# Patient Record
Sex: FEMALE | Race: WHITE | NOT HISPANIC OR LATINO | Employment: FULL TIME | URBAN - METROPOLITAN AREA
[De-identification: names, ages, dates, MRNs, and addresses within clinical notes are randomized per-mention and may not be internally consistent; named-entity substitution may affect disease eponyms.]

---

## 2017-02-07 ENCOUNTER — HOSPITAL ENCOUNTER (INPATIENT)
Facility: HOSPITAL | Age: 57
LOS: 5 days | Discharge: HOME | DRG: 871 | End: 2017-02-12
Attending: EMERGENCY MEDICINE | Admitting: INTERNAL MEDICINE

## 2017-02-07 DIAGNOSIS — J96.01 ACUTE RESPIRATORY FAILURE WITH HYPOXIA: ICD-10-CM

## 2017-02-07 DIAGNOSIS — J44.1 COPD EXACERBATION: Primary | ICD-10-CM

## 2017-02-07 DIAGNOSIS — J44.0 CHRONIC OBSTRUCTIVE PULMONARY DISEASE WITH ACUTE LOWER RESPIRATORY INFECTION: ICD-10-CM

## 2017-02-07 DIAGNOSIS — R73.02 GLUCOSE INTOLERANCE (IMPAIRED GLUCOSE TOLERANCE): ICD-10-CM

## 2017-02-07 DIAGNOSIS — A41.9 SEPSIS, DUE TO UNSPECIFIED ORGANISM: ICD-10-CM

## 2017-02-07 DIAGNOSIS — J20.9 ACUTE BRONCHITIS, UNSPECIFIED ORGANISM: ICD-10-CM

## 2017-02-07 DIAGNOSIS — J44.9 CHRONIC OBSTRUCTIVE PULMONARY DISEASE, UNSPECIFIED COPD TYPE: ICD-10-CM

## 2017-02-07 DIAGNOSIS — R73.9 HYPERGLYCEMIA, UNSPECIFIED: ICD-10-CM

## 2017-02-07 DIAGNOSIS — Z72.0 TOBACCO ABUSE: ICD-10-CM

## 2017-02-07 DIAGNOSIS — R09.02 HYPOXEMIA: ICD-10-CM

## 2017-02-07 DIAGNOSIS — R06.02 SHORTNESS OF BREATH: ICD-10-CM

## 2017-02-07 LAB
ALBUMIN SERPL BCP-MCNC: 3.3 G/DL
ALP SERPL-CCNC: 58 U/L
ALT SERPL W/O P-5'-P-CCNC: 18 U/L
ANION GAP SERPL CALC-SCNC: 7 MMOL/L
AST SERPL-CCNC: 16 U/L
BASOPHILS # BLD AUTO: 0.01 K/UL
BASOPHILS NFR BLD: 0.1 %
BILIRUB SERPL-MCNC: 0.3 MG/DL
BILIRUB UR QL STRIP: NEGATIVE
BNP SERPL-MCNC: 24 PG/ML
BUN SERPL-MCNC: 8 MG/DL
CALCIUM SERPL-MCNC: 8.9 MG/DL
CHLORIDE SERPL-SCNC: 100 MMOL/L
CLARITY UR REFRACT.AUTO: CLEAR
CO2 SERPL-SCNC: 34 MMOL/L
COLOR UR AUTO: YELLOW
CREAT SERPL-MCNC: 0.8 MG/DL
DIFFERENTIAL METHOD: ABNORMAL
EOSINOPHIL # BLD AUTO: 0 K/UL
EOSINOPHIL NFR BLD: 0.3 %
ERYTHROCYTE [DISTWIDTH] IN BLOOD BY AUTOMATED COUNT: 13.5 %
EST. GFR  (AFRICAN AMERICAN): >60 ML/MIN/1.73 M^2
EST. GFR  (NON AFRICAN AMERICAN): >60 ML/MIN/1.73 M^2
GLUCOSE SERPL-MCNC: 225 MG/DL
GLUCOSE UR QL STRIP: NEGATIVE
HCT VFR BLD AUTO: 48.9 %
HGB BLD-MCNC: 15.7 G/DL
HGB UR QL STRIP: NEGATIVE
KETONES UR QL STRIP: NEGATIVE
LACTATE SERPL-SCNC: 0.7 MMOL/L
LEUKOCYTE ESTERASE UR QL STRIP: NEGATIVE
LYMPHOCYTES # BLD AUTO: 0.9 K/UL
LYMPHOCYTES NFR BLD: 5.9 %
MCH RBC QN AUTO: 31.5 PG
MCHC RBC AUTO-ENTMCNC: 32.1 %
MCV RBC AUTO: 98 FL
MONOCYTES # BLD AUTO: 0.7 K/UL
MONOCYTES NFR BLD: 4.5 %
NEUTROPHILS # BLD AUTO: 13.3 K/UL
NEUTROPHILS NFR BLD: 88.7 %
NITRITE UR QL STRIP: NEGATIVE
PH UR STRIP: 6 [PH] (ref 5–8)
PLATELET # BLD AUTO: 194 K/UL
PMV BLD AUTO: 10.9 FL
POCT GLUCOSE: 107 MG/DL (ref 70–110)
POTASSIUM SERPL-SCNC: 3.8 MMOL/L
PROCALCITONIN SERPL IA-MCNC: <0.09 NG/ML
PROT SERPL-MCNC: 6.6 G/DL
PROT UR QL STRIP: ABNORMAL
RBC # BLD AUTO: 4.98 M/UL
SODIUM SERPL-SCNC: 141 MMOL/L
SP GR UR STRIP: 1.01 (ref 1–1.03)
TROPONIN I SERPL DL<=0.01 NG/ML-MCNC: <0.006 NG/ML
URN SPEC COLLECT METH UR: ABNORMAL
UROBILINOGEN UR STRIP-ACNC: NEGATIVE EU/DL
WBC # BLD AUTO: 14.99 K/UL

## 2017-02-07 PROCEDURE — 87040 BLOOD CULTURE FOR BACTERIA: CPT | Mod: 59

## 2017-02-07 PROCEDURE — 96374 THER/PROPH/DIAG INJ IV PUSH: CPT

## 2017-02-07 PROCEDURE — 81003 URINALYSIS AUTO W/O SCOPE: CPT

## 2017-02-07 PROCEDURE — 25000242 PHARM REV CODE 250 ALT 637 W/ HCPCS

## 2017-02-07 PROCEDURE — 11000001 HC ACUTE MED/SURG PRIVATE ROOM

## 2017-02-07 PROCEDURE — 83605 ASSAY OF LACTIC ACID: CPT

## 2017-02-07 PROCEDURE — 99291 CRITICAL CARE FIRST HOUR: CPT | Mod: ,,, | Performed by: EMERGENCY MEDICINE

## 2017-02-07 PROCEDURE — 96361 HYDRATE IV INFUSION ADD-ON: CPT

## 2017-02-07 PROCEDURE — 87633 RESP VIRUS 12-25 TARGETS: CPT

## 2017-02-07 PROCEDURE — 84145 PROCALCITONIN (PCT): CPT

## 2017-02-07 PROCEDURE — 84484 ASSAY OF TROPONIN QUANT: CPT

## 2017-02-07 PROCEDURE — 93005 ELECTROCARDIOGRAM TRACING: CPT

## 2017-02-07 PROCEDURE — 25000242 PHARM REV CODE 250 ALT 637 W/ HCPCS: Performed by: INTERNAL MEDICINE

## 2017-02-07 PROCEDURE — 94640 AIRWAY INHALATION TREATMENT: CPT

## 2017-02-07 PROCEDURE — 25000003 PHARM REV CODE 250: Performed by: EMERGENCY MEDICINE

## 2017-02-07 PROCEDURE — 25000003 PHARM REV CODE 250: Performed by: INTERNAL MEDICINE

## 2017-02-07 PROCEDURE — 93010 ELECTROCARDIOGRAM REPORT: CPT | Mod: ,,, | Performed by: INTERNAL MEDICINE

## 2017-02-07 PROCEDURE — 85025 COMPLETE CBC W/AUTO DIFF WBC: CPT

## 2017-02-07 PROCEDURE — 96372 THER/PROPH/DIAG INJ SC/IM: CPT

## 2017-02-07 PROCEDURE — 25000242 PHARM REV CODE 250 ALT 637 W/ HCPCS: Performed by: EMERGENCY MEDICINE

## 2017-02-07 PROCEDURE — 99284 EMERGENCY DEPT VISIT MOD MDM: CPT | Mod: 25

## 2017-02-07 PROCEDURE — 83880 ASSAY OF NATRIURETIC PEPTIDE: CPT

## 2017-02-07 PROCEDURE — 80053 COMPREHEN METABOLIC PANEL: CPT

## 2017-02-07 PROCEDURE — 63600175 PHARM REV CODE 636 W HCPCS: Performed by: INTERNAL MEDICINE

## 2017-02-07 PROCEDURE — 27000221 HC OXYGEN, UP TO 24 HOURS

## 2017-02-07 PROCEDURE — 82962 GLUCOSE BLOOD TEST: CPT

## 2017-02-07 RX ORDER — METHYLPREDNISOLONE SOD SUCC 125 MG
125 VIAL (EA) INJECTION ONCE
Status: COMPLETED | OUTPATIENT
Start: 2017-02-07 | End: 2017-02-07

## 2017-02-07 RX ORDER — IPRATROPIUM BROMIDE AND ALBUTEROL SULFATE 2.5; .5 MG/3ML; MG/3ML
3 SOLUTION RESPIRATORY (INHALATION) EVERY 4 HOURS
Status: DISCONTINUED | OUTPATIENT
Start: 2017-02-07 | End: 2017-02-12 | Stop reason: HOSPADM

## 2017-02-07 RX ORDER — IBUPROFEN 200 MG
1 TABLET ORAL DAILY
Status: DISCONTINUED | OUTPATIENT
Start: 2017-02-08 | End: 2017-02-12 | Stop reason: HOSPADM

## 2017-02-07 RX ORDER — ACETAMINOPHEN 325 MG/1
650 TABLET ORAL EVERY 6 HOURS PRN
Status: DISCONTINUED | OUTPATIENT
Start: 2017-02-07 | End: 2017-02-12 | Stop reason: HOSPADM

## 2017-02-07 RX ORDER — ASPIRIN 81 MG/1
81 TABLET ORAL DAILY
Status: DISCONTINUED | OUTPATIENT
Start: 2017-02-08 | End: 2017-02-12 | Stop reason: HOSPADM

## 2017-02-07 RX ORDER — RAMELTEON 8 MG/1
8 TABLET ORAL NIGHTLY PRN
Status: DISCONTINUED | OUTPATIENT
Start: 2017-02-07 | End: 2017-02-12 | Stop reason: HOSPADM

## 2017-02-07 RX ORDER — AZITHROMYCIN 250 MG/1
250 TABLET, FILM COATED ORAL DAILY
Status: COMPLETED | OUTPATIENT
Start: 2017-02-08 | End: 2017-02-11

## 2017-02-07 RX ORDER — ONDANSETRON 8 MG/1
8 TABLET, ORALLY DISINTEGRATING ORAL EVERY 8 HOURS PRN
Status: DISCONTINUED | OUTPATIENT
Start: 2017-02-07 | End: 2017-02-12 | Stop reason: HOSPADM

## 2017-02-07 RX ORDER — AZITHROMYCIN 250 MG/1
500 TABLET, FILM COATED ORAL DAILY
Status: DISCONTINUED | OUTPATIENT
Start: 2017-02-08 | End: 2017-02-07

## 2017-02-07 RX ORDER — PREDNISONE 20 MG/1
40 TABLET ORAL DAILY
Status: DISCONTINUED | OUTPATIENT
Start: 2017-02-07 | End: 2017-02-12 | Stop reason: HOSPADM

## 2017-02-07 RX ORDER — PANTOPRAZOLE SODIUM 40 MG/1
40 TABLET, DELAYED RELEASE ORAL DAILY
Status: DISCONTINUED | OUTPATIENT
Start: 2017-02-07 | End: 2017-02-12 | Stop reason: HOSPADM

## 2017-02-07 RX ORDER — AMOXICILLIN 250 MG
1 CAPSULE ORAL 2 TIMES DAILY
Status: DISCONTINUED | OUTPATIENT
Start: 2017-02-07 | End: 2017-02-12 | Stop reason: HOSPADM

## 2017-02-07 RX ORDER — ENOXAPARIN SODIUM 100 MG/ML
40 INJECTION SUBCUTANEOUS EVERY 24 HOURS
Status: DISCONTINUED | OUTPATIENT
Start: 2017-02-07 | End: 2017-02-12 | Stop reason: HOSPADM

## 2017-02-07 RX ORDER — POLYETHYLENE GLYCOL 3350 17 G/17G
17 POWDER, FOR SOLUTION ORAL 2 TIMES DAILY PRN
Status: DISCONTINUED | OUTPATIENT
Start: 2017-02-07 | End: 2017-02-12 | Stop reason: HOSPADM

## 2017-02-07 RX ORDER — POTASSIUM CHLORIDE 20 MEQ/15ML
40 SOLUTION ORAL
Status: DISCONTINUED | OUTPATIENT
Start: 2017-02-07 | End: 2017-02-08

## 2017-02-07 RX ORDER — TIOTROPIUM BROMIDE 18 UG/1
18 CAPSULE ORAL; RESPIRATORY (INHALATION) DAILY
Status: DISCONTINUED | OUTPATIENT
Start: 2017-02-08 | End: 2017-02-12 | Stop reason: HOSPADM

## 2017-02-07 RX ORDER — FLUTICASONE FUROATE AND VILANTEROL 100; 25 UG/1; UG/1
1 POWDER RESPIRATORY (INHALATION) DAILY
Status: DISCONTINUED | OUTPATIENT
Start: 2017-02-08 | End: 2017-02-12 | Stop reason: HOSPADM

## 2017-02-07 RX ORDER — IPRATROPIUM BROMIDE AND ALBUTEROL SULFATE 2.5; .5 MG/3ML; MG/3ML
3 SOLUTION RESPIRATORY (INHALATION)
Status: ACTIVE | OUTPATIENT
Start: 2017-02-07 | End: 2017-02-07

## 2017-02-07 RX ORDER — POTASSIUM CHLORIDE 20 MEQ/15ML
60 SOLUTION ORAL
Status: DISCONTINUED | OUTPATIENT
Start: 2017-02-07 | End: 2017-02-08

## 2017-02-07 RX ORDER — GLUCAGON 1 MG
1 KIT INJECTION
Status: DISCONTINUED | OUTPATIENT
Start: 2017-02-07 | End: 2017-02-08

## 2017-02-07 RX ORDER — IPRATROPIUM BROMIDE AND ALBUTEROL SULFATE 2.5; .5 MG/3ML; MG/3ML
SOLUTION RESPIRATORY (INHALATION)
Status: COMPLETED
Start: 2017-02-07 | End: 2017-02-07

## 2017-02-07 RX ORDER — IBUPROFEN 200 MG
16 TABLET ORAL
Status: DISCONTINUED | OUTPATIENT
Start: 2017-02-07 | End: 2017-02-08

## 2017-02-07 RX ORDER — OXYCODONE HYDROCHLORIDE 5 MG/1
5 TABLET ORAL EVERY 4 HOURS PRN
Status: DISCONTINUED | OUTPATIENT
Start: 2017-02-07 | End: 2017-02-12 | Stop reason: HOSPADM

## 2017-02-07 RX ORDER — FLUTICASONE PROPIONATE 50 MCG
1 SPRAY, SUSPENSION (ML) NASAL DAILY
Status: DISCONTINUED | OUTPATIENT
Start: 2017-02-08 | End: 2017-02-12 | Stop reason: HOSPADM

## 2017-02-07 RX ORDER — METHYLPREDNISOLONE SOD SUCC 125 MG
125 VIAL (EA) INJECTION
Status: DISCONTINUED | OUTPATIENT
Start: 2017-02-07 | End: 2017-02-07

## 2017-02-07 RX ORDER — INSULIN ASPART 100 [IU]/ML
0-5 INJECTION, SOLUTION INTRAVENOUS; SUBCUTANEOUS
Status: DISCONTINUED | OUTPATIENT
Start: 2017-02-07 | End: 2017-02-08

## 2017-02-07 RX ORDER — IPRATROPIUM BROMIDE AND ALBUTEROL SULFATE 2.5; .5 MG/3ML; MG/3ML
3 SOLUTION RESPIRATORY (INHALATION)
Status: COMPLETED | OUTPATIENT
Start: 2017-02-07 | End: 2017-02-07

## 2017-02-07 RX ORDER — AZITHROMYCIN 250 MG/1
500 TABLET, FILM COATED ORAL
Status: COMPLETED | OUTPATIENT
Start: 2017-02-07 | End: 2017-02-07

## 2017-02-07 RX ORDER — IBUPROFEN 200 MG
24 TABLET ORAL
Status: DISCONTINUED | OUTPATIENT
Start: 2017-02-07 | End: 2017-02-08

## 2017-02-07 RX ORDER — IBUPROFEN 200 MG
1 TABLET ORAL
Status: COMPLETED | OUTPATIENT
Start: 2017-02-07 | End: 2017-02-08

## 2017-02-07 RX ORDER — ONDANSETRON 2 MG/ML
4 INJECTION INTRAMUSCULAR; INTRAVENOUS EVERY 12 HOURS PRN
Status: DISCONTINUED | OUTPATIENT
Start: 2017-02-07 | End: 2017-02-07

## 2017-02-07 RX ADMIN — NICOTINE 1 PATCH: 21 PATCH, EXTENDED RELEASE TOPICAL at 08:02

## 2017-02-07 RX ADMIN — IPRATROPIUM BROMIDE AND ALBUTEROL SULFATE 3 ML: .5; 3 SOLUTION RESPIRATORY (INHALATION) at 03:02

## 2017-02-07 RX ADMIN — IPRATROPIUM BROMIDE AND ALBUTEROL SULFATE 3 ML: .5; 3 SOLUTION RESPIRATORY (INHALATION) at 05:02

## 2017-02-07 RX ADMIN — PANTOPRAZOLE SODIUM 40 MG: 40 TABLET, DELAYED RELEASE ORAL at 06:02

## 2017-02-07 RX ADMIN — METHYLPREDNISOLONE SODIUM SUCCINATE 125 MG: 125 INJECTION, POWDER, FOR SOLUTION INTRAMUSCULAR; INTRAVENOUS at 05:02

## 2017-02-07 RX ADMIN — IPRATROPIUM BROMIDE AND ALBUTEROL SULFATE 3 ML: .5; 3 SOLUTION RESPIRATORY (INHALATION) at 07:02

## 2017-02-07 RX ADMIN — ENOXAPARIN SODIUM 40 MG: 100 INJECTION SUBCUTANEOUS at 06:02

## 2017-02-07 RX ADMIN — AZITHROMYCIN 500 MG: 250 TABLET, FILM COATED ORAL at 04:02

## 2017-02-07 RX ADMIN — SODIUM CHLORIDE 1000 ML: 0.9 INJECTION, SOLUTION INTRAVENOUS at 05:02

## 2017-02-07 NOTE — IP AVS SNAPSHOT
[facility]  [address]  [facility] [address]  Phone: [phone]           Patient Discharge Instructions     Our goal is to set you up for success. This packet includes information on your condition, medications, and your home care. It will help you to care for yourself so you don't get sicker and need to go back to the hospital.     Please ask your nurse if you have any questions.        There are many details to remember when preparing to leave the hospital. Here is what you will need to do:    1. Take your medicine. If you are prescribed medications, review your Medication List in the following pages. You may have new medications to  at the pharmacy and others that you'll need to stop taking. Review the instructions for how and when to take your medications. Talk with your doctor or nurses if you are unsure of what to do.     2. Go to your follow-up appointments. Specific follow-up information is listed in the following pages. Your may be contacted by a transition nurse or clinical provider about future appointments. Be sure we have all of the phone numbers to reach you, if needed. Please contact your provider's office if you are unable to make an appointment.     3. Watch for warning signs. Your doctor or nurse will give you detailed warning signs to watch for and when to call for assistance. These instructions may also include educational information about your condition. If you experience any of warning signs to your health, call your doctor.               [facility]  Unless otherwise directed by your provider, please contact [facility], our nurse care line that is available for 24/7 assistance.     [phone] (toll-free)    Registered nurses in the [facility] provide clinical advisement, health education, appointment booking, and other advisory services.                    ** Verify the list of medication(s) below is accurate and up  to date. Carry this with you in case of emergency. If your medications have changed, please notify your healthcare provider.             Medication List      START taking these medications        Additional Info                      * albuterol 90 mcg/actuation inhaler   Quantity:  18 g   Refills:  0   Dose:  2 puff    Instructions:  Inhale 2 puffs into the lungs every 6 (six) hours as needed for Wheezing. Rescue     Begin Date    AM    Noon    PM    Bedtime       * albuterol 90 mcg/actuation inhaler   Quantity:  18 g   Refills:  11   Dose:  2 puff    Instructions:  Inhale 2 puffs into the lungs every 6 (six) hours as needed for Wheezing. Rescue     Begin Date    AM    Noon    PM    Bedtime       guaifenesin 600 mg 12 hr tablet   Commonly known as:  MUCINEX   Quantity:  20 tablet   Refills:  0   Dose:  1200 mg    Last time this was given:  600 mg on 2/12/2017  9:43 AM   Instructions:  Take 2 tablets (1,200 mg total) by mouth 2 (two) times daily.     Begin Date    AM    Noon    PM    Bedtime       predniSONE 20 MG tablet   Commonly known as:  DELTASONE   Quantity:  7 tablet   Refills:  0   Dose:  20 mg    Last time this was given:  40 mg on 2/12/2017  9:43 AM   Instructions:  Take 1 tablet (20 mg total) by mouth once daily.     Begin Date    AM    Noon    PM    Bedtime       * Notice:  This list has 2 medication(s) that are the same as other medications prescribed for you. Read the directions carefully, and ask your doctor or other care provider to review them with you.      CONTINUE taking these medications        Additional Info                      aspirin 81 MG EC tablet   Commonly known as:  ECOTRIN   Quantity:  30 tablet   Refills:  3   Dose:  81 mg    Last time this was given:  81 mg on 2/12/2017  9:42 AM   Instructions:  Take 1 tablet (81 mg total) by mouth once daily.     Begin Date    AM    Noon    PM    Bedtime       fluticasone 50 mcg/actuation nasal spray   Commonly known as:  FLONASE   Quantity:  1  Bottle   Refills:  1   Dose:  1 spray    Last time this was given:  1 spray on 2/12/2017  9:43 AM   Instructions:  1 spray by Each Nare route once daily.     Begin Date    AM    Noon    PM    Bedtime       fluticasone-salmeterol 250-50 mcg/dose 250-50 mcg/dose diskus inhaler   Commonly known as:  ADVAIR   Quantity:  60 each   Refills:  11   Dose:  1 puff    Instructions:  Inhale 1 puff into the lungs 2 (two) times daily.     Begin Date    AM    Noon    PM    Bedtime       nicotine 21 mg/24 hr   Commonly known as:  NICODERM CQ   Quantity:  21 patch   Refills:  0   Dose:  1 patch    Last time this was given:  1 patch on 2/12/2017  9:44 AM   Instructions:  Place 1 patch onto the skin once daily.     Begin Date    AM    Noon    PM    Bedtime       tiotropium 18 mcg inhalation capsule   Commonly known as:  SPIRIVA   Quantity:  30 capsule   Refills:  11   Dose:  18 mcg    Last time this was given:  18 mcg on 2/12/2017  9:43 AM   Instructions:  Inhale 1 capsule (18 mcg total) into the lungs once daily.     Begin Date    AM    Noon    PM    Bedtime            Where to Get Your Medications      These medications were sent to [facility] - [place], [place] - [address], [place] [address]     Phone:  [phone]     albuterol 90 mcg/actuation inhaler         You can get these medications from any pharmacy     Bring a paper prescription for each of these medications     albuterol 90 mcg/actuation inhaler    fluticasone-salmeterol 250-50 mcg/dose 250-50 mcg/dose diskus inhaler    predniSONE 20 MG tablet    tiotropium 18 mcg inhalation capsule       You don't need a prescription for these medications     guaifenesin 600 mg 12 hr tablet                  Please bring to all follow up appointments:    1. A copy of your discharge instructions.  2. All medicines you are currently taking in their original bottles.  3. Identification and insurance card.    Please arrive 15  minutes ahead of scheduled appointment time.    Please call 24 hours in advance if you must reschedule your appointment and/or time.        Follow-up Information     Follow up with [facility]. Schedule an appointment as soon as possible for a visit in 1 week.    Why:  To establish care, For discharge from hospital follow up        Follow up with [facility]. Schedule an appointment as soon as possible for a visit in 3 days.    Specialty:  [facility]    Why:  For discharge from hospital follow up    Contact information:    [address]  [facility] [address] [phone]    Additional information:    [facility]      Referrals     Future Orders    Ambulatory Referral to [facility]     Ambulatory referral to Internal Medicine     Scheduling Instructions:    Schedule appointment within 7 days of d/c and inform the patient to bring all of their inhalers to the appointment    Ambulatory referral to Outpatient Case Management     Questions:    Does the patient have a chronic or uncontrolled disease process?:  Yes    Does the patient have a new diagnosis of a catastrophic or life altering illness/treatment?:  No    Does the patient have any psycho-social issues that may affect their ability to adhere to treatment plan?:  Yes    Does patient have any behaviors or circumstances that may impede ability to adhere to treatment plan?:  No    Is patient at risk for admission/readmission?:  Yes    Ambulatory Referral to Pharmacy Assistance     Questions:    Medication(s) needing assistance with?:  respiratory meds - get from [facility]    Ambulatory Referral to Pulmonary Rehab     Ambulatory referral to Pulmonology     Ambulatory referral to Smoking Cessation Program         Discharge Instructions     Future Orders    Activity as tolerated     Call MD for:  difficulty breathing or increased cough     Call MD for:  persistent  "dizziness, light-headedness, or visual disturbances     Call MD for:  persistent nausea and vomiting or diarrhea     Call MD for:  temperature >100.4     Diet general     Comments:    LOW CARB    Questions:    Total calories:      Fat restriction, if any:      Protein restriction, if any:      Na restriction, if any:      Fluid restriction:      Additional restrictions:      OXYGEN FOR HOME USE     Questions:    Liter Flow:  2    Duration:  With activity    Qualifying SpO2:  82% with ambulation    Testing done at:  Exercise/Activity    Route:  nasal cannula    Portable mode:  continuous    Device:  home concentrator with portable unit    Length of need (in months):  12 mos    Patient condition with qualifying saturation:  COPD    Height:  5' 6" (1.676 m)    Weight:  65.8 kg (145 lb)    Does patient have medical equipment at home?:  none    Alternative treatment measures have been tried or considered and deemed clinically ineffective.:  Yes        Primary Diagnosis     Your primary diagnosis was:  Chronic Bronchitis      Admission Information     Date & Time Provider Department CSN    2/7/2017  2:58 PM Jade Chou MD [facility] [id number]      Care Providers     Provider Role Specialty Primary office phone    Jade Chou MD Attending Provider Hospitalist [phone]    Fahad Elliott MD Team Attending  Hospitalist [phone]    Jade Chou MD Team Attending  Hospitalist [phone]      Your Vitals Were     BP Pulse Temp Resp Height Weight    122/72 (BP Location: Right arm, Patient Position: Lying, BP Method: Automatic) 109 97.4 °F (36.3 °C) (Oral) 20 5' 6" (1.676 m) 65.8 kg (145 lb)    SpO2 BMI             99% 23.4 kg/m2         Recent Lab Values        2/8/2017                           4:00 AM           A1C 6.5 (H)           Comment for A1C at  4:00 AM on 2/8/2017:  According to ADA guidelines, hemoglobin A1C <7.0% represents  optimal control in non-pregnant diabetic " patients.  Different  metrics may apply to specific populations.   Standards of Medical Care in Diabetes - 2016.  For the purpose of screening for the presence of diabetes:  <5.7%     Consistent with the absence of diabetes  5.7-6.4%  Consistent with increasing risk for diabetes   (prediabetes)  >or=6.5%  Consistent with diabetes  Currently no consensus exists for use of hemoglobin A1C  for diagnosis of diabetes for children.        Pending Labs     Order Current Status    Respiratory Viral Panel by PCR In process    Blood Culture #1 **CANNOT BE ORDERED STAT** Preliminary result    Blood Culture #2 **CANNOT BE ORDERED STAT** Preliminary result      Allergies as of 2/12/2017        Reactions    Avelox [Moxifloxacin] Itching, Rash    IV      Advance Directives     An advance directive is a document which, in the event you are no longer able to make decisions for yourself, tells your healthcare team what kind of treatment you do or do not want to receive, or who you would like to make those decisions for you.  If you do not currently have an advance directive, [facility] encourages you to create one.  For more information call:  [phone] ([phone]), [phone] ([phone]),  or log on to [email or web address].        Smoking Cessation     If you would like to quit smoking:   You may be eligible for free services if you are a [place] resident and started smoking cigarettes before September 1, 1988.  Call the Smoking Cessation Trust (SCT) toll free at (193) 004-9830 or (206) 203-1644.   Call 1-800-QUIT-NOW if you do not meet the above criteria.            Language Assistance Services     ATTENTION: Language assistance services are available, free of charge. Please call [phone].      ATENCIÓN: Si habla español, tiene a puente disposición servicios gratuitos de asistencia lingüística. Llame al [phone].     CHÚ Ý: N?u b?n nói Ti?ng Vi?t, có các d?ch v? h? tr? ngôn ng? mi?n phí dành cho b?n. G?i s?  [phone].        [facility] Sign-Up     Activating your [facility] account is as easy as 1-2-3!     1) Visit [email or web address], select Sign Up Now, enter this activation code and your date of birth, then select Next.  [email or web address]  Expires: 3/26/2017 10:32 AM      2) Create a username and password to use when you visit [facility] in the future and select a security question in case you lose your password and select Next.    3) Enter your e-mail address and click Sign Up!    Additional Information  If you have questions, please e-mail [email or web address] or call [phone] to talk to our [facility] staff. Remember, [facility] is NOT to be used for urgent needs. For medical emergencies, dial 911.          [facility] complies with applicable Federal civil rights laws and does not discriminate on the basis of race, color, national origin, age, disability, or sex.

## 2017-02-07 NOTE — ED NOTES
Pt sitting up in stretcher on 3L oxygen via nasal cannula, tolerating well. Pt states breathing is easier since receiving breathing treatment. Pt aware a urine specimen is needed. NAD noted. Will continue to monitor

## 2017-02-07 NOTE — ED PROVIDER NOTES
Encounter Date: 2/7/2017       History     Chief Complaint   Patient presents with    Shortness of Breath     Review of patient's allergies indicates:   Allergen Reactions    Avelox [moxifloxacin] Itching and Rash     IV     HPI   Ms. Canavan is 57 YO female h/o COPD presenting with 2 weeks worsening cough, congestion, shortness of breath. She states symptoms worsening this morning. Does not use rescue inhalers, no home oxygen. She is having subjective fevers, chills. Smokes 5-6 cigarettes/day. Denies chest pain, +wheezing. No recent COPD exacerbations in years or steroid use. No other complaints    History reviewed. No pertinent past medical history.  No past medical history pertinent negatives.  History reviewed. No pertinent past surgical history.  History reviewed. No pertinent family history.  Social History   Substance Use Topics    Smoking status: Heavy Tobacco Smoker     Packs/day: 1.00     Types: Cigarettes    Smokeless tobacco: None    Alcohol use 0.5 oz/week     1 drink(s) per week     Review of Systems   Constitutional: Positive for chills, fatigue and fever.   HENT: Negative for sore throat.    Respiratory: Positive for cough, chest tightness, shortness of breath and wheezing.    Cardiovascular: Negative for chest pain.   Gastrointestinal: Negative for nausea.   Genitourinary: Negative for dysuria.   Musculoskeletal: Negative for back pain.   Skin: Negative for rash.   Neurological: Negative for weakness.   Hematological: Does not bruise/bleed easily.   All other systems reviewed and are negative.      Physical Exam   Initial Vitals   BP Pulse Resp Temp SpO2   02/07/17 1444 02/07/17 1444 02/07/17 1444 02/07/17 1444 02/07/17 1444   120/74 144 30 98.9 °F (37.2 °C) 69 %     Physical Exam    Nursing note and vitals reviewed.  Constitutional: She appears well-developed and well-nourished. She is not diaphoretic. No distress.   HENT:   Head: Normocephalic and atraumatic.   Eyes: EOM are normal. Pupils  are equal, round, and reactive to light.   Neck: Normal range of motion. Neck supple.   Cardiovascular: Normal rate, regular rhythm and normal heart sounds. Exam reveals no gallop and no friction rub.    No murmur heard.  Pulmonary/Chest: Breath sounds normal. She has no wheezes. She has no rhonchi. She has no rales.   RR 20s  Decreased breath sounds throughout   Abdominal: Soft. Bowel sounds are normal. She exhibits no distension. There is no tenderness. There is no rebound and no guarding.   Musculoskeletal: Normal range of motion. She exhibits no edema or tenderness.   Neurological: She is alert and oriented to person, place, and time. She has normal strength. No cranial nerve deficit or sensory deficit.   Skin: Skin is warm and dry.         ED Course   Procedures  Labs Reviewed   CBC W/ AUTO DIFFERENTIAL - Abnormal; Notable for the following:        Result Value    WBC 14.99 (*)     Hematocrit 48.9 (*)     MCH 31.5 (*)     Gran # 13.3 (*)     Lymph # 0.9 (*)     Gran% 88.7 (*)     Lymph% 5.9 (*)     All other components within normal limits   COMPREHENSIVE METABOLIC PANEL - Abnormal; Notable for the following:     CO2 34 (*)     Glucose 225 (*)     Albumin 3.3 (*)     Anion Gap 7 (*)     All other components within normal limits   CULTURE, BLOOD   CULTURE, BLOOD   CULTURE, RESPIRATORY   RESPIRATORY VIRAL PANEL BY PCR   TROPONIN I   B-TYPE NATRIURETIC PEPTIDE   LACTIC ACID, PLASMA   PROCALCITONIN   PROCALCITONIN    Narrative:     ADDING ON PROCALCITONIN PER GILDARDO GUSTAFSON MD 18:10  02/07/2017       POCT GLUCOSE   POCT GLUCOSE MONITORING CONTINUOUS     EKG Readings: (Independently Interpreted)   Initial Reading: No STEMI. Rhythm: Sinus Tachycardia. Heart Rate: 129. Ectopy: No Ectopy. Conduction: Normal. ST Segments: Normal ST Segments. T Waves: Normal. Clinical Impression: Normal Sinus Rhythm                APC / Resident Notes:   H/o well controlled COPD, active smoker with SOB/cough/wheezing x 2 weeks,  worse today.  Severe hypoxia and tachycardia on arrival, emergently evaluated on arrival.    Ddx included COPD exacerbation, URI, pneumonia, pleural effusion, pneumothorax, symptomatic anemia. Less likely ACS. Pt improving with duonebs. Will check labs, CXR. Dispo pending.  Trinh Gonzalez, [address]  [facility] Emergency Medicine  4:04 PM     Work up with leukocytosis 14.99, CO2 34. Lactate wnl, CXR no e/o infiltrate. She is feeling better after nebs- now on 3L NC sating 94%. When pt stands off oxygen, sats drop to 85% and she feels lightheaded. Will give another round of duonebs, IVF and steroids. Will admit for COPD exacerbation, hypoxia. Gave azithromycin to cover atypical pneumonia in setting of COPD with leukocytosis.   Trinh Gonzalez, [address] Emergency Medicine  5:00 PM          Attending Attestation:   Physician Attestation Statement for Resident:  As the supervising MD   Physician Attestation Statement: I have personally seen and examined this patient.   I agree with the above history. -:   As the supervising MD I agree with the above PE.    As the supervising MD I agree with the above treatment, course, plan, and disposition.   -:     H/o well controlled COPD, active smoker with SOB/cough/wheezing x 2 weeks, worse today. Severe hypoxia and tachycardia on arrival, improved with NRB, no sign hypercapnea. Tx as COPD exacerbation with improvement, no definite PNA so covered for bronchitis and admitted to IM.            Attending Critical Care:   Critical Care Times:   Direct Patient Care (initial evaluation, reassessments, and time considering the case)................................................................19 minutes.   Additional History from reviewing old medical records or taking additional history from the family, EMS, PCP, etc.......................3 minutes.   Ordering, Reviewing, and Interpreting Diagnostic  Studies...............................................................................................................5 minutes.   Documentation..................................................................................................................................................................................5 minutes.   Consultation with other Physicians. .................................................................................................................................................4 minutes.   ==============================================================  · Total Critical Care Time - exclusive of procedural time: 36 minutes.  ==============================================================  Critical Care Condition: life-threatening   Critical Care Comments:     Severe COPD exacerbation with O2 sat 60s and tachycardia on arrival, emergent evaluation and stabilization               ED Course     Clinical Impression:   The primary encounter diagnosis was Chronic obstructive pulmonary disease, unspecified COPD type. Diagnoses of Shortness of breath and Hypoxemia were also pertinent to this visit.          Trinh Gonzalez MD  Resident  02/07/17 1726       Jose D Guzman MD  02/08/17 0008

## 2017-02-07 NOTE — ED TRIAGE NOTES
Patient received with complaint of flu type symptoms two weeks ago.  Ongoing weakness, cough, thick yellow/white sputum in mornings, intermittent chills though has not taken temperature.     No LDA's in place on arrival to department.    Family not present.    Pain:  Denies pain.      Psychosocial:  Patient is calm and cooperative.  Patients insight and judgement are appropriate to situation.  Appears clean, well maintained, with clothing appropriate to environment.  No evidence of delusions, hallucinations, or psychosis.    Neuro:  Eyes open spontaneously.  Awake, alert, oriented x 4.  Speech clear and appropriate.  Tolerating saliva secretions well.  Able to follow commands, demonstrating ability to actively and appropriately communicate within context of current conversation.  Symmetrical facial muscles.  Moving all extremities well with no noted weakness.  Adequate muscle tone present.    Movement is purposeful.  No evidence of impaired sensation.        Airway:  Bilateral chest rise and fall.  RR regular and non-labored.  Speaking full sentences without SOB on 6 LPM NC.  No crepitus or subcutaneous emphysema noted on palpation.      Circulatory:  Skin warm, dry, and pink.  Apical and radial pulses strong and regular.  Capillary refill/skin blanching less than 3 seconds to distal of 4 extremities.    Abdomen:  Abdomen soft and non-distended.  LBM yesterday loose x 2 weeks, dark brown, twice daily.    Urinary:  Patient reports routine urination without pain, frequency, or urgency.  Voids independently.  Reports urine appears antonio/yellow in color.    Extremities:  No redness, heat, swelling, deformity, or pain.    Skin:  Intact with no bruising/discolorations noted.

## 2017-02-07 NOTE — H&P
"[facility] Hospital Medicine  History & Physical    Patient Name: Ann Canavan  MRN: [id number]  Admission Date: 2/7/2017  Attending Physician: Jade Chou MD  Primary Care Provider: [facility] Medicine Team: Networked reference to record PCT  W Desmond Cooley MD     Patient information was obtained from patient, past medical records and ER records.     Subjective:     Principal Problem: COPD EXACERBATION    Chief Complaint:    Chief Complaint   Patient presents with    Shortness of Breath        HPI: Ms. Canavan is a 57 yo lady with a past medical history of COPD due to smoking for over 40 years.  She has been recently trying to wean herself down to just shy of 1/2 PPD of cigarettes over the past several months.  She has been admitted in the past with COPD exacerbations and quite well remembers how they felt.  She now comes to the ED due to gradually progressive shortness of breath and dyspnea on exertion worsening over the past two weeks.  She states that originally she had some friends come visit from [place] who were ill with a respiratory infection on arrival.  Shortly afterwards she, "felt icky, like I had the flu."  She developed malaise, diarrhea and some dry hacking cough.  She never coughed up any purulent sputum.  As the days went by, she gradually developed the same feeling she had back when she had to be admitted for her last COPD exacerbation.  She tried to tough it out with her home medications, but finally her friends confronted her and mentioned that she looked just like she did the last time she had to be admitted.  She finally agreed to seek help.      Past Medical History   Diagnosis Date    COPD (chronic obstructive pulmonary disease)        History reviewed. No pertinent past surgical history.    Review of patient's allergies indicates:   Allergen Reactions    Avelox [moxifloxacin] Itching and Rash     IV       No current facility-administered " medications on file prior to encounter.      Current Outpatient Prescriptions on File Prior to Encounter   Medication Sig    albuterol 2.5 mg /3 mL (0.083 %) Nebu 3 mL, albuterol 5 mg/mL Nebu 0.5 mL Inhale into the lungs.    aspirin (ECOTRIN) 81 MG EC tablet Take 1 tablet (81 mg total) by mouth once daily.    doxycycline (VIBRA-TABS) 100 MG tablet Take 1 tablet (100 mg total) by mouth every 12 (twelve) hours.    fluticasone (FLONASE) 50 mcg/actuation nasal spray 1 spray by Each Nare route once daily.    fluticasone-salmeterol 250-50 mcg/dose (ADVAIR) 250-50 mcg/dose diskus inhaler Inhale 1 puff into the lungs 2 (two) times daily.    nicotine (NICODERM CQ) 21 mg/24 hr Place 1 patch onto the skin once daily.    tiotropium (SPIRIVA) 18 mcg inhalation capsule Inhale 1 capsule (18 mcg total) into the lungs once daily.     Family History     None        Social History Main Topics    Smoking status: Heavy Tobacco Smoker     Packs/day: 1.00     Types: Cigarettes    Smokeless tobacco: Not on file    Alcohol use 0.5 oz/week     1 drink(s) per week    Drug use: No    Sexual activity: Not on file     Review of Systems   Constitutional: Positive for activity change, appetite change and diaphoresis. Negative for chills, fatigue and fever.   HENT: Positive for sore throat. Negative for congestion.    Respiratory: Positive for cough, shortness of breath and wheezing.    Cardiovascular: Negative for chest pain.   Gastrointestinal: Positive for diarrhea and nausea. Negative for abdominal pain, constipation and vomiting.   Neurological: Negative for dizziness and weakness.   Psychiatric/Behavioral: Negative for confusion. The patient is not nervous/anxious.      Objective:     Vital Signs (Most Recent):  Temp: 98.8 °F (37.1 °C) (02/07/17 2340)  Pulse: 110 (02/07/17 2340)  Resp: 18 (02/07/17 2340)  BP: 123/71 (02/07/17 2340)  SpO2: (!) 93 % (02/07/17 2340) Vital Signs (24h Range):  Temp:  [98.8 °F (37.1 °C)-98.9 °F (37.2  °C)] 98.8 °F (37.1 °C)  Pulse:  [104-144] 110  Resp:  [15-30] 18  SpO2:  [69 %-99 %] 93 %  BP: (120-146)/(64-92) 123/71     Weight: 65.8 kg (145 lb)  Body mass index is 23.4 kg/(m^2).    Physical Exam   Constitutional: She is oriented to person, place, and time. She appears well-developed and well-nourished. She appears listless.  Non-toxic appearance. She has a sickly appearance. She does not appear ill. No distress.   HENT:   Head: Normocephalic and atraumatic.   Mouth/Throat: Mucous membranes are dry. Abnormal dentition. No oropharyngeal exudate.   Eyes: EOM are normal. Pupils are equal, round, and reactive to light. Right eye exhibits no discharge. Left eye exhibits no discharge. Right conjunctiva is injected. Left conjunctiva is injected. No scleral icterus.   Neck: Normal range of motion. Neck supple. No JVD present. No tracheal deviation present. No thyromegaly present.   Cardiovascular: Regular rhythm and intact distal pulses.  Tachycardia present.  Exam reveals no gallop and no friction rub.    Murmur heard.   Systolic murmur is present with a grade of 1/6   Pulmonary/Chest: Effort normal. No stridor. No respiratory distress. She has decreased breath sounds. She has wheezes. She has no rhonchi. She has no rales. She exhibits no tenderness.   Very tight with mostly end expiratory wheezing with squeaks   Abdominal: Soft. Bowel sounds are normal. She exhibits no distension and no mass. There is no tenderness. There is no rebound and no guarding.   Genitourinary:   Genitourinary Comments: No reeves in place   Musculoskeletal: Normal range of motion. She exhibits no edema or tenderness.   Lymphadenopathy:     She has no cervical adenopathy.   No peripheral edema   Neurological: She is oriented to person, place, and time. She appears listless. She is not disoriented. She displays no tremor and normal reflexes. No cranial nerve deficit or sensory deficit. She exhibits normal muscle tone. Coordination normal. GCS  eye subscore is 4. GCS verbal subscore is 5. GCS motor subscore is 6.   Skin: Skin is warm and dry. No rash noted. She is not diaphoretic. No erythema. No pallor.   Psychiatric: She has a normal mood and affect. Her behavior is normal. Judgment and thought content normal. She is not actively hallucinating. Cognition and memory are normal. She is attentive.   Nursing note and vitals reviewed.      Significant Labs:   Recent Results (from the past 24 hour(s))   Troponin I    Collection Time: 02/07/17  3:17 PM   Result Value Ref Range    Troponin I <0.006 0.000 - 0.026 ng/mL   CBC auto differential    Collection Time: 02/07/17  3:17 PM   Result Value Ref Range    WBC 14.99 (H) 3.90 - 12.70 K/uL    RBC 4.98 4.00 - 5.40 M/uL    Hemoglobin 15.7 12.0 - 16.0 g/dL    Hematocrit 48.9 (H) 37.0 - 48.5 %    MCV 98 82 - 98 fL    MCH 31.5 (H) 27.0 - 31.0 pg    MCHC 32.1 32.0 - 36.0 %    RDW 13.5 11.5 - 14.5 %    Platelets 194 150 - 350 K/uL    MPV 10.9 9.2 - 12.9 fL    Gran # 13.3 (H) 1.8 - 7.7 K/uL    Lymph # 0.9 (L) 1.0 - 4.8 K/uL    Mono # 0.7 0.3 - 1.0 K/uL    Eos # 0.0 0.0 - 0.5 K/uL    Baso # 0.01 0.00 - 0.20 K/uL    Gran% 88.7 (H) 38.0 - 73.0 %    Lymph% 5.9 (L) 18.0 - 48.0 %    Mono% 4.5 4.0 - 15.0 %    Eosinophil% 0.3 0.0 - 8.0 %    Basophil% 0.1 0.0 - 1.9 %    Differential Method Automated    Comprehensive metabolic panel    Collection Time: 02/07/17  3:17 PM   Result Value Ref Range    Sodium 141 136 - 145 mmol/L    Potassium 3.8 3.5 - 5.1 mmol/L    Chloride 100 95 - 110 mmol/L    CO2 34 (H) 23 - 29 mmol/L    Glucose 225 (H) 70 - 110 mg/dL    BUN, Bld 8 6 - 20 mg/dL    Creatinine 0.8 0.5 - 1.4 mg/dL    Calcium 8.9 8.7 - 10.5 mg/dL    Total Protein 6.6 6.0 - 8.4 g/dL    Albumin 3.3 (L) 3.5 - 5.2 g/dL    Total Bilirubin 0.3 0.1 - 1.0 mg/dL    Alkaline Phosphatase 58 55 - 135 U/L    AST 16 10 - 40 U/L    ALT 18 10 - 44 U/L    Anion Gap 7 (L) 8 - 16 mmol/L    eGFR if African American >60.0 >60 mL/min/1.73 m^2    eGFR if non  African American >60.0 >60 mL/min/1.73 m^2   Brain natriuretic peptide    Collection Time: 17  3:17 PM   Result Value Ref Range    BNP 24 0 - 99 pg/mL   Lactic acid, plasma    Collection Time: 17  3:17 PM   Result Value Ref Range    Lactate (Lactic Acid) 0.7 0.5 - 2.2 mmol/L   Procalcitonin    Collection Time: 17  3:17 PM   Result Value Ref Range    Procalcitonin <0.09 <0.25 ng/mL   POCT glucose    Collection Time: 17  6:48 PM   Result Value Ref Range    POCT Glucose 107 70 - 110 mg/dL   Urinalysis    Collection Time: 17 10:08 PM   Result Value Ref Range    Specimen UA Urine, Clean Catch     Color, UA Yellow Yellow, Straw, Britni    Appearance, UA Clear Clear    pH, UA 6.0 5.0 - 8.0    Specific Gravity, UA 1.010 1.005 - 1.030    Protein, UA Trace (A) Negative    Glucose, UA Negative Negative    Ketones, UA Negative Negative    Bilirubin (UA) Negative Negative    Occult Blood UA Negative Negative    Nitrite, UA Negative Negative    Urobilinogen, UA Negative <2.0 EU/dL    Leukocytes, UA Negative Negative         Significant Imaging:   X-Ray Chest 1 View 2017 None Specified          RESULTS:  Chest x-ray AP portable demonstrates that the lungs are very well-expanded, possibly related to COPD. No acute infiltrates are seen. No pleural effusion or pneumothorax is noted.  IMPRESSION:         Stable radiographic appearance compared with 2014        Electronically signed by: JANETTE LUU MD  Date:     17  Time:    16:07        Signed By: Janette Luu MD on 2017 4:07 PM                   EK2017 15:11:15 [facility]  Sinus tachycardia 129  Biatrial enlargement  Right axis deviation  Cannot rule out Anterior infarct ,age undetermined  Abnormal ECG  When compared with ECG of 02-MAY-2014 00:42,  No significant change was found    Assessment/Plan:     COPD EXACERBATION WITH ACUTE BRONCHITIS  -COPD best practices protocol  -Duonebs, Breo,  Spiriva  -Solumedrol 125mg iv x 1 in ED then Prednisone 40mg po qday to start 6 hours later  -Mucinex, tessalon pearles  -Consider repeat TTE in am with CFD to assess PAP  -Tele    ACUTE HYPOXIC RESPIRATORY FAILURE  -ABG was attempted twice with inability to get by respiratory  -O2 titration as per COPD pathway  -Appears comfortable, talking in complete sentences with no accessory muscle use or pursed lip breathing on my exam    HYPERGLYCEMIA  -SSI protocol while on steroids  -IVF's    VTE Risk Mitigation         Ordered     enoxaparin injection 40 mg  Daily     Route:  Subcutaneous        02/07/17 1736     Medium Risk of VTE  Once      02/07/17 1736     Place sequential compression device  Until discontinued      02/07/17 1736     Place HOLLIE hose  Until discontinued      02/07/17 1736        W Desmond Cooley MD  Department of Hospital Medicine   [facility]

## 2017-02-08 PROBLEM — R73.9 HYPERGLYCEMIA, UNSPECIFIED: Status: ACTIVE | Noted: 2017-02-08

## 2017-02-08 LAB
ANION GAP SERPL CALC-SCNC: 6 MMOL/L
BASOPHILS # BLD AUTO: 0.01 K/UL
BASOPHILS NFR BLD: 0.1 %
BUN SERPL-MCNC: 7 MG/DL
CALCIUM SERPL-MCNC: 8.4 MG/DL
CHLORIDE SERPL-SCNC: 103 MMOL/L
CO2 SERPL-SCNC: 35 MMOL/L
CREAT SERPL-MCNC: 0.7 MG/DL
DIFFERENTIAL METHOD: ABNORMAL
EOSINOPHIL # BLD AUTO: 0 K/UL
EOSINOPHIL NFR BLD: 0 %
ERYTHROCYTE [DISTWIDTH] IN BLOOD BY AUTOMATED COUNT: 13.4 %
EST. GFR  (AFRICAN AMERICAN): >60 ML/MIN/1.73 M^2
EST. GFR  (NON AFRICAN AMERICAN): >60 ML/MIN/1.73 M^2
GLUCOSE SERPL-MCNC: 209 MG/DL
HCT VFR BLD AUTO: 44.1 %
HGB BLD-MCNC: 14.1 G/DL
LYMPHOCYTES # BLD AUTO: 0.7 K/UL
LYMPHOCYTES NFR BLD: 3.9 %
MAGNESIUM SERPL-MCNC: 1.7 MG/DL
MCH RBC QN AUTO: 31.5 PG
MCHC RBC AUTO-ENTMCNC: 32 %
MCV RBC AUTO: 98 FL
MONOCYTES # BLD AUTO: 0.5 K/UL
MONOCYTES NFR BLD: 2.7 %
NEUTROPHILS # BLD AUTO: 17.7 K/UL
NEUTROPHILS NFR BLD: 93 %
PHOSPHATE SERPL-MCNC: 4 MG/DL
PLATELET # BLD AUTO: 179 K/UL
PMV BLD AUTO: 11 FL
POCT GLUCOSE: 108 MG/DL (ref 70–110)
POCT GLUCOSE: 134 MG/DL (ref 70–110)
POCT GLUCOSE: 180 MG/DL (ref 70–110)
POCT GLUCOSE: 263 MG/DL (ref 70–110)
POTASSIUM SERPL-SCNC: 4.7 MMOL/L
RBC # BLD AUTO: 4.48 M/UL
SODIUM SERPL-SCNC: 144 MMOL/L
WBC # BLD AUTO: 18.99 K/UL

## 2017-02-08 PROCEDURE — 97161 PT EVAL LOW COMPLEX 20 MIN: CPT

## 2017-02-08 PROCEDURE — 97530 THERAPEUTIC ACTIVITIES: CPT

## 2017-02-08 PROCEDURE — 94640 AIRWAY INHALATION TREATMENT: CPT

## 2017-02-08 PROCEDURE — 99232 SBSQ HOSP IP/OBS MODERATE 35: CPT | Mod: ,,, | Performed by: INTERNAL MEDICINE

## 2017-02-08 PROCEDURE — 63600175 PHARM REV CODE 636 W HCPCS: Performed by: INTERNAL MEDICINE

## 2017-02-08 PROCEDURE — 27000221 HC OXYGEN, UP TO 24 HOURS

## 2017-02-08 PROCEDURE — 36415 COLL VENOUS BLD VENIPUNCTURE: CPT

## 2017-02-08 PROCEDURE — 99406 BEHAV CHNG SMOKING 3-10 MIN: CPT

## 2017-02-08 PROCEDURE — 94761 N-INVAS EAR/PLS OXIMETRY MLT: CPT

## 2017-02-08 PROCEDURE — 80048 BASIC METABOLIC PNL TOTAL CA: CPT

## 2017-02-08 PROCEDURE — 25000003 PHARM REV CODE 250: Performed by: INTERNAL MEDICINE

## 2017-02-08 PROCEDURE — 11000001 HC ACUTE MED/SURG PRIVATE ROOM

## 2017-02-08 PROCEDURE — 97165 OT EVAL LOW COMPLEX 30 MIN: CPT

## 2017-02-08 PROCEDURE — 97535 SELF CARE MNGMENT TRAINING: CPT

## 2017-02-08 PROCEDURE — 85025 COMPLETE CBC W/AUTO DIFF WBC: CPT

## 2017-02-08 PROCEDURE — 25000242 PHARM REV CODE 250 ALT 637 W/ HCPCS: Performed by: INTERNAL MEDICINE

## 2017-02-08 PROCEDURE — 84100 ASSAY OF PHOSPHORUS: CPT

## 2017-02-08 PROCEDURE — 83735 ASSAY OF MAGNESIUM: CPT

## 2017-02-08 PROCEDURE — 83036 HEMOGLOBIN GLYCOSYLATED A1C: CPT

## 2017-02-08 RX ORDER — GUAIFENESIN/DEXTROMETHORPHAN 100-10MG/5
5 SYRUP ORAL EVERY 4 HOURS PRN
Status: DISCONTINUED | OUTPATIENT
Start: 2017-02-08 | End: 2017-02-12 | Stop reason: HOSPADM

## 2017-02-08 RX ORDER — INSULIN ASPART 100 [IU]/ML
1-10 INJECTION, SOLUTION INTRAVENOUS; SUBCUTANEOUS
Status: DISCONTINUED | OUTPATIENT
Start: 2017-02-08 | End: 2017-02-12 | Stop reason: HOSPADM

## 2017-02-08 RX ORDER — GUAIFENESIN 600 MG/1
600 TABLET, EXTENDED RELEASE ORAL 2 TIMES DAILY
Status: DISCONTINUED | OUTPATIENT
Start: 2017-02-08 | End: 2017-02-12 | Stop reason: HOSPADM

## 2017-02-08 RX ORDER — PROMETHAZINE HYDROCHLORIDE 12.5 MG/1
12.5 TABLET ORAL EVERY 6 HOURS PRN
Status: DISCONTINUED | OUTPATIENT
Start: 2017-02-08 | End: 2017-02-12 | Stop reason: HOSPADM

## 2017-02-08 RX ORDER — GLUCAGON 1 MG
1 KIT INJECTION
Status: DISCONTINUED | OUTPATIENT
Start: 2017-02-08 | End: 2017-02-12 | Stop reason: HOSPADM

## 2017-02-08 RX ORDER — SODIUM CHLORIDE AND POTASSIUM CHLORIDE 150; 900 MG/100ML; MG/100ML
INJECTION, SOLUTION INTRAVENOUS CONTINUOUS
Status: DISCONTINUED | OUTPATIENT
Start: 2017-02-08 | End: 2017-02-08

## 2017-02-08 RX ORDER — BENZONATATE 100 MG/1
100 CAPSULE ORAL 3 TIMES DAILY PRN
Status: DISCONTINUED | OUTPATIENT
Start: 2017-02-08 | End: 2017-02-12 | Stop reason: HOSPADM

## 2017-02-08 RX ORDER — IBUPROFEN 200 MG
24 TABLET ORAL
Status: DISCONTINUED | OUTPATIENT
Start: 2017-02-08 | End: 2017-02-12 | Stop reason: HOSPADM

## 2017-02-08 RX ORDER — INSULIN ASPART 100 [IU]/ML
3 INJECTION, SOLUTION INTRAVENOUS; SUBCUTANEOUS
Status: DISCONTINUED | OUTPATIENT
Start: 2017-02-08 | End: 2017-02-09

## 2017-02-08 RX ORDER — IBUPROFEN 200 MG
16 TABLET ORAL
Status: DISCONTINUED | OUTPATIENT
Start: 2017-02-08 | End: 2017-02-12 | Stop reason: HOSPADM

## 2017-02-08 RX ADMIN — PREDNISONE 40 MG: 20 TABLET ORAL at 09:02

## 2017-02-08 RX ADMIN — FLUTICASONE FUROATE AND VILANTEROL TRIFENATATE 1 PUFF: 100; 25 POWDER RESPIRATORY (INHALATION) at 09:02

## 2017-02-08 RX ADMIN — STANDARDIZED SENNA CONCENTRATE AND DOCUSATE SODIUM 1 TABLET: 8.6; 5 TABLET, FILM COATED ORAL at 09:02

## 2017-02-08 RX ADMIN — IPRATROPIUM BROMIDE AND ALBUTEROL SULFATE 3 ML: .5; 3 SOLUTION RESPIRATORY (INHALATION) at 12:02

## 2017-02-08 RX ADMIN — PANTOPRAZOLE SODIUM 40 MG: 40 TABLET, DELAYED RELEASE ORAL at 09:02

## 2017-02-08 RX ADMIN — IPRATROPIUM BROMIDE AND ALBUTEROL SULFATE 3 ML: .5; 3 SOLUTION RESPIRATORY (INHALATION) at 09:02

## 2017-02-08 RX ADMIN — INSULIN ASPART 6 UNITS: 100 INJECTION, SOLUTION INTRAVENOUS; SUBCUTANEOUS at 06:02

## 2017-02-08 RX ADMIN — IPRATROPIUM BROMIDE AND ALBUTEROL SULFATE 3 ML: .5; 3 SOLUTION RESPIRATORY (INHALATION) at 08:02

## 2017-02-08 RX ADMIN — AZITHROMYCIN 250 MG: 250 TABLET, FILM COATED ORAL at 09:02

## 2017-02-08 RX ADMIN — ENOXAPARIN SODIUM 40 MG: 100 INJECTION SUBCUTANEOUS at 11:02

## 2017-02-08 RX ADMIN — FLUTICASONE PROPIONATE 1 SPRAY: 50 SPRAY, METERED NASAL at 09:02

## 2017-02-08 RX ADMIN — PANTOPRAZOLE SODIUM 600 MG: 40 TABLET, DELAYED RELEASE ORAL at 09:02

## 2017-02-08 RX ADMIN — SODIUM CHLORIDE AND POTASSIUM CHLORIDE: 9; 1.49 INJECTION, SOLUTION INTRAVENOUS at 01:02

## 2017-02-08 RX ADMIN — IPRATROPIUM BROMIDE AND ALBUTEROL SULFATE 3 ML: .5; 3 SOLUTION RESPIRATORY (INHALATION) at 04:02

## 2017-02-08 RX ADMIN — TIOTROPIUM BROMIDE 18 MCG: 18 CAPSULE ORAL; RESPIRATORY (INHALATION) at 09:02

## 2017-02-08 RX ADMIN — INSULIN DETEMIR 6 UNITS: 100 INJECTION, SOLUTION SUBCUTANEOUS at 09:02

## 2017-02-08 RX ADMIN — ASPIRIN 81 MG: 81 TABLET, COATED ORAL at 09:02

## 2017-02-08 NOTE — PLAN OF CARE
Patient information sent to Pharmacy assistance program.     02/08/17 4873   Discharge Assessment   Assessment Type Discharge Planning Assessment   Confirmed/corrected address and phone number on facesheet? Yes   Assessment information obtained from? Patient;Medical Record   Expected Length of Stay (days) 3   Communicated expected length of stay with patient/caregiver yes   Prior to hospitilization cognitive status: Alert/Oriented   Prior to hospitalization functional status: Independent   Current cognitive status: Alert/Oriented   Current Functional Status: Independent   Arrived From home or self-care   Lives With alone   Able to Return to Prior Arrangements yes   Is patient able to care for self after discharge? Yes   Patient's perception of discharge disposition home or selfcare   Readmission Within The Last 30 Days no previous admission in last 30 days   Patient currently being followed by outpatient case management? No   Patient currently receives home health services? No   Does the patient currently use HME? No   Patient currently receives private duty nursing? No   Patient currently receives any other outside agency services? No   Equipment Currently Used at Home none   Do you have any problems affording any of your prescribed medications? Yes   If yes, what medications? respiratory meds - get from [facility]   Is the patient taking medications as prescribed? yes   Do you have any financial concerns preventing you from receiving the healthcare you need? Yes  (no insurance)   Does the patient have transportation to healthcare appointments? Yes   Transportation Available car   On Dialysis? No   Does the patient receive services at the Coumadin Clinic? No   Are there any open cases? No   Discharge Plan A Home   Patient/Family In Agreement With Plan yes

## 2017-02-08 NOTE — ED NOTES
Pt aware of admitting status. Pt aware waiting for a room to become available. Pt offers no complaints at this time. Will continue to monitor

## 2017-02-08 NOTE — PLAN OF CARE
Problem: Physical Therapy Goal  Goal: Physical Therapy Goal  Goals to be met by: 2017     Patient will increase functional independence with mobility by performin. Gait x 400 feet with [place] and O2 sats >90% on supplemental oxygen as needed.   2. Ascend/descend 6 stair with no Handrails and independence  3. Pt will verbalize exercise/walking plan to continue upon discharge  Outcome: Ongoing (interventions implemented as appropriate)  Initial eval completed.  Results, POC, goals discussed with patient.

## 2017-02-08 NOTE — PLAN OF CARE
Problem: Occupational Therapy Goal  Goal: Occupational Therapy Goal  No goals set this date 2* high (I) demonstrated during eval. Pt does not require further OT at this time due to performing functional tasks at baseline.  Outcome: Outcome(s) achieved Date Met:  02/08/17  OT eval completed and pt DC'd 2* high (I).  MARU Jesus  2/8/2017

## 2017-02-08 NOTE — PLAN OF CARE
PATHWAY - IP COPD Exacerbation - [facility]      COPD Step 1       RT: Clinical Outcome Respiratory rate < 20 at time of aerosol treatment Met

## 2017-02-08 NOTE — ED NOTES
Pt requesting nicotine patch . Dr. Cooley notified and telephone orders received to administer patch now . Pt appears anxious and nervous

## 2017-02-08 NOTE — PROGRESS NOTES
Patient Consulted by CTTS:     The following was discussed by the Tobacco Treatment Specialist:  ? Relevance of Quitting  ? Risk to Health  ? Long Term Risk  ? Risk for Others  ? Rewards of Quitting  ? Motivation Intervention to Quit        Information given to patient concerning the [facility].  Patient is currently using NRT (patch) during hospital stay.

## 2017-02-08 NOTE — PT/OT/SLP EVAL
"Occupational Therapy  Evaluation, Treatment, and Discharge    Ann Canavan   MRN: [id number]   Admitting Diagnosis: COPD exacerbation    OT Date of Treatment: 17   OT Start Time: 1008  OT Stop Time: 1024  OT Total Time (min): 16 min    Billable Minutes:  Evaluation 8  Self Care/Home Management 8    Diagnosis: COPD exacerbation       Past Medical History   Diagnosis Date    COPD (chronic obstructive pulmonary disease)       History reviewed. No pertinent past surgical history.    Referring physician: JOSEE Cooley  Date referred to OT: 2017    General Precautions: Standard, fall  Orthopedic Precautions:    Braces:      Do you have any cultural, spiritual, Zoroastrian conflicts, given your current situation?: no     Patient History:  Living Environment  Lives With: alone  Living Environment Comment: Pt lives alone in 1 SH with 5-6 CHILANGO and currently no rail (but rail is in process of replacing). Pt reported her boss is currently staying with her but she is basically living alone. Pt reported her home is still under construction and has tub/shower combo. Pt was (I) with ADLs and funcitonal mobiltiy prior to ~2 weeks ago. Pt reported recently required incresaed time, effort, and rest breaks to complete all tasks. Pt will have friend A upon DC.   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  "I just have to plan it out. This is life changing."  Chief Complaint: fatigue with tasks  Patient/Family stated goals: improve endurance    Pain Ratin/10      "         Pain Rating Post-Intervention: 0/10    Objective:  Patient found with: telemetry, oxygen    Cognitive Exam:  Oriented to: Person, Place, Time and Situation  Follows Commands/attention: Follows multistep  commands  Communication: clear/fluent  Memory:  No Deficits noted  Safety awareness/insight to disability: intact  Coping skills/emotional control: Appropriate to situation    Visual/perceptual:  Intact    Physical Exam:  Postural examination/scapula alignment: No postural abnormalities identified  Skin integrity: Visible skin intact  Edema: None noted     Sensation:   Intact    Upper Extremity Range of Motion:  Right Upper Extremity: WFL  Left Upper Extremity: WFL    Upper Extremity Strength:  Right Upper Extremity: WFL  Left Upper Extremity: WFL   Strength: WFL    Fine motor coordination:   Intact    Gross motor coordination: WFL    Functional Mobility:  Bed Mobility:  Scooting/Bridging: Independent  Supine to Sit: Independent (HOB flat)  Sit to Supine: Independent (HOB flat)    Transfers:  Sit <> Stand Assistance: Independent (EOB)  Sit <> Stand Assistive Device: No Assistive Device  Toilet Transfer Technique: Stand Pivot  Toilet Transfer Assistance: Independent  Toilet Transfer Assistive Device: No Assistive Device    Functional Ambulation: Pt performed functional mobility ~12ft within room with (I) and no AD.      Activities of Daily Living:       UE Dressing Level of Assistance: Independent (don/doff gown like robe)    LE Dressing Level of Assistance: Independent (don socks)    Grooming Position: Standing at sink  Grooming Level of Assistance: Modified independent (wash hands occasionally utilizing sink for support)              Therapeutic Activities and Exercises:  Pt educated on energy conservation techniques, planned rest breaks, self-initiated rest breaks when needed, overall safety with daily tasks OOB, and importance of participating in daily ax. Pt whiteboard updated.      AM-PAC 6 CLICK  "ADL  How much help from another person does this patient currently need?  1 = Unable, Total/Dependent Assistance  2 = A lot, Maximum/Moderate Assistance  3 = A little, Minimum/Contact Guard/Supervision  4 = None, Modified [place]/Independent    Putting on and taking off regular lower body clothing? : 4  Bathing (including washing, rinsing, drying)?: 4  Toileting, which includes using toilet, bedpan, or urinal? : 4  Putting on and taking off regular upper body clothing?: 4  Taking care of personal grooming such as brushing teeth?: 4  Eating meals?: 4  Total Score: 24    AM-PAC Raw Score CMS "G-Code Modifier Level of Impairment Assistance   6 % Total / Unable   7 - 9 CM 80 - 100% Maximal Assist   10 - 14 CL 60 - 80% Moderate Assist   15 - 19 CK 40 - 60% Moderate Assist   20 - 22 CJ 20 - 40% Minimal Assist   23 CI 1-20% SBA / CGA   24 CH 0% Independent/ Mod I       Patient left seated EOB with all lines intact, call button in reach and RN notified    Assessment:  Ann Canavan is a 56 y.o. female with a medical diagnosis of COPD exacerbation. Pt tolerated session well and put forth good effort to participate. Pt presented with high (I) and only slight deficits in functional performance regarding endurance. Pt provided education for energy conservation, rest break initiation and planned tasks to optimize performance and time based on her ax tolerance during exertion.  Pt being DC'd by acute OT 2* performing at baseline and demonstrating no significant decline in functional performance. Pt required no further OT at this time.         Rehab identified problem list/impairments: Rehab identified problem list/impairments: impaired endurance    Rehab potential is good.    Activity tolerance: Good    Discharge recommendations: Discharge Facility/Level Of Care Needs: home health PT     Barriers to discharge: Barriers to Discharge: Inaccessible home environment (5-6 CHILANGO with no rail)    Equipment recommendations: " shower chair, grab bar     GOALS:   Occupational Therapy Goals     Not on file      Multidisciplinary Problems (Resolved)        Problem: Occupational Therapy Goal    Goal Priority Disciplines Outcome Interventions   Occupational Therapy Goal   (Resolved)     OT, PT/OT Outcome(s) achieved    Description:  No goals set this date 2* high (I) demonstrated during eval. Pt does not require further OT at this time due to performing functional tasks at baseline.                PLAN:  DC from acute OT.  Plan of Care reviewed with: patient         MARU Jesus  02/08/2017

## 2017-02-08 NOTE — PT/OT/SLP EVAL
"Physical Therapy  Evaluation    Ann Canavan   MRN: [id number]   Admitting Diagnosis: COPD exacerbation    PT Received On: 17  PT Start Time: 1035     PT Stop Time: 1106    PT Total Time (min): 31 min       Billable Minutes:  Evaluation 20 and Therapeutic Activity 10    Diagnosis: COPD exacerbation      Past Medical History   Diagnosis Date    COPD (chronic obstructive pulmonary disease)       History reviewed. No pertinent past surgical history.    Referring physician: JOSEE Cooley MD  Date referred to PT: 2017    General Precautions: Standard,      Patient History:  Living Environment Comment: Pt lives alone in a 1 story home with 5-6 steps and no rails to enter.  She was completely independent prior to admit but led a very sedentary lifestyle.  Equipment Currently Used at Home: none  DME owned (not currently used): none    Subjective:  Communicated with Rn prior to session.  "I feel embarrassed to say I really was not even leaving the house much anymore.  That (walking to the coffee pot down the [address]) was the most I've walked in a while."   Chief Complaint: SOB  Patient goals: to improve endurance    Pain Ratin/10      Objective:   Patient found with: oxygen supine in bed.  She agreed to initial evaluation.  Patient ambulated with therapist      Cognitive Exam:  Oriented to: Person, Place, Time and Situation    Follows Commands/attention: Follows multistep  commands  Communication: clear/fluent  Safety awareness/insight to disability: intact    Physical Exam:  Skin integrity: Visible skin intact  Edema: None noted     Sensation:   Intact    Lower Extremity Range of Motion:  Right Lower Extremity: WFL  Left Lower Extremity: WFL    Lower Extremity Strength:  Right Lower Extremity: WFL  Left Lower Extremity: WFL     Fine motor coordination:  Intact    Gross motor coordination: WFL    Functional Mobility:  Bed Mobility:  Rolling/Turning to Left: Independent  Rolling/Turning Right: " Independent  Scooting/Bridging: Independent  Supine to Sit: Independent  Sit to Supine: Independent    Transfers:  Sit <> Stand Assistance: Stand By Assistance  Bed <> Chair Assistance: Stand By Assistance    Gait:   300 feet with supervision, supplemental O2 and puse ox.  Pt ambulates with no significant gait deviaitons, but experienced decrease in O2 sats.   · Oxygen sats prior to gait: 94% on 3L at rest  · Oxygen during gait/activity: 86% after ambulating 250 feet with 3L supplemental oxygen.  Therapist coached patient in pursed lip breathing with improvement in sats to >90% prior to resuming gait  · Oxygen after gait:  92% on 3L at rest    Therapeutic Activities and Exercises:  Therapist provided patient with extensive education regarding the following:  · Role of PT, POC  · Importance of mobilization   · Lifestyle change to include daily exercise  · Response of O2 sats with exercise  · Independent walking program to improve endurance while in the hospital.   · Energy conservation   · COPD handout with therapy-related education    Therapist ambulated with patient a second time to determine consistency of oxygen response with activity. Patient ambulated 250 feet with 3L supplemental oxygen and sats decreasing to 86%.  Pt stopped gait when sats dropped to 88% and began practicing pursed lip breathing.      AM-PAC 6 CLICK MOBILITY  How much help from another person does this patient currently need?   1 = Unable, Total/Dependent Assistance  2 = A lot, Maximum/Moderate Assistance  3 = A little, Minimum/Contact Guard/Supervision  4 = None, Modified [place]/Independent    Turning over in bed (including adjusting bedclothes, sheets and blankets)?: 4  Sitting down on and standing up from a chair with arms (e.g., wheelchair, bedside commode, etc.): 4  Moving from lying on back to sitting on the side of the bed?: 4  Moving to and from a bed to a chair (including a wheelchair)?: 4  Need to walk in hospital room?:  3  Climbing 3-5 steps with a railing?: 3  Total Score: 22     AM-PAC Raw Score CMS G-Code Modifier Level of Impairment Assistance   6 % Total / Unable   7 - 9 CM 80 - 100% Maximal Assist   10 - 14 CL 60 - 80% Moderate Assist   15 - 19 CK 40 - 60% Moderate Assist   20 - 22 CJ 20 - 40% Minimal Assist   23 CI 1-20% SBA / CGA   24 CH 0% Independent/ Mod I     Patient left up in chair with all lines intact and call button in reach.    Assessment:   Ann Canavan is a 56 y.o. female with a medical diagnosis of COPD exacerbation and presents with decreased endurance.  Patient reports living a sedentary lifestyle prior to admit.  She was able to ambulate 250 feet twice with supervision and supplemental oxygen, but O2 sats decreased to 86% on both trials.  Patient would benefit from skilled PT services to progress endurance and assist patient to safely initiate an endurance exercise program at home upon d/c.    Rehab identified problem list/impairments: Rehab identified problem list/impairments: impaired endurance    Rehab potential is excellent.    Activity tolerance: Good    Discharge recommendations: Discharge Facility/Level Of Care Needs: home with home health     Barriers to discharge: Barriers to Discharge: None    Equipment recommendations:   none    GOALS:   Physical Therapy Goals        Problem: Physical Therapy Goal    Goal Priority Disciplines Outcome Goal Variances Interventions   Physical Therapy Goal     PT/OT, PT Ongoing (interventions implemented as appropriate)     Description:  Goals to be met by: 2017     Patient will increase functional independence with mobility by performin. Gait  x 400 feet with [place] and O2 sats >90% on supplemental oxygen as needed.   2. Ascend/descend 6 stair with no Handrails and independence  3. Pt will verbalize exercise/walking plan to continue upon discharge              PLAN:    Patient to be seen 3 x/week to address the above listed problems via gait  training, therapeutic activities, therapeutic exercises  Plan of Care expires: 03/08/17  Plan of Care reviewed with: patient          Nelda A Jessica, PT  02/08/2017

## 2017-02-08 NOTE — PLAN OF CARE
Problem: Patient Care Overview  Goal: Plan of Care Review  Outcome: Ongoing (interventions implemented as appropriate)  Pt AA0x3 and VSS. Two side rails up, bed locked, call light within reach. No falls noted as these precautions remain. Pt free of skin breakdown as the pt moves well independently. Oxygen saturation maintained above  91% with 3.5 L/min via NC. Blood glucose monitoring ordered. Pain controlled well with PRN meds. Hourly rounds made and no complaints at this time noted. Will resume with plan of care.

## 2017-02-08 NOTE — PLAN OF CARE
Problem: Patient Care Overview  Goal: Plan of Care Review  Outcome: Ongoing (interventions implemented as appropriate)  Patient progressing with POC. Patient denies pain throughout shift. Vital signs stable. Afebrile. 3L O2. Safety and fall precautions active. Call light in reach. Will continue to monitor per frequent rounding.

## 2017-02-09 LAB
BASOPHILS # BLD AUTO: 0 K/UL
BASOPHILS NFR BLD: 0 %
DIFFERENTIAL METHOD: ABNORMAL
EOSINOPHIL # BLD AUTO: 0 K/UL
EOSINOPHIL NFR BLD: 0.5 %
ERYTHROCYTE [DISTWIDTH] IN BLOOD BY AUTOMATED COUNT: 13.8 %
ESTIMATED AVG GLUCOSE: 140 MG/DL
HBA1C MFR BLD HPLC: 6.5 %
HCT VFR BLD AUTO: 41.8 %
HGB BLD-MCNC: 13 G/DL
LYMPHOCYTES # BLD AUTO: 1.5 K/UL
LYMPHOCYTES NFR BLD: 19 %
MCH RBC QN AUTO: 31 PG
MCHC RBC AUTO-ENTMCNC: 31.1 %
MCV RBC AUTO: 100 FL
MONOCYTES # BLD AUTO: 0.5 K/UL
MONOCYTES NFR BLD: 6.7 %
NEUTROPHILS # BLD AUTO: 5.9 K/UL
NEUTROPHILS NFR BLD: 73.8 %
PLATELET # BLD AUTO: 167 K/UL
PMV BLD AUTO: 11.2 FL
POCT GLUCOSE: 171 MG/DL (ref 70–110)
POCT GLUCOSE: 181 MG/DL (ref 70–110)
POCT GLUCOSE: 87 MG/DL (ref 70–110)
RBC # BLD AUTO: 4.19 M/UL
WBC # BLD AUTO: 8.05 K/UL

## 2017-02-09 PROCEDURE — 63600175 PHARM REV CODE 636 W HCPCS: Performed by: INTERNAL MEDICINE

## 2017-02-09 PROCEDURE — 99232 SBSQ HOSP IP/OBS MODERATE 35: CPT | Mod: ,,, | Performed by: INTERNAL MEDICINE

## 2017-02-09 PROCEDURE — 25000003 PHARM REV CODE 250: Performed by: INTERNAL MEDICINE

## 2017-02-09 PROCEDURE — 11000001 HC ACUTE MED/SURG PRIVATE ROOM

## 2017-02-09 PROCEDURE — 94640 AIRWAY INHALATION TREATMENT: CPT

## 2017-02-09 PROCEDURE — 99900031 HC PATIENT EDUCATION (STAT)

## 2017-02-09 PROCEDURE — 97802 MEDICAL NUTRITION INDIV IN: CPT

## 2017-02-09 PROCEDURE — 85025 COMPLETE CBC W/AUTO DIFF WBC: CPT

## 2017-02-09 PROCEDURE — 94761 N-INVAS EAR/PLS OXIMETRY MLT: CPT

## 2017-02-09 PROCEDURE — 36415 COLL VENOUS BLD VENIPUNCTURE: CPT

## 2017-02-09 PROCEDURE — 25000242 PHARM REV CODE 250 ALT 637 W/ HCPCS: Performed by: INTERNAL MEDICINE

## 2017-02-09 PROCEDURE — 27000221 HC OXYGEN, UP TO 24 HOURS

## 2017-02-09 RX ORDER — FLUTICASONE PROPIONATE AND SALMETEROL 250; 50 UG/1; UG/1
1 POWDER RESPIRATORY (INHALATION) 2 TIMES DAILY
Qty: 180 EACH | Refills: 3 | Status: SHIPPED | OUTPATIENT
Start: 2017-02-09 | End: 2017-02-12

## 2017-02-09 RX ORDER — MAGNESIUM SULFATE HEPTAHYDRATE 40 MG/ML
2 INJECTION, SOLUTION INTRAVENOUS ONCE
Status: COMPLETED | OUTPATIENT
Start: 2017-02-09 | End: 2017-02-09

## 2017-02-09 RX ORDER — ALBUTEROL SULFATE 90 UG/1
2 AEROSOL, METERED RESPIRATORY (INHALATION) EVERY 6 HOURS PRN
Qty: 18 G | Refills: 0 | Status: SHIPPED | OUTPATIENT
Start: 2017-02-09 | End: 2017-02-14

## 2017-02-09 RX ORDER — AZITHROMYCIN 500 MG/1
500 TABLET, FILM COATED ORAL DAILY
Qty: 3 TABLET | Refills: 0 | Status: SHIPPED | OUTPATIENT
Start: 2017-02-09 | End: 2017-02-12 | Stop reason: HOSPADM

## 2017-02-09 RX ORDER — ALBUTEROL SULFATE 90 UG/1
2 AEROSOL, METERED RESPIRATORY (INHALATION) EVERY 6 HOURS PRN
Qty: 18 G | Refills: 11 | Status: SHIPPED | OUTPATIENT
Start: 2017-02-09 | End: 2017-02-12

## 2017-02-09 RX ORDER — PREDNISONE 20 MG/1
40 TABLET ORAL DAILY
Qty: 14 TABLET | Refills: 0 | Status: SHIPPED | OUTPATIENT
Start: 2017-02-09 | End: 2017-02-12

## 2017-02-09 RX ORDER — GUAIFENESIN 600 MG/1
1200 TABLET, EXTENDED RELEASE ORAL 2 TIMES DAILY
Qty: 20 TABLET | Refills: 0 | COMMUNITY
Start: 2017-02-09 | End: 2017-02-19

## 2017-02-09 RX ORDER — INSULIN ASPART 100 [IU]/ML
2 INJECTION, SOLUTION INTRAVENOUS; SUBCUTANEOUS
Status: DISCONTINUED | OUTPATIENT
Start: 2017-02-10 | End: 2017-02-12 | Stop reason: HOSPADM

## 2017-02-09 RX ORDER — TIOTROPIUM BROMIDE 18 UG/1
18 CAPSULE ORAL; RESPIRATORY (INHALATION) DAILY
Qty: 90 CAPSULE | Refills: 3 | Status: SHIPPED | OUTPATIENT
Start: 2017-02-09 | End: 2017-02-12

## 2017-02-09 RX ADMIN — INSULIN ASPART 2 UNITS: 100 INJECTION, SOLUTION INTRAVENOUS; SUBCUTANEOUS at 01:02

## 2017-02-09 RX ADMIN — INSULIN DETEMIR 6 UNITS: 100 INJECTION, SOLUTION SUBCUTANEOUS at 09:02

## 2017-02-09 RX ADMIN — FLUTICASONE PROPIONATE 1 SPRAY: 50 SPRAY, METERED NASAL at 09:02

## 2017-02-09 RX ADMIN — BENZONATATE 100 MG: 100 CAPSULE ORAL at 11:02

## 2017-02-09 RX ADMIN — IPRATROPIUM BROMIDE AND ALBUTEROL SULFATE 3 ML: .5; 3 SOLUTION RESPIRATORY (INHALATION) at 06:02

## 2017-02-09 RX ADMIN — FLUTICASONE FUROATE AND VILANTEROL TRIFENATATE 1 PUFF: 100; 25 POWDER RESPIRATORY (INHALATION) at 09:02

## 2017-02-09 RX ADMIN — IPRATROPIUM BROMIDE AND ALBUTEROL SULFATE 3 ML: .5; 3 SOLUTION RESPIRATORY (INHALATION) at 02:02

## 2017-02-09 RX ADMIN — AZITHROMYCIN 250 MG: 250 TABLET, FILM COATED ORAL at 09:02

## 2017-02-09 RX ADMIN — PANTOPRAZOLE SODIUM 40 MG: 40 TABLET, DELAYED RELEASE ORAL at 09:02

## 2017-02-09 RX ADMIN — IPRATROPIUM BROMIDE AND ALBUTEROL SULFATE 3 ML: .5; 3 SOLUTION RESPIRATORY (INHALATION) at 11:02

## 2017-02-09 RX ADMIN — TIOTROPIUM BROMIDE 18 MCG: 18 CAPSULE ORAL; RESPIRATORY (INHALATION) at 09:02

## 2017-02-09 RX ADMIN — PANTOPRAZOLE SODIUM 600 MG: 40 TABLET, DELAYED RELEASE ORAL at 08:02

## 2017-02-09 RX ADMIN — INSULIN ASPART 3 UNITS: 100 INJECTION, SOLUTION INTRAVENOUS; SUBCUTANEOUS at 01:02

## 2017-02-09 RX ADMIN — PREDNISONE 40 MG: 20 TABLET ORAL at 09:02

## 2017-02-09 RX ADMIN — PANTOPRAZOLE SODIUM 600 MG: 40 TABLET, DELAYED RELEASE ORAL at 09:02

## 2017-02-09 RX ADMIN — IPRATROPIUM BROMIDE AND ALBUTEROL SULFATE 3 ML: .5; 3 SOLUTION RESPIRATORY (INHALATION) at 05:02

## 2017-02-09 RX ADMIN — NICOTINE 1 PATCH: 21 PATCH TRANSDERMAL at 09:02

## 2017-02-09 RX ADMIN — ASPIRIN 81 MG: 81 TABLET, COATED ORAL at 09:02

## 2017-02-09 RX ADMIN — IPRATROPIUM BROMIDE AND ALBUTEROL SULFATE 3 ML: .5; 3 SOLUTION RESPIRATORY (INHALATION) at 03:02

## 2017-02-09 RX ADMIN — MAGNESIUM SULFATE IN WATER 2 G: 40 INJECTION, SOLUTION INTRAVENOUS at 09:02

## 2017-02-09 RX ADMIN — STANDARDIZED SENNA CONCENTRATE AND DOCUSATE SODIUM 1 TABLET: 8.6; 5 TABLET, FILM COATED ORAL at 08:02

## 2017-02-09 NOTE — SUBJECTIVE & OBJECTIVE
Interval History: Patient with no events overnight, no new complaints. Feeling better, almost back to baseline.    Review of Systems   Constitutional: Negative for fever.   HENT: Negative for congestion.    Respiratory: Positive for shortness of breath. Negative for cough.    Cardiovascular: Negative for chest pain.   Gastrointestinal: Negative for abdominal pain.     Objective:     Vital Signs (Most Recent):  Temp: 97.4 °F (36.3 °C) (02/08/17 1540)  Pulse: 106 (02/08/17 1700)  Resp: 16 (02/08/17 1645)  BP: (!) 112/59 (02/08/17 1540)  SpO2: 96 % (02/08/17 1645) Vital Signs (24h Range):  Temp:  [97.4 °F (36.3 °C)-98.8 °F (37.1 °C)] 97.4 °F (36.3 °C)  Pulse:  [] 106  Resp:  [16-21] 16  SpO2:  [90 %-98 %] 96 %  BP: (107-138)/(59-74) 112/59     Weight: 65.8 kg (145 lb)  Body mass index is 23.4 kg/(m^2).    Intake/Output Summary (Last 24 hours) at 02/08/17 1829  Last data filed at 02/08/17 1338   Gross per 24 hour   Intake              560 ml   Output                0 ml   Net              560 ml      Physical Exam   Constitutional: She appears well-developed.   HENT:   Head: Normocephalic.   Mouth/Throat: Mucous membranes are not cyanotic.   Eyes: Conjunctivae and lids are normal. Pupils are equal.   Neck: Neck supple.   Cardiovascular: Normal rate, regular rhythm, S1 normal and S2 normal.    Pulmonary/Chest: Effort normal. She has decreased breath sounds (prolonged expiratory phase). She has no wheezes. She has no rales.   Abdominal: Soft. Bowel sounds are normal. There is no tenderness.   Musculoskeletal: She exhibits no edema.   Neurological: She is alert. She is not disoriented.   Skin: She is not diaphoretic. No cyanosis. Nails show no clubbing.   Psychiatric: She has a normal mood and affect. Cognition and memory are normal.       Significant Labs:     CBC:   Recent Labs  Lab 02/07/17  1517 02/08/17  0440   WBC 14.99* 18.99*   HGB 15.7 14.1   HCT 48.9* 44.1    179     CMP:   Recent Labs  Lab  02/07/17  1517 02/08/17  0440    144   K 3.8 4.7    103   CO2 34* 35*   * 209*   BUN 8 7   CREATININE 0.8 0.7   CALCIUM 8.9 8.4*   PROT 6.6  --    ALBUMIN 3.3*  --    BILITOT 0.3  --    ALKPHOS 58  --    AST 16  --    ALT 18  --    ANIONGAP 7* 6*   EGFRNONAA >60.0 >60.0     Cardiac Markers:   Recent Labs  Lab 02/07/17  1517   BNP 24     Lactic Acid:   Recent Labs  Lab 02/07/17  1517   LACTATE 0.7     POCT Glucose:   Recent Labs  Lab 02/08/17  0825 02/08/17  1310 02/08/17  1811   POCTGLUCOSE 108 134* 263*     Troponin:   Recent Labs  Lab 02/07/17  1517   TROPONINI <0.006     Urine Studies:   Recent Labs  Lab 02/07/17  2208   COLORU Yellow   APPEARANCEUA Clear   PHUR 6.0   SPECGRAV 1.010   PROTEINUA Trace*   GLUCUA Negative   KETONESU Negative   BILIRUBINUA Negative   OCCULTUA Negative   NITRITE Negative   UROBILINOGEN Negative   LEUKOCYTESUR Negative

## 2017-02-09 NOTE — PLAN OF CARE
Problem: Patient Care Overview  Goal: Plan of Care Review  Outcome: Ongoing (interventions implemented as appropriate)  AAOX3 VSS. No falls noted, fall precautions remain. Skin intact, patient encouraged to eat and drink. Pain assessed, no pain noted. Call light within reach. Will continue to monitor.

## 2017-02-09 NOTE — NURSING
"Patient stated that she felt light headed and weak. /80 with HR of 96. Pulse oximetry on RA at rest 94% and then 90%. Patient ambulated to bathroom with no oxygen and when returned, pulse oximetry was 70% on RA. When put on three liters patient's pulse oximetry came up to 85% at rest. Patient still stated she did not "feel good" and she had felt that way since she had woken up this morning. Dr. Chou notified of the above. Will continue to monitor.  "

## 2017-02-09 NOTE — PLAN OF CARE
"PATHWAY - IP COPD Exacerbation - [facility]      COPD Step 3       RT: Clinical Outcome: RR<20 at morning assessment Met       RT: Clinical Outcome: No wheezing and improved air movement near baseline at morning assessment Met       RT: "When to seek help for flare-up" education provided at discharge Met          Chronic Obstructive Pulmonary Disease (Adult)     Signs and Symptoms of Listed Potential Problems Will be Absent, Minimized or Managed (Chronic Obstructive Pulmonary Disease) Ongoing (interventions implemented as appropriate)          "

## 2017-02-09 NOTE — PHYSICIAN QUERY
PT Name: Ann Canavan  MR #: [id number]     Physician Query Form - Documentation Clarification    Reviewer  B.Capley, RN  Ext [id number]    This form is a permanent document in the medical record.     Query Date: February 9, 2017  By submitting this query, we are merely seeking further clarification of documentation to reflect the severity of illness of your patient. Please utilize your independent clinical judgment when addressing the question(s) below.    (The Medical record reflects the following:)      Supporting Clinical Findings Location in Medical Record     Magnesium= 1.7     Labs 2/8     Magnesium sulfate 2g IVPB     MAR 2/9 2983                                                                            Doctor, Please specify diagnosis or diagnoses associated with above clinical findings.     ( xxx )  Hypomagnesemia     (  )  Other (please specify):  _________________________________    Physician Use Only                                                                                                             [  ] Unable to determine

## 2017-02-09 NOTE — PROGRESS NOTES
"[facility] Hospital Medicine  Progress Note    Patient Name: Ann Canavan  MRN: [id number]  Patient Class: IP- Inpatient   Admission Date: 2/7/2017  Length of Stay: 1 days  Attending Physician: Jade Chou MD  Primary Care Provider: Primary Doctor [facility] Medicine Team: [facility]OSP MED G Jade Chou MD    Subjective:     Principal Problem:COPD exacerbation    HPI:  "57 yo female with a past medical history of COPD due to smoking for over 40 years.  She has recently tried to wean herself down to just shy of 1/2 PPD of cigarettes over the past several months.  She has been admitted in the past with COPD exacerbations and quite well remembers how they felt.  She now comes to the ED due to gradually progressive shortness of breath and dyspnea on exertion worsening over the past two weeks.  She states that originally she had some friends come visit from [place] who were ill with a respiratory infection on arrival.  Shortly afterwards she, "felt icky, like I had the flu."  She developed malaise, diarrhea and some dry hacking cough.  She never coughed up any purulent sputum.  As the days went by, she gradually developed the same feeling she had back when she had to be admitted for her last COPD exacerbation.  She tried to tough it out with her home medications, but finally her friends confronted her and mentioned that she looked just like she did the last time she had to be admitted.  She finally agreed to seek help."    Hospital Course:       Interval History: Patient with no events overnight, no new complaints. Feeling better, almost back to baseline.    Review of Systems   Constitutional: Negative for fever.   HENT: Negative for congestion.    Respiratory: Positive for shortness of breath. Negative for cough.    Cardiovascular: Negative for chest pain.   Gastrointestinal: Negative for abdominal pain.     Objective:     Vital Signs (Most Recent):  Temp: 97.4 °F (36.3 °C) (02/08/17 " 1540)  Pulse: 106 (02/08/17 1700)  Resp: 16 (02/08/17 1645)  BP: (!) 112/59 (02/08/17 1540)  SpO2: 96 % (02/08/17 1645) Vital Signs (24h Range):  Temp:  [97.4 °F (36.3 °C)-98.8 °F (37.1 °C)] 97.4 °F (36.3 °C)  Pulse:  [] 106  Resp:  [16-21] 16  SpO2:  [90 %-98 %] 96 %  BP: (107-138)/(59-74) 112/59     Weight: 65.8 kg (145 lb)  Body mass index is 23.4 kg/(m^2).    Intake/Output Summary (Last 24 hours) at 02/08/17 1829  Last data filed at 02/08/17 1338   Gross per 24 hour   Intake              560 ml   Output                0 ml   Net              560 ml      Physical Exam   Constitutional: She appears well-developed.   HENT:   Head: Normocephalic.   Mouth/Throat: Mucous membranes are not cyanotic.   Eyes: Conjunctivae and lids are normal. Pupils are equal.   Neck: Neck supple.   Cardiovascular: Normal rate, regular rhythm, S1 normal and S2 normal.    Pulmonary/Chest: Effort normal. She has decreased breath sounds (prolonged expiratory phase). She has no wheezes. She has no rales.   Abdominal: Soft. Bowel sounds are normal. There is no tenderness.   Musculoskeletal: She exhibits no edema.   Neurological: She is alert. She is not disoriented.   Skin: She is not diaphoretic. No cyanosis. Nails show no clubbing.   Psychiatric: She has a normal mood and affect. Cognition and memory are normal.       Significant Labs:     CBC:   Recent Labs  Lab 02/07/17  1517 02/08/17  0440   WBC 14.99* 18.99*   HGB 15.7 14.1   HCT 48.9* 44.1    179     CMP:   Recent Labs  Lab 02/07/17  1517 02/08/17  0440    144   K 3.8 4.7    103   CO2 34* 35*   * 209*   BUN 8 7   CREATININE 0.8 0.7   CALCIUM 8.9 8.4*   PROT 6.6  --    ALBUMIN 3.3*  --    BILITOT 0.3  --    ALKPHOS 58  --    AST 16  --    ALT 18  --    ANIONGAP 7* 6*   EGFRNONAA >60.0 >60.0     Cardiac Markers:   Recent Labs  Lab 02/07/17  1517   BNP 24     Lactic Acid:   Recent Labs  Lab 02/07/17  1517   LACTATE 0.7     POCT Glucose:   Recent Labs  Lab  02/08/17  0825 02/08/17  1310 02/08/17  1811   POCTGLUCOSE 108 134* 263*     Troponin:   Recent Labs  Lab 02/07/17  1517   TROPONINI <0.006     Urine Studies:   Recent Labs  Lab 02/07/17  2208   COLORU Yellow   APPEARANCEUA Clear   PHUR 6.0   SPECGRAV 1.010   PROTEINUA Trace*   GLUCUA Negative   KETONESU Negative   BILIRUBINUA Negative   OCCULTUA Negative   NITRITE Negative   UROBILINOGEN Negative   LEUKOCYTESUR Negative     Assessment/Plan:      * COPD exacerbation  -COPD pathway  -Duonebs Breo, Spiriva  -Solumedrol 125 mg IV x 1 in ED then Prednisone 40 mg daily  -Mucinex, tessalon pearles for cough    Bronchitis, acute  Continue azithromycin.    Acute respiratory failure with hypoxia  -O2 titration as per COPD pathway  Improved.    Hyperglycemia, unspecified  Likely due to corticosteroids; A1C pending.    VTE Risk Mitigation         Ordered     enoxaparin injection 40 mg  Daily     Route:  Subcutaneous        02/07/17 1736     Medium Risk of VTE  Once      02/07/17 1736     Place sequential compression device  Until discontinued      02/07/17 1736     Place HOLLIE hose  Until discontinued      02/07/17 1736          Jade Chou MD  Department of Hospital Medicine   [facility]

## 2017-02-09 NOTE — PLAN OF CARE
11:51 AM. SW provided pt with a list of [facility]'s throughout the city explaining to pt that these centers accept private pay on a sliding scale and have different specialist throughout their system. Pt thankful for resources.       GERHARD Villalobos, AMBROCIO  [id number]

## 2017-02-09 NOTE — ASSESSMENT & PLAN NOTE
-COPD pathway  -Julia Harris Spiriva  -Solumedrol 125 mg IV x 1 in ED then Prednisone 40 mg daily  -Mucinex tessalon ray for cough

## 2017-02-09 NOTE — PLAN OF CARE
Problem: Patient Care Overview  Goal: Plan of Care Review  Outcome: Ongoing (interventions implemented as appropriate)  Pt AA0x3 and VSS. Two side rails up, bed locked, call light within reach. No falls noted as these precautions remain. Pt free of skin breakdown as the pt moves well independently. Blood glucose monitoring ordered. Pain controlled well with PRN meds. Hourly rounds made and no complaints at this time noted. Will resume with plan of care.

## 2017-02-09 NOTE — CONSULTS
Consult received for COPD Pathway. Discussed low sodium diet and label reading with patient. Provided written guidelines. Patient verbalized understanding.

## 2017-02-09 NOTE — NURSING
Home Oxygen Evaluation    Date Performed: 2/9/2017    1) Patient's Home O2 Sat on room air, while at rest: ***        If O2 sats on room air at rest are 88% or below, patient qualifies. No additional testing needed. Document N/A in steps 2 and 3. If 89% or above, complete steps 2.      2) Patient's O2 Sat on room air while exercising: ***        If O2 sats on room air while exercising remain 89% or above patient does not qualify, no further testing needed Document N/A in step 3. If O2 sats on room air while exercising are 88% or below, continue to step 3.      3) Patient's O2 Sat while exercising on O2: *** at *** LPM         (Must show improvement from #2 for patients to qualify)    If O2 sats improve on oxygen, patient qualifies for portable oxygen. If not, the patient does not qualify.

## 2017-02-10 ENCOUNTER — TELEPHONE (OUTPATIENT)
Dept: PHARMACY | Facility: CLINIC | Age: 57
End: 2017-02-10

## 2017-02-10 LAB
ALBUMIN SERPL BCP-MCNC: 2.7 G/DL
ANION GAP SERPL CALC-SCNC: 7 MMOL/L
BASOPHILS # BLD AUTO: 0.01 K/UL
BASOPHILS NFR BLD: 0.1 %
BUN SERPL-MCNC: 9 MG/DL
CALCIUM SERPL-MCNC: 8.7 MG/DL
CHLORIDE SERPL-SCNC: 99 MMOL/L
CO2 SERPL-SCNC: 36 MMOL/L
CREAT SERPL-MCNC: 0.6 MG/DL
DIFFERENTIAL METHOD: ABNORMAL
EOSINOPHIL # BLD AUTO: 0.1 K/UL
EOSINOPHIL NFR BLD: 0.7 %
ERYTHROCYTE [DISTWIDTH] IN BLOOD BY AUTOMATED COUNT: 13.8 %
EST. GFR  (AFRICAN AMERICAN): >60 ML/MIN/1.73 M^2
EST. GFR  (NON AFRICAN AMERICAN): >60 ML/MIN/1.73 M^2
GLUCOSE SERPL-MCNC: 84 MG/DL
HCT VFR BLD AUTO: 42.3 %
HGB BLD-MCNC: 13.2 G/DL
LYMPHOCYTES # BLD AUTO: 1.7 K/UL
LYMPHOCYTES NFR BLD: 24.7 %
MAGNESIUM SERPL-MCNC: 2 MG/DL
MCH RBC QN AUTO: 30.8 PG
MCHC RBC AUTO-ENTMCNC: 31.2 %
MCV RBC AUTO: 99 FL
MONOCYTES # BLD AUTO: 0.5 K/UL
MONOCYTES NFR BLD: 7.1 %
NEUTROPHILS # BLD AUTO: 4.7 K/UL
NEUTROPHILS NFR BLD: 67.3 %
PHOSPHATE SERPL-MCNC: 3.7 MG/DL
PLATELET # BLD AUTO: 170 K/UL
PMV BLD AUTO: 10.4 FL
POCT GLUCOSE: 102 MG/DL (ref 70–110)
POCT GLUCOSE: 136 MG/DL (ref 70–110)
POCT GLUCOSE: 180 MG/DL (ref 70–110)
POCT GLUCOSE: 86 MG/DL (ref 70–110)
POTASSIUM SERPL-SCNC: 4 MMOL/L
RBC # BLD AUTO: 4.28 M/UL
SODIUM SERPL-SCNC: 142 MMOL/L
WBC # BLD AUTO: 6.93 K/UL

## 2017-02-10 PROCEDURE — 94640 AIRWAY INHALATION TREATMENT: CPT

## 2017-02-10 PROCEDURE — 83735 ASSAY OF MAGNESIUM: CPT

## 2017-02-10 PROCEDURE — 99232 SBSQ HOSP IP/OBS MODERATE 35: CPT | Mod: ,,, | Performed by: INTERNAL MEDICINE

## 2017-02-10 PROCEDURE — 63600175 PHARM REV CODE 636 W HCPCS: Performed by: INTERNAL MEDICINE

## 2017-02-10 PROCEDURE — 25000242 PHARM REV CODE 250 ALT 637 W/ HCPCS: Performed by: INTERNAL MEDICINE

## 2017-02-10 PROCEDURE — 36415 COLL VENOUS BLD VENIPUNCTURE: CPT

## 2017-02-10 PROCEDURE — 27000221 HC OXYGEN, UP TO 24 HOURS

## 2017-02-10 PROCEDURE — 11000001 HC ACUTE MED/SURG PRIVATE ROOM

## 2017-02-10 PROCEDURE — 85025 COMPLETE CBC W/AUTO DIFF WBC: CPT

## 2017-02-10 PROCEDURE — 25000003 PHARM REV CODE 250: Performed by: INTERNAL MEDICINE

## 2017-02-10 PROCEDURE — 80069 RENAL FUNCTION PANEL: CPT

## 2017-02-10 PROCEDURE — 97530 THERAPEUTIC ACTIVITIES: CPT

## 2017-02-10 RX ADMIN — IPRATROPIUM BROMIDE AND ALBUTEROL SULFATE 3 ML: .5; 3 SOLUTION RESPIRATORY (INHALATION) at 08:02

## 2017-02-10 RX ADMIN — PANTOPRAZOLE SODIUM 600 MG: 40 TABLET, DELAYED RELEASE ORAL at 08:02

## 2017-02-10 RX ADMIN — IPRATROPIUM BROMIDE AND ALBUTEROL SULFATE 3 ML: .5; 3 SOLUTION RESPIRATORY (INHALATION) at 12:02

## 2017-02-10 RX ADMIN — STANDARDIZED SENNA CONCENTRATE AND DOCUSATE SODIUM 1 TABLET: 8.6; 5 TABLET, FILM COATED ORAL at 08:02

## 2017-02-10 RX ADMIN — IPRATROPIUM BROMIDE AND ALBUTEROL SULFATE 3 ML: .5; 3 SOLUTION RESPIRATORY (INHALATION) at 05:02

## 2017-02-10 RX ADMIN — INSULIN ASPART 2 UNITS: 100 INJECTION, SOLUTION INTRAVENOUS; SUBCUTANEOUS at 05:02

## 2017-02-10 RX ADMIN — NICOTINE 1 PATCH: 21 PATCH TRANSDERMAL at 09:02

## 2017-02-10 RX ADMIN — PANTOPRAZOLE SODIUM 40 MG: 40 TABLET, DELAYED RELEASE ORAL at 09:02

## 2017-02-10 RX ADMIN — IPRATROPIUM BROMIDE AND ALBUTEROL SULFATE 3 ML: .5; 3 SOLUTION RESPIRATORY (INHALATION) at 03:02

## 2017-02-10 RX ADMIN — STANDARDIZED SENNA CONCENTRATE AND DOCUSATE SODIUM 1 TABLET: 8.6; 5 TABLET, FILM COATED ORAL at 09:02

## 2017-02-10 RX ADMIN — ENOXAPARIN SODIUM 40 MG: 100 INJECTION SUBCUTANEOUS at 12:02

## 2017-02-10 RX ADMIN — IPRATROPIUM BROMIDE AND ALBUTEROL SULFATE 3 ML: .5; 3 SOLUTION RESPIRATORY (INHALATION) at 07:02

## 2017-02-10 RX ADMIN — FLUTICASONE PROPIONATE 1 SPRAY: 50 SPRAY, METERED NASAL at 09:02

## 2017-02-10 RX ADMIN — TIOTROPIUM BROMIDE 18 MCG: 18 CAPSULE ORAL; RESPIRATORY (INHALATION) at 09:02

## 2017-02-10 RX ADMIN — AZITHROMYCIN 250 MG: 250 TABLET, FILM COATED ORAL at 09:02

## 2017-02-10 RX ADMIN — IPRATROPIUM BROMIDE AND ALBUTEROL SULFATE 3 ML: .5; 3 SOLUTION RESPIRATORY (INHALATION) at 11:02

## 2017-02-10 RX ADMIN — GUAIFENESIN AND DEXTROMETHORPHAN 5 ML: 100; 10 SYRUP ORAL at 09:02

## 2017-02-10 RX ADMIN — INSULIN ASPART 3 UNITS: 100 INJECTION, SOLUTION INTRAVENOUS; SUBCUTANEOUS at 05:02

## 2017-02-10 RX ADMIN — ASPIRIN 81 MG: 81 TABLET, COATED ORAL at 09:02

## 2017-02-10 RX ADMIN — PANTOPRAZOLE SODIUM 600 MG: 40 TABLET, DELAYED RELEASE ORAL at 09:02

## 2017-02-10 RX ADMIN — FLUTICASONE FUROATE AND VILANTEROL TRIFENATATE 1 PUFF: 100; 25 POWDER RESPIRATORY (INHALATION) at 09:02

## 2017-02-10 RX ADMIN — PREDNISONE 40 MG: 20 TABLET ORAL at 09:02

## 2017-02-10 NOTE — SUBJECTIVE & OBJECTIVE
Interval History: Patient with no events overnight, no new complaints. Feeling better, but remains hypoxic with ambulation.    Review of Systems   Constitutional: Negative for fever.   HENT: Negative for congestion.    Respiratory: Positive for shortness of breath. Negative for cough.    Cardiovascular: Negative for chest pain.   Gastrointestinal: Negative for abdominal pain.   Neurological: Positive for dizziness and light-headedness.     Objective:     Vital Signs (Most Recent):  Temp: 98.1 °F (36.7 °C) (02/09/17 1532)  Pulse: 91 (02/09/17 1803)  Resp: 15 (02/09/17 1803)  BP: 133/71 (02/09/17 1532)  SpO2: (!) 94 % (02/09/17 1803) Vital Signs (24h Range):  Temp:  [96.3 °F (35.7 °C)-98.5 °F (36.9 °C)] 98.1 °F (36.7 °C)  Pulse:  [] 91  Resp:  [14-19] 15  SpO2:  [91 %-98 %] 94 %  BP: ()/(60-80) 133/71     Weight: 65.8 kg (145 lb)  Body mass index is 23.4 kg/(m^2).    Intake/Output Summary (Last 24 hours) at 02/09/17 1814  Last data filed at 02/09/17 1336   Gross per 24 hour   Intake             1110 ml   Output                2 ml   Net             1108 ml      Physical Exam   Constitutional: She appears well-developed.   HENT:   Head: Normocephalic.   Mouth/Throat: Mucous membranes are not cyanotic.   Eyes: Conjunctivae and lids are normal. Pupils are equal.   Neck: Neck supple.   Cardiovascular: Normal rate, regular rhythm, S1 normal and S2 normal.    Pulmonary/Chest: Effort normal. She has decreased breath sounds (prolonged expiratory phase). She has no wheezes. She has no rales.   Abdominal: Soft. Bowel sounds are normal. There is no tenderness.   Musculoskeletal: She exhibits no edema.   Neurological: She is alert. She is not disoriented.   Skin: She is not diaphoretic. No cyanosis. Nails show no clubbing.   Psychiatric: She has a normal mood and affect. Cognition and memory are normal.       Significant Labs:     CBC:     Recent Labs  Lab 02/08/17  0440 02/09/17  0540   WBC 18.99* 8.05   HGB 14.1  13.0   HCT 44.1 41.8    167     CMP:     Recent Labs  Lab 02/08/17  0440      K 4.7      CO2 35*   *   BUN 7   CREATININE 0.7   CALCIUM 8.4*   ANIONGAP 6*   EGFRNONAA >60.0     POCT Glucose:     Recent Labs  Lab 02/08/17  2115 02/09/17  0905 02/09/17  1344   POCTGLUCOSE 180* 87 171*     Urine Studies:     Recent Labs  Lab 02/07/17  2208   COLORU Yellow   APPEARANCEUA Clear   PHUR 6.0   SPECGRAV 1.010   PROTEINUA Trace*   GLUCUA Negative   KETONESU Negative   BILIRUBINUA Negative   OCCULTUA Negative   NITRITE Negative   UROBILINOGEN Negative   LEUKOCYTESUR Negative

## 2017-02-10 NOTE — SUBJECTIVE & OBJECTIVE
Interval History: Patient with no events overnight, no new complaints. Remains hypoxic with ambulation.    Review of Systems   Constitutional: Negative for fever.   HENT: Negative for congestion.    Respiratory: Positive for shortness of breath. Negative for cough.    Cardiovascular: Negative for chest pain.   Gastrointestinal: Negative for abdominal pain.   Neurological: Negative for light-headedness.     Objective:     Vital Signs (Most Recent):  Temp: 98.2 °F (36.8 °C) (02/10/17 1600)  Pulse: 77 (02/10/17 1700)  Resp: 20 (02/10/17 1600)  BP: 133/68 (02/10/17 1600)  SpO2: (!) 92 % (02/10/17 1600) Vital Signs (24h Range):  Temp:  [96.6 °F (35.9 °C)-98.5 °F (36.9 °C)] 98.2 °F (36.8 °C)  Pulse:  [] 77  Resp:  [15-20] 20  SpO2:  [92 %-99 %] 92 %  BP: (120-133)/(68-72) 133/68     Weight: 65.8 kg (145 lb)  Body mass index is 23.4 kg/(m^2).  No intake or output data in the 24 hours ending 02/10/17 1731   Physical Exam   Constitutional: She appears well-developed.   HENT:   Head: Normocephalic.   Mouth/Throat: Mucous membranes are not cyanotic.   Eyes: Conjunctivae and lids are normal. Pupils are equal.   Neck: Neck supple.   Cardiovascular: Normal rate, regular rhythm, S1 normal and S2 normal.    Pulmonary/Chest: Effort normal. She has decreased breath sounds (prolonged expiratory phase). She has no wheezes. She has no rales.   Abdominal: Soft. Bowel sounds are normal. There is no tenderness.   Musculoskeletal: She exhibits no edema.   Neurological: She is alert. She is not disoriented.   Skin: She is not diaphoretic. No cyanosis. Nails show no clubbing.   Psychiatric: She has a normal mood and affect. Cognition and memory are normal.       Significant Labs:     CBC:     Recent Labs  Lab 02/09/17  0540 02/10/17  0500   WBC 8.05 6.93   HGB 13.0 13.2   HCT 41.8 42.3    170     CMP:     Recent Labs  Lab 02/10/17  0500      K 4.0   CL 99   CO2 36*   GLU 84   BUN 9   CREATININE 0.6   CALCIUM 8.7   ALBUMIN  2.7*   ANIONGAP 7*   EGFRNONAA >60.0     POCT Glucose:     Recent Labs  Lab 02/09/17 2048 02/10/17  0745 02/10/17  1247   POCTGLUCOSE 181* 86 136*

## 2017-02-10 NOTE — PLAN OF CARE
COPD Step 3     RT: Clinical Outcome: RR<20 at morning assessment Met          COPD Step 3     RT: Clinical Outcome: Reduced respiratory therapy treatments at morning assessment Not Met        PATHWAYS STILL REQUIRES Q4 NEBULIZER     RT: Clinical Outcome: No wheezing and improved air movement near baseline at morning assessment Not Met        PATHWAYS STILL REQUIRES Q4 NEBULIZER

## 2017-02-10 NOTE — PLAN OF CARE
CM met with patient at bedside. Pharmacy assistance Jennifer monique's number given. Jennifer requested patient call her when discharged home. CM also discussed possible home O2 with patient including approximate cost. Patient verbalized understanding and stated she could manage if needed. Will follow.

## 2017-02-10 NOTE — PT/OT/SLP PROGRESS
"Physical Therapy  Treatment    Ann Canavan   MRN: [id number]   Admitting Diagnosis: COPD exacerbation    PT Received On: 02/10/17  PT Start Time: 1019     PT Stop Time: 1050    PT Total Time (min): 31 min       Billable Minutes:  Therapeutic Activity 30    Treatment Type: Treatment  PT/PTA: PT             General Precautions: Standard,      Subjective:  Communicated with RN prior to session.  "I didn't walk yesterday.  I know I need to and I want to."     Objective:   Patient found with: oxygen sitting edge of bed.  Therapist explained 6 minute walk test protocol.  Pt agreed to participate in 6 minute walk test.  Therapist facilitated 6 minute walk test in the hallway.  Following the test the therapist educated the patient on importance of progressive endurance activity.  Therapist educated patient on pursed lip breathing, safety with home O2 tubing, energy conservation, and independent walking program 2 minutes at a time 3x/day or more for the duration of hospital stay.     6MWT   Supplemental O2: room air to start   AD: N/A   Time in Minutes BP SpO2 HR RPE Distance Walked (feet) Rests/comments   At Rest  90% 116 0 0 Pt coughing with audible chest congestion   1 -----84% 124 ---- 235 Therapist stopped patient and instructed her to perform pursed lip breathing to elevate O2 sats.   2 -----75% 130 1 +0 (235) Therapist applied supplemental oxygen at 2L to improve O2 sats.   3 -----89% 2LO2 133 ---- +0 (235) Standing rest break with 2L O2.     4 -----89% 130 1 +200 (435) Walking resumed on 2L supplemental O2   5 -----88% 125 ---- +200 (635)    6 -----88% 136 1 +200 (835) Patient able to complete test with no further standing rest breaks after O2 applied.    Recovery 1  87% 134 1 - Standing rest break   Recovery 2   91% 131 1 - Standing rest   *Feet to meters conversion:   1 ft = .3048 meter    Total distance walked= (835 feet) 254.5 m    (Optional) Walking speed=.7 m/s    Normative data:  Healthy subjects:  ? Range from " 400-700 m  ? Walking speed = 1.4 m/s  COPD:   ? Average: 380 m   ? Range from 160-600 m  Community-dwelling Elderly:   ? Age  Male  Female   ? 60-69 yrs 572 m  538 m  ? 70-79 yrs 527 m  471 m  ? 80-89 yrs 417 m  392 m    Summary of Results:  1) Oxygen sats at rest on room air:  90%    2) Oxygen sats with activity on room air: 75-84%    3) Oxygen sats with activity and supplemental oxygen: 88-89% on 2 L O2    Assessment:  Ann Canavan is a 56 y.o. female with a medical diagnosis of COPD exacerbation and presents with oxygen desaturation with activity.  Patient participated in 6 minute walk test to evaluate oxygenation with activity.  Pt began the test with O2 sats of 90% on room air.  After only 1 minute of walking (~200 feet) her O2 sats dropped to 84%.  Even with a 1 minute standing rest break and deliberate pursed lip breathing, her oxygen continued to fall into the mid 70s on room air.  2L O2 reapplied with immediate rise in O2 sats to 88-89%.  She was able to complete the walk test with supplemental oxygen and O2 sats in the upper 80s.  From this test it appears the patient will require supplemental oxygen upon discharge.  Pt would benefit from pulmonary rehab upon discharge.      Rehab identified problem list/impairments: Rehab identified problem list/impairments: impaired endurance    Discharge recommendations: Discharge Facility/Level Of Care Needs:  (HH PT or pulmonary rehab)     Barriers to discharge: Barriers to Discharge: None    Equipment recommendations: Equipment Needed After Discharge:  (oxygen)     GOALS:   Physical Therapy Goals        Problem: Physical Therapy Goal    Goal Priority Disciplines Outcome Goal Variances Interventions   Physical Therapy Goal     PT/OT, PT Ongoing (interventions implemented as appropriate)     Description:  Goals to be met by: 2017     Patient will increase functional independence with mobility by performin. Gait  x 400 feet with [place] and O2 sats >90%  on supplemental oxygen as needed. - MET  2. Ascend/descend 6 stair with no Handrails and independence-  3. Pt will verbalize exercise/walking plan to continue upon discharge - MET              PLAN:    Patient appropriate for discharge from inpatient physical therapy.   Plan of Care reviewed with: patient         Nelda RASHAD Lopez, PT  02/10/2017

## 2017-02-10 NOTE — PROGRESS NOTES
"[facility] Hospital Medicine  Progress Note    Patient Name: Ann Canavan  MRN: [id number]  Patient Class: IP- Inpatient   Admission Date: 2/7/2017  Length of Stay: 2 days  Attending Physician: Jade Chou MD  Primary Care Provider: Primary Doctor [facility] Medicine Team[place] [facility] [place] Jade Chou MD    Subjective:     Principal Problem:COPD exacerbation    HPI:  "55 yo female with a past medical history of COPD due to smoking for over 40 years.  She has recently tried to wean herself down to just shy of 1/2 PPD of cigarettes over the past several months.  She has been admitted in the past with COPD exacerbations and quite well remembers how they felt.  She now comes to the ED due to gradually progressive shortness of breath and dyspnea on exertion worsening over the past two weeks.  She states that originally she had some friends come visit from [place] who were ill with a respiratory infection on arrival.  Shortly afterwards she, "felt icky, like I had the flu."  She developed malaise, diarrhea and some dry hacking cough.  She never coughed up any purulent sputum.  As the days went by, she gradually developed the same feeling she had back when she had to be admitted for her last COPD exacerbation.  She tried to tough it out with her home medications, but finally her friends confronted her and mentioned that she looked just like she did the last time she had to be admitted.  She finally agreed to seek help."    Hospital Course:       Interval History: Patient with no events overnight, no new complaints. Feeling better, but remains hypoxic with ambulation.    Review of Systems   Constitutional: Negative for fever.   HENT: Negative for congestion.    Respiratory: Positive for shortness of breath. Negative for cough.    Cardiovascular: Negative for chest pain.   Gastrointestinal: Negative for abdominal pain.   Neurological: Positive for dizziness and light-headedness.     Objective: "     Vital Signs (Most Recent):  Temp: 98.1 °F (36.7 °C) (02/09/17 1532)  Pulse: 91 (02/09/17 1803)  Resp: 15 (02/09/17 1803)  BP: 133/71 (02/09/17 1532)  SpO2: (!) 94 % (02/09/17 1803) Vital Signs (24h Range):  Temp:  [96.3 °F (35.7 °C)-98.5 °F (36.9 °C)] 98.1 °F (36.7 °C)  Pulse:  [] 91  Resp:  [14-19] 15  SpO2:  [91 %-98 %] 94 %  BP: ()/(60-80) 133/71     Weight: 65.8 kg (145 lb)  Body mass index is 23.4 kg/(m^2).    Intake/Output Summary (Last 24 hours) at 02/09/17 1814  Last data filed at 02/09/17 1336   Gross per 24 hour   Intake             1110 ml   Output                2 ml   Net             1108 ml      Physical Exam   Constitutional: She appears well-developed.   HENT:   Head: Normocephalic.   Mouth/Throat: Mucous membranes are not cyanotic.   Eyes: Conjunctivae and lids are normal. Pupils are equal.   Neck: Neck supple.   Cardiovascular: Normal rate, regular rhythm, S1 normal and S2 normal.    Pulmonary/Chest: Effort normal. She has decreased breath sounds (prolonged expiratory phase). She has no wheezes. She has no rales.   Abdominal: Soft. Bowel sounds are normal. There is no tenderness.   Musculoskeletal: She exhibits no edema.   Neurological: She is alert. She is not disoriented.   Skin: She is not diaphoretic. No cyanosis. Nails show no clubbing.   Psychiatric: She has a normal mood and affect. Cognition and memory are normal.       Significant Labs:     CBC:     Recent Labs  Lab 02/08/17  0440 02/09/17  0540   WBC 18.99* 8.05   HGB 14.1 13.0   HCT 44.1 41.8    167     CMP:     Recent Labs  Lab 02/08/17  0440      K 4.7      CO2 35*   *   BUN 7   CREATININE 0.7   CALCIUM 8.4*   ANIONGAP 6*   EGFRNONAA >60.0     POCT Glucose:     Recent Labs  Lab 02/08/17  2115 02/09/17  0905 02/09/17  1344   POCTGLUCOSE 180* 87 171*     Urine Studies:     Recent Labs  Lab 02/07/17 2208   COLORU Yellow   APPEARANCEUA Clear   PHUR 6.0   SPECGRAV 1.010   PROTEINUA Trace*   GLUCUA  Negative   KETONESU Negative   BILIRUBINUA Negative   OCCULTUA Negative   NITRITE Negative   UROBILINOGEN Negative   LEUKOCYTESUR Negative     Assessment/Plan:      * COPD exacerbation  -COPD pathway  -DuJulia sibley Spiriva  -Solumedrol 125 mg IV x 1 in ED then Prednisone 40 mg daily  -Mucinex, tessalon pearles for cough  Slowly improving.    Acute respiratory failure with hypoxia  -O2 titration as per COPD pathway  Improved but remains hypoxic.    Bronchitis, acute  Continue azithromycin.    Hyperglycemia, unspecified  Likely due to corticosteroids; A1C 6.5%.    VTE Risk Mitigation         Ordered     enoxaparin injection 40 mg  Daily     Route:  Subcutaneous        02/07/17 1736     Medium Risk of VTE  Once      02/07/17 1736     Place sequential compression device  Until discontinued      02/07/17 1736     Place HOLLIE hose  Until discontinued      02/07/17 1736          Jade Chou MD  Department of Hospital Medicine   [facility]

## 2017-02-10 NOTE — ASSESSMENT & PLAN NOTE
-COPD pathway  -Julia Harris Spiriva  -Solumedrol 125 mg IV x 1 in ED then Prednisone 40 mg daily  -Mucinex tessalon pearles for cough  Slowly improving.

## 2017-02-10 NOTE — PLAN OF CARE
Chronic Obstructive Pulmonary Disease (Adult)     Signs and Symptoms of Listed Potential Problems Will be Absent, Minimized or Managed (Chronic Obstructive Pulmonary Disease) Ongoing (interventions implemented as appropriate)

## 2017-02-11 PROBLEM — J44.0 CHRONIC OBSTRUCTIVE PULMONARY DISEASE WITH ACUTE LOWER RESPIRATORY INFECTION: Status: ACTIVE | Noted: 2017-02-07

## 2017-02-11 PROBLEM — J44.0 CHRONIC OBSTRUCTIVE PULMONARY DISEASE WITH ACUTE LOWER RESPIRATORY INFECTION: Status: RESOLVED | Noted: 2017-02-07 | Resolved: 2017-02-07

## 2017-02-11 PROBLEM — A41.9 SEPSIS: Status: ACTIVE | Noted: 2017-02-11

## 2017-02-11 LAB
ALBUMIN SERPL BCP-MCNC: 2.9 G/DL
ANION GAP SERPL CALC-SCNC: 5 MMOL/L
BASOPHILS # BLD AUTO: 0.01 K/UL
BASOPHILS NFR BLD: 0.2 %
BUN SERPL-MCNC: 11 MG/DL
CALCIUM SERPL-MCNC: 8.9 MG/DL
CHLORIDE SERPL-SCNC: 99 MMOL/L
CO2 SERPL-SCNC: 37 MMOL/L
CREAT SERPL-MCNC: 0.7 MG/DL
DIFFERENTIAL METHOD: ABNORMAL
EOSINOPHIL # BLD AUTO: 0.1 K/UL
EOSINOPHIL NFR BLD: 0.9 %
ERYTHROCYTE [DISTWIDTH] IN BLOOD BY AUTOMATED COUNT: 13.6 %
EST. GFR  (AFRICAN AMERICAN): >60 ML/MIN/1.73 M^2
EST. GFR  (NON AFRICAN AMERICAN): >60 ML/MIN/1.73 M^2
GLUCOSE SERPL-MCNC: 94 MG/DL
HCT VFR BLD AUTO: 42.6 %
HGB BLD-MCNC: 13.1 G/DL
LYMPHOCYTES # BLD AUTO: 1.5 K/UL
LYMPHOCYTES NFR BLD: 25.6 %
MAGNESIUM SERPL-MCNC: 1.9 MG/DL
MCH RBC QN AUTO: 30.4 PG
MCHC RBC AUTO-ENTMCNC: 30.8 %
MCV RBC AUTO: 99 FL
MONOCYTES # BLD AUTO: 0.4 K/UL
MONOCYTES NFR BLD: 7 %
NEUTROPHILS # BLD AUTO: 3.9 K/UL
NEUTROPHILS NFR BLD: 66.1 %
PHOSPHATE SERPL-MCNC: 4.4 MG/DL
PLATELET # BLD AUTO: 179 K/UL
PMV BLD AUTO: 11.2 FL
POCT GLUCOSE: 132 MG/DL (ref 70–110)
POCT GLUCOSE: 163 MG/DL (ref 70–110)
POCT GLUCOSE: 214 MG/DL (ref 70–110)
POCT GLUCOSE: 94 MG/DL (ref 70–110)
POTASSIUM SERPL-SCNC: 3.8 MMOL/L
RBC # BLD AUTO: 4.31 M/UL
SODIUM SERPL-SCNC: 141 MMOL/L
WBC # BLD AUTO: 5.83 K/UL

## 2017-02-11 PROCEDURE — 94761 N-INVAS EAR/PLS OXIMETRY MLT: CPT

## 2017-02-11 PROCEDURE — 99232 SBSQ HOSP IP/OBS MODERATE 35: CPT | Mod: ,,, | Performed by: INTERNAL MEDICINE

## 2017-02-11 PROCEDURE — 85025 COMPLETE CBC W/AUTO DIFF WBC: CPT

## 2017-02-11 PROCEDURE — 80069 RENAL FUNCTION PANEL: CPT

## 2017-02-11 PROCEDURE — 25000003 PHARM REV CODE 250: Performed by: INTERNAL MEDICINE

## 2017-02-11 PROCEDURE — 36415 COLL VENOUS BLD VENIPUNCTURE: CPT

## 2017-02-11 PROCEDURE — 25000242 PHARM REV CODE 250 ALT 637 W/ HCPCS: Performed by: INTERNAL MEDICINE

## 2017-02-11 PROCEDURE — 27000221 HC OXYGEN, UP TO 24 HOURS

## 2017-02-11 PROCEDURE — 94640 AIRWAY INHALATION TREATMENT: CPT

## 2017-02-11 PROCEDURE — 63600175 PHARM REV CODE 636 W HCPCS: Performed by: INTERNAL MEDICINE

## 2017-02-11 PROCEDURE — 11000001 HC ACUTE MED/SURG PRIVATE ROOM

## 2017-02-11 PROCEDURE — 83735 ASSAY OF MAGNESIUM: CPT

## 2017-02-11 RX ADMIN — INSULIN ASPART 2 UNITS: 100 INJECTION, SOLUTION INTRAVENOUS; SUBCUTANEOUS at 04:02

## 2017-02-11 RX ADMIN — TIOTROPIUM BROMIDE 18 MCG: 18 CAPSULE ORAL; RESPIRATORY (INHALATION) at 09:02

## 2017-02-11 RX ADMIN — PANTOPRAZOLE SODIUM 600 MG: 40 TABLET, DELAYED RELEASE ORAL at 08:02

## 2017-02-11 RX ADMIN — STANDARDIZED SENNA CONCENTRATE AND DOCUSATE SODIUM 1 TABLET: 8.6; 5 TABLET, FILM COATED ORAL at 08:02

## 2017-02-11 RX ADMIN — NICOTINE 1 PATCH: 21 PATCH TRANSDERMAL at 08:02

## 2017-02-11 RX ADMIN — PREDNISONE 40 MG: 20 TABLET ORAL at 08:02

## 2017-02-11 RX ADMIN — PANTOPRAZOLE SODIUM 40 MG: 40 TABLET, DELAYED RELEASE ORAL at 09:02

## 2017-02-11 RX ADMIN — ENOXAPARIN SODIUM 40 MG: 100 INJECTION SUBCUTANEOUS at 12:02

## 2017-02-11 RX ADMIN — BENZONATATE 100 MG: 100 CAPSULE ORAL at 08:02

## 2017-02-11 RX ADMIN — IPRATROPIUM BROMIDE AND ALBUTEROL SULFATE 3 ML: .5; 3 SOLUTION RESPIRATORY (INHALATION) at 12:02

## 2017-02-11 RX ADMIN — INSULIN ASPART 2 UNITS: 100 INJECTION, SOLUTION INTRAVENOUS; SUBCUTANEOUS at 12:02

## 2017-02-11 RX ADMIN — ASPIRIN 81 MG: 81 TABLET, COATED ORAL at 08:02

## 2017-02-11 RX ADMIN — FLUTICASONE PROPIONATE 1 SPRAY: 50 SPRAY, METERED NASAL at 12:02

## 2017-02-11 RX ADMIN — IPRATROPIUM BROMIDE AND ALBUTEROL SULFATE 3 ML: .5; 3 SOLUTION RESPIRATORY (INHALATION) at 07:02

## 2017-02-11 RX ADMIN — INSULIN ASPART 4 UNITS: 100 INJECTION, SOLUTION INTRAVENOUS; SUBCUTANEOUS at 04:02

## 2017-02-11 RX ADMIN — INSULIN ASPART 2 UNITS: 100 INJECTION, SOLUTION INTRAVENOUS; SUBCUTANEOUS at 08:02

## 2017-02-11 RX ADMIN — INSULIN ASPART 1 UNITS: 100 INJECTION, SOLUTION INTRAVENOUS; SUBCUTANEOUS at 12:02

## 2017-02-11 RX ADMIN — FLUTICASONE FUROATE AND VILANTEROL TRIFENATATE 1 PUFF: 100; 25 POWDER RESPIRATORY (INHALATION) at 09:02

## 2017-02-11 RX ADMIN — IPRATROPIUM BROMIDE AND ALBUTEROL SULFATE 3 ML: .5; 3 SOLUTION RESPIRATORY (INHALATION) at 11:02

## 2017-02-11 RX ADMIN — IPRATROPIUM BROMIDE AND ALBUTEROL SULFATE 3 ML: .5; 3 SOLUTION RESPIRATORY (INHALATION) at 03:02

## 2017-02-11 RX ADMIN — AZITHROMYCIN 250 MG: 250 TABLET, FILM COATED ORAL at 02:02

## 2017-02-11 RX ADMIN — IPRATROPIUM BROMIDE AND ALBUTEROL SULFATE 3 ML: .5; 3 SOLUTION RESPIRATORY (INHALATION) at 08:02

## 2017-02-11 NOTE — PROGRESS NOTES
"[facility] Hospital Medicine  Progress Note    Patient Name: Ann Canavan  MRN: [id number]  Patient Class: IP- Inpatient   Admission Date: 2/7/2017  Length of Stay: 3 days  Attending Physician: Jade Chou MD  Primary Care Provider: Primary Doctor [facility] Medicine Team: [facility] Jade Chou MD    Subjective:     Principal Problem:COPD exacerbation    HPI:  "55 yo female with a past medical history of COPD due to smoking for over 40 years.  She has recently tried to wean herself down to just shy of 1/2 PPD of cigarettes over the past several months.  She has been admitted in the past with COPD exacerbations and quite well remembers how they felt.  She now comes to the ED due to gradually progressive shortness of breath and dyspnea on exertion worsening over the past two weeks.  She states that originally she had some friends come visit from [place] who were ill with a respiratory infection on arrival.  Shortly afterwards she, "felt icky, like I had the flu."  She developed malaise, diarrhea and some dry hacking cough.  She never coughed up any purulent sputum.  As the days went by, she gradually developed the same feeling she had back when she had to be admitted for her last COPD exacerbation.  She tried to tough it out with her home medications, but finally her friends confronted her and mentioned that she looked just like she did the last time she had to be admitted.  She finally agreed to seek help."    Hospital Course:       Interval History: Patient with no events overnight, no new complaints. Remains hypoxic with ambulation.    Review of Systems   Constitutional: Negative for fever.   HENT: Negative for congestion.    Respiratory: Positive for shortness of breath. Negative for cough.    Cardiovascular: Negative for chest pain.   Gastrointestinal: Negative for abdominal pain.   Neurological: Negative for light-headedness.     Objective:     Vital Signs (Most " Recent):  Temp: 98.2 °F (36.8 °C) (02/10/17 1600)  Pulse: 77 (02/10/17 1700)  Resp: 20 (02/10/17 1600)  BP: 133/68 (02/10/17 1600)  SpO2: (!) 92 % (02/10/17 1600) Vital Signs (24h Range):  Temp:  [96.6 °F (35.9 °C)-98.5 °F (36.9 °C)] 98.2 °F (36.8 °C)  Pulse:  [] 77  Resp:  [15-20] 20  SpO2:  [92 %-99 %] 92 %  BP: (120-133)/(68-72) 133/68     Weight: 65.8 kg (145 lb)  Body mass index is 23.4 kg/(m^2).  No intake or output data in the 24 hours ending 02/10/17 1731   Physical Exam   Constitutional: She appears well-developed.   HENT:   Head: Normocephalic.   Mouth/Throat: Mucous membranes are not cyanotic.   Eyes: Conjunctivae and lids are normal. Pupils are equal.   Neck: Neck supple.   Cardiovascular: Normal rate, regular rhythm, S1 normal and S2 normal.    Pulmonary/Chest: Effort normal. She has decreased breath sounds (prolonged expiratory phase). She has no wheezes. She has no rales.   Abdominal: Soft. Bowel sounds are normal. There is no tenderness.   Musculoskeletal: She exhibits no edema.   Neurological: She is alert. She is not disoriented.   Skin: She is not diaphoretic. No cyanosis. Nails show no clubbing.   Psychiatric: She has a normal mood and affect. Cognition and memory are normal.       Significant Labs:     CBC:     Recent Labs  Lab 02/09/17  0540 02/10/17  0500   WBC 8.05 6.93   HGB 13.0 13.2   HCT 41.8 42.3    170     CMP:     Recent Labs  Lab 02/10/17  0500      K 4.0   CL 99   CO2 36*   GLU 84   BUN 9   CREATININE 0.6   CALCIUM 8.7   ALBUMIN 2.7*   ANIONGAP 7*   EGFRNONAA >60.0     POCT Glucose:     Recent Labs  Lab 02/09/17  2048 02/10/17  0745 02/10/17  1247   POCTGLUCOSE 181* 86 136*     Assessment/Plan:      * COPD exacerbation  -COPD pathway  -Julia Harris Spiriva  -Solumedrol 125 mg IV x 1 in ED then Prednisone 40 mg daily  -Mucinex, tessalon pearles for cough  Slowly improving. Remains hypoxic.    Acute respiratory failure with hypoxia  -O2 titration as per COPD  pathway  Improved but remains hypoxic.    Bronchitis, acute  Continue azithromycin. WBC trending downward.    Hyperglycemia, unspecified  Likely due to corticosteroids; A1C 6.5%.    VTE Risk Mitigation         Ordered     enoxaparin injection 40 mg  Daily     Route:  Subcutaneous        02/07/17 1736     Medium Risk of VTE  Once      02/07/17 1736     Place sequential compression device  Until discontinued      02/07/17 1736     Place HOLLIE hose  Until discontinued      02/07/17 1736          Jade Chou MD  Department of Hospital Medicine   [facility]

## 2017-02-11 NOTE — ASSESSMENT & PLAN NOTE
-COPD pathway  -Julia Harris Spiriva  -Solumedrol 125 mg IV x 1 in ED then Prednisone 40 mg daily  -Mucinex, tessalon pearles for cough  Slowly improving. Remains hypoxic.

## 2017-02-12 VITALS
SYSTOLIC BLOOD PRESSURE: 124 MMHG | HEART RATE: 109 BPM | DIASTOLIC BLOOD PRESSURE: 66 MMHG | RESPIRATION RATE: 20 BRPM | WEIGHT: 145 LBS | TEMPERATURE: 98 F | BODY MASS INDEX: 23.3 KG/M2 | OXYGEN SATURATION: 99 % | HEIGHT: 66 IN

## 2017-02-12 PROBLEM — R73.02 GLUCOSE INTOLERANCE (IMPAIRED GLUCOSE TOLERANCE): Status: ACTIVE | Noted: 2017-02-08

## 2017-02-12 PROBLEM — A41.9 SEPSIS: Status: RESOLVED | Noted: 2017-02-11 | Resolved: 2017-02-12

## 2017-02-12 LAB
ALBUMIN SERPL BCP-MCNC: 3 G/DL
ANION GAP SERPL CALC-SCNC: 9 MMOL/L
BACTERIA BLD CULT: NORMAL
BACTERIA BLD CULT: NORMAL
BASOPHILS # BLD AUTO: 0 K/UL
BASOPHILS NFR BLD: 0 %
BUN SERPL-MCNC: 18 MG/DL
CALCIUM SERPL-MCNC: 9.1 MG/DL
CHLORIDE SERPL-SCNC: 100 MMOL/L
CO2 SERPL-SCNC: 33 MMOL/L
CREAT SERPL-MCNC: 0.7 MG/DL
DIFFERENTIAL METHOD: ABNORMAL
EOSINOPHIL # BLD AUTO: 0 K/UL
EOSINOPHIL NFR BLD: 0.5 %
ERYTHROCYTE [DISTWIDTH] IN BLOOD BY AUTOMATED COUNT: 13.9 %
EST. GFR  (AFRICAN AMERICAN): >60 ML/MIN/1.73 M^2
EST. GFR  (NON AFRICAN AMERICAN): >60 ML/MIN/1.73 M^2
GLUCOSE SERPL-MCNC: 97 MG/DL
HCT VFR BLD AUTO: 42.8 %
HGB BLD-MCNC: 13.5 G/DL
LYMPHOCYTES # BLD AUTO: 1.5 K/UL
LYMPHOCYTES NFR BLD: 25 %
MAGNESIUM SERPL-MCNC: 2 MG/DL
MCH RBC QN AUTO: 31 PG
MCHC RBC AUTO-ENTMCNC: 31.5 %
MCV RBC AUTO: 98 FL
MONOCYTES # BLD AUTO: 0.3 K/UL
MONOCYTES NFR BLD: 4.9 %
NEUTROPHILS # BLD AUTO: 4.3 K/UL
NEUTROPHILS NFR BLD: 69.6 %
PHOSPHATE SERPL-MCNC: 4.8 MG/DL
PLATELET # BLD AUTO: 157 K/UL
PMV BLD AUTO: 11 FL
POCT GLUCOSE: 82 MG/DL (ref 70–110)
POCT GLUCOSE: 98 MG/DL (ref 70–110)
POTASSIUM SERPL-SCNC: 4.1 MMOL/L
RBC # BLD AUTO: 4.35 M/UL
SODIUM SERPL-SCNC: 142 MMOL/L
WBC # BLD AUTO: 6.17 K/UL

## 2017-02-12 PROCEDURE — 80069 RENAL FUNCTION PANEL: CPT

## 2017-02-12 PROCEDURE — 25000242 PHARM REV CODE 250 ALT 637 W/ HCPCS: Performed by: INTERNAL MEDICINE

## 2017-02-12 PROCEDURE — 94760 N-INVAS EAR/PLS OXIMETRY 1: CPT

## 2017-02-12 PROCEDURE — 25000003 PHARM REV CODE 250: Performed by: INTERNAL MEDICINE

## 2017-02-12 PROCEDURE — 85025 COMPLETE CBC W/AUTO DIFF WBC: CPT

## 2017-02-12 PROCEDURE — 83735 ASSAY OF MAGNESIUM: CPT

## 2017-02-12 PROCEDURE — 36415 COLL VENOUS BLD VENIPUNCTURE: CPT

## 2017-02-12 PROCEDURE — 63600175 PHARM REV CODE 636 W HCPCS: Performed by: INTERNAL MEDICINE

## 2017-02-12 PROCEDURE — 27000221 HC OXYGEN, UP TO 24 HOURS

## 2017-02-12 PROCEDURE — 94640 AIRWAY INHALATION TREATMENT: CPT

## 2017-02-12 PROCEDURE — 99239 HOSP IP/OBS DSCHRG MGMT >30: CPT | Mod: ,,, | Performed by: INTERNAL MEDICINE

## 2017-02-12 RX ORDER — PREDNISONE 20 MG/1
20 TABLET ORAL DAILY
Qty: 7 TABLET | Refills: 0 | Status: SHIPPED | OUTPATIENT
Start: 2017-02-12 | End: 2017-02-19

## 2017-02-12 RX ORDER — TIOTROPIUM BROMIDE 18 UG/1
18 CAPSULE ORAL; RESPIRATORY (INHALATION) DAILY
Qty: 30 CAPSULE | Refills: 11 | Status: SHIPPED | OUTPATIENT
Start: 2017-02-12 | End: 2017-05-12

## 2017-02-12 RX ORDER — FLUTICASONE PROPIONATE AND SALMETEROL 250; 50 UG/1; UG/1
1 POWDER RESPIRATORY (INHALATION) 2 TIMES DAILY
Qty: 60 EACH | Refills: 11 | Status: SHIPPED | OUTPATIENT
Start: 2017-02-12 | End: 2017-05-12

## 2017-02-12 RX ORDER — ALBUTEROL SULFATE 90 UG/1
2 AEROSOL, METERED RESPIRATORY (INHALATION) EVERY 6 HOURS PRN
Qty: 18 G | Refills: 11 | Status: SHIPPED | OUTPATIENT
Start: 2017-02-12 | End: 2017-05-26 | Stop reason: SDUPTHER

## 2017-02-12 RX ADMIN — FLUTICASONE PROPIONATE 1 SPRAY: 50 SPRAY, METERED NASAL at 09:02

## 2017-02-12 RX ADMIN — FLUTICASONE FUROATE AND VILANTEROL TRIFENATATE 1 PUFF: 100; 25 POWDER RESPIRATORY (INHALATION) at 09:02

## 2017-02-12 RX ADMIN — IPRATROPIUM BROMIDE AND ALBUTEROL SULFATE 3 ML: .5; 3 SOLUTION RESPIRATORY (INHALATION) at 02:02

## 2017-02-12 RX ADMIN — IPRATROPIUM BROMIDE AND ALBUTEROL SULFATE 3 ML: .5; 3 SOLUTION RESPIRATORY (INHALATION) at 01:02

## 2017-02-12 RX ADMIN — IPRATROPIUM BROMIDE AND ALBUTEROL SULFATE 3 ML: .5; 3 SOLUTION RESPIRATORY (INHALATION) at 09:02

## 2017-02-12 RX ADMIN — ASPIRIN 81 MG: 81 TABLET, COATED ORAL at 09:02

## 2017-02-12 RX ADMIN — INSULIN ASPART 2 UNITS: 100 INJECTION, SOLUTION INTRAVENOUS; SUBCUTANEOUS at 06:02

## 2017-02-12 RX ADMIN — TIOTROPIUM BROMIDE 18 MCG: 18 CAPSULE ORAL; RESPIRATORY (INHALATION) at 09:02

## 2017-02-12 RX ADMIN — IPRATROPIUM BROMIDE AND ALBUTEROL SULFATE 3 ML: .5; 3 SOLUTION RESPIRATORY (INHALATION) at 04:02

## 2017-02-12 RX ADMIN — PREDNISONE 40 MG: 20 TABLET ORAL at 09:02

## 2017-02-12 RX ADMIN — STANDARDIZED SENNA CONCENTRATE AND DOCUSATE SODIUM 1 TABLET: 8.6; 5 TABLET, FILM COATED ORAL at 09:02

## 2017-02-12 RX ADMIN — ENOXAPARIN SODIUM 40 MG: 100 INJECTION SUBCUTANEOUS at 12:02

## 2017-02-12 RX ADMIN — PANTOPRAZOLE SODIUM 600 MG: 40 TABLET, DELAYED RELEASE ORAL at 09:02

## 2017-02-12 RX ADMIN — NICOTINE 1 PATCH: 21 PATCH TRANSDERMAL at 09:02

## 2017-02-12 RX ADMIN — PANTOPRAZOLE SODIUM 40 MG: 40 TABLET, DELAYED RELEASE ORAL at 09:02

## 2017-02-12 NOTE — PROGRESS NOTES
[facility] arrived at bedside to deliver home oxygen equipment; pt aware of $475 out-of-pocket fee. Will continue to monitor.

## 2017-02-12 NOTE — PLAN OF CARE
Pt being discharged home today, will need O2, was ordered from [facility], orders faxed to [phone].  Pt is paying 475.00 with a credit card, when O2 arrives, can be discharged home.

## 2017-02-12 NOTE — DISCHARGE SUMMARY
"[facility] Hospital Medicine  Discharge Summary      Patient Name: Ann Canavan  MRN: [id number]  Admission Date: 2/7/2017  Hospital Length of Stay: 5 days  Discharge Date and Time: 2/12/2017  5:33 PM  Attending Physician: Jade Chou MD   Discharging Provider: Jade Chou MD  Primary Care Provider: Primary Doctor [facility] Medicine Team: [facility] HOSP MED  Jade Chou MD    HPI: "57 yo female with a past medical history of COPD due to smoking for over 40 years.  She has recently tried to wean herself down to just shy of 1/2 PPD of cigarettes over the past several months.  She has been admitted in the past with COPD exacerbations and quite well remembers how they felt.  She now comes to the ED due to gradually progressive shortness of breath and dyspnea on exertion worsening over the past two weeks.  She states that originally she had some friends come visit from [place] who were ill with a respiratory infection on arrival.  Shortly afterwards she, "felt icky, like I had the flu."  She developed malaise, diarrhea and some dry hacking cough.  She never coughed up any purulent sputum.  As the days went by, she gradually developed the same feeling she had back when she had to be admitted for her last COPD exacerbation.  She tried to tough it out with her home medications, but finally her friends confronted her and mentioned that she looked just like she did the last time she had to be admitted.  She finally agreed to seek help."      * No surgery found *      Indwelling Lines/Drains at time of discharge:   Lines/Drains/Airways          No matching active lines, drains, or airways        Hospital Course:     COPD exacerbation  -COPD pathway  -Julia Harris Spiriva  -Solumedrol 125 mg IV x 1 in ED then Prednisone 40 mg daily  -Mucinex tessalon pearles for cough  Slowly improving. Remains hypoxic with ambulatory pulse ox in mid-80s.  Arranging home O2.  Continuing Prednisone 20 mg x " 7 more days due to persistent wheezing.     Acute respiratory failure with hypoxia  -O2 titration as per COPD pathway  Improved clinically but remained hypoxic.     Bronchitis, acute  Continued azithromycin x 5 days. WBC trending downward.     Chronic obstructive pulmonary disease with acute lower respiratory infection  Likely bacterial bronchitis.     Sepsis  3/4 SIRS on admission. WBC trending down.     Hyperglycemia, unspecified  Likely due to corticosteroids; A1C 6.5%.     Tobacco abuse  Needs cessation program.    Consults:   Consults         Status Ordering Provider     Inpatient consult to Social Work/Case Management  Once     Provider:  (Not yet assigned)    Acknowledged JOSEE SOLORZANO     IP consult to dietary  Once     Provider:  (Not yet assigned)    Completed WOODY RECINOS        Significant Diagnostic Studies:  EF   Date Value Ref Range Status   05/02/2014 60       Hemoglobin A1C   Date Value Ref Range Status   02/08/2017 6.5 (H) 4.5 - 6.2 % Final     CBC:   Recent Labs  Lab 02/12/17  0435   WBC 6.17   RBC 4.35   HGB 13.5   HCT 42.8      MCV 98   MCH 31.0   MCHC 31.5*     CMP:   Recent Labs  Lab 02/07/17  1517  02/12/17  0435   *  < > 97   CALCIUM 8.9  < > 9.1   ALBUMIN 3.3*  < > 3.0*   PROT 6.6  --   --      < > 142   K 3.8  < > 4.1   CO2 34*  < > 33*     < > 100   BUN 8  < > 18   CREATININE 0.8  < > 0.7   ALKPHOS 58  --   --    ALT 18  --   --    AST 16  --   --    BILITOT 0.3  --   --    < > = values in this interval not displayed.     Cardiac markers:   Recent Labs  Lab 02/07/17  1517   TROPONINI <0.006     Microbiology Results (last 7 days)     Procedure Component Value Units Date/Time    Blood Culture #2 **CANNOT BE ORDERED STAT** [[id number]] Collected:  02/07/17 1526    Order Status:  Completed Specimen:  Blood from Peripheral, Forearm, Right Updated:  02/11/17 1812     Blood Culture, Routine No Growth to date     Blood Culture, Routine No Growth to date     Blood  Culture, Routine No Growth to date     Blood Culture, Routine No Growth to date     Blood Culture, Routine No Growth to date    Blood Culture #1 **CANNOT BE ORDERED STAT** [[id number]] Collected:  02/07/17 1517    Order Status:  Completed Specimen:  Blood from Peripheral, Hand, Left Updated:  02/11/17 1812     Blood Culture, Routine No Growth to date     Blood Culture, Routine No Growth to date     Blood Culture, Routine No Growth to date     Blood Culture, Routine No Growth to date     Blood Culture, Routine No Growth to date    Respiratory Viral Panel by PCR [[id number]] Resulted:  02/07/17 1754    Order Status:  No result Updated:  02/07/17 1754    Respiratory virus antigens panel [[id number]] Collected:  02/07/17 1734    Order Status:  Canceled Specimen:  Respiratory from Nasopharyngeal Swab Updated:  02/07/17 1738    Narrative:       Respiratory Viruses - DFA was cancelled on 02/07/2017 at 17:53 by   JCY; Test changed to sendout. 02/07/2017  17:53    Culture, Respiratory with Gram Stain [[id number]]     Order Status:  No result Specimen:  Respiratory           Recent Labs  Lab 02/07/17  2208   COLORU Yellow   SPECGRAV 1.010   PHUR 6.0   PROTEINUA Trace*   NITRITE Negative   LEUKOCYTESUR Negative   UROBILINOGEN Negative     Imaging Results         X-Ray Chest 1 View (Final result) Result time:  02/07/17 16:07:46    Final result by Janette Blanton MD (02/07/17 16:07:46)    Impression:        Stable radiographic appearance compared with 05/01/2014      Electronically signed by: JANETTE BLANTON MD  Date:     02/07/17  Time:    16:07     Narrative:    Chest x-ray AP portable demonstrates that the lungs are very well-expanded, possibly related to COPD.  No acute infiltrates are seen.  No pleural effusion or pneumothorax is noted.            Pending Diagnostic Studies:     None        Final Active Diagnoses:    Diagnosis Date Noted POA    PRINCIPAL PROBLEM:  COPD exacerbation [J44.1] 05/04/2014 Yes    Acute respiratory  "failure with hypoxia [J96.01] 05/04/2014 Yes    Chronic obstructive pulmonary disease with acute lower respiratory infection [J44.0] 02/07/2017 Yes    Bronchitis, acute [J20.9] 05/01/2014 Yes    Glucose intolerance (impaired glucose tolerance) [R73.02] 02/08/2017 Yes    Tobacco abuse [Z72.0] 05/02/2014 Yes      Problems Resolved During this Admission:    Diagnosis Date Noted Date Resolved POA    Sepsis [A41.9] 02/11/2017 02/12/2017 Yes      Discharged Condition: stable    Disposition: Home or Self Care    Follow Up:  Follow-up Information     Follow up with [facility]. Schedule an appointment as soon as possible for a visit in 1 week.    Why:  To establish care, For discharge from hospital follow up        Follow up with [facility]. Schedule an appointment as soon as possible for a visit in 3 days.    Specialty:  Priority Care    Why:  For discharge from hospital follow up    Contact information:    [id number]  [facility] [address] [phone]    Additional information:    [facility]        Patient Instructions:     OXYGEN FOR HOME USE   Order Specific Question Answer Comments   Liter Flow 2    Duration With activity    Qualifying SpO2: 82% with ambulation    Testing done at: Exercise/Activity    Route nasal cannula    Portable mode: continuous    Device home concentrator with portable unit    Length of need (in months): 12 mos    Patient condition with qualifying saturation COPD    Height: 5' 6" (1.676 m)    Weight: 65.8 kg (145 lb)    Does patient have medical equipment at home? none    Alternative treatment measures have been tried or considered and deemed clinically ineffective. Yes      Ambulatory referral to Internal Medicine   Referral Priority: Routine Referral Type: Consultation   Referral Reason: Specialty Services Required    Requested Specialty: Internal Medicine    Number of Visits Requested: 1  "     Ambulatory Referral to [facility]   Referral Priority: Routine Referral Type: Consultation   Referral Reason: Specialty Services Required    Number of Visits Requested: 1      Ambulatory referral to Pulmonology   Referral Priority: Routine Referral Type: Consultation   Referral Reason: Specialty Services Required    Requested Specialty: Pulmonary Disease    Number of Visits Requested: 1      Ambulatory Referral to Pulmonary Rehab   Referral Priority: Routine Referral Type: Consultation   Referral Reason: Specialty Services Required    Requested Specialty: Pulmonary Disease    Number of Visits Requested: 1      Ambulatory referral to Smoking Cessation Program   Referral Priority: Routine Referral Type: Consultation   Referral Reason: Specialty Services Required    Requested Specialty: CTTS    Number of Visits Requested: 1      Ambulatory Referral to Pharmacy Assistance   Referral Priority: Routine Referral Type: Consultation   Referral Reason: Specialty Services Required    Number of Visits Requested: 1      Ambulatory referral to Outpatient Case Management   Referral Priority: Routine Referral Type: Consultation   Referral Reason: Specialty Services Required    Number of Visits Requested: 1      Diet general   Order Comments: LOW CARB     Activity as tolerated     Call MD for:  temperature >100.4     Call MD for:  persistent nausea and vomiting or diarrhea     Call MD for:  difficulty breathing or increased cough     Call MD for:  persistent dizziness, light-headedness, or visual disturbances       Medications:  Reconciled Home Medications:   Current Discharge Medication List      START taking these medications    Details   !! albuterol 90 mcg/actuation inhaler  Pharmacy assistance -- sent to [facility] Inhale 2 puffs into the lungs every 6 (six) hours as needed for Wheezing. Rescue  Qty: 18 g, Refills: 0      !! albuterol 90 mcg/actuation inhaler Inhale 2 puffs into the lungs every 6 (six) hours as  "needed for Wheezing. Rescue  Qty: 18 g, Refills: 11      guaifenesin (MUCINEX) 600 mg 12 hr tablet Take 2 tablets (1,200 mg total) by mouth 2 (two) times daily.  Qty: 20 tablet, Refills: 0      predniSONE (DELTASONE) 20 MG tablet Take 1 tablet (20 mg total) by mouth once daily.  Qty: 7 tablet, Refills: 0       !! - Potential duplicate medications found. Please discuss with provider.   fluticasone-salmeterol 250-50 mcg/dose (ADVAIR) 250-50 mcg/dose diskus inhaler Inhale 1 puff into the lungs 2 (two) times daily.  Qty: 60 each, Refills: 11      tiotropium (SPIRIVA) 18 mcg inhalation capsule Inhale 1 capsule (18 mcg total) into the lungs once daily.  Qty: 30 capsule, Refills: 11      aspirin (ECOTRIN) 81 MG EC tablet Take 1 tablet (81 mg total) by mouth once daily.  Qty: 30 tablet, Refills: 3      fluticasone (FLONASE) 50 mcg/actuation nasal spray 1 spray by Each Nare route once daily.  Qty: 1 Bottle, Refills: 1      nicotine (NICODERM CQ) 21 mg/24 hr Place 1 patch onto the skin once daily.  Qty: 21 patch, Refills: 0           Time spent on the discharge of patient: 30 minutes  Time Spent:  Included reviewing hospital course with patient/family, reviewing discharge medications, and arranging follow-up care.  Face to face services were provided on 2/12/2017   Physical Exam on 2/12/2017:  height is 5' 6" (1.676 m) and weight is 65.8 kg (145 lb). Her oral temperature is 97.4 °F (36.3 °C). Her blood pressure is 122/72 and her pulse is 108. Her respiration is 17 and oxygen saturation is 96%.   Cardiovascular: Normal rate, regular rhythm, S1 normal and S2 normal.   Pulmonary/Chest: Effort normal. She has decreased breath sounds (prolonged expiratory phase). She has mild wheezes. She has no rales.   Abdominal: Soft. Bowel sounds are normal. There is no tenderness.      Jade Chou MD  Department of Hospital Medicine  [facility]    "

## 2017-02-12 NOTE — PLAN OF CARE
Pt on 2 L PRN O2 overnight, O2 sats >93%. Respiratory treatments done overnight. PRN meds administered for c/o cough. NAD. Will continue to monitor

## 2017-02-12 NOTE — SUBJECTIVE & OBJECTIVE
Interval History: Patient with no events overnight, no new complaints. Remains hypoxic with ambulation but improving.    Review of Systems   Constitutional: Negative for fever.   HENT: Negative for congestion.    Respiratory: Positive for shortness of breath. Negative for cough.    Cardiovascular: Negative for chest pain.   Gastrointestinal: Negative for abdominal pain.   Neurological: Negative for light-headedness.     Objective:     Vital Signs (Most Recent):  Temp: 98.8 °F (37.1 °C) (02/11/17 1515)  Pulse: 104 (02/11/17 1710)  Resp: 18 (02/11/17 1557)  BP: 125/62 (02/11/17 1515)  SpO2: 97 % (02/11/17 1557) Vital Signs (24h Range):  Temp:  [98.2 °F (36.8 °C)-98.8 °F (37.1 °C)] 98.8 °F (37.1 °C)  Pulse:  [] 104  Resp:  [14-18] 18  SpO2:  [89 %-97 %] 97 %  BP: (119-135)/(62-83) 125/62     Weight: 65.8 kg (145 lb)  Body mass index is 23.4 kg/(m^2).    Intake/Output Summary (Last 24 hours) at 02/11/17 1812  Last data filed at 02/11/17 1700   Gross per 24 hour   Intake             2290 ml   Output                3 ml   Net             2287 ml      Physical Exam   Constitutional: She appears well-developed.   HENT:   Head: Normocephalic.   Mouth/Throat: Mucous membranes are not cyanotic.   Eyes: Conjunctivae and lids are normal. Pupils are equal.   Neck: Neck supple.   Cardiovascular: Normal rate, regular rhythm, S1 normal and S2 normal.    Pulmonary/Chest: Effort normal. She has decreased breath sounds (prolonged expiratory phase). She has no wheezes. She has no rales.   Abdominal: Soft. Bowel sounds are normal. There is no tenderness.   Musculoskeletal: She exhibits no edema.   Neurological: She is alert. She is not disoriented.   Skin: She is not diaphoretic. No cyanosis. Nails show no clubbing.   Psychiatric: She has a normal mood and affect. Cognition and memory are normal.       Significant Labs:     CBC:     Recent Labs  Lab 02/10/17  0500 02/11/17  0533   WBC 6.93 5.83   HGB 13.2 13.1   HCT 42.3 42.6   PLT  170 179     CMP:     Recent Labs  Lab 02/10/17  0500 02/11/17  0533    141   K 4.0 3.8   CL 99 99   CO2 36* 37*   GLU 84 94   BUN 9 11   CREATININE 0.6 0.7   CALCIUM 8.7 8.9   ALBUMIN 2.7* 2.9*   ANIONGAP 7* 5*   EGFRNONAA >60.0 >60.0     POCT Glucose:     Recent Labs  Lab 02/11/17  0846 02/11/17  1231 02/11/17  1646   POCTGLUCOSE 94 163* 214*

## 2017-02-12 NOTE — PLAN OF CARE
Problem: Patient Care Overview  Goal: Plan of Care Review  Outcome: Ongoing (interventions implemented as appropriate)  Plan of care reviewed with patient with no questions or concerns at this time. Pain was assessed and managed throughout shift. O2 on continuously per pt O2 sats. Patient voids without difficulty. Patient repositions self independently. Fall precautions in place with call light within reach. Family at bedside. Will continue to monitor.

## 2017-02-12 NOTE — PROGRESS NOTES
Pt returned to room after ambulating in hallway on room air. O2 2.5L NC reapplied; SpO2 returned to 93% after sitting edge of bed for 2 minutes. Will continue to monitor.

## 2017-02-12 NOTE — PROGRESS NOTES
"[facility] Hospital Medicine  Progress Note    Patient Name: Ann Canavan  MRN: [id number]  Patient Class: IP- Inpatient   Admission Date: 2/7/2017  Length of Stay: 4 days  Attending Physician: Jade Chou MD  Primary Care Provider: Primary Doctor [facility] Medicine Team: [facility]OSP MED G Jade Chou MD    Subjective:     Principal Problem:COPD exacerbation    HPI:  "57 yo female with a past medical history of COPD due to smoking for over 40 years.  She has recently tried to wean herself down to just shy of 1/2 PPD of cigarettes over the past several months.  She has been admitted in the past with COPD exacerbations and quite well remembers how they felt.  She now comes to the ED due to gradually progressive shortness of breath and dyspnea on exertion worsening over the past two weeks.  She states that originally she had some friends come visit from [place] who were ill with a respiratory infection on arrival.  Shortly afterwards she, "felt icky, like I had the flu."  She developed malaise, diarrhea and some dry hacking cough.  She never coughed up any purulent sputum.  As the days went by, she gradually developed the same feeling she had back when she had to be admitted for her last COPD exacerbation.  She tried to tough it out with her home medications, but finally her friends confronted her and mentioned that she looked just like she did the last time she had to be admitted.  She finally agreed to seek help."    Hospital Course:       Interval History: Patient with no events overnight, no new complaints. Remains hypoxic with ambulation but improving.    Review of Systems   Constitutional: Negative for fever.   HENT: Negative for congestion.    Respiratory: Positive for shortness of breath. Negative for cough.    Cardiovascular: Negative for chest pain.   Gastrointestinal: Negative for abdominal pain.   Neurological: Negative for light-headedness.     Objective:     Vital Signs " (Most Recent):  Temp: 98.8 °F (37.1 °C) (02/11/17 1515)  Pulse: 104 (02/11/17 1710)  Resp: 18 (02/11/17 1557)  BP: 125/62 (02/11/17 1515)  SpO2: 97 % (02/11/17 1557) Vital Signs (24h Range):  Temp:  [98.2 °F (36.8 °C)-98.8 °F (37.1 °C)] 98.8 °F (37.1 °C)  Pulse:  [] 104  Resp:  [14-18] 18  SpO2:  [89 %-97 %] 97 %  BP: (119-135)/(62-83) 125/62     Weight: 65.8 kg (145 lb)  Body mass index is 23.4 kg/(m^2).    Intake/Output Summary (Last 24 hours) at 02/11/17 1812  Last data filed at 02/11/17 1700   Gross per 24 hour   Intake             2290 ml   Output                3 ml   Net             2287 ml      Physical Exam   Constitutional: She appears well-developed.   HENT:   Head: Normocephalic.   Mouth/Throat: Mucous membranes are not cyanotic.   Eyes: Conjunctivae and lids are normal. Pupils are equal.   Neck: Neck supple.   Cardiovascular: Normal rate, regular rhythm, S1 normal and S2 normal.    Pulmonary/Chest: Effort normal. She has decreased breath sounds (prolonged expiratory phase). She has no wheezes. She has no rales.   Abdominal: Soft. Bowel sounds are normal. There is no tenderness.   Musculoskeletal: She exhibits no edema.   Neurological: She is alert. She is not disoriented.   Skin: She is not diaphoretic. No cyanosis. Nails show no clubbing.   Psychiatric: She has a normal mood and affect. Cognition and memory are normal.       Significant Labs:     CBC:     Recent Labs  Lab 02/10/17  0500 02/11/17  0533   WBC 6.93 5.83   HGB 13.2 13.1   HCT 42.3 42.6    179     CMP:     Recent Labs  Lab 02/10/17  0500 02/11/17  0533    141   K 4.0 3.8   CL 99 99   CO2 36* 37*   GLU 84 94   BUN 9 11   CREATININE 0.6 0.7   CALCIUM 8.7 8.9   ALBUMIN 2.7* 2.9*   ANIONGAP 7* 5*   EGFRNONAA >60.0 >60.0     POCT Glucose:     Recent Labs  Lab 02/11/17  0846 02/11/17  1231 02/11/17  1646   POCTGLUCOSE 94 163* 214*     Assessment/Plan:      * COPD exacerbation  -COPD pathway  -Dumaryjo, Nikio, Spiriva  -Solumedrol  125 mg IV x 1 in ED then Prednisone 40 mg daily  -Mucinex, tessalon pearles for cough  Slowly improving. Remains hypoxic with ambulatory pulse ox in mid-80s.    Acute respiratory failure with hypoxia  -O2 titration as per COPD pathway  Improved but remains hypoxic.    Bronchitis, acute  Continue azithromycin. WBC trending downward.    Chronic obstructive pulmonary disease with acute lower respiratory infection  Likely bacterial bronchitis.    Sepsis  3/4 SIRS on admission. WBC trending down    Hyperglycemia, unspecified  Likely due to corticosteroids; A1C 6.5%.    Tobacco abuse  Needs cessation.    VTE Risk Mitigation         Ordered     enoxaparin injection 40 mg  Daily     Route:  Subcutaneous        02/07/17 1736     Medium Risk of VTE  Once      02/07/17 1736     Place sequential compression device  Until discontinued      02/07/17 1736     Place HOLLIE hose  Until discontinued      02/07/17 1736          Jade Chou MD  Department of Hospital Medicine   [facility]

## 2017-02-12 NOTE — PROGRESS NOTES
Pt ambulated 140 ft in hallway without oxygen. SpO2 decreased from 93% to 82%. Pt denies SOB at this time. Will continue to monitor.

## 2017-02-12 NOTE — ASSESSMENT & PLAN NOTE
-COPD pathway  -Julia Harris Spiriva  -Solumedrol 125 mg IV x 1 in ED then Prednisone 40 mg daily  -Mucinex, tessalon pearles for cough  Slowly improving. Remains hypoxic with ambulatory pulse ox in mid-80s.

## 2017-02-12 NOTE — PLAN OF CARE
Problem: Fall Risk (Adult)  Goal: Identify Related Risk Factors and Signs and Symptoms  Related risk factors and signs and symptoms are identified upon initiation of Human Response Clinical Practice Guideline (CPG)   Outcome: Outcome(s) achieved Date Met:  02/12/17  Pt discharged, per MD order. No c/o pain or discomfort. VSS. IV removed, intact, and Tele discontinued. Discharge paperwork reviewed with pt; verbalized understanding. RXs given. O2 @ 2L NC initiated per O2 tank for home use. Awaiting wheelchair tranpsort off floor at this time.

## 2017-02-12 NOTE — PLAN OF CARE
Home Oxygen Evaluation    Date Performed: 2017    1) Patient's Home O2 Sat on room air, while at rest: 93%         If O2 sats on room air at rest are 88% or below, patient qualifies. No additional testing needed. Document N/A in steps 2 and 3. If 89% or above, complete steps 2.      2) Patient's O2 Sat on room air while exercisin%         If O2 sats on room air while exercising remain 89% or above patient does not qualify, no further testing needed Document N/A in step 3. If O2 sats on room air while exercising are 88% or below, continue to step 3.      3) Patient's O2 Sat while exercising on O2: 93% at 2.5 LPM         (Must show improvement from #2 for patients to qualify)    If O2 sats improve on oxygen, patient qualifies for portable oxygen. If not, the patient does not qualify.

## 2017-02-14 ENCOUNTER — PATIENT OUTREACH (OUTPATIENT)
Dept: ADMINISTRATIVE | Facility: CLINIC | Age: 57
End: 2017-02-14

## 2017-02-14 ENCOUNTER — OUTPATIENT CASE MANAGEMENT (OUTPATIENT)
Dept: ADMINISTRATIVE | Facility: OTHER | Age: 57
End: 2017-02-14

## 2017-02-14 LAB
RVP - ADENOVIRUS: NORMAL
RVP - HUMAN METAPNEUMOVIRUS (HMPV): NORMAL
RVP - INFLUENZA A SUBTYPE H1 - (SEASONAL): NORMAL
RVP - INFLUENZA A SUBTYPE H3 - (SEASONAL): NORMAL
RVP - INFLUENZA A: NORMAL
RVP - INFLUENZA B: NORMAL
RVP - PARAINFLUENZA VIRUS 1: NORMAL
RVP - PARAINFLUENZA VIRUS 2: NORMAL
RVP - PARAINFLUENZA VIRUS 3: NORMAL
RVP - RESPIRATORY SYNCTIAL VIRUS (RSV) A: NORMAL
RVP - RESPIRATORY SYNCTIAL VIRUS (RSV) B: NORMAL
RVP - RESPIRATORY VIRAL PANEL, SOURCE: NORMAL
RVP - RHINOVIRUS: NORMAL

## 2017-02-14 NOTE — PATIENT INSTRUCTIONS
COPD Flare    You have had a flare-up of your COPD.  COPD, or chronic obstructive pulmonary disease, is a common lung disease. It causes your airways to become irritated and narrower. This makes it harder for you to breathe. Emphysema and chronic bronchitis are both types of COPD. This is a chronic condition, which means you always have it. Sometimes it gets worse. When this happens, it is called a flare-up.  Symptoms of COPD  People with COPD may have symptoms most of the time. In a flare-up, your symptoms get worse. These symptoms may mean you are having a flare-up:  · Shortness of breath, shallow or rapid breathing, or wheezing that gets worse  · Lung infection  · Cough that gets worse  · More mucus, thicker mucus or mucus of a different color  · Tiredness, decreased energy, or trouble doing your usual activities  · Fever  · Chest tightness  · Your symptoms dont get better even when you use your usual medicines, inhalers, and nebulizer  · Trouble talking  · You feel confused  Causes of flare-ups  Unfortunately, a flare-up can happen even though you did everything right, and you followed your doctors instructions. Some causes of flare-ups are:  · Smoking or secondhand smoke  · Colds, the flu, or respiratory infections  · Air pollution  · Sudden change in the weather  · Dust, irritating chemicals, or strong fumes  · Not taking your medicines as prescribed  Home care  Here are some things you can do at home to treat a flare-up:  · Try not to panic. This makes it harder to breathe, and keeps you from doing the right things.  · Dont smoke or be around others who are smoking.  · Try to drink more fluids than usual during a flare-up, unless your doctor has told you not to because of heart and kidney problems. More fluids can help loosen the mucus.  · Use your inhalers and nebulizer, if you have one, as you have been told to.  · If you were given antibiotics, take them until they are used up or your doctor tells you  to stop. Its important to finish the antibiotics, even though you feel better. This will make sure the infection has cleared.  · If you were given prednisone or another steroid, finish it even if you feel better.  Preventing a flare-up  Even though flare-ups happen, the best way to treat one is to prevent it before it starts. Here are some pointers:  · Dont smoke or be around others who are smoking.  · Take your medicines as you have been told.  · Talk with your doctor about getting a flu shot every year. Also find out if you need a pneumonia shot.  · If there is a weather advisory warning to stay indoors, try to stay inside when possible.  · Try to eat healthy and get plenty of sleep.  · Try to avoid things that usually set you off, like dust, chemical fumes, hairsprays, or strong perfumes.  Follow-up care  Follow up with your healthcare provider, or as advised.  If a culture was done, you will be told if your treatment needs to be changed. You can call as directed for the results.  If X-rays were done, you will be notified of any new findings that may affect your care.  Call 911  Call 911 if any of these occur:  · You have trouble breathing  · You feel confused or its difficult to wake you up  · You faint or lose consciousness  · You have a rapid heart rate  · You have new pain in your chest, arm, shoulder, neck or upper back  When to seek medical advice  Call your healthcare provider right away if any of these occur:  · Wheezing or shortness of breath gets worse  · You need to use your inhalers more often than usual without relief  · Fever of 100.4°F (38ºC) or higher, or as directed by your healthcare provider  · Coughing up lots of dark-colored or bloody mucus (sputum)  · Chest pain with each breath  · You do not start to get better within 24 hours  · Swelling of your ankles gets worse  · Dizziness or weakness  Date Last Reviewed: 9/1/2016  © 3050-6738 [employer]. [address] [facility],  [place] [address]. All rights reserved. This information is not intended as a substitute for professional medical care. Always follow your healthcare professional's instructions.

## 2017-02-14 NOTE — PROGRESS NOTES
Please note the following patient information has been forwarded to [facility]/ Medicaid for Case Management or .    Please see the media section of the patient's chart for additional details.    Please contact Outpatient Complex Care Management at ext [phone] with any questions.    Thank you    Amira Bowen, SSC

## 2017-04-27 ENCOUNTER — HOSPITAL ENCOUNTER (INPATIENT)
Facility: HOSPITAL | Age: 57
LOS: 3 days | Discharge: HOME | DRG: 190 | End: 2017-05-01
Attending: EMERGENCY MEDICINE | Admitting: HOSPITALIST
Payer: MEDICAID

## 2017-04-27 ENCOUNTER — NURSE TRIAGE (OUTPATIENT)
Dept: ADMINISTRATIVE | Facility: CLINIC | Age: 57
End: 2017-04-27

## 2017-04-27 DIAGNOSIS — R73.02 GLUCOSE INTOLERANCE (IMPAIRED GLUCOSE TOLERANCE): ICD-10-CM

## 2017-04-27 DIAGNOSIS — J44.1 COPD WITH EXACERBATION: Primary | ICD-10-CM

## 2017-04-27 DIAGNOSIS — F17.200 SMOKER: ICD-10-CM

## 2017-04-27 LAB
ALBUMIN SERPL BCP-MCNC: 3.9 G/DL
ALP SERPL-CCNC: 90 U/L
ALT SERPL W/O P-5'-P-CCNC: 16 U/L
ANION GAP SERPL CALC-SCNC: 11 MMOL/L
AST SERPL-CCNC: 26 U/L
BACTERIA #/AREA URNS AUTO: ABNORMAL /HPF
BASOPHILS # BLD AUTO: 0.03 K/UL
BASOPHILS NFR BLD: 0.3 %
BILIRUB SERPL-MCNC: 0.4 MG/DL
BILIRUB UR QL STRIP: NEGATIVE
BUN SERPL-MCNC: 15 MG/DL
CALCIUM SERPL-MCNC: 9.7 MG/DL
CHLORIDE SERPL-SCNC: 103 MMOL/L
CLARITY UR REFRACT.AUTO: ABNORMAL
CO2 SERPL-SCNC: 27 MMOL/L
COLOR UR AUTO: YELLOW
CREAT SERPL-MCNC: 0.8 MG/DL
DIFFERENTIAL METHOD: ABNORMAL
EOSINOPHIL # BLD AUTO: 0.2 K/UL
EOSINOPHIL NFR BLD: 2.2 %
ERYTHROCYTE [DISTWIDTH] IN BLOOD BY AUTOMATED COUNT: 14.5 %
EST. GFR  (AFRICAN AMERICAN): >60 ML/MIN/1.73 M^2
EST. GFR  (NON AFRICAN AMERICAN): >60 ML/MIN/1.73 M^2
GLUCOSE SERPL-MCNC: 115 MG/DL
GLUCOSE UR QL STRIP: NEGATIVE
HCT VFR BLD AUTO: 51.2 %
HGB BLD-MCNC: 16.7 G/DL
HGB UR QL STRIP: NEGATIVE
INR PPP: 0.9
KETONES UR QL STRIP: NEGATIVE
LEUKOCYTE ESTERASE UR QL STRIP: ABNORMAL
LYMPHOCYTES # BLD AUTO: 1.3 K/UL
LYMPHOCYTES NFR BLD: 11.4 %
MCH RBC QN AUTO: 32.4 PG
MCHC RBC AUTO-ENTMCNC: 32.6 %
MCV RBC AUTO: 99 FL
MICROSCOPIC COMMENT: ABNORMAL
MONOCYTES # BLD AUTO: 0.5 K/UL
MONOCYTES NFR BLD: 4.5 %
NEUTROPHILS # BLD AUTO: 9 K/UL
NEUTROPHILS NFR BLD: 81.5 %
NITRITE UR QL STRIP: NEGATIVE
PH UR STRIP: 5 [PH] (ref 5–8)
PLATELET # BLD AUTO: 162 K/UL
PMV BLD AUTO: 12.3 FL
POTASSIUM SERPL-SCNC: 4.9 MMOL/L
PROT SERPL-MCNC: 7.5 G/DL
PROT UR QL STRIP: NEGATIVE
PROTHROMBIN TIME: 10 SEC
RBC # BLD AUTO: 5.16 M/UL
RBC #/AREA URNS AUTO: 0 /HPF (ref 0–4)
SODIUM SERPL-SCNC: 141 MMOL/L
SP GR UR STRIP: 1 (ref 1–1.03)
SQUAMOUS #/AREA URNS AUTO: 2 /HPF
URN SPEC COLLECT METH UR: ABNORMAL
UROBILINOGEN UR STRIP-ACNC: NEGATIVE EU/DL
WBC # BLD AUTO: 11.04 K/UL
WBC #/AREA URNS AUTO: 2 /HPF (ref 0–5)

## 2017-04-27 PROCEDURE — 94761 N-INVAS EAR/PLS OXIMETRY MLT: CPT

## 2017-04-27 PROCEDURE — 99285 EMERGENCY DEPT VISIT HI MDM: CPT | Mod: ,,, | Performed by: EMERGENCY MEDICINE

## 2017-04-27 PROCEDURE — 81001 URINALYSIS AUTO W/SCOPE: CPT

## 2017-04-27 PROCEDURE — 25000242 PHARM REV CODE 250 ALT 637 W/ HCPCS: Performed by: EMERGENCY MEDICINE

## 2017-04-27 PROCEDURE — 80053 COMPREHEN METABOLIC PANEL: CPT

## 2017-04-27 PROCEDURE — 85610 PROTHROMBIN TIME: CPT

## 2017-04-27 PROCEDURE — 11000001 HC ACUTE MED/SURG PRIVATE ROOM

## 2017-04-27 PROCEDURE — 94640 AIRWAY INHALATION TREATMENT: CPT

## 2017-04-27 PROCEDURE — 27000221 HC OXYGEN, UP TO 24 HOURS

## 2017-04-27 PROCEDURE — 85025 COMPLETE CBC W/AUTO DIFF WBC: CPT

## 2017-04-27 PROCEDURE — 99285 EMERGENCY DEPT VISIT HI MDM: CPT | Mod: 25

## 2017-04-27 PROCEDURE — 96374 THER/PROPH/DIAG INJ IV PUSH: CPT

## 2017-04-27 PROCEDURE — 96361 HYDRATE IV INFUSION ADD-ON: CPT

## 2017-04-27 PROCEDURE — 63600175 PHARM REV CODE 636 W HCPCS: Performed by: EMERGENCY MEDICINE

## 2017-04-27 PROCEDURE — 25000003 PHARM REV CODE 250: Performed by: EMERGENCY MEDICINE

## 2017-04-27 RX ORDER — IPRATROPIUM BROMIDE AND ALBUTEROL SULFATE 2.5; .5 MG/3ML; MG/3ML
3 SOLUTION RESPIRATORY (INHALATION)
Status: COMPLETED | OUTPATIENT
Start: 2017-04-27 | End: 2017-04-27

## 2017-04-27 RX ORDER — METHYLPREDNISOLONE SOD SUCC 125 MG
125 VIAL (EA) INJECTION
Status: COMPLETED | OUTPATIENT
Start: 2017-04-27 | End: 2017-04-27

## 2017-04-27 RX ADMIN — IPRATROPIUM BROMIDE AND ALBUTEROL SULFATE 3 ML: .5; 3 SOLUTION RESPIRATORY (INHALATION) at 09:04

## 2017-04-27 RX ADMIN — METHYLPREDNISOLONE SODIUM SUCCINATE 125 MG: 125 INJECTION, POWDER, FOR SOLUTION INTRAMUSCULAR; INTRAVENOUS at 09:04

## 2017-04-27 RX ADMIN — SODIUM CHLORIDE 1000 ML: 0.9 INJECTION, SOLUTION INTRAVENOUS at 09:04

## 2017-04-27 NOTE — TELEPHONE ENCOUNTER
"  Reason for Disposition   Severe difficulty breathing (e.g., struggling for each breath, speaks in single words)    Answer Assessment - Initial Assessment Questions  1. RESPIRATORY STATUS: "Describe your breathing?" (e.g., wheezing, shortness of breath, unable to speak, severe coughing)       Shortness of breath   2. ONSET: "When did this breathing problem begin?"       Two days   3. PATTERN "Does the difficult breathing come and go, or has it been constant since it started?"       Constant   4. SEVERITY: "How bad is your breathing?" (e.g., mild, moderate, severe)     - MILD: No SOB at rest, mild SOB with walking, speaks normally in sentences, can lay down, no retractions, pulse < 100.     - MODERATE: SOB at rest, SOB with minimal exertion and prefers to sit, cannot lie down flat, speaks in phrases, mild retractions, audible wheezing, pulse 100-120.     - SEVERE: Very SOB at rest, speaks in single words, struggling to breathe, sitting hunched forward, retractions, pulse > 120       Severe   5. RECURRENT SYMPTOM: "Have you had difficulty breathing before?" If so, ask: "When was the last time?" and "What happened that time?"       Yes, COPD but never this bad before stop using oxygen two days ago   6. CARDIAC HISTORY: "Do you have any history of heart disease?" (e.g., heart attack, angina, bypass surgery, angioplasty)       No   7. LUNG HISTORY: "Do you have any history of lung disease?"  (e.g., pulmonary embolus, asthma, emphysema)      Yes   8. CAUSE: "What do you think is causing the breathing problem?"       Unsure   9. OTHER SYMPTOMS: "Do you have any other symptoms? (e.g., dizziness, runny nose, cough, chest pain, fever)      weakness  10. PREGNANCY: "Is there any chance you are pregnant?" "When was your last menstrual period?"        No   11. TRAVEL: "Have you traveled out of the country in the last month?" (e.g., travel history, exposures)        No    Protocols used: ST BREATHING DIFFICULTY-A-AH    "

## 2017-04-27 NOTE — IP AVS SNAPSHOT
[facility]  [address]  [facility] [address]  Phone: [phone]           Patient Discharge Instructions   Our goal is to set you up for success. This packet includes information on your condition, medications, and your home care.  It will help you care for yourself to prevent having to return to the hospital.     Please ask your nurse if you have any questions.      There are many details to remember when preparing to leave the hospital. Here is what you will need to do:    1. Take your medicine. If you are prescribed medications, review your Medication List on the following pages. You may have new medications to  at the pharmacy and others that you'll need to stop taking. Review the instructions for how and when to take your medications. Talk with your doctor or nurses if you are unsure of what to do.     2. Go to your follow-up appointments. Specific follow-up information is listed in the following pages. Your may be contacted by a nurse or clinical provider about future appointments. Be sure we have all of the phone numbers to reach you. Please contact your provider's office if you are unable to make an appointment.     3. Watch for warning signs. Your doctor or nurse will give you detailed warning signs to watch for and when to call for assistance. These instructions may also include educational information about your condition. If you experience any of warning signs to your health, call your doctor.           [facility]  Unless otherwise directed by your provider, please   contact [facility], our nurse care line   that is available for 24/7 assistance.     [phone] (toll-free)     Registered nurses in the [facility]   provide: appointment scheduling, clinical advisement, health education, and other advisory services.                  ** Verify the list of medication(s) below is accurate and up to date. Carry this with you in case of  emergency. If your medications have changed, please notify your healthcare provider.             Medication List      START taking these medications        Additional Info    Begin Date AM Noon PM Bedtime    predniSONE 20 MG tablet   Commonly known as:  DELTASONE   Quantity:  6 tablet   Refills:  0   Dose:  40 mg    Last time this was given:  40 mg on 4/30/2017  8:26 AM   Instructions:  Take 2 tablets (40 mg total) by mouth once daily.     5/1/17                            CONTINUE taking these medications        Additional Info    Begin Date AM Noon PM Bedtime    albuterol 90 mcg/actuation inhaler   Quantity:  18 g   Refills:  11   Dose:  2 puff    Instructions:  Inhale 2 puffs into the lungs every 6 (six) hours as needed for Wheezing. Rescue     5/1/17           As needed               aspirin 81 MG EC tablet   Commonly known as:  ECOTRIN   Refills:  0   Dose:  81 mg    Last time this was given:  81 mg on 5/1/2017  8:35 AM   Instructions:  Take 81 mg by mouth once daily.     5/2/17                          fluticasone 50 mcg/actuation nasal spray   Commonly known as:  FLONASE   Quantity:  1 Bottle   Refills:  1   Dose:  1 spray    Instructions:  1 spray by Each Nare route once daily.     5/1/17                          fluticasone-salmeterol 250-50 mcg/dose 250-50 mcg/dose diskus inhaler   Commonly known as:  ADVAIR   Quantity:  60 each   Refills:  11   Dose:  1 puff    Instructions:  Inhale 1 puff into the lungs 2 (two) times daily.     5/2/17                          guaifenesin 600 mg 12 hr tablet   Commonly known as:  MUCINEX   Refills:  0   Dose:  600 mg    Instructions:  Take 600 mg by mouth every morning.     5/1/17                          nicotine 21 mg/24 hr   Commonly known as:  NICODERM CQ   Quantity:  21 patch   Refills:  0   Dose:  1 patch    Last time this was given:  1 patch on 5/1/2017  8:36 AM   Instructions:  Place 1 patch onto the skin once daily.     5/2/17                          tiotropium  18 mcg inhalation capsule   Commonly known as:  SPIRIVA   Quantity:  30 capsule   Refills:  11   Dose:  18 mcg    Last time this was given:  18 mcg on 5/1/2017  8:39 AM   Instructions:  Inhale 1 capsule (18 mcg total) into the lungs once daily.     5/2/17                            Where to Get Your Medications      These medications were sent to [id number] - [place], [place] - [address] AT [facility]  [address], [facility] [address]    Hours:  24-hours Phone:  [phone]     predniSONE 20 MG tablet                  Please bring to all follow up appointments:    1. A copy of your discharge instructions.  2. All medicines you are currently taking in their original bottles.  3. Identification and insurance card.    Please arrive 15 minutes ahead of scheduled appointment time.    Please call 24 hours in advance if you must reschedule your appointment and/or time.        Your Scheduled Appointments     May 12, 2017 10:00 AM T   Hospital Follow Up with PHYSICIAN, [facility]   [facility] ([facility])    [address]  [facility] [address] [phone]              Referrals     Future Orders    Ambulatory Referral to Optometry     Ambulatory referral to Smoking Cessation Program         Discharge Instructions     Future Orders    Activity as tolerated     Call MD for:  difficulty breathing or increased cough     Call MD for:  increased confusion or weakness     Call MD for:  persistent dizziness, light-headedness, or visual disturbances     Call MD for:  persistent nausea and vomiting or diarrhea     Call MD for:  redness, tenderness, or signs of infection (pain, swelling, redness, odor or green/yellow discharge around incision site)     Call MD for:  severe persistent headache     Call MD for:  severe uncontrolled pain     Call MD for:  temperature >100.4     Call MD for:  worsening  "rash     Diet general     Questions:    Total calories:      Fat restriction, if any:      Protein restriction, if any:      Na restriction, if any:      Fluid restriction:      Additional restrictions:      OXYGEN FOR HOME USE     Questions:    Liter Flow:  2    Duration:  Continuous    Qualifying SpO2:  85% Comment - RA    Testing done at:  [facility]    Route:  nasal cannula    Portable mode:  pulse dose acceptable    Device:  home concentrator with portable unit    Length of need (in months):  99 mos    Patient condition with qualifying saturation:  COPD    Height:  5' 6" (1.676 m)    Weight:  53.5 kg (118 lb)    Does patient have medical equipment at home?:  none    Alternative treatment measures have been tried or considered and deemed clinically ineffective.:  Yes    Vendor:  [facility]    Expected Date of Delivery:  4/30/2017    Expected Time of Delivery:          Discharge Instructions         Diabetes (General Information)  Diabetes is a long-term health problem. It means your body does not make enough insulin. Or it may mean that your body cannot use the insulin it makes. Insulin is a hormone in your body. It lets blood sugar (glucose) reach the cells in your body. All of your cells need glucose for fuel.  When you have diabetes, the glucose in your blood builds up because it cannot get into the cells. This buildup is called high blood sugar (hyperglycemia).  Your blood sugar level depends on several things. It depends on what kind of food you eat and how much of it you eat. It also depends on how much exercise you get, and how much insulin you have in your body. Eating too much of the wrong kinds of food or not taking diabetes medicine on time can cause high blood sugar. Infections can cause high blood sugar even if you are taking medicines correctly.  These things can also cause low blood sugar:  · Missing meals  · Not eating enough food  · Taking too much diabetes medicine  Diabetes can " cause serious problems over time if you do not get treated. These problems include heart disease, stroke, kidney failure, and blindness. They also include nerve pain or loss of feeling in your legs and feet, and gangrene of the feet. By keeping your blood sugar under control you can prevent or delay these problems.  Normal blood sugar levels are 80 to 100 before a meal and less than 180 in the 1 to 2 hours after a meal.  Home care  Follow these guidelines when caring for yourself at home:  · Follow the diet your healthcare provider gives you. Take insulin or other diabetes medicine exactly as told to.  · Watch your blood sugar as you are told to. Keep a log of your results. This will help your provider change your medicines to keep your blood sugar under control.  · Try to reach your ideal weight. You may be able to cut back on or not have to take diabetes medicine if you eat the right foods and get exercise.  · Do not smoke. Smoking worsens the effects of diabetes on your circulation. You are much more likely to have a heart attack if you have diabetes and you smoke.  · Take good care of your feet. If you have lost feeling in your feet, you may not see an injury or infection. Check your feet and between your toes at least once a week.  · Wear a medical alert bracelet or necklace, or carry a card in your wallet that says you have diabetes. This will help healthcare providers give you the right care if you get very ill and cannot tell them that you have diabetes.  Sick day plan  If you get a cold, the flu, or a bacterial or viral infection, take these steps:  · Look at your diabetes sick plan and call your healthcare provider as you were told to. You may need to call your provider right away if:  ¨ Your blood sugar is above 240 while taking your diabetes medicine  ¨ Your urine ketone levels are above normal or high  ¨ You have been vomiting more than 6 hours  ¨ You have trouble breathing or your breath ha s a fruity  smell  ¨ You have a high fever  ¨ You have a fever for several days and you are not getting better  ¨ You get light-headed and are sleepier than usual  · Keep taking your diabetes pills (oral medicine) even if you have been vomiting and are feeling sick. Call your provider right away because you may need insulin to lower your blood sugar until you recover from your illness.  · Keep taking your insulin even if you have been vomiting and are feeling sick. Call your provider right away to ask if you need to change your insulin dose. This will depend on your blood sugar results.  · Check your blood sugar every 2 to 4 hours, or at least 4 times a day.  · Check your ketones often. If you are vomiting and having diarrhea, watch them more often.  · Do not skip meals. Try to eat small meals on a regular schedule. Do this even if you do not feel like eating.  · Drink water or other liquids that do not have caffeine or calories. This will keep you from getting dehydrated. If you are nauseated or vomiting, takes small sips every 5 minutes. To prevent dehydration try to drink a cup (8 ounces) of fluids every hour while you are awake.  General care  Always bring a source of fast-acting sugar with you in case you have symptoms of low blood sugar (below 70). At the first sign of low blood sugar, eat or drink 15 to 20 grams of fast-acting sugar to raise your blood sugar. Examples are:  · 3 to 4 glucose tablets. You can buy these at most drugstores.  · 4 ounces (1/2 cup) of regular (not diet) soft drinks  · 4 ounces (1/2 cup) of any fruit juice  · 8 ounces (1 cup) of milk  · 5 to 6 pieces of hard candy  · 1 tablespoon of honey  Check your blood sugar 15 minutes after treating yourself. If it is still below 70, take 15 to 20 more grams of fast-acting sugar. Test again in 15 minutes. If it returns to normal (70 or above), eat a snack or meal to keep your blood sugar in a safe range. If it stays low, call your doctor or go to an  emergency room.  Follow-up care  Follow-up with your healthcare provider, or as advised. For more information about diabetes, visit the American Diabetes Association website at www.diabetes.org or call 331-649-0178.  When to seek medical advice  Call your healthcare provider right away if you have any of these symptoms of high blood sugar:  · Frequent urination  · Dizziness  · Drowsiness  · Thirst  · Headache  · Nausea or vomiting  · Abdominal pain  · Eyesight changes  · Fast breathing  · Confusion or loss of consciousness  Also call your provider right away if you have any of these signs of low blood sugar:  · Fatigue  · Headache  · Shakes  · Excess sweating  · Hunger  · Feeling anxious or restless  · Eyesight changes  · Drowsiness  · Weakness  · Confusion or loss of consciousness  Call 911  Call for emergency help right away if any of these occur:  · Chest pain or shortness of breath  · Dizziness or fainting  · Weakness of an arm or leg or one side of the face  · Trouble speaking or seeing   Date Last Reviewed: 6/1/2016  © 4771-3996 [employer]. [address] [place] [address]. All rights reserved. This information is not intended as a substitute for professional medical care. Always follow your healthcare professional's instructions.            Understanding Type 2 Diabetes  When your body is working normally, the food you eat is digested and used as fuel. This fuel supplies energy to the bodys cells. When you have diabetes, the fuel cant enter the cells. Without treatment, diabetes can cause serious long-term health problems.     Your body breaks down the food you eat into glucose.         How the body gets energy  The digestive system breaks down food, resulting in a sugar called glucose. Some of this glucose is stored in the liver. But most of it enters the bloodstream and travels to the cells to be used as fuel. Glucose needs the help of a hormone called insulin to enter the cells.  Insulin is made in the pancreas. It is released into the bloodstream in response to the presence of glucose in the blood. Think of insulin as a key. When insulin reaches a cell, it attaches to the cell wall. This signals the cell to create an opening that allows glucose to enter the cell.  When you have type 2 diabetes  Early in type 2 diabetes, your cells dont respond properly to insulin. Because of this, less glucose than normal moves into cells. This is called insulin resistance. In response, the pancreas makes more insulin. But eventually, the pancreas cant produce enough insulin to overcome insulin resistance. As less and less glucose enters cells, it builds up to a harmful level in the bloodstream. This is known as high blood sugar or hyperglycemia. The result is type 2 diabetes. The cells become starved for energy, which can leave you feeling tired and rundown.  Why high blood sugar is a problem  If high blood sugar is not controlled, blood vessels throughout the body become damaged. Prolonged high blood sugar affects organs, blood vessels, and nerves. As a result, the risks of damage to the heart, kidneys, eyes, and limbs increase. Diabetes also makes other problems, such as high blood pressure and high cholesterol, more dangerous. Over time, people with uncontrolled high blood sugar have an increase in risk of dying of, or being disabled by, heart attack or stroke.  Date Last Reviewed: 7/1/2016  © 1612-0027 [employer]. [address] [place] [address]. All rights reserved. This information is not intended as a substitute for professional medical care. Always follow your healthcare professional's instructions.          Diabetes: Caring for Your Body    When you have diabetes, your body needs special care. This care helps you stay healthy and prevent complications. Exercise and healthy eating are a part of this. You can also protect yourself by taking special care of your feet, skin,  and teeth.  Caring for your feet  Follow these tips to help keep your feet healthy:  · Check your feet every day for redness, blisters, cracks, dry skin, or numbness. Use a mirror to inspect the bottoms of your feet, if needed. Or, ask for help.  · Wash your feet in warm (not hot) water. Dont soak them.  · Use an emery board to keep your toenails even with the ends of your toes. File away sharp edges. A podiatrist (foot doctor) may need to cut your toenails for you.  · Keep your skin soft and smooth by placing a thin layer of skin lotion on the tops and bottoms of your feet. Do not put lotion in between your toes.  · Always wear shoes or slippers, even inside your home. Make sure that shoes are properly fitted. Change your socks daily.  · Call your healthcare provider right away if your feet are numb or painful, or if a cut or sore doesnt heal within a few days.  Preventing skin infections  To prevent skin infections, bathe every day. Dry yourself well, especially between your toes. Wash any cuts with warm, soapy water and cover with a sterile bandage. Call your healthcare provider if a cut or sore doesn't heal in a few days, feels warm, itches, or has a bad odor.  Caring for your teeth  Follow these guidelines for healthy teeth:  · Brush your teeth twice daily.  · Floss your teeth daily.  · See your dentist at least twice yearly.  If you smoke, quit  Smoking is dangerous for everyone, especially people with diabetes. It can harm the blood vessels in your eyes, kidneys, and heart. It raises blood pressure. Smoking can also slow healing, so infections are more likely. Ask your healthcare provider about programs to help you stop smoking.   Date Last Reviewed: 3/1/2016  © 8287-1542 [employer]. [address] [place] [address], [place] [address]. All rights reserved. This information is not intended as a substitute for professional medical care. Always follow your healthcare professional's  instructions.        A1C  Does this test have other names?  Hemoglobin A1c; HbA1c; glycosylated hemoglobin; glycohemoglobin; Glycated hemoglobin  What is this test?  A1C is a blood test used to screen people to find out whether they have diabetes or prediabetes. It's also used in people who know they have diabetes to measure how well they are controlling their blood sugar and to guide their treatment decisions over time.  Why do I need this test?  You may need this test to check for prediabetes or diabetes. If you already know that you have diabetes or prediabetes, you may need this test to see how well you are controlling your blood sugar.  People with diabetes must track their blood sugar (glucose) levels every day to make sure they arent too high or too low. The A1C test gives results for a longer period of time. It shows whether your blood sugar has been too high on average in the previous two to three months. When blood sugar is high, more glucose builds up and sticks to your hemoglobin, a protein that carries oxygen in red blood cells. The A1C test measures the percentage of hemoglobin that is coated with blood sugar.  Depending on the type of diabetes you have, how well it's controlled, and your health care providers preferences, you may need to have the A1C test two or more times a year. The American Diabetes Association (ADA) recommends that you have an A1C test at least twice a year if you are meeting your blood sugar goals and your blood sugar is well-controlled. If you arent meeting your goals or your medication has changed lately, you should have the A1C test more often. You also may have the test when your health care provider first starts working with you to treat your diabetes.  What other tests might I have along with this test?   If your health care provider is testing you for diabetes, you may also take a fasting plasma glucose test, or FPG, or an oral glucose tolerance test, or OGTT, as part  of your screening and diagnosis. You may also be tested for sugar, ketones or protein in the urine.  What do my test results mean?  Laboratory test results may vary depending on your age, gender, health history, the method used for the test, and many other factors. If your results are different from the results suggested below, this may not mean that you have a problem. Ask your health care provider to explain what the results mean for you.   A1C is reported as a percentage:  · Normal A1C is considered to be below 5.7 percent. Results between 5.7 and 6.4 percent may mean you have prediabetes. This means you have a higher risk of developing diabetes.  · Results of 6.5 percent or higher on two separate occasions may mean that you have diabetes.  · The ADA  recommends that people with diabetes should maintain an A1C below 7 percent. The American Association of Clinical Endocrinologists recommends an A1C of 6.5 percent or less. Recommendations may vary based on the person's age, medical conditions, or other factors.   How is this test done?  The test requires a blood sample, which is drawn through a needle from a vein in your arm.   Does this test pose any risks?  Taking a blood sample with a needle carries risks that include bleeding, infection, bruising, and a sense of lightheadedness. When the needle pricks your arm, you may feel a slight stinging sensation or pain. Afterward, the site may be slightly sore.  What might affect my test results?  Your blood sugar levels usually vary throughout the day. These variations won't affect the A1C.  If you have sickle cell anemia or other blood disorders, a standard A1C test may be less useful for diagnosing or monitoring diabetes. (Your health care provider may be able to find another diabetes test that will better serve you.) In addition, the test results may be skewed if you have anemia, heavy bleeding, an iron deficiency, kidney failure, or liver disease.  How do I get  "ready for this test?  You don't need any special preparation for the test.    Date Last Reviewed: 5/15/2015  © 4270-2619 [employer]. [address] [place] [address], [place] [address]. All rights reserved. This information is not intended as a substitute for professional medical care. Always follow your healthcare professional's instructions.                  Primary Diagnosis     Your primary diagnosis was:  Chronic Bronchitis      Admission Information     Date & Time Provider Department CSN    4/27/2017  8:07 PM Jeffrey Boyer MD [facility] [id number]      Care Providers     Provider Role Specialty Primary office phone    Jeffrey Boyer MD Attending Provider Hospitalist [phone]    Jeffrey Boyer MD Team Attending  Hospitalist [phone]      Your Vitals Were     BP Pulse Temp Resp Height Weight    134/79 (BP Location: Left arm, Patient Position: Lying, BP Method: Automatic) 92 98.2 °F (36.8 °C) (Oral) 16 5' 6" (1.676 m) 53.5 kg (118 lb)    Last Period SpO2 BMI          (LMP Unknown) 99% 19.05 kg/m2        Recent Lab Values        2/8/2017                           4:00 AM           A1C 6.5 (H)           Comment for A1C at  4:00 AM on 2/8/2017:  According to ADA guidelines, hemoglobin A1C <7.0% represents  optimal control in non-pregnant diabetic patients.  Different  metrics may apply to specific populations.   Standards of Medical Care in Diabetes - 2016.  For the purpose of screening for the presence of diabetes:  <5.7%     Consistent with the absence of diabetes  5.7-6.4%  Consistent with increasing risk for diabetes   (prediabetes)  >or=6.5%  Consistent with diabetes  Currently no consensus exists for use of hemoglobin A1C  for diagnosis of diabetes for children.        Allergies as of 5/1/2017        Reactions    Avelox [Moxifloxacin] Itching, Rash    IV      Advance Directives     An advance directive is a document which, in the event you are no longer able to make " decisions for yourself, tells your healthcare team what kind of treatment you do or do not want to receive, or who you would like to make those decisions for you.  If you do not currently have an advance directive, [facility] encourages you to create one.  For more information call:  [phone]), [phone] ([phone]),  or log on to [email or web address].        Smoking Cessation     If you would like to quit smoking:   You may be eligible for free services if you are a [place] resident and started smoking cigarettes before September 1, 1987.  Call the [employer]oking [employer]ess[employer] ([facility]) toll free at [phone] or [phone].   Call [phone] if you do not meet the above criteria.   Contact us via email: [email or web address]   View our website for more information: [email or web address]        Language Assistance Services     ATTENTION: Language assistance services are available, free of charge. Please call [phone].      ATENCIÓN: Si habla español, tiene a puente disposición servicios gratuitos de asistencia lingüística. Llame al [phone].     CHÚ Ý: N?u b?n nói Ti?ng Vi?t, có các d?ch v? h? tr? ngôn ng? mi?n phí dành cho b?n. G?i s? [phone].        Diabetes Discharge Instructions                                   [facility] Sign-Up     Activating your [facility] account is as easy as 1-2-3!     1) Visit [email or web address], select Sign Up Now, enter this activation code and your date of birth, then select Next.  [email or web address]  Expires: 6/12/2017  3:08 PM      2) Create a username and password to use when you visit [facility] in the future and select a security question in case you lose your password and select Next.    3) Enter your e-mail address and click Sign Up!    Additional Information  If you have questions, please e-mail [email or web address] or call [phone] to talk to our [facility] staff. Remember, [facility] is NOT to be used for urgent needs.  For medical emergencies, dial 911.          [facility] complies with applicable Federal civil rights laws and does not discriminate on the basis of race, color, national origin, age, disability, or sex.

## 2017-04-28 PROBLEM — J44.0 CHRONIC OBSTRUCTIVE PULMONARY DISEASE WITH ACUTE LOWER RESPIRATORY INFECTION: Status: RESOLVED | Noted: 2017-02-07 | Resolved: 2017-04-28

## 2017-04-28 PROBLEM — J44.1 COPD WITH EXACERBATION: Status: ACTIVE | Noted: 2017-04-28

## 2017-04-28 LAB
ANION GAP SERPL CALC-SCNC: 10 MMOL/L
BASOPHILS # BLD AUTO: 0.01 K/UL
BASOPHILS NFR BLD: 0.1 %
BUN SERPL-MCNC: 9 MG/DL
CALCIUM SERPL-MCNC: 9.4 MG/DL
CHLORIDE SERPL-SCNC: 103 MMOL/L
CO2 SERPL-SCNC: 27 MMOL/L
CREAT SERPL-MCNC: 0.7 MG/DL
DIFFERENTIAL METHOD: ABNORMAL
EOSINOPHIL # BLD AUTO: 0 K/UL
EOSINOPHIL NFR BLD: 0 %
ERYTHROCYTE [DISTWIDTH] IN BLOOD BY AUTOMATED COUNT: 14.4 %
EST. GFR  (AFRICAN AMERICAN): >60 ML/MIN/1.73 M^2
EST. GFR  (NON AFRICAN AMERICAN): >60 ML/MIN/1.73 M^2
GLUCOSE SERPL-MCNC: 157 MG/DL
HCT VFR BLD AUTO: 47.1 %
HGB BLD-MCNC: 15.5 G/DL
LYMPHOCYTES # BLD AUTO: 0.6 K/UL
LYMPHOCYTES NFR BLD: 8.6 %
MAGNESIUM SERPL-MCNC: 1.9 MG/DL
MCH RBC QN AUTO: 31.6 PG
MCHC RBC AUTO-ENTMCNC: 32.9 %
MCV RBC AUTO: 96 FL
MONOCYTES # BLD AUTO: 0.1 K/UL
MONOCYTES NFR BLD: 1.7 %
NEUTROPHILS # BLD AUTO: 6.3 K/UL
NEUTROPHILS NFR BLD: 89.5 %
PHOSPHATE SERPL-MCNC: 4.1 MG/DL
PLATELET # BLD AUTO: 138 K/UL
PMV BLD AUTO: 11.2 FL
POCT GLUCOSE: 118 MG/DL (ref 70–110)
POCT GLUCOSE: 138 MG/DL (ref 70–110)
POCT GLUCOSE: 195 MG/DL (ref 70–110)
POTASSIUM SERPL-SCNC: 4.5 MMOL/L
RBC # BLD AUTO: 4.91 M/UL
SODIUM SERPL-SCNC: 140 MMOL/L
WBC # BLD AUTO: 6.99 K/UL

## 2017-04-28 PROCEDURE — 97535 SELF CARE MNGMENT TRAINING: CPT

## 2017-04-28 PROCEDURE — 94640 AIRWAY INHALATION TREATMENT: CPT

## 2017-04-28 PROCEDURE — 99900035 HC TECH TIME PER 15 MIN (STAT)

## 2017-04-28 PROCEDURE — 63600175 PHARM REV CODE 636 W HCPCS: Performed by: STUDENT IN AN ORGANIZED HEALTH CARE EDUCATION/TRAINING PROGRAM

## 2017-04-28 PROCEDURE — 99406 BEHAV CHNG SMOKING 3-10 MIN: CPT

## 2017-04-28 PROCEDURE — 25000242 PHARM REV CODE 250 ALT 637 W/ HCPCS: Performed by: STUDENT IN AN ORGANIZED HEALTH CARE EDUCATION/TRAINING PROGRAM

## 2017-04-28 PROCEDURE — 80048 BASIC METABOLIC PNL TOTAL CA: CPT

## 2017-04-28 PROCEDURE — 84100 ASSAY OF PHOSPHORUS: CPT

## 2017-04-28 PROCEDURE — 97161 PT EVAL LOW COMPLEX 20 MIN: CPT

## 2017-04-28 PROCEDURE — 97165 OT EVAL LOW COMPLEX 30 MIN: CPT

## 2017-04-28 PROCEDURE — 97116 GAIT TRAINING THERAPY: CPT

## 2017-04-28 PROCEDURE — 11000001 HC ACUTE MED/SURG PRIVATE ROOM

## 2017-04-28 PROCEDURE — 85025 COMPLETE CBC W/AUTO DIFF WBC: CPT

## 2017-04-28 PROCEDURE — 83735 ASSAY OF MAGNESIUM: CPT

## 2017-04-28 PROCEDURE — 94761 N-INVAS EAR/PLS OXIMETRY MLT: CPT

## 2017-04-28 PROCEDURE — 27000221 HC OXYGEN, UP TO 24 HOURS

## 2017-04-28 PROCEDURE — 25000003 PHARM REV CODE 250: Performed by: STUDENT IN AN ORGANIZED HEALTH CARE EDUCATION/TRAINING PROGRAM

## 2017-04-28 PROCEDURE — 99222 1ST HOSP IP/OBS MODERATE 55: CPT | Mod: ,,, | Performed by: HOSPITALIST

## 2017-04-28 RX ORDER — IBUPROFEN 200 MG
16 TABLET ORAL
Status: DISCONTINUED | OUTPATIENT
Start: 2017-04-28 | End: 2017-04-29

## 2017-04-28 RX ORDER — ASPIRIN 81 MG/1
81 TABLET ORAL DAILY
COMMUNITY
End: 2022-01-01 | Stop reason: HOSPADM

## 2017-04-28 RX ORDER — IPRATROPIUM BROMIDE AND ALBUTEROL SULFATE 2.5; .5 MG/3ML; MG/3ML
3 SOLUTION RESPIRATORY (INHALATION) EVERY 4 HOURS
Status: DISCONTINUED | OUTPATIENT
Start: 2017-04-28 | End: 2017-05-01 | Stop reason: HOSPADM

## 2017-04-28 RX ORDER — FLUTICASONE FUROATE AND VILANTEROL 100; 25 UG/1; UG/1
1 POWDER RESPIRATORY (INHALATION) DAILY
Status: DISCONTINUED | OUTPATIENT
Start: 2017-04-28 | End: 2017-05-01 | Stop reason: HOSPADM

## 2017-04-28 RX ORDER — PREDNISONE 20 MG/1
40 TABLET ORAL DAILY
Status: DISCONTINUED | OUTPATIENT
Start: 2017-04-28 | End: 2017-04-30

## 2017-04-28 RX ORDER — ENOXAPARIN SODIUM 100 MG/ML
40 INJECTION SUBCUTANEOUS EVERY 24 HOURS
Status: DISCONTINUED | OUTPATIENT
Start: 2017-04-28 | End: 2017-05-01 | Stop reason: HOSPADM

## 2017-04-28 RX ORDER — IBUPROFEN 200 MG
24 TABLET ORAL
Status: DISCONTINUED | OUTPATIENT
Start: 2017-04-28 | End: 2017-04-29

## 2017-04-28 RX ORDER — ASPIRIN 81 MG/1
81 TABLET ORAL DAILY
Status: DISCONTINUED | OUTPATIENT
Start: 2017-04-28 | End: 2017-05-01 | Stop reason: HOSPADM

## 2017-04-28 RX ORDER — TIOTROPIUM BROMIDE 18 UG/1
1 CAPSULE ORAL; RESPIRATORY (INHALATION) DAILY
Status: DISCONTINUED | OUTPATIENT
Start: 2017-04-28 | End: 2017-05-01 | Stop reason: HOSPADM

## 2017-04-28 RX ORDER — GLUCAGON 1 MG
1 KIT INJECTION
Status: DISCONTINUED | OUTPATIENT
Start: 2017-04-28 | End: 2017-04-29

## 2017-04-28 RX ORDER — RAMELTEON 8 MG/1
8 TABLET ORAL NIGHTLY PRN
Status: DISCONTINUED | OUTPATIENT
Start: 2017-04-28 | End: 2017-05-01 | Stop reason: HOSPADM

## 2017-04-28 RX ORDER — IBUPROFEN 200 MG
1 TABLET ORAL DAILY
Status: DISCONTINUED | OUTPATIENT
Start: 2017-04-28 | End: 2017-05-01 | Stop reason: HOSPADM

## 2017-04-28 RX ORDER — ACETAMINOPHEN 325 MG/1
650 TABLET ORAL EVERY 4 HOURS PRN
Status: DISCONTINUED | OUTPATIENT
Start: 2017-04-28 | End: 2017-05-01 | Stop reason: HOSPADM

## 2017-04-28 RX ORDER — GUAIFENESIN 600 MG/1
1200 TABLET, EXTENDED RELEASE ORAL DAILY
COMMUNITY
End: 2017-11-16

## 2017-04-28 RX ADMIN — FLUTICASONE FUROATE AND VILANTEROL TRIFENATATE 1 PUFF: 100; 25 POWDER RESPIRATORY (INHALATION) at 02:04

## 2017-04-28 RX ADMIN — IPRATROPIUM BROMIDE AND ALBUTEROL SULFATE 3 ML: .5; 3 SOLUTION RESPIRATORY (INHALATION) at 11:04

## 2017-04-28 RX ADMIN — PREDNISONE 40 MG: 20 TABLET ORAL at 10:04

## 2017-04-28 RX ADMIN — IPRATROPIUM BROMIDE AND ALBUTEROL SULFATE 3 ML: .5; 3 SOLUTION RESPIRATORY (INHALATION) at 03:04

## 2017-04-28 RX ADMIN — IPRATROPIUM BROMIDE AND ALBUTEROL SULFATE 3 ML: .5; 3 SOLUTION RESPIRATORY (INHALATION) at 07:04

## 2017-04-28 RX ADMIN — TIOTROPIUM BROMIDE 18 MCG: 18 CAPSULE ORAL; RESPIRATORY (INHALATION) at 02:04

## 2017-04-28 RX ADMIN — ENOXAPARIN SODIUM 40 MG: 100 INJECTION SUBCUTANEOUS at 04:04

## 2017-04-28 RX ADMIN — NICOTINE 1 PATCH: 21 PATCH, EXTENDED RELEASE TRANSDERMAL at 10:04

## 2017-04-28 RX ADMIN — ASPIRIN 81 MG: 81 TABLET, COATED ORAL at 10:04

## 2017-04-28 NOTE — PLAN OF CARE
Problem: Physical Therapy Goal  Goal: Physical Therapy Goal  Goals to be met by: 17     Patient will increase functional independence with mobility by performin. Gait x 1000 feet with Supervision using Rolling Walker PRN.   2. Ascend/descend 4 stair with bilateral Handrails Supervision .   3. Lower extremity exercise program x 20 reps per handout, with assistance as needed.  Outcome: Ongoing (interventions implemented as appropriate)  PT goals established.

## 2017-04-28 NOTE — PLAN OF CARE
Problem: Occupational Therapy Goal  Goal: Occupational Therapy Goal  Pt is currently performing ADLs, functional mobility & t/fs without assistance and displays age-appropriate strength, endurance & balance. OT services are not recommended at this time and patient is safe to D/C home.  D/C acute OT services         Comments:   Joshua Mcrae OTR/L  4/28/2017

## 2017-04-28 NOTE — PLAN OF CARE
Patient states she feels like she is breathing better at this time.  States that she is still smoking, but that she does want to quit.  RT provided patient with smoking cessation education.  Will continue to follow.  Hema Bray

## 2017-04-28 NOTE — ASSESSMENT & PLAN NOTE
- COPD pathway with recent exacerbation back in 02/2017  - DuoNebs Q4H scheduled  - Spiriva 1 puff daily  - Breo 1 puff daily  - Prednisone 40 mg PO daily  - Smoking cessation counseling  - Nicotine patch 21 mg daily

## 2017-04-28 NOTE — SUBJECTIVE & OBJECTIVE
Past Medical History:   Diagnosis Date    COPD (chronic obstructive pulmonary disease)        History reviewed. No pertinent surgical history.    Review of patient's allergies indicates:   Allergen Reactions    Avelox [moxifloxacin] Itching and Rash     IV       No current facility-administered medications on file prior to encounter.      Current Outpatient Prescriptions on File Prior to Encounter   Medication Sig    albuterol 90 mcg/actuation inhaler Inhale 2 puffs into the lungs every 6 (six) hours as needed for Wheezing. Rescue    aspirin (ECOTRIN) 81 MG EC tablet Take 1 tablet (81 mg total) by mouth once daily.    fluticasone (FLONASE) 50 mcg/actuation nasal spray 1 spray by Each Nare route once daily.    fluticasone-salmeterol 250-50 mcg/dose (ADVAIR) 250-50 mcg/dose diskus inhaler Inhale 1 puff into the lungs 2 (two) times daily.    nicotine (NICODERM CQ) 21 mg/24 hr Place 1 patch onto the skin once daily.    tiotropium (SPIRIVA) 18 mcg inhalation capsule Inhale 1 capsule (18 mcg total) into the lungs once daily.     Family History     None        Social History Main Topics    Smoking status: Heavy Tobacco Smoker     Packs/day: 1.00     Types: Cigarettes    Smokeless tobacco: Not on file    Alcohol use 0.5 oz/week     1 drink(s) per week    Drug use: No    Sexual activity: Not on file     Review of Systems   Constitutional: Negative for activity change, appetite change, chills, diaphoresis, fatigue and fever.   HENT: Negative for congestion, rhinorrhea, sore throat and trouble swallowing.    Eyes: Negative for photophobia, redness and visual disturbance.   Respiratory: Positive for cough (chronic, non-productive), chest tightness, shortness of breath and wheezing.    Cardiovascular: Negative for chest pain, palpitations and leg swelling.   Gastrointestinal: Negative for abdominal distention, abdominal pain, blood in stool, constipation, diarrhea, nausea and vomiting.   Endocrine: Negative for  polyuria.   Genitourinary: Negative for decreased urine volume, difficulty urinating, dysuria, frequency and hematuria.   Musculoskeletal: Negative for arthralgias, back pain, joint swelling and myalgias.   Skin: Negative for rash and wound.   Allergic/Immunologic: Negative for immunocompromised state.   Neurological: Negative for dizziness, tremors, seizures, weakness, light-headedness and headaches.   Hematological: Does not bruise/bleed easily.   Psychiatric/Behavioral: Negative for dysphoric mood and sleep disturbance. The patient is not nervous/anxious.      Objective:     Vital Signs (Most Recent):  Temp: 98.1 °F (36.7 °C) (04/27/17 1958)  Pulse: (!) 114 (04/27/17 2134)  Resp: 20 (04/27/17 2134)  BP: (!) 169/88 (04/27/17 1958)  SpO2: 96 % (04/27/17 2134) Vital Signs (24h Range):  Temp:  [98.1 °F (36.7 °C)] 98.1 °F (36.7 °C)  Pulse:  [114-126] 114  Resp:  [20-31] 20  SpO2:  [80 %-96 %] 96 %  BP: (169)/(88) 169/88     Weight: 59 kg (130 lb)  Body mass index is 20.98 kg/(m^2).    Physical Exam   Constitutional: She is oriented to person, place, and time. She appears well-developed and well-nourished.   HENT:   Head: Normocephalic and atraumatic.   Right Ear: External ear normal.   Left Ear: External ear normal.   Eyes: Conjunctivae and EOM are normal. Pupils are equal, round, and reactive to light. Right eye exhibits no discharge. Left eye exhibits no discharge. No scleral icterus.   Neck: Normal range of motion. Neck supple. No JVD present. No tracheal deviation present. No thyromegaly present.   Cardiovascular: Normal rate, regular rhythm and normal heart sounds.  Exam reveals no gallop and no friction rub.    No murmur heard.  Pulmonary/Chest: Effort normal. No stridor. No respiratory distress. She has decreased breath sounds (all lung fields). She has wheezes (R > L). She has no rales. She exhibits no tenderness.   Abdominal: Soft. Bowel sounds are normal. She exhibits no distension and no mass. There is no  tenderness. There is no rebound and no guarding. No hernia.   Musculoskeletal: Normal range of motion. She exhibits no edema, tenderness or deformity.   Neurological: She is alert and oriented to person, place, and time. No cranial nerve deficit. She exhibits normal muscle tone. Coordination normal.   Skin: Skin is warm and dry. No rash noted. No erythema. No pallor.   Psychiatric: She has a normal mood and affect. Her behavior is normal. Judgment and thought content normal.   Nursing note and vitals reviewed.       Significant Labs:   CBC:   Recent Labs  Lab 04/27/17 2120   WBC 11.04   HGB 16.7*   HCT 51.2*        CMP:   Recent Labs  Lab 04/27/17 2120      K 4.9      CO2 27   *   BUN 15   CREATININE 0.8   CALCIUM 9.7   PROT 7.5   ALBUMIN 3.9   BILITOT 0.4   ALKPHOS 90   AST 26   ALT 16   ANIONGAP 11   EGFRNONAA >60.0     Coagulation:   Recent Labs  Lab 04/27/17 2120   INR 0.9     Urine Studies:   Recent Labs  Lab 04/27/17  2246   COLORU Yellow   APPEARANCEUA Hazy*   PHUR 5.0   SPECGRAV 1.005   PROTEINUA Negative   GLUCUA Negative   KETONESU Negative   BILIRUBINUA Negative   OCCULTUA Negative   NITRITE Negative   UROBILINOGEN Negative   LEUKOCYTESUR Trace*   RBCUA 0   WBCUA 2   BACTERIA Moderate*   SQUAMEPITHEL 2       Significant Imaging:    CXR (04/27/17):  Mediastinal structures are midline. Cardiomediastinal silhouette is stable.  Lungs are hyperexpanded with mild flattening of the hemidiaphragms which can be seen with COPD, similar to prior examination. No large focal consolidation. There is no pneumothorax. No pleural effusions.  No acute osseous abnormality.

## 2017-04-28 NOTE — PT/OT/SLP EVAL
Occupational Therapy  Evaluation/Treatment/Dishcarge     Ann Canavan   MRN: [id number]   Admitting Diagnosis: COPD with exacerbation    OT Date of Treatment: 17   OT Start Time: 809  OT Stop Time: 838  OT Total Time (min): 29 min    Billable Minutes:  Evaluation 21 minutes  Self Care/Home Management 8 minutes    Diagnosis: COPD with exacerbation       Past Medical History:   Diagnosis Date    COPD (chronic obstructive pulmonary disease)       History reviewed. No pertinent surgical history.    Referring physician: Amilcar  Date referred to OT: 2017    General Precautions: Standard, fall  Orthopedic Precautions: N/A  Braces: N/A          Patient History:  Living Environment  Lives With: alone  Living Arrangements: house  Home Accessibility: stairs to enter home  Number of Stairs to Enter Home: 4  Stair Railings at Home: none  Transportation Available: car  Living Environment Comment: Pt lives alone in a [facility] w/ 4 steps to enter and no rails. PLOF indep and bathes in a tub. Pt was on home oxygen but gave it up stating at the time shse didnt believe she needed it.   Equipment Currently Used at Home: none    Prior level of function:   Bed Mobility/Transfers: independent  Grooming: independent  Bathing: independent  Upper Body Dressing: independent  Lower Body Dressing: independent  Toileting: independent  Home Management Skills: independent        Dominant hand: right    Subjective:  Communicated with nurse prior to session.  Pt agreeable to skilled OT services.  Chief Complaint: deconditioning  Patient/Family stated goals: return home    Pain Ratin/10  Pain Rating Post-Intervention: 0/10    Objective:  Patient found with: telemetry, pulse ox (continuous), oxygen, blood pressure cuff  Pt received w/ head of stretcher elevated.    Cognitive Exam:  Oriented to: Person, Place, Time and Situation  Follows Commands/attention: Follows multistep  commands  Communication: clear/fluent  Memory:  No Deficits  noted  Safety awareness/insight to disability: intact  Coping skills/emotional control: Appropriate to situation    Visual/perceptual:  Intact    Physical Exam:  Postural examination/scapula alignment: No postural abnormalities identified  Skin integrity: Visible skin intact  Edema: None noted     Sensation:   Intact    Upper Extremity Range of Motion:  Right Upper Extremity: WFL  Left Upper Extremity: WFL    Upper Extremity Strength:  Right Upper Extremity: WFL  Left Upper Extremity: WFL   Strength: WFL    Fine motor coordination:   Intact    Gross motor coordination: WFL    Functional Mobility:  Bed Mobility:  Rolling/Turning to Left: Independent  Scooting/Bridging: Independent  Supine to Sit: Independent  Sit to Supine: Independent    Transfers:  Sit <> Stand Assistance: Independent  Sit <> Stand Assistive Device: No Assistive Device  Toilet Transfer Technique: Stand Pivot  Toilet Transfer Assistance: Supervision  Toilet Transfer Assistive Device: No Assistive Device    Functional Ambulation: Pt ambulated 35 ft at supv w/o AD on 4 L of 02. Refer to objective session to se about sat levels.    Activities of Daily Living:  LE Dressing Level of Assistance: Supervision (donned/doffed socks)  Grooming Position: Standing at sink  Grooming Level of Assistance: Supervision  Toileting Where Assessed: Toilet  Toileting Level of Assistance: Independent    Balance:   Static Sit: NORMAL: No deviations seen in posture held statically  Dynamic Sit: NORMAL: No deviations seen in posture held dynamically  Static Stand: NORMAL: No deviations seen in posture held statically  Dynamic stand: NORMAL: No deviations seen in posture held dynamically    Therapeutic Activities and Exercises:  Pt educated on POC.  Pt educated on energy conservation technique of pursed lipped breathing.    AM-PAC 6 CLICK ADL  How much help from another person does this patient currently need?  1 = Unable, Total/Dependent Assistance  2 = A lot,  "Maximum/Moderate Assistance  3 = A little, Minimum/Contact Guard/Supervision  4 = None, Modified Miller/Independent    Putting on and taking off regular lower body clothing? : 4  Bathing (including washing, rinsing, drying)?: 4  Toileting, which includes using toilet, bedpan, or urinal? : 3  Putting on and taking off regular upper body clothing?: 4  Taking care of personal grooming such as brushing teeth?: 4  Eating meals?: 4  Total Score: 23    AM-PAC Raw Score CMS "G-Code Modifier Level of Impairment Assistance   6 % Total / Unable   7 - 9 CM 80 - 100% Maximal Assist   10 - 14 CL 60 - 80% Moderate Assist   15 - 19 CK 40 - 60% Moderate Assist   20 - 22 CJ 20 - 40% Minimal Assist   23 CI 1-20% SBA / CGA   24 CH 0% Independent/ Mod I       Patient left seated EOB with call button in reach and MD present    Assessment:  Ann Canavan is a 56 y.o. female with a medical diagnosis of COPD with exacerbation and presents with Impairments listed below. Pt is not currently displaying a need for acute OT services. D/C acute OT services and recommend pt D/C home.    Rehab identified problem list/impairments: Rehab identified problem list/impairments: weakness, impaired endurance, impaired functional mobilty, impaired cardiopulmonary response to activity    Rehab potential is good.    Activity tolerance: Good    Discharge recommendations: Discharge Facility/Level Of Care Needs: home     Barriers to discharge: Barriers to Discharge: Decreased caregiver support    Equipment recommendations: bath bench     GOALS:   Occupational Therapy Goals        Problem: Occupational Therapy Goal    Goal Priority Disciplines Outcome Interventions   Occupational Therapy Goal     OT, PT/OT     Description:  Pt is currently performing ADLs, functional mobility & t/fs without assistance and displays age-appropriate strength, endurance & balance. OT services are not recommended at this time and patient is safe to D/C home.          "       PLAN:  Patient to be seen  (D/C acute OT services ) to address the above listed problems via    Plan of Care expires:    Plan of Care reviewed with: patient    Joshua Mcrae, OT  04/28/2017

## 2017-04-28 NOTE — ASSESSMENT & PLAN NOTE
- No acute processes seen on CXR  - Likely 2/2 to COPD exacerbation  - Continuous oxygen, titrating to pO2 of > 92%  - Will perform 6 min walk test on day of discharge to home O2 therapy

## 2017-04-28 NOTE — PROGRESS NOTES
Patient Consulted by CTTS:     The following was discussed by the Tobacco Treatment Specialist:  ? Relevance of Quitting  ? Risk to Health  ? Long Term Risk  ? Risk for Others  ? Rewards of Quitting  ? Motivation Intervention to Quit      Pt states that she is ready, and highly motivated to quit smoking.  Referral made to ambulatory smoking cessation clinic.

## 2017-04-28 NOTE — PLAN OF CARE
04/28/17 1508   Discharge Assessment   Assessment Type Discharge Planning Assessment   Confirmed/corrected address and phone number on facesheet? Yes   Assessment information obtained from? Patient   Expected Length of Stay (days) 3   Communicated expected length of stay with patient/caregiver yes   Prior to hospitilization cognitive status: Alert/Oriented   Prior to hospitalization functional status: Independent   Current cognitive status: Alert/Oriented   Current Functional Status: Independent   Arrived From home or self-care   Lives With alone   Able to Return to Prior Arrangements yes   Is patient able to care for self after discharge? Yes   Patient's perception of discharge disposition home or selfcare   Readmission Within The Last 30 Days no previous admission in last 30 days   Patient currently being followed by outpatient case management? No   Patient currently receives home health services? No   Does the patient currently use HME? No   Patient currently receives private duty nursing? No   Patient currently receives any other outside agency services? No   Equipment Currently Used at Home none   Do you have any problems affording any of your prescribed medications? TBD   Do you have any financial concerns preventing you from receiving the healthcare you need? Yes  (Patient does not have any medical insurance.)   Does the patient have transportation to healthcare appointments? Yes   Transportation Available car;family or friend will provide   On Dialysis? No   Does the patient receive services at the Coumadin Clinic? No   Discharge Plan A Home   Patient/Family In Agreement With Plan yes

## 2017-04-28 NOTE — PLAN OF CARE
OT: OT Evaluation and education completed upon consult Met    Joshua Mcrae OTR/CONG  4/28/2017

## 2017-04-28 NOTE — PLAN OF CARE
Breath sounds remain diminished, however, slight improvement in aeration noted following treatment.

## 2017-04-28 NOTE — H&P
[facility] Hospital Medicine  History & Physical    Patient Name: Ann Canavan  MRN: [id number]  Admission Date: 4/27/2017  Attending Physician: Matthew Fitzgerald, *   Primary Care Provider: Primary Doctor [facility] Medicine Team: [facility] MED 2 Maxx Vazquez MD     Patient information was obtained from patient, past medical records and ER records.     Subjective:     Principal Problem:COPD with exacerbation    Chief Complaint:   Chief Complaint   Patient presents with    Shortness of Breath     Patient reports reports shortness of breath, especially on exertion. Patient reports a hx of COPD. Patient has not been using her oxygen        HPI: Ann Canavan is a 56 y.o. female w/ a significant PMHx of COPD, continued smoker (1 ppd), who presents to [facility] ED for progressive shortness of breath over the past week with severe episode of hypoxia noticed today by the patient. Patient checked her O2 sats prior to presentation and states that her sats were in the high 60's. Patient states she has been compliant with her home COPD regimen and have not skipped any treatments. Patient cannot recall anything that triggered this episode other than a recent trip to [place] where the air quality is poor and extremely bruce. Patient denies any associated symptoms including productive cough, fevers, chills, recent sick contacts, sinus congestion, sore throat, chest pain. Patient was previously on home oxygen therapy but had it  because she was no longer using it.     In the ED patient receive Solumedrol 125 mg IV x1, as well as breathing treatments. CXR was done which demonstrated findings consistent with a COPD patient however no acute lung processes were seen. UA and urine microscopy were also performed which showed trace leukocytes and moderate bacteria. Patient is currently asymptomatic.      Past Medical History:   Diagnosis Date    COPD (chronic obstructive pulmonary disease)         History reviewed. No pertinent surgical history.    Review of patient's allergies indicates:   Allergen Reactions    Avelox [moxifloxacin] Itching and Rash     IV       No current facility-administered medications on file prior to encounter.      Current Outpatient Prescriptions on File Prior to Encounter   Medication Sig    albuterol 90 mcg/actuation inhaler Inhale 2 puffs into the lungs every 6 (six) hours as needed for Wheezing. Rescue    aspirin (ECOTRIN) 81 MG EC tablet Take 1 tablet (81 mg total) by mouth once daily.    fluticasone (FLONASE) 50 mcg/actuation nasal spray 1 spray by Each Nare route once daily.    fluticasone-salmeterol 250-50 mcg/dose (ADVAIR) 250-50 mcg/dose diskus inhaler Inhale 1 puff into the lungs 2 (two) times daily.    nicotine (NICODERM CQ) 21 mg/24 hr Place 1 patch onto the skin once daily.    tiotropium (SPIRIVA) 18 mcg inhalation capsule Inhale 1 capsule (18 mcg total) into the lungs once daily.     Family History     None        Social History Main Topics    Smoking status: Heavy Tobacco Smoker     Packs/day: 1.00     Types: Cigarettes    Smokeless tobacco: Not on file    Alcohol use 0.5 oz/week     1 drink(s) per week    Drug use: No    Sexual activity: Not on file     Review of Systems   Constitutional: Negative for activity change, appetite change, chills, diaphoresis, fatigue and fever.   HENT: Negative for congestion, rhinorrhea, sore throat and trouble swallowing.    Eyes: Negative for photophobia, redness and visual disturbance.   Respiratory: Positive for cough (chronic, non-productive), chest tightness, shortness of breath and wheezing.    Cardiovascular: Negative for chest pain, palpitations and leg swelling.   Gastrointestinal: Negative for abdominal distention, abdominal pain, blood in stool, constipation, diarrhea, nausea and vomiting.   Endocrine: Negative for polyuria.   Genitourinary: Negative for decreased urine volume, difficulty urinating,  dysuria, frequency and hematuria.   Musculoskeletal: Negative for arthralgias, back pain, joint swelling and myalgias.   Skin: Negative for rash and wound.   Allergic/Immunologic: Negative for immunocompromised state.   Neurological: Negative for dizziness, tremors, seizures, weakness, light-headedness and headaches.   Hematological: Does not bruise/bleed easily.   Psychiatric/Behavioral: Negative for dysphoric mood and sleep disturbance. The patient is not nervous/anxious.      Objective:     Vital Signs (Most Recent):  Temp: 98.1 °F (36.7 °C) (04/27/17 1958)  Pulse: (!) 114 (04/27/17 2134)  Resp: 20 (04/27/17 2134)  BP: (!) 169/88 (04/27/17 1958)  SpO2: 96 % (04/27/17 2134) Vital Signs (24h Range):  Temp:  [98.1 °F (36.7 °C)] 98.1 °F (36.7 °C)  Pulse:  [114-126] 114  Resp:  [20-31] 20  SpO2:  [80 %-96 %] 96 %  BP: (169)/(88) 169/88     Weight: 59 kg (130 lb)  Body mass index is 20.98 kg/(m^2).    Physical Exam   Constitutional: She is oriented to person, place, and time. She appears well-developed and well-nourished.   HENT:   Head: Normocephalic and atraumatic.   Right Ear: External ear normal.   Left Ear: External ear normal.   Eyes: Conjunctivae and EOM are normal. Pupils are equal, round, and reactive to light. Right eye exhibits no discharge. Left eye exhibits no discharge. No scleral icterus.   Neck: Normal range of motion. Neck supple. No JVD present. No tracheal deviation present. No thyromegaly present.   Cardiovascular: Normal rate, regular rhythm and normal heart sounds.  Exam reveals no gallop and no friction rub.    No murmur heard.  Pulmonary/Chest: Effort normal. No stridor. No respiratory distress. She has decreased breath sounds (all lung fields). She has wheezes (R > L). She has no rales. She exhibits no tenderness.   Abdominal: Soft. Bowel sounds are normal. She exhibits no distension and no mass. There is no tenderness. There is no rebound and no guarding. No hernia.   Musculoskeletal: Normal  range of motion. She exhibits no edema, tenderness or deformity.   Neurological: She is alert and oriented to person, place, and time. No cranial nerve deficit. She exhibits normal muscle tone. Coordination normal.   Skin: Skin is warm and dry. No rash noted. No erythema. No pallor.   Psychiatric: She has a normal mood and affect. Her behavior is normal. Judgment and thought content normal.   Nursing note and vitals reviewed.       Significant Labs:   CBC:   Recent Labs  Lab 04/27/17 2120   WBC 11.04   HGB 16.7*   HCT 51.2*        CMP:   Recent Labs  Lab 04/27/17 2120      K 4.9      CO2 27   *   BUN 15   CREATININE 0.8   CALCIUM 9.7   PROT 7.5   ALBUMIN 3.9   BILITOT 0.4   ALKPHOS 90   AST 26   ALT 16   ANIONGAP 11   EGFRNONAA >60.0     Coagulation:   Recent Labs  Lab 04/27/17 2120   INR 0.9     Urine Studies:   Recent Labs  Lab 04/27/17  2246   COLORU Yellow   APPEARANCEUA Hazy*   PHUR 5.0   SPECGRAV 1.005   PROTEINUA Negative   GLUCUA Negative   KETONESU Negative   BILIRUBINUA Negative   OCCULTUA Negative   NITRITE Negative   UROBILINOGEN Negative   LEUKOCYTESUR Trace*   RBCUA 0   WBCUA 2   BACTERIA Moderate*   SQUAMEPITHEL 2       Significant Imaging:    CXR (04/27/17):  Mediastinal structures are midline. Cardiomediastinal silhouette is stable.  Lungs are hyperexpanded with mild flattening of the hemidiaphragms which can be seen with COPD, similar to prior examination. No large focal consolidation. There is no pneumothorax. No pleural effusions.  No acute osseous abnormality.    Assessment/Plan:     * COPD with exacerbation  - COPD pathway with recent exacerbation back in 02/2017  - DuoNebs Q4H scheduled  - Spiriva 1 puff daily  - Breo 1 puff daily  - Prednisone 40 mg PO daily  - Smoking cessation counseling  - Nicotine patch 21 mg daily    Acute respiratory failure with hypoxia  - No acute processes seen on CXR  - Likely 2/2 to COPD exacerbation  - Continuous oxygen, titrating to  pO2 of > 92%  - Will perform 6 min walk test on day of discharge to home O2 therapy    VTE Risk Mitigation         Ordered     enoxaparin injection 40 mg  Daily     Route:  Subcutaneous        04/28/17 0109     Medium Risk of VTE  Once      04/28/17 0109     Place sequential compression device  Until discontinued      04/28/17 0109     Place HOLLIE hose  Until discontinued      04/28/17 0109            Maxx Vazquez MD  Department of Hospital Medicine   [facility]

## 2017-04-28 NOTE — PT/OT/SLP EVAL
Physical Therapy  Evaluation    Ann Canavan   MRN: [id number]   Admitting Diagnosis: COPD with exacerbation    PT Received On: 17  PT Start Time: 937     PT Stop Time: 959    PT Total Time (min): 22 min       Billable Minutes:  Evaluation 14 and Gait Training8    Diagnosis: COPD with exacerbation      Past Medical History:   Diagnosis Date    COPD (chronic obstructive pulmonary disease)       History reviewed. No pertinent surgical history.    Referring physician: Maxx Vazquez MD  Date referred to PT: 17    General Precautions: Standard, fall  Orthopedic Precautions: N/A   Braces: N/A       Do you have any cultural, spiritual, Confucianism conflicts, given your current situation?: none    Patient History:  Lives With: alone  Living Arrangements: house  Home Accessibility: stairs to enter home  Home Layout: Able to live on 1st floor  Number of Stairs to Enter Home: 4  Stair Railings at Home: none  Transportation Available: car, family or friend will provide  Equipment Currently Used at Home: none  DME owned (not currently used): none    Previous Level of Function:  Ambulation Skills: independent  Transfer Skills: independent  ADL Skills: independent  Work/Leisure Activity: independent    Subjective:  Communicated with nursing prior to session.    Chief Complaint: B LE cramping  Patient goals: To get back to amb without pain    Pain Ratin/10               Pain Rating Post-Intervention: 0/10    Objective:   Patient found with: oxygen, telemetry, peripheral IV     Cognitive Exam:  Oriented to: Person, Place, Time and Situation    Follows Commands/attention: Follows multistep  commands  Communication: clear/fluent  Safety awareness/insight to disability: intact    Physical Exam:  Postural examination/scapula alignment: No postural abnormalities identified    Skin integrity: Visible skin intact  Edema: None noted     Sensation:   Intact  light/touch B LEs    Upper Extremity Range of Motion:  Right  Upper Extremity: WFL  Left Upper Extremity: WFL    Upper Extremity Strength:  Right Upper Extremity: WFL  Left Upper Extremity: WFL    Lower Extremity Range of Motion:  Right Lower Extremity: WFL  Left Lower Extremity: WFL    Lower Extremity Strength:  Right Lower Extremity: WFL  Left Lower Extremity: WFL     Fine motor coordination:  Intact  RLE heel shin and LLE heel shin    Gross motor coordination: WFL    Functional Mobility:  Bed Mobility:  Rolling/Turning to Left: Modified independent  Scooting/Bridging: Modified Independent  Supine to Sit: Modified Independent  Sit to Supine: Modified Independent    Transfers:  Sit <> Stand Assistance: Independent  Sit <> Stand Assistive Device: No Assistive Device    Gait:   Gait Distance: Pt amb 16' on room air  Assistance 1: Supervision  Gait Assistive Device: No device  Gait Pattern: swing-through gait  Gait Deviation(s): decreased aleksandr, increased time in double stance      Balance:   Static Sit: GOOD-: Takes MODERATE challenges from all directions but inconsistently  Dynamic Sit: GOOD-: Maintains balance through MODERATE excursions of active trunk movement,     Static Stand: FAIR: Maintains without assist but unable to take challenges  Dynamic stand: GOOD-: Needs SUPERVISION only during gait and able to self right with moderate     Therapeutic Activities and Exercises:  SaO2 98% on 4L/min  SaO2 84% on room air a symptomatic  Amb without supp O2    AM-PAC 6 CLICK MOBILITY  How much help from another person does this patient currently need?   1 = Unable, Total/Dependent Assistance  2 = A lot, Maximum/Moderate Assistance  3 = A little, Minimum/Contact Guard/Supervision  4 = None, Modified Knott/Independent    Turning over in bed (including adjusting bedclothes, sheets and blankets)?: 4  Sitting down on and standing up from a chair with arms (e.g., wheelchair, bedside commode, etc.): 4  Moving from lying on back to sitting on the side of the bed?: 4  Moving to and  from a bed to a chair (including a wheelchair)?: 4  Need to walk in hospital room?: 4  Climbing 3-5 steps with a railing?: 3  Total Score: 23     AM-PAC Raw Score CMS G-Code Modifier Level of Impairment Assistance   6 % Total / Unable   7 - 9 CM 80 - 100% Maximal Assist   10 - 14 CL 60 - 80% Moderate Assist   15 - 19 CK 40 - 60% Moderate Assist   20 - 22 CJ 20 - 40% Minimal Assist   23 CI 1-20% SBA / CGA   24 CH 0% Independent/ Mod I     Patient left supine with all lines intact, call button in reach and nursing notified.    Assessment:   Ann Canavan is a 56 y.o. female with a medical diagnosis of COPD with exacerbation and presents with unstable SaO2, which is asymptomatic.  Pt is functionally mobile and safe with transfers.  Unable to determine safety with gait during initial evaluation.  Pt will benefit from skilled PT services during hospitalization to address the impairments below and to stabilize O2 saturation.      Rehab identified problem list/impairments: Rehab identified problem list/impairments: weakness, impaired endurance, impaired cardiopulmonary response to activity, impaired balance    Rehab potential is fair.    Activity tolerance: Fair    Discharge recommendations: Discharge Facility/Level Of Care Needs: outpatient PT     Barriers to discharge: Barriers to Discharge: Decreased caregiver support    Equipment recommendations: Equipment Needed After Discharge: bath bench     GOALS:   Physical Therapy Goals        Problem: Physical Therapy Goal    Goal Priority Disciplines Outcome Goal Variances Interventions   Physical Therapy Goal     PT/OT, PT Ongoing (interventions implemented as appropriate)     Description:  Goals to be met by: 17     Patient will increase functional independence with mobility by performin. Gait  x 1000 feet with Supervision using Rolling Walker PRN.   2. Ascend/descend 4 stair with bilateral Handrails Supervision .   3. Lower extremity exercise program x 20  reps per handout, with assistance as needed.                PLAN:    Patient to be seen 3 x/week to address the above listed problems via    Plan of Care expires:    Plan of Care reviewed with: patient          Drea Brushin, PT  04/28/2017

## 2017-04-28 NOTE — ED TRIAGE NOTES
56 year old female presents to the ED c/o increasingly worsening shortness of breath. Reports that she was placed on home oxygen after her last admission, but after a few weeks felt as if she did not need it. Shortness of breath with any exertion.

## 2017-04-29 PROBLEM — E11.9 TYPE 2 DIABETES MELLITUS WITHOUT COMPLICATION, WITHOUT LONG-TERM CURRENT USE OF INSULIN: Status: ACTIVE | Noted: 2017-04-29

## 2017-04-29 LAB
ANION GAP SERPL CALC-SCNC: 9 MMOL/L
BASOPHILS # BLD AUTO: 0.01 K/UL
BASOPHILS NFR BLD: 0.1 %
BUN SERPL-MCNC: 13 MG/DL
CALCIUM SERPL-MCNC: 9 MG/DL
CHLORIDE SERPL-SCNC: 102 MMOL/L
CO2 SERPL-SCNC: 30 MMOL/L
CREAT SERPL-MCNC: 0.7 MG/DL
CREAT UR-MCNC: 51 MG/DL
DIFFERENTIAL METHOD: ABNORMAL
EOSINOPHIL # BLD AUTO: 0.2 K/UL
EOSINOPHIL NFR BLD: 1.8 %
ERYTHROCYTE [DISTWIDTH] IN BLOOD BY AUTOMATED COUNT: 14.4 %
EST. GFR  (AFRICAN AMERICAN): >60 ML/MIN/1.73 M^2
EST. GFR  (NON AFRICAN AMERICAN): >60 ML/MIN/1.73 M^2
GLUCOSE SERPL-MCNC: 88 MG/DL
HCT VFR BLD AUTO: 45 %
HGB BLD-MCNC: 14 G/DL
LYMPHOCYTES # BLD AUTO: 2 K/UL
LYMPHOCYTES NFR BLD: 21.1 %
MAGNESIUM SERPL-MCNC: 1.9 MG/DL
MCH RBC QN AUTO: 30.8 PG
MCHC RBC AUTO-ENTMCNC: 31.1 %
MCV RBC AUTO: 99 FL
MICROALBUMIN UR DL<=1MG/L-MCNC: 12 UG/ML
MICROALBUMIN/CREATININE RATIO: 23.5 UG/MG
MONOCYTES # BLD AUTO: 0.6 K/UL
MONOCYTES NFR BLD: 6.6 %
NEUTROPHILS # BLD AUTO: 6.6 K/UL
NEUTROPHILS NFR BLD: 70.3 %
PHOSPHATE SERPL-MCNC: 3.3 MG/DL
PLATELET # BLD AUTO: 137 K/UL
PMV BLD AUTO: 12.1 FL
POCT GLUCOSE: 108 MG/DL (ref 70–110)
POCT GLUCOSE: 160 MG/DL (ref 70–110)
POCT GLUCOSE: 179 MG/DL (ref 70–110)
POCT GLUCOSE: 259 MG/DL (ref 70–110)
POTASSIUM SERPL-SCNC: 3.8 MMOL/L
RBC # BLD AUTO: 4.55 M/UL
SODIUM SERPL-SCNC: 141 MMOL/L
WBC # BLD AUTO: 9.42 K/UL

## 2017-04-29 PROCEDURE — 36415 COLL VENOUS BLD VENIPUNCTURE: CPT

## 2017-04-29 PROCEDURE — 63600175 PHARM REV CODE 636 W HCPCS: Performed by: STUDENT IN AN ORGANIZED HEALTH CARE EDUCATION/TRAINING PROGRAM

## 2017-04-29 PROCEDURE — 80048 BASIC METABOLIC PNL TOTAL CA: CPT

## 2017-04-29 PROCEDURE — 94640 AIRWAY INHALATION TREATMENT: CPT

## 2017-04-29 PROCEDURE — 84100 ASSAY OF PHOSPHORUS: CPT

## 2017-04-29 PROCEDURE — 99232 SBSQ HOSP IP/OBS MODERATE 35: CPT | Mod: ,,, | Performed by: HOSPITALIST

## 2017-04-29 PROCEDURE — 82570 ASSAY OF URINE CREATININE: CPT

## 2017-04-29 PROCEDURE — 83735 ASSAY OF MAGNESIUM: CPT

## 2017-04-29 PROCEDURE — 11000001 HC ACUTE MED/SURG PRIVATE ROOM

## 2017-04-29 PROCEDURE — 25000242 PHARM REV CODE 250 ALT 637 W/ HCPCS: Performed by: STUDENT IN AN ORGANIZED HEALTH CARE EDUCATION/TRAINING PROGRAM

## 2017-04-29 PROCEDURE — 25000003 PHARM REV CODE 250: Performed by: STUDENT IN AN ORGANIZED HEALTH CARE EDUCATION/TRAINING PROGRAM

## 2017-04-29 PROCEDURE — 85025 COMPLETE CBC W/AUTO DIFF WBC: CPT

## 2017-04-29 RX ORDER — GLUCAGON 1 MG
1 KIT INJECTION
Status: DISCONTINUED | OUTPATIENT
Start: 2017-04-29 | End: 2017-05-01 | Stop reason: HOSPADM

## 2017-04-29 RX ORDER — IBUPROFEN 200 MG
16 TABLET ORAL
Status: DISCONTINUED | OUTPATIENT
Start: 2017-04-29 | End: 2017-05-01 | Stop reason: HOSPADM

## 2017-04-29 RX ORDER — INSULIN ASPART 100 [IU]/ML
0-5 INJECTION, SOLUTION INTRAVENOUS; SUBCUTANEOUS
Status: DISCONTINUED | OUTPATIENT
Start: 2017-04-29 | End: 2017-05-01 | Stop reason: HOSPADM

## 2017-04-29 RX ORDER — IBUPROFEN 200 MG
24 TABLET ORAL
Status: DISCONTINUED | OUTPATIENT
Start: 2017-04-29 | End: 2017-05-01 | Stop reason: HOSPADM

## 2017-04-29 RX ORDER — GUAIFENESIN 100 MG/5ML
200 SOLUTION ORAL EVERY 4 HOURS PRN
Status: DISCONTINUED | OUTPATIENT
Start: 2017-04-29 | End: 2017-05-01 | Stop reason: HOSPADM

## 2017-04-29 RX ORDER — PREDNISONE 20 MG/1
40 TABLET ORAL DAILY
Qty: 6 TABLET | Refills: 0 | Status: SHIPPED | OUTPATIENT
Start: 2017-04-29 | End: 2017-05-03

## 2017-04-29 RX ADMIN — FLUTICASONE FUROATE AND VILANTEROL TRIFENATATE 1 PUFF: 100; 25 POWDER RESPIRATORY (INHALATION) at 09:04

## 2017-04-29 RX ADMIN — IPRATROPIUM BROMIDE AND ALBUTEROL SULFATE 3 ML: .5; 3 SOLUTION RESPIRATORY (INHALATION) at 08:04

## 2017-04-29 RX ADMIN — NICOTINE 1 PATCH: 21 PATCH, EXTENDED RELEASE TRANSDERMAL at 09:04

## 2017-04-29 RX ADMIN — INSULIN ASPART 3 UNITS: 100 INJECTION, SOLUTION INTRAVENOUS; SUBCUTANEOUS at 01:04

## 2017-04-29 RX ADMIN — TIOTROPIUM BROMIDE 18 MCG: 18 CAPSULE ORAL; RESPIRATORY (INHALATION) at 09:04

## 2017-04-29 RX ADMIN — IPRATROPIUM BROMIDE AND ALBUTEROL SULFATE 3 ML: .5; 3 SOLUTION RESPIRATORY (INHALATION) at 03:04

## 2017-04-29 RX ADMIN — IPRATROPIUM BROMIDE AND ALBUTEROL SULFATE 3 ML: .5; 3 SOLUTION RESPIRATORY (INHALATION) at 12:04

## 2017-04-29 RX ADMIN — ASPIRIN 81 MG: 81 TABLET, COATED ORAL at 09:04

## 2017-04-29 RX ADMIN — PREDNISONE 40 MG: 20 TABLET ORAL at 09:04

## 2017-04-29 RX ADMIN — IPRATROPIUM BROMIDE AND ALBUTEROL SULFATE 3 ML: .5; 3 SOLUTION RESPIRATORY (INHALATION) at 11:04

## 2017-04-29 RX ADMIN — GUAIFENESIN 200 MG: 100 SOLUTION ORAL at 12:04

## 2017-04-29 RX ADMIN — ENOXAPARIN SODIUM 40 MG: 100 INJECTION SUBCUTANEOUS at 05:04

## 2017-04-29 NOTE — DISCHARGE INSTRUCTIONS
Diabetes (General Information)  Diabetes is a long-term health problem. It means your body does not make enough insulin. Or it may mean that your body cannot use the insulin it makes. Insulin is a hormone in your body. It lets blood sugar (glucose) reach the cells in your body. All of your cells need glucose for fuel.  When you have diabetes, the glucose in your blood builds up because it cannot get into the cells. This buildup is called high blood sugar (hyperglycemia).  Your blood sugar level depends on several things. It depends on what kind of food you eat and how much of it you eat. It also depends on how much exercise you get, and how much insulin you have in your body. Eating too much of the wrong kinds of food or not taking diabetes medicine on time can cause high blood sugar. Infections can cause high blood sugar even if you are taking medicines correctly.  These things can also cause low blood sugar:  · Missing meals  · Not eating enough food  · Taking too much diabetes medicine  Diabetes can cause serious problems over time if you do not get treated. These problems include heart disease, stroke, kidney failure, and blindness. They also include nerve pain or loss of feeling in your legs and feet, and gangrene of the feet. By keeping your blood sugar under control you can prevent or delay these problems.  Normal blood sugar levels are 80 to 100 before a meal and less than 180 in the 1 to 2 hours after a meal.  Home care  Follow these guidelines when caring for yourself at home:  · Follow the diet your healthcare provider gives you. Take insulin or other diabetes medicine exactly as told to.  · Watch your blood sugar as you are told to. Keep a log of your results. This will help your provider change your medicines to keep your blood sugar under control.  · Try to reach your ideal weight. You may be able to cut back on or not have to take diabetes medicine if you eat the right foods and get exercise.  · Do  not smoke. Smoking worsens the effects of diabetes on your circulation. You are much more likely to have a heart attack if you have diabetes and you smoke.  · Take good care of your feet. If you have lost feeling in your feet, you may not see an injury or infection. Check your feet and between your toes at least once a week.  · Wear a medical alert bracelet or necklace, or carry a card in your wallet that says you have diabetes. This will help healthcare providers give you the right care if you get very ill and cannot tell them that you have diabetes.  Sick day plan  If you get a cold, the flu, or a bacterial or viral infection, take these steps:  · Look at your diabetes sick plan and call your healthcare provider as you were told to. You may need to call your provider right away if:  ¨ Your blood sugar is above 240 while taking your diabetes medicine  ¨ Your urine ketone levels are above normal or high  ¨ You have been vomiting more than 6 hours  ¨ You have trouble breathing or your breath ha s a fruity smell  ¨ You have a high fever  ¨ You have a fever for several days and you are not getting better  ¨ You get light-headed and are sleepier than usual  · Keep taking your diabetes pills (oral medicine) even if you have been vomiting and are feeling sick. Call your provider right away because you may need insulin to lower your blood sugar until you recover from your illness.  · Keep taking your insulin even if you have been vomiting and are feeling sick. Call your provider right away to ask if you need to change your insulin dose. This will depend on your blood sugar results.  · Check your blood sugar every 2 to 4 hours, or at least 4 times a day.  · Check your ketones often. If you are vomiting and having diarrhea, watch them more often.  · Do not skip meals. Try to eat small meals on a regular schedule. Do this even if you do not feel like eating.  · Drink water or other liquids that do not have caffeine or  calories. This will keep you from getting dehydrated. If you are nauseated or vomiting, takes small sips every 5 minutes. To prevent dehydration try to drink a cup (8 ounces) of fluids every hour while you are awake.  General care  Always bring a source of fast-acting sugar with you in case you have symptoms of low blood sugar (below 70). At the first sign of low blood sugar, eat or drink 15 to 20 grams of fast-acting sugar to raise your blood sugar. Examples are:  · 3 to 4 glucose tablets. You can buy these at most [employer].  · 4 ounces (1/2 cup) of regular (not diet) soft drinks  · 4 ounces (1/2 cup) of any fruit juice  · 8 ounces (1 cup) of milk  · 5 to 6 pieces of hard candy  · 1 tablespoon of honey  Check your blood sugar 15 minutes after treating yourself. If it is still below 70, take 15 to 20 more grams of fast-acting sugar. Test again in 15 minutes. If it returns to normal (70 or above), eat a snack or meal to keep your blood sugar in a safe range. If it stays low, call your doctor or go to an emergency room.  Follow-up care  Follow-up with your healthcare provider, or as advised. For more information about diabetes, visit the American Diabetes Association website at www.diabetes.org or call 761-949-6636.  When to seek medical advice  Call your healthcare provider right away if you have any of these symptoms of high blood sugar:  · Frequent urination  · Dizziness  · Drowsiness  · Thirst  · Headache  · Nausea or vomiting  · Abdominal pain  · Eyesight changes  · Fast breathing  · Confusion or loss of consciousness  Also call your provider right away if you have any of these signs of low blood sugar:  · Fatigue  · Headache  · Shakes  · Excess sweating  · Hunger  · Feeling anxious or restless  · Eyesight changes  · Drowsiness  · Weakness  · Confusion or loss of consciousness  Call 911  Call for emergency help right away if any of these occur:  · Chest pain or shortness of breath  · Dizziness or  fainting  · Weakness of an arm or leg or one side of the face  · Trouble speaking or seeing   Date Last Reviewed: 6/1/2016 © 2000-2016 "[employer] [address] [place] [address], [place] [address]. All rights reserved. This information is not intended as a substitute for professional medical care. Always follow your healthcare professional's instructions.            Understanding Type 2 Diabetes  When your body is working normally, the food you eat is digested and used as fuel. This fuel supplies energy to the bodys cells. When you have diabetes, the fuel cant enter the cells. Without treatment, diabetes can cause serious long-term health problems.     Your body breaks down the food you eat into glucose.         How the body gets energy  The digestive system breaks down food, resulting in a sugar called glucose. Some of this glucose is stored in the liver. But most of it enters the bloodstream and travels to the cells to be used as fuel. Glucose needs the help of a hormone called insulin to enter the cells. Insulin is made in the pancreas. It is released into the bloodstream in response to the presence of glucose in the blood. Think of insulin as a key. When insulin reaches a cell, it attaches to the cell wall. This signals the cell to create an opening that allows glucose to enter the cell.  When you have type 2 diabetes  Early in type 2 diabetes, your cells dont respond properly to insulin. Because of this, less glucose than normal moves into cells. This is called insulin resistance. In response, the pancreas makes more insulin. But eventually, the pancreas cant produce enough insulin to overcome insulin resistance. As less and less glucose enters cells, it builds up to a harmful level in the bloodstream. This is known as high blood sugar or hyperglycemia. The result is type 2 diabetes. The cells become starved for energy, which can leave you feeling tired and rundown.  Why high blood sugar is a  problem  If high blood sugar is not controlled, blood vessels throughout the body become damaged. Prolonged high blood sugar affects organs, blood vessels, and nerves. As a result, the risks of damage to the heart, kidneys, eyes, and limbs increase. Diabetes also makes other problems, such as high blood pressure and high cholesterol, more dangerous. Over time, people with uncontrolled high blood sugar have an increase in risk of dying of, or being disabled by, heart attack or stroke.  Date Last Reviewed: 7/1/2016 © 2000-2016 [employer]. [address] [place] [address]. All rights reserved. This information is not intended as a substitute for professional medical care. Always follow your healthcare professional's instructions.          Diabetes: Caring for Your Body    When you have diabetes, your body needs special care. This care helps you stay healthy and prevent complications. Exercise and healthy eating are a part of this. You can also protect yourself by taking special care of your feet, skin, and teeth.  Caring for your feet  Follow these tips to help keep your feet healthy:  · Check your feet every day for redness, blisters, cracks, dry skin, or numbness. Use a mirror to inspect the bottoms of your feet, if needed. Or, ask for help.  · Wash your feet in warm (not hot) water. Dont soak them.  · Use an emery board to keep your toenails even with the ends of your toes. File away sharp edges. A podiatrist (foot doctor) may need to cut your toenails for you.  · Keep your skin soft and smooth by placing a thin layer of skin lotion on the tops and bottoms of your feet. Do not put lotion in between your toes.  · Always wear shoes or slippers, even inside your home. Make sure that shoes are properly fitted. Change your socks daily.  · Call your healthcare provider right away if your feet are numb or painful, or if a cut or sore doesnt heal within a few days.  Preventing skin infections  To  prevent skin infections, bathe every day. Dry yourself well, especially between your toes. Wash any cuts with warm, soapy water and cover with a sterile bandage. Call your healthcare provider if a cut or sore doesn't heal in a few days, feels warm, itches, or has a bad odor.  Caring for your teeth  Follow these guidelines for healthy teeth:  · Brush your teeth twice daily.  · Floss your teeth daily.  · See your dentist at least twice yearly.  If you smoke, quit  Smoking is dangerous for everyone, especially people with diabetes. It can harm the blood vessels in your eyes, kidneys, and heart. It raises blood pressure. Smoking can also slow healing, so infections are more likely. Ask your healthcare provider about programs to help you stop smoking.   Date Last Reviewed: 3/1/2016  © 4585-7626 [employer]. [address] [place] [address]. All rights reserved. This information is not intended as a substitute for professional medical care. Always follow your healthcare professional's instructions.        A1C  Does this test have other names?  Hemoglobin A1c; HbA1c; glycosylated hemoglobin; glycohemoglobin; Glycated hemoglobin  What is this test?  A1C is a blood test used to screen people to find out whether they have diabetes or prediabetes. It's also used in people who know they have diabetes to measure how well they are controlling their blood sugar and to guide their treatment decisions over time.  Why do I need this test?  You may need this test to check for prediabetes or diabetes. If you already know that you have diabetes or prediabetes, you may need this test to see how well you are controlling your blood sugar.  People with diabetes must track their blood sugar (glucose) levels every day to make sure they arent too high or too low. The A1C test gives results for a longer period of time. It shows whether your blood sugar has been too high on average in the previous two to three  months. When blood sugar is high, more glucose builds up and sticks to your hemoglobin, a protein that carries oxygen in red blood cells. The A1C test measures the percentage of hemoglobin that is coated with blood sugar.  Depending on the type of diabetes you have, how well it's controlled, and your health care providers preferences, you may need to have the A1C test two or more times a year. The American Diabetes Association (ADA) recommends that you have an A1C test at least twice a year if you are meeting your blood sugar goals and your blood sugar is well-controlled. If you arent meeting your goals or your medication has changed lately, you should have the A1C test more often. You also may have the test when your health care provider first starts working with you to treat your diabetes.  What other tests might I have along with this test?   If your health care provider is testing you for diabetes, you may also take a fasting plasma glucose test, or FPG, or an oral glucose tolerance test, or OGTT, as part of your screening and diagnosis. You may also be tested for sugar, ketones or protein in the urine.  What do my test results mean?  Laboratory test results may vary depending on your age, gender, health history, the method used for the test, and many other factors. If your results are different from the results suggested below, this may not mean that you have a problem. Ask your health care provider to explain what the results mean for you.   A1C is reported as a percentage:  · Normal A1C is considered to be below 5.7 percent. Results between 5.7 and 6.4 percent may mean you have prediabetes. This means you have a higher risk of developing diabetes.  · Results of 6.5 percent or higher on two separate occasions may mean that you have diabetes.  · The ADA  recommends that people with diabetes should maintain an A1C below 7 percent. The American Association of Clinical Endocrinologists recommends an A1C of 6.5  percent or less. Recommendations may vary based on the person's age, medical conditions, or other factors.   How is this test done?  The test requires a blood sample, which is drawn through a needle from a vein in your arm.   Does this test pose any risks?  Taking a blood sample with a needle carries risks that include bleeding, infection, bruising, and a sense of lightheadedness. When the needle pricks your arm, you may feel a slight stinging sensation or pain. Afterward, the site may be slightly sore.  What might affect my test results?  Your blood sugar levels usually vary throughout the day. These variations won't affect the A1C.  If you have sickle cell anemia or other blood disorders, a standard A1C test may be less useful for diagnosing or monitoring diabetes. (Your health care provider may be able to find another diabetes test that will better serve you.) In addition, the test results may be skewed if you have anemia, heavy bleeding, an iron deficiency, kidney failure, or liver disease.  How do I get ready for this test?  You don't need any special preparation for the test.    Date Last Reviewed: 5/15/2015  © 0543-5343 [employer]. [address] [place] [address], [place] [address]. All rights reserved. This information is not intended as a substitute for professional medical care. Always follow your healthcare professional's instructions.

## 2017-04-29 NOTE — PLAN OF CARE
Patient resting in bed. Alert and oriented x 4. Occasional shortness of breath reported. Unable to tolerate oxygen weaning down and she qualified for home oxygen. Non- productive cough noted. Guaifenesin given.  CBG monitored pre-meals and Insulin coverage given per orders. Safety maintained at all times.

## 2017-04-29 NOTE — ASSESSMENT & PLAN NOTE
- COPD pathway with recent exacerbation back in 02/2017  - DuoNebs Q4H scheduled  - Spiriva 1 puff daily  - Breo 1 puff daily  - Prednisone 40 mg PO daily  - Smoking cessation counseling  - Nicotine patch 21 mg daily  - arrange for home O2   - treat cough with guaifenesin, reevaluate later today if she is good to go home when home O2 ready and cough improved

## 2017-04-29 NOTE — PROGRESS NOTES
Home Oxygen Evaluatio  Date Performed: 4/29/2017    1) Patient's Home O2 Sat on room air, while at rest: 85%        If O2 sats on room air at rest are 88% or below, patient qualifies. No additional testing needed. Document N/A in steps 2 and 3. If 89% or above, complete steps 2.      2) Patient's O2 Sat on room air while exercising: NA        If O2 sats on room air while exercising remain 89% or above patient does not qualify, no further testing needed Document N/A in step 3. If O2 sats on room air while exercising are 88% or below, continue to step 3.      3) Patient's O2 Sat while exercising on O2: NA at NA LPM         (Must show improvement from #2 for patients to qualify)    If O2 sats improve on oxygen, patient qualifies for portable oxygen. If not, the patient does not qualify.

## 2017-04-29 NOTE — PLAN OF CARE
Patient ready for discharge home and will be needing home O2. Patient previously with oxygen at home and just recently returned it 2/2 not using. Patient is private pay and will use same credit card information as previously. Face sheet, H&P, O2 qualifying note and orders faxed to [facility] at [phone]. VM left for [place] with E notifying of pending discharge.

## 2017-04-29 NOTE — PROGRESS NOTES
[facility] Hospital Medicine  Progress Note    Patient Name: Ann Canavan  MRN: [id number]  Patient Class: IP- Inpatient   Admission Date: 4/27/2017  Length of Stay: 1 days  Attending Physician: Jeffrey Boyer MD  Primary Care Provider: [facility] Medicine Team: [facility] HOSP MED 2 VICKY Henry    Subjective:     Principal Problem:COPD with exacerbation    HPI:  Ann Canavan is a 56 y.o. female w/ a significant PMHx of COPD, continued smoker (1 ppd), who presents to [facility] ED for progressive shortness of breath over the past week with severe episode of hypoxia noticed today by the patient. Patient checked her O2 sats prior to presentation and states that her sats were in the high 60's. Patient states she has been compliant with her home COPD regimen and have not skipped any treatments. Patient cannot recall anything that triggered this episode other than a recent trip to [place] where the air quality is poor and extremely bruce. Patient denies any associated symptoms including productive cough, fevers, chills, recent sick contacts, sinus congestion, sore throat, chest pain. Patient was previously on home oxygen therapy but had it  because she was no longer using it.     In the ED patient receive Solumedrol 125 mg IV x1, as well as breathing treatments. CXR was done which demonstrated findings consistent with a COPD patient however no acute lung processes were seen. UA and urine microscopy were also performed which showed trace leukocytes and moderate bacteria. Patient is currently asymptomatic.      Hospital Course:  04/28/17: Patient admitted to hospital medicine team 1 for further observation and optimization of respiratory status.   4/29: doing better today, still with bouts of productive cough and SOB. On 3 LNC, dests to 85% at rest off O2.       Interval History:     Review of Systems   Constitutional: Negative for activity change, appetite change, chills,  diaphoresis, fatigue and fever.   HENT: Negative for congestion, rhinorrhea, sore throat and trouble swallowing.    Eyes: Negative for photophobia, redness and visual disturbance.   Respiratory: Positive for cough (chronic, non-productive), chest tightness and shortness of breath. Negative for wheezing.    Cardiovascular: Negative for chest pain, palpitations and leg swelling.   Gastrointestinal: Negative for abdominal distention, abdominal pain, blood in stool, constipation, diarrhea, nausea and vomiting.   Endocrine: Negative for polyuria.   Genitourinary: Negative for decreased urine volume, difficulty urinating, dysuria, frequency and hematuria.   Musculoskeletal: Negative for arthralgias, back pain, joint swelling and myalgias.   Skin: Negative for rash and wound.   Allergic/Immunologic: Negative for immunocompromised state.   Neurological: Negative for dizziness, tremors, seizures, weakness, light-headedness and headaches.   Hematological: Does not bruise/bleed easily.   Psychiatric/Behavioral: Negative for dysphoric mood and sleep disturbance. The patient is not nervous/anxious.      Objective:     Vital Signs (Most Recent):  Temp: 99.4 °F (37.4 °C) (04/29/17 1200)  Pulse: 106 (04/29/17 1200)  Resp: 18 (04/29/17 1200)  BP: 124/67 (04/29/17 1200)  SpO2: (!) 87 % (04/29/17 1200) Vital Signs (24h Range):  Temp:  [97.2 °F (36.2 °C)-99.4 °F (37.4 °C)] 99.4 °F (37.4 °C)  Pulse:  [] 106  Resp:  [16-20] 18  SpO2:  [85 %-96 %] 87 %  BP: (117-145)/(67-76) 124/67     Weight: 53.5 kg (118 lb)  Body mass index is 19.05 kg/(m^2).  No intake or output data in the 24 hours ending 04/29/17 1258   Physical Exam   Constitutional: She is oriented to person, place, and time. She appears well-developed and well-nourished.   HENT:   Head: Normocephalic and atraumatic.   Right Ear: External ear normal.   Left Ear: External ear normal.   Eyes: Conjunctivae and EOM are normal. Pupils are equal, round, and reactive to light.  Right eye exhibits no discharge. Left eye exhibits no discharge. No scleral icterus.   Neck: Normal range of motion. Neck supple. No JVD present. No tracheal deviation present. No thyromegaly present.   Cardiovascular: Normal rate, regular rhythm and normal heart sounds.  Exam reveals no gallop and no friction rub.    No murmur heard.  Pulmonary/Chest: Effort normal. No accessory muscle usage or stridor. No respiratory distress. She has no decreased breath sounds (all lung fields). She has no wheezes (resolved ). She has no rales. She exhibits no tenderness.   adequate air entrey BL.    Abdominal: Soft. Bowel sounds are normal. She exhibits no distension and no mass. There is no tenderness. There is no rebound and no guarding. No hernia.   Musculoskeletal: Normal range of motion. She exhibits no edema, tenderness or deformity.   Neurological: She is alert and oriented to person, place, and time. No cranial nerve deficit. She exhibits normal muscle tone. Coordination normal.   Skin: Skin is warm and dry. No rash noted. No erythema. No pallor.   Psychiatric: She has a normal mood and affect. Her behavior is normal. Judgment and thought content normal.   Nursing note and vitals reviewed.      Significant Labs: All pertinent labs within the past 24 hours have been reviewed.      Assessment/Plan:      * COPD with exacerbation  - COPD pathway with recent exacerbation back in 02/2017  - Shon Q4H scheduled  - Spiriva 1 puff daily  - Breo 1 puff daily  - Prednisone 40 mg PO daily  - Smoking cessation counseling  - Nicotine patch 21 mg daily  - arrange for home O2   - treat cough with guaifenesin, reevaluate later today if she is good to go home when home O2 ready and cough improved      Tobacco abuse        Acute respiratory failure with hypoxia  - No acute processes seen on CXR  - Likely 2/2 to COPD exacerbation  - Continuous oxygen, titrating to pO2 of > 92%      Glucose intolerance (impaired glucose  tolerance)      Type 2 diabetes mellitus without complication, without long-term current use of insulin    HgA1c is 6.5   Informed abut the Dx  - sent microalbuminurea, ref opth  - she is thin and with COPD req O2 at rest. Advise against dieting and exercise.   - PCP follow up  - insulin ISS in hospital after steroid       VTE Risk Mitigation         Ordered     enoxaparin injection 40 mg  Daily     Route:  Subcutaneous        04/28/17 0109     Medium Risk of VTE  Once      04/28/17 0109     Place sequential compression device  Until discontinued      04/28/17 0109     Place HOLLIE hose  Until discontinued      04/28/17 0109          VICKY Henry  Department of Hospital Medicine   [facility]

## 2017-04-29 NOTE — ASSESSMENT & PLAN NOTE
- No acute processes seen on CXR  - Likely 2/2 to COPD exacerbation  - Continuous oxygen, titrating to pO2 of > 92%

## 2017-04-29 NOTE — ASSESSMENT & PLAN NOTE
HgA1c is 6.5   Informed abut the Dx  - sent microalbuminurea, ref opth  - she is thin and with COPD req O2 at rest. Advise against dieting and exercise.   - PCP follow up  - insulin ISS in hospital after steroid  yesterday, improving today, expected to increase after IV steroids today.

## 2017-04-29 NOTE — SUBJECTIVE & OBJECTIVE
Interval History:     Review of Systems   Constitutional: Negative for activity change, appetite change, chills, diaphoresis, fatigue and fever.   HENT: Negative for congestion, rhinorrhea, sore throat and trouble swallowing.    Eyes: Negative for photophobia, redness and visual disturbance.   Respiratory: Positive for cough (chronic, non-productive), chest tightness and shortness of breath. Negative for wheezing.    Cardiovascular: Negative for chest pain, palpitations and leg swelling.   Gastrointestinal: Negative for abdominal distention, abdominal pain, blood in stool, constipation, diarrhea, nausea and vomiting.   Endocrine: Negative for polyuria.   Genitourinary: Negative for decreased urine volume, difficulty urinating, dysuria, frequency and hematuria.   Musculoskeletal: Negative for arthralgias, back pain, joint swelling and myalgias.   Skin: Negative for rash and wound.   Allergic/Immunologic: Negative for immunocompromised state.   Neurological: Negative for dizziness, tremors, seizures, weakness, light-headedness and headaches.   Hematological: Does not bruise/bleed easily.   Psychiatric/Behavioral: Negative for dysphoric mood and sleep disturbance. The patient is not nervous/anxious.      Objective:     Vital Signs (Most Recent):  Temp: 99.4 °F (37.4 °C) (04/29/17 1200)  Pulse: 106 (04/29/17 1200)  Resp: 18 (04/29/17 1200)  BP: 124/67 (04/29/17 1200)  SpO2: (!) 87 % (04/29/17 1200) Vital Signs (24h Range):  Temp:  [97.2 °F (36.2 °C)-99.4 °F (37.4 °C)] 99.4 °F (37.4 °C)  Pulse:  [] 106  Resp:  [16-20] 18  SpO2:  [85 %-96 %] 87 %  BP: (117-145)/(67-76) 124/67     Weight: 53.5 kg (118 lb)  Body mass index is 19.05 kg/(m^2).  No intake or output data in the 24 hours ending 04/29/17 1258   Physical Exam   Constitutional: She is oriented to person, place, and time. She appears well-developed and well-nourished.   HENT:   Head: Normocephalic and atraumatic.   Right Ear: External ear normal.   Left Ear:  External ear normal.   Eyes: Conjunctivae and EOM are normal. Pupils are equal, round, and reactive to light. Right eye exhibits no discharge. Left eye exhibits no discharge. No scleral icterus.   Neck: Normal range of motion. Neck supple. No JVD present. No tracheal deviation present. No thyromegaly present.   Cardiovascular: Normal rate, regular rhythm and normal heart sounds.  Exam reveals no gallop and no friction rub.    No murmur heard.  Pulmonary/Chest: Effort normal. No accessory muscle usage or stridor. No respiratory distress. She has no decreased breath sounds (all lung fields). She has no wheezes (resolved ). She has no rales. She exhibits no tenderness.   adequate air entrey BL.    Abdominal: Soft. Bowel sounds are normal. She exhibits no distension and no mass. There is no tenderness. There is no rebound and no guarding. No hernia.   Musculoskeletal: Normal range of motion. She exhibits no edema, tenderness or deformity.   Neurological: She is alert and oriented to person, place, and time. No cranial nerve deficit. She exhibits normal muscle tone. Coordination normal.   Skin: Skin is warm and dry. No rash noted. No erythema. No pallor.   Psychiatric: She has a normal mood and affect. Her behavior is normal. Judgment and thought content normal.   Nursing note and vitals reviewed.      Significant Labs: All pertinent labs within the past 24 hours have been reviewed.

## 2017-04-30 LAB
ANION GAP SERPL CALC-SCNC: 10 MMOL/L
BASOPHILS # BLD AUTO: 0.02 K/UL
BASOPHILS NFR BLD: 0.2 %
BUN SERPL-MCNC: 16 MG/DL
CALCIUM SERPL-MCNC: 9.1 MG/DL
CHLORIDE SERPL-SCNC: 102 MMOL/L
CO2 SERPL-SCNC: 33 MMOL/L
CREAT SERPL-MCNC: 0.8 MG/DL
DIFFERENTIAL METHOD: ABNORMAL
EOSINOPHIL # BLD AUTO: 0.2 K/UL
EOSINOPHIL NFR BLD: 2.9 %
ERYTHROCYTE [DISTWIDTH] IN BLOOD BY AUTOMATED COUNT: 14.4 %
EST. GFR  (AFRICAN AMERICAN): >60 ML/MIN/1.73 M^2
EST. GFR  (NON AFRICAN AMERICAN): >60 ML/MIN/1.73 M^2
FERRITIN SERPL-MCNC: 41 NG/ML
GLUCOSE SERPL-MCNC: 96 MG/DL
HCT VFR BLD AUTO: 46.7 %
HGB BLD-MCNC: 14.9 G/DL
IRON SERPL-MCNC: 53 UG/DL
LYMPHOCYTES # BLD AUTO: 1.8 K/UL
LYMPHOCYTES NFR BLD: 21.9 %
MAGNESIUM SERPL-MCNC: 1.9 MG/DL
MCH RBC QN AUTO: 31.8 PG
MCHC RBC AUTO-ENTMCNC: 31.9 %
MCV RBC AUTO: 100 FL
MONOCYTES # BLD AUTO: 0.5 K/UL
MONOCYTES NFR BLD: 5.7 %
NEUTROPHILS # BLD AUTO: 5.6 K/UL
NEUTROPHILS NFR BLD: 69.1 %
PHOSPHATE SERPL-MCNC: 4.1 MG/DL
PLATELET # BLD AUTO: 145 K/UL
PMV BLD AUTO: 11.8 FL
POCT GLUCOSE: 144 MG/DL (ref 70–110)
POCT GLUCOSE: 225 MG/DL (ref 70–110)
POCT GLUCOSE: 91 MG/DL (ref 70–110)
POTASSIUM SERPL-SCNC: 3.4 MMOL/L
RBC # BLD AUTO: 4.69 M/UL
SATURATED IRON: 15 %
SODIUM SERPL-SCNC: 145 MMOL/L
TOTAL IRON BINDING CAPACITY: 346 UG/DL
TRANSFERRIN SERPL-MCNC: 234 MG/DL
WBC # BLD AUTO: 8.07 K/UL

## 2017-04-30 PROCEDURE — 84100 ASSAY OF PHOSPHORUS: CPT

## 2017-04-30 PROCEDURE — 94761 N-INVAS EAR/PLS OXIMETRY MLT: CPT

## 2017-04-30 PROCEDURE — 82728 ASSAY OF FERRITIN: CPT

## 2017-04-30 PROCEDURE — 27000221 HC OXYGEN, UP TO 24 HOURS

## 2017-04-30 PROCEDURE — 99232 SBSQ HOSP IP/OBS MODERATE 35: CPT | Mod: ,,, | Performed by: HOSPITALIST

## 2017-04-30 PROCEDURE — 83735 ASSAY OF MAGNESIUM: CPT

## 2017-04-30 PROCEDURE — 83540 ASSAY OF IRON: CPT

## 2017-04-30 PROCEDURE — 63600175 PHARM REV CODE 636 W HCPCS: Performed by: HOSPITALIST

## 2017-04-30 PROCEDURE — 36415 COLL VENOUS BLD VENIPUNCTURE: CPT

## 2017-04-30 PROCEDURE — 85025 COMPLETE CBC W/AUTO DIFF WBC: CPT

## 2017-04-30 PROCEDURE — 63600175 PHARM REV CODE 636 W HCPCS: Performed by: STUDENT IN AN ORGANIZED HEALTH CARE EDUCATION/TRAINING PROGRAM

## 2017-04-30 PROCEDURE — 25000003 PHARM REV CODE 250: Performed by: STUDENT IN AN ORGANIZED HEALTH CARE EDUCATION/TRAINING PROGRAM

## 2017-04-30 PROCEDURE — 25000242 PHARM REV CODE 250 ALT 637 W/ HCPCS: Performed by: STUDENT IN AN ORGANIZED HEALTH CARE EDUCATION/TRAINING PROGRAM

## 2017-04-30 PROCEDURE — 94640 AIRWAY INHALATION TREATMENT: CPT

## 2017-04-30 PROCEDURE — 11000001 HC ACUTE MED/SURG PRIVATE ROOM

## 2017-04-30 PROCEDURE — 80048 BASIC METABOLIC PNL TOTAL CA: CPT

## 2017-04-30 RX ORDER — POTASSIUM CHLORIDE 750 MG/1
30 CAPSULE, EXTENDED RELEASE ORAL ONCE
Status: COMPLETED | OUTPATIENT
Start: 2017-04-30 | End: 2017-04-30

## 2017-04-30 RX ORDER — POTASSIUM CHLORIDE 20 MEQ/1
40 TABLET, EXTENDED RELEASE ORAL ONCE
Status: COMPLETED | OUTPATIENT
Start: 2017-04-30 | End: 2017-04-30

## 2017-04-30 RX ADMIN — PREDNISONE 40 MG: 20 TABLET ORAL at 08:04

## 2017-04-30 RX ADMIN — METHYLPREDNISOLONE SODIUM SUCCINATE 60 MG: 40 INJECTION, POWDER, FOR SOLUTION INTRAMUSCULAR; INTRAVENOUS at 12:04

## 2017-04-30 RX ADMIN — METHYLPREDNISOLONE SODIUM SUCCINATE 60 MG: 40 INJECTION, POWDER, FOR SOLUTION INTRAMUSCULAR; INTRAVENOUS at 10:04

## 2017-04-30 RX ADMIN — IPRATROPIUM BROMIDE AND ALBUTEROL SULFATE 3 ML: .5; 3 SOLUTION RESPIRATORY (INHALATION) at 07:04

## 2017-04-30 RX ADMIN — INSULIN ASPART 2 UNITS: 100 INJECTION, SOLUTION INTRAVENOUS; SUBCUTANEOUS at 05:04

## 2017-04-30 RX ADMIN — POTASSIUM CHLORIDE 30 MEQ: 750 CAPSULE, EXTENDED RELEASE ORAL at 08:04

## 2017-04-30 RX ADMIN — GUAIFENESIN 200 MG: 100 SOLUTION ORAL at 12:04

## 2017-04-30 RX ADMIN — NICOTINE 1 PATCH: 21 PATCH, EXTENDED RELEASE TRANSDERMAL at 08:04

## 2017-04-30 RX ADMIN — IPRATROPIUM BROMIDE AND ALBUTEROL SULFATE 3 ML: .5; 3 SOLUTION RESPIRATORY (INHALATION) at 04:04

## 2017-04-30 RX ADMIN — POTASSIUM CHLORIDE 40 MEQ: 1500 TABLET, EXTENDED RELEASE ORAL at 05:04

## 2017-04-30 RX ADMIN — TIOTROPIUM BROMIDE 18 MCG: 18 CAPSULE ORAL; RESPIRATORY (INHALATION) at 08:04

## 2017-04-30 RX ADMIN — FLUTICASONE FUROATE AND VILANTEROL TRIFENATATE 1 PUFF: 100; 25 POWDER RESPIRATORY (INHALATION) at 08:04

## 2017-04-30 RX ADMIN — IPRATROPIUM BROMIDE AND ALBUTEROL SULFATE 3 ML: .5; 3 SOLUTION RESPIRATORY (INHALATION) at 01:04

## 2017-04-30 RX ADMIN — ENOXAPARIN SODIUM 40 MG: 100 INJECTION SUBCUTANEOUS at 05:04

## 2017-04-30 RX ADMIN — IPRATROPIUM BROMIDE AND ALBUTEROL SULFATE 3 ML: .5; 3 SOLUTION RESPIRATORY (INHALATION) at 08:04

## 2017-04-30 RX ADMIN — ASPIRIN 81 MG: 81 TABLET, COATED ORAL at 08:04

## 2017-04-30 NOTE — PLAN OF CARE
Problem: Chronic Obstructive Pulmonary Disease (Adult)  Intervention: Reduce/Relieve Breathlessness    04/29/17 2121   Cognitive Interventions   Sensory Stimulation Regulation care clustered;lighting decreased;quiet environment promoted   Coping/Psychosocial Interventions   Environmental Support calm environment promoted;environmental consistency promoted;rest periods encouraged       Intervention: Prevent/Manage DVT/VTE Risk    04/29/17 2121   Minimize Embolism Risk   VTE Prevention/Management ambulation promoted;bleeding precautions maintained;bleeding risk assessed;dorsiflexion/plantar flexion performed;fluids promoted;ROM (active) performed   OTHER   VTE Required Core Measure Pharmacological prophylaxis initiated/maintained       Intervention: Optimize Nutrition and Fluid Status    04/30/17 0720   Nutrition Interventions   Oral Nutrition Promotion physical activity promoted;rest periods promoted       Intervention: Prevent/Manage Infection    04/29/17 2121 04/30/17 0720   Safety Interventions   Infection Prevention --  gylcemic control management;environmental surveillance;equipment surfaces disinfected;hydration promoted;rest/sleep promoted;single patient room provided   Infection Management --  aseptic technique maintained   Prevent/Manage Colorectal Surgical Infection   Fever Reduction/Comfort Measures lightweight bedding;lightweight clothing --        Intervention: Optimize Functional Ability/Increase Activity Tolerance    04/29/17 2121   Activity   Activity Type up ad suresh;activity clustered for rest period;activity encouraged       Intervention: Optimize Oxygenation/Ventilation/Perfusion    04/29/17 2121 04/30/17 0720   Respiratory Interventions   Breathing Techniques/Airway Clearance --  deep/controlled cough encouraged   Positioning   Head of Bed (HOB) HOB at 30-45 degrees --        Intervention: Support/Optimize Psychosocial Response to Chronic Pulmonary Disease    04/29/17 2121 04/30/17 0720    Psychosocial Support   Family/Support System Care --  self-care encouraged;support provided   Trust Relationship/Rapport --  care explained;choices provided;empathic listening provided;questions answered;questions encouraged;reassurance provided;thoughts/feelings acknowledged   Coping/Psychosocial Interventions   Supportive Measures active listening utilized;relaxation techniques promoted;self-care encouraged;verbalization of feelings encouraged --    Life Transition/Adjustment --  decision-making facilitated         Goal: Signs and Symptoms of Listed Potential Problems Will be Absent, Minimized or Managed (Chronic Obstructive Pulmonary Disease)  Signs and symptoms of listed potential problems will be absent, minimized or managed by discharge/transition of care (reference Chronic Obstructive Pulmonary Disease (Adult) CPG).   Outcome: Ongoing (interventions implemented as appropriate)    04/30/17 0720   Chronic Obstructive Pulmonary Disease   Problems Assessed (Chronic Obstructive Pulmonary Disease (COPD)) all   Problems Present (Chronic Obstructive Pulmonary Disease (COPD)) functional deficit

## 2017-04-30 NOTE — ASSESSMENT & PLAN NOTE
- COPD pathway with recent exacerbation back in 02/2017  - today with significant o2 sats drop we decided to add IV steroids and revaluate later today. Still pending home O2.   - DuoNebs Q4H scheduled  - Spiriva 1 puff daily  - Breo 1 puff daily  - will continue Prednisone 40 mg PO daily  - Smoking cessation counseling  - Nicotine patch 21 mg daily  - arrange for home O2

## 2017-04-30 NOTE — SUBJECTIVE & OBJECTIVE
Interval History:     Review of Systems   Constitutional: Negative for activity change, appetite change, chills, diaphoresis, fatigue and fever.   HENT: Negative for congestion, rhinorrhea, sore throat and trouble swallowing.    Eyes: Negative for photophobia, redness and visual disturbance.   Respiratory: Positive for cough (chronic, non-productive), chest tightness and shortness of breath. Negative for wheezing.    Cardiovascular: Negative for chest pain, palpitations and leg swelling.   Gastrointestinal: Negative for abdominal distention, abdominal pain, blood in stool, constipation, diarrhea, nausea and vomiting.   Endocrine: Negative for polyuria.   Genitourinary: Negative for decreased urine volume, difficulty urinating, dysuria, frequency and hematuria.   Musculoskeletal: Negative for arthralgias, back pain, joint swelling and myalgias.   Skin: Negative for rash and wound.   Allergic/Immunologic: Negative for immunocompromised state.   Neurological: Negative for dizziness, tremors, seizures, weakness, light-headedness and headaches.   Hematological: Does not bruise/bleed easily.   Psychiatric/Behavioral: Negative for dysphoric mood and sleep disturbance. The patient is not nervous/anxious.      Objective:     Vital Signs (Most Recent):  Temp: 97.8 °F (36.6 °C) (04/30/17 1119)  Pulse: 76 (04/30/17 1309)  Resp: 12 (04/30/17 1309)  BP: 138/78 (04/30/17 1119)  SpO2: 95 % (04/30/17 1119) Vital Signs (24h Range):  Temp:  [97.2 °F (36.2 °C)-98.7 °F (37.1 °C)] 97.8 °F (36.6 °C)  Pulse:  [] 76  Resp:  [12-20] 12  SpO2:  [90 %-98 %] 95 %  BP: (133-153)/(72-85) 138/78     Weight: 53.5 kg (118 lb)  Body mass index is 19.05 kg/(m^2).    Intake/Output Summary (Last 24 hours) at 04/30/17 1400  Last data filed at 04/30/17 1300   Gross per 24 hour   Intake              480 ml   Output                0 ml   Net              480 ml      Physical Exam   Constitutional: She is oriented to person, place, and time. She  appears well-developed and well-nourished.   HENT:   Head: Normocephalic and atraumatic.   Right Ear: External ear normal.   Left Ear: External ear normal.   Eyes: Conjunctivae and EOM are normal. Pupils are equal, round, and reactive to light. Right eye exhibits no discharge. Left eye exhibits no discharge. No scleral icterus.   Neck: Normal range of motion. Neck supple. No JVD present. No tracheal deviation present. No thyromegaly present.   Cardiovascular: Normal rate, regular rhythm and normal heart sounds.  Exam reveals no gallop and no friction rub.    No murmur heard.  Pulmonary/Chest: Effort normal. No accessory muscle usage or stridor. No respiratory distress. She has no decreased breath sounds (all lung fields). She has no wheezes (resolved ). She has no rales. She exhibits no tenderness.   adequate air entrey BL.    Abdominal: Soft. Bowel sounds are normal. She exhibits no distension and no mass. There is no tenderness. There is no rebound and no guarding. No hernia.   Musculoskeletal: Normal range of motion. She exhibits no edema, tenderness or deformity.   Neurological: She is alert and oriented to person, place, and time. No cranial nerve deficit. She exhibits normal muscle tone. Coordination normal.   Skin: Skin is warm and dry. No rash noted. No erythema. No pallor.   Psychiatric: She has a normal mood and affect. Her behavior is normal. Judgment and thought content normal.   Nursing note and vitals reviewed.      Significant Labs: All pertinent labs within the past 24 hours have been reviewed.

## 2017-04-30 NOTE — PLAN OF CARE
Patient will stay overnight and discharge in the morning. Home O2 was delivered and is at bedside. Patient understands to call when she is leaving to have her concentrator delivered.

## 2017-04-30 NOTE — PROGRESS NOTES
[facility] Hospital Medicine  Progress Note    Patient Name: Ann Canavan  MRN: [id number]  Patient Class: IP- Inpatient   Admission Date: 4/27/2017  Length of Stay: 2 days  Attending Physician: Jeffrey Boyer MD  Primary Care Provider: Primary Doctor No    [facility] Medicine Team: [facility] 2 VICKY Henry    Subjective:     Principal Problem:COPD with exacerbation    HPI:  Ann Canavan is a 56 y.o. female w/ a significant PMHx of COPD, continued smoker (1 ppd), who presents to [facility] ED for progressive shortness of breath over the past week with severe episode of hypoxia noticed today by the patient. Patient checked her O2 sats prior to presentation and states that her sats were in the high 60's. Patient states she has been compliant with her home COPD regimen and have not skipped any treatments. Patient cannot recall anything that triggered this episode other than a recent trip to [place] where the air quality is poor and extremely bruce. Patient denies any associated symptoms including productive cough, fevers, chills, recent sick contacts, sinus congestion, sore throat, chest pain. Patient was previously on home oxygen therapy but had it  because she was no longer using it.     In the ED patient receive Solumedrol 125 mg IV x1, as well as breathing treatments. CXR was done which demonstrated findings consistent with a COPD patient however no acute lung processes were seen. UA and urine microscopy were also performed which showed trace leukocytes and moderate bacteria. Patient is currently asymptomatic.      Hospital Course:  04/28/17: Patient admitted to hospital medicine team 1 for further observation and optimization of respiratory status.   4/29: doing better today, still with bouts of productive cough and SOB. On 3 LNC, dests to 85% at rest off O2.   4/30: patient report BOGGS and drop of O2 sats to ~60s after going for rest room. No chest pain or rest SOB. Cough  improved.       Interval History:     Review of Systems   Constitutional: Negative for activity change, appetite change, chills, diaphoresis, fatigue and fever.   HENT: Negative for congestion, rhinorrhea, sore throat and trouble swallowing.    Eyes: Negative for photophobia, redness and visual disturbance.   Respiratory: Positive for cough (chronic, non-productive), chest tightness and shortness of breath. Negative for wheezing.    Cardiovascular: Negative for chest pain, palpitations and leg swelling.   Gastrointestinal: Negative for abdominal distention, abdominal pain, blood in stool, constipation, diarrhea, nausea and vomiting.   Endocrine: Negative for polyuria.   Genitourinary: Negative for decreased urine volume, difficulty urinating, dysuria, frequency and hematuria.   Musculoskeletal: Negative for arthralgias, back pain, joint swelling and myalgias.   Skin: Negative for rash and wound.   Allergic/Immunologic: Negative for immunocompromised state.   Neurological: Negative for dizziness, tremors, seizures, weakness, light-headedness and headaches.   Hematological: Does not bruise/bleed easily.   Psychiatric/Behavioral: Negative for dysphoric mood and sleep disturbance. The patient is not nervous/anxious.      Objective:     Vital Signs (Most Recent):  Temp: 97.8 °F (36.6 °C) (04/30/17 1119)  Pulse: 76 (04/30/17 1309)  Resp: 12 (04/30/17 1309)  BP: 138/78 (04/30/17 1119)  SpO2: 95 % (04/30/17 1119) Vital Signs (24h Range):  Temp:  [97.2 °F (36.2 °C)-98.7 °F (37.1 °C)] 97.8 °F (36.6 °C)  Pulse:  [] 76  Resp:  [12-20] 12  SpO2:  [90 %-98 %] 95 %  BP: (133-153)/(72-85) 138/78     Weight: 53.5 kg (118 lb)  Body mass index is 19.05 kg/(m^2).    Intake/Output Summary (Last 24 hours) at 04/30/17 1400  Last data filed at 04/30/17 1300   Gross per 24 hour   Intake              480 ml   Output                0 ml   Net              480 ml      Physical Exam   Constitutional: She is oriented to person, place, and  time. She appears well-developed and well-nourished.   HENT:   Head: Normocephalic and atraumatic.   Right Ear: External ear normal.   Left Ear: External ear normal.   Eyes: Conjunctivae and EOM are normal. Pupils are equal, round, and reactive to light. Right eye exhibits no discharge. Left eye exhibits no discharge. No scleral icterus.   Neck: Normal range of motion. Neck supple. No JVD present. No tracheal deviation present. No thyromegaly present.   Cardiovascular: Normal rate, regular rhythm and normal heart sounds.  Exam reveals no gallop and no friction rub.    No murmur heard.  Pulmonary/Chest: Effort normal. No accessory muscle usage or stridor. No respiratory distress. She has no decreased breath sounds (all lung fields). She has no wheezes (resolved ). She has no rales. She exhibits no tenderness.   adequate air entrey BL.    Abdominal: Soft. Bowel sounds are normal. She exhibits no distension and no mass. There is no tenderness. There is no rebound and no guarding. No hernia.   Musculoskeletal: Normal range of motion. She exhibits no edema, tenderness or deformity.   Neurological: She is alert and oriented to person, place, and time. No cranial nerve deficit. She exhibits normal muscle tone. Coordination normal.   Skin: Skin is warm and dry. No rash noted. No erythema. No pallor.   Psychiatric: She has a normal mood and affect. Her behavior is normal. Judgment and thought content normal.   Nursing note and vitals reviewed.      Significant Labs: All pertinent labs within the past 24 hours have been reviewed.        Assessment/Plan:      * COPD with exacerbation  - COPD pathway with recent exacerbation back in 02/2017  - today with significant o2 sats drop we decided to add IV steroids and revaluate later today. Still pending home O2.   - DuoNebs Q4H scheduled  - Spiriva 1 puff daily  - Breo 1 puff daily  - will continue Prednisone 40 mg PO daily  - Smoking cessation counseling  - Nicotine patch 21 mg  daily  - arrange for home O2       Claudication        Tobacco abuse        Acute respiratory failure with hypoxia  - No acute processes seen on CXR  - Likely 2/2 to COPD exacerbation  - Continuous oxygen, titrating to pO2 of > 92%      Type 2 diabetes mellitus without complication, without long-term current use of insulin    HgA1c is 6.5   Informed abut the Dx  - sent microalbuminurea, ref opth  - she is thin and with COPD req O2 at rest. Advise against dieting and exercise.   - PCP follow up  - insulin ISS in hospital after steroid  yesterday, improving today, expected to increase after IV steroids today.      VTE Risk Mitigation         Ordered     enoxaparin injection 40 mg  Daily     Route:  Subcutaneous        04/28/17 0109     Medium Risk of VTE  Once      04/28/17 0109     Place sequential compression device  Until discontinued      04/28/17 0109     Place HOLLIE hose  Until discontinued      04/28/17 0109          VICKY Henry  Department of Hospital Medicine   [facility]

## 2017-04-30 NOTE — PLAN OF CARE
Patient resting in bed. Alert and oriented x 4. Maintained on oxygen at 3 lpm/ NC to keep saturations at least 92% per order. Medicated with Guaifenesin for cough. Solumedrol IV x 1 given.  Potassium level 3.4 and oral replacement given per MD order.  CBG monitored pre-meals. Patient did not require Insulin coverage.

## 2017-05-01 VITALS
BODY MASS INDEX: 18.96 KG/M2 | DIASTOLIC BLOOD PRESSURE: 84 MMHG | WEIGHT: 118 LBS | HEIGHT: 66 IN | OXYGEN SATURATION: 98 % | TEMPERATURE: 98 F | SYSTOLIC BLOOD PRESSURE: 148 MMHG | RESPIRATION RATE: 15 BRPM | HEART RATE: 99 BPM

## 2017-05-01 LAB
ANION GAP SERPL CALC-SCNC: 11 MMOL/L
BASOPHILS # BLD AUTO: 0 K/UL
BASOPHILS NFR BLD: 0 %
BUN SERPL-MCNC: 12 MG/DL
CALCIUM SERPL-MCNC: 9.5 MG/DL
CHLORIDE SERPL-SCNC: 101 MMOL/L
CO2 SERPL-SCNC: 28 MMOL/L
CREAT SERPL-MCNC: 0.7 MG/DL
DIFFERENTIAL METHOD: ABNORMAL
EOSINOPHIL # BLD AUTO: 0 K/UL
EOSINOPHIL NFR BLD: 0 %
ERYTHROCYTE [DISTWIDTH] IN BLOOD BY AUTOMATED COUNT: 14.2 %
EST. GFR  (AFRICAN AMERICAN): >60 ML/MIN/1.73 M^2
EST. GFR  (NON AFRICAN AMERICAN): >60 ML/MIN/1.73 M^2
GLUCOSE SERPL-MCNC: 165 MG/DL
HCT VFR BLD AUTO: 44.8 %
HGB BLD-MCNC: 15 G/DL
LYMPHOCYTES # BLD AUTO: 0.7 K/UL
LYMPHOCYTES NFR BLD: 7.5 %
MAGNESIUM SERPL-MCNC: 2 MG/DL
MCH RBC QN AUTO: 32.1 PG
MCHC RBC AUTO-ENTMCNC: 33.5 %
MCV RBC AUTO: 96 FL
MONOCYTES # BLD AUTO: 0.2 K/UL
MONOCYTES NFR BLD: 2.6 %
NEUTROPHILS # BLD AUTO: 8 K/UL
NEUTROPHILS NFR BLD: 89.6 %
PHOSPHATE SERPL-MCNC: 4.3 MG/DL
PLATELET # BLD AUTO: 136 K/UL
PMV BLD AUTO: 11.2 FL
POCT GLUCOSE: 107 MG/DL (ref 70–110)
POCT GLUCOSE: 112 MG/DL (ref 70–110)
POCT GLUCOSE: 132 MG/DL (ref 70–110)
POTASSIUM SERPL-SCNC: 5.1 MMOL/L
RBC # BLD AUTO: 4.68 M/UL
SODIUM SERPL-SCNC: 140 MMOL/L
WBC # BLD AUTO: 8.95 K/UL

## 2017-05-01 PROCEDURE — 80048 BASIC METABOLIC PNL TOTAL CA: CPT

## 2017-05-01 PROCEDURE — 94761 N-INVAS EAR/PLS OXIMETRY MLT: CPT

## 2017-05-01 PROCEDURE — 84100 ASSAY OF PHOSPHORUS: CPT

## 2017-05-01 PROCEDURE — 99238 HOSP IP/OBS DSCHRG MGMT 30/<: CPT | Mod: ,,, | Performed by: HOSPITALIST

## 2017-05-01 PROCEDURE — 25000242 PHARM REV CODE 250 ALT 637 W/ HCPCS: Performed by: STUDENT IN AN ORGANIZED HEALTH CARE EDUCATION/TRAINING PROGRAM

## 2017-05-01 PROCEDURE — 36415 COLL VENOUS BLD VENIPUNCTURE: CPT

## 2017-05-01 PROCEDURE — 85025 COMPLETE CBC W/AUTO DIFF WBC: CPT

## 2017-05-01 PROCEDURE — 83735 ASSAY OF MAGNESIUM: CPT

## 2017-05-01 PROCEDURE — 25000003 PHARM REV CODE 250: Performed by: STUDENT IN AN ORGANIZED HEALTH CARE EDUCATION/TRAINING PROGRAM

## 2017-05-01 PROCEDURE — 94640 AIRWAY INHALATION TREATMENT: CPT

## 2017-05-01 PROCEDURE — 27000221 HC OXYGEN, UP TO 24 HOURS

## 2017-05-01 PROCEDURE — 97530 THERAPEUTIC ACTIVITIES: CPT

## 2017-05-01 RX ADMIN — IPRATROPIUM BROMIDE AND ALBUTEROL SULFATE 3 ML: .5; 3 SOLUTION RESPIRATORY (INHALATION) at 04:05

## 2017-05-01 RX ADMIN — TIOTROPIUM BROMIDE 18 MCG: 18 CAPSULE ORAL; RESPIRATORY (INHALATION) at 08:05

## 2017-05-01 RX ADMIN — IPRATROPIUM BROMIDE AND ALBUTEROL SULFATE 3 ML: .5; 3 SOLUTION RESPIRATORY (INHALATION) at 12:05

## 2017-05-01 RX ADMIN — NICOTINE 1 PATCH: 21 PATCH, EXTENDED RELEASE TRANSDERMAL at 08:05

## 2017-05-01 RX ADMIN — IPRATROPIUM BROMIDE AND ALBUTEROL SULFATE 3 ML: .5; 3 SOLUTION RESPIRATORY (INHALATION) at 08:05

## 2017-05-01 RX ADMIN — ASPIRIN 81 MG: 81 TABLET, COATED ORAL at 08:05

## 2017-05-01 RX ADMIN — FLUTICASONE FUROATE AND VILANTEROL TRIFENATATE 1 PUFF: 100; 25 POWDER RESPIRATORY (INHALATION) at 08:05

## 2017-05-01 NOTE — PT/OT/SLP PROGRESS
Physical Therapy  Treatment/ Discharge Summary    Ann Canavan   MRN: [id number]   Admitting Diagnosis: COPD with exacerbation    PT Received On: 17  PT Start Time: 0956     PT Stop Time: 1015    PT Total Time (min): 19 min       Billable Minutes:  Therapeutic Activity 19    Treatment Type: Treatment  PT/PTA: PT             General Precautions: Standard, fall  Orthopedic Precautions: N/A   Braces: N/A    Do you have any cultural, spiritual, Mu-ism conflicts, given your current situation?: none    Subjective:  Communicated with RN prior to session.  Pt agreeable to PT treatment.     Pain Ratin/10  Pain Rating Post-Intervention: 0/10    Objective:   Patient found supine, HOB elevated with: telemetry, oxygen    Functional Mobility:  Bed Mobility:   Supine to Sit: Independent  Sit to Supine: Independent    Transfers:  Sit <> Stand Assistance: Independent (x 2 trials from bed in lowest position)  Sit <> Stand Assistive Device: No Assistive Device    Gait:   Gait Distance: 350 ft with no AD mod (I) on 3L O2  Assistance 1: Modified Independent  Gait Assistive Device: No device  Gait Pattern: reciprocal  Gait Deviation(s):  (no significant gait deviation or LOB noted)  O2 sats on 3L O2 88-92% during ambulation trial.    Stairs:  Pt refused stair training to mimic home environment.  Pt reports feeling comfortable ascending/descending stairs upon return to home.    Balance:   Static Sit: GOOD: Takes MODERATE challenges from all directions  Dynamic Sit: GOOD: Maintains balance through MODERATE excursions of active trunk movement  Static Stand: FAIR+: Takes MINIMAL challenges from all directions  Dynamic stand: FAIR+: Needs CLOSE SUPERVISION during gait and is able to right self with minor LOB     Therapeutic Activities and Exercises:  Pt performed functional ambulation for endurance training.  Verbal cues on pursed lip breathing and monitoring O2 sats with pulse ox. Pt verbalized understanding.  PT provided pt with  standing BLE therapeutic exercise program (written instruction and illustration).  PT reviewed home exercise program with pt and pt verbalized understanding.      AM-PAC 6 CLICK MOBILITY  How much help from another person does this patient currently need?   1 = Unable, Total/Dependent Assistance  2 = A lot, Maximum/Moderate Assistance  3 = A little, Minimum/Contact Guard/Supervision  4 = None, Modified Will/Independent    Turning over in bed (including adjusting bedclothes, sheets and blankets)?: 4  Sitting down on and standing up from a chair with arms (e.g., wheelchair, bedside commode, etc.): 4  Moving from lying on back to sitting on the side of the bed?: 4  Moving to and from a bed to a chair (including a wheelchair)?: 4  Need to walk in hospital room?: 4  Climbing 3-5 steps with a railing?: 4  Total Score: 24    AM-PAC Raw Score CMS G-Code Modifier Level of Impairment Assistance   6 % Total / Unable   7 - 9 CM 80 - 100% Maximal Assist   10 - 14 CL 60 - 80% Moderate Assist   15 - 19 CK 40 - 60% Moderate Assist   20 - 22 CJ 20 - 40% Minimal Assist   23 CI 1-20% SBA / CGA   24 CH 0% Independent/ Mod I     Patient left HOB elevated with all lines intact and call button in reach.    Assessment:  Ann Canavan is a 56 y.o. female with a medical diagnosis of COPD with exacerbation and presents with below impairment.  She is mod (I) - (I) with functional mobility and O2 sats remained >/= 88% throughout PT session.  She is no longer in need of acute PT intervention.  Pt with no further questions/ concerns for PT and agreeable to discharge at this time.     Rehab identified problem list/impairments: Rehab identified problem list/impairments: impaired cardiopulmonary response to activity, impaired endurance, weakness    Discharge recommendations: Discharge Facility/Level Of Care Needs: outpatient PT     Barriers to discharge: Barriers to Discharge: None    Equipment recommendations: Equipment Needed After  Discharge: none     GOALS:   Physical Therapy Goals     Not on file      Multidisciplinary Problems (Resolved)        Problem: Physical Therapy Goal    Goal Priority Disciplines Outcome Goal Variances Interventions   Physical Therapy Goal   (Resolved)     PT/OT, PT Outcome(s) achieved     Description:  Goals to be met by: 17     Patient will increase functional independence with mobility by performin. Gait  x 1000 feet with Supervision using Rolling Walker PRN.   2. Ascend/descend 4 stair with bilateral Handrails Supervision .   3. Lower extremity exercise program x 20 reps per handout, with assistance as needed.                 PLAN:    Discharge PT.  Plan of Care reviewed with: patient          Mami Lory, PT, DPT  2017  Pager: [phone]

## 2017-05-01 NOTE — PROGRESS NOTES
[facility] Hospital Medicine  Progress Note    Patient Name: Ann Canavan  MRN: [id number]  Patient Class: IP- Inpatient   Admission Date: 4/27/2017  Length of Stay: 3 days  Attending Physician: No att. providers found  Primary Care Provider: Primary Doctor Phuong    [place] [facility] Medicine Team: [facility] HOSP MED 2 Jose Joseph MD    Subjective:     Principal Problem:COPD with exacerbation    HPI:  Ann Canavan is a 56 y.o. female w/ a significant PMHx of COPD, continued smoker (1 ppd), who presents to [facility] ED for progressive shortness of breath over the past week with severe episode of hypoxia noticed today by the patient. Patient checked her O2 sats prior to presentation and states that her sats were in the high 60's. Patient states she has been compliant with her home COPD regimen and have not skipped any treatments. Patient cannot recall anything that triggered this episode other than a recent trip to [place] where the air quality is poor and extremely bruce. Patient denies any associated symptoms including productive cough, fevers, chills, recent sick contacts, sinus congestion, sore throat, chest pain. Patient was previously on home oxygen therapy but had it  because she was no longer using it.     In the ED patient receive Solumedrol 125 mg IV x1, as well as breathing treatments. CXR was done which demonstrated findings consistent with a COPD patient however no acute lung processes were seen. UA and urine microscopy were also performed which showed trace leukocytes and moderate bacteria. Patient is currently asymptomatic.      Hospital Course:  04/28/17: Patient admitted to hospital medicine team 1 for further observation and optimization of respiratory status.   4/29: doing better today, still with bouts of productive cough and SOB. On 3 LNC, dests to 85% at rest off O2.   4/30: patient report BOGGS and drop of O2 sats to ~60s after going for rest room. No chest pain or rest SOB. Cough  improved.   5/1 -- Cough resolved. Lungs clear. No wheezing or crackles. Oxygen at bedside. Plan to discharge home today.          Review of Systems   Constitutional: Negative for activity change, appetite change, chills, diaphoresis, fatigue and fever.   HENT: Negative for congestion, rhinorrhea, sore throat and trouble swallowing.    Eyes: Negative for photophobia, redness and visual disturbance.   Respiratory: Positive for cough (chronic, non-productive), chest tightness and shortness of breath. Negative for wheezing.    Cardiovascular: Negative for chest pain, palpitations and leg swelling.   Gastrointestinal: Negative for abdominal distention, abdominal pain, blood in stool, constipation, diarrhea, nausea and vomiting.   Endocrine: Negative for polyuria.   Genitourinary: Negative for decreased urine volume, difficulty urinating, dysuria, frequency and hematuria.   Musculoskeletal: Negative for arthralgias, back pain, joint swelling and myalgias.   Skin: Negative for rash and wound.   Allergic/Immunologic: Negative for immunocompromised state.   Neurological: Negative for dizziness, tremors, seizures, weakness, light-headedness and headaches.   Hematological: Does not bruise/bleed easily.   Psychiatric/Behavioral: Negative for dysphoric mood and sleep disturbance. The patient is not nervous/anxious.      Objective:     Vital Signs (Most Recent):  Temp: 98 °F (36.7 °C) (05/01/17 1117)  Pulse: 99 (05/01/17 1204)  Resp: 15 (05/01/17 1204)  BP: (!) 148/84 (05/01/17 1117)  SpO2: 98 % (05/01/17 1204) Vital Signs (24h Range):  Temp:  [97.9 °F (36.6 °C)-98.4 °F (36.9 °C)] 98 °F (36.7 °C)  Pulse:  [] 99  Resp:  [15-18] 15  SpO2:  [92 %-99 %] 98 %  BP: (127-148)/(74-84) 148/84     Weight: 53.5 kg (118 lb)  Body mass index is 19.05 kg/(m^2).    Intake/Output Summary (Last 24 hours) at 05/01/17 1352  Last data filed at 04/30/17 1800   Gross per 24 hour   Intake              240 ml   Output                0 ml   Net               240 ml      Physical Exam   Constitutional: She is oriented to person, place, and time. She appears well-developed and well-nourished.   HENT:   Head: Normocephalic and atraumatic.   Right Ear: External ear normal.   Left Ear: External ear normal.   Eyes: Conjunctivae and EOM are normal. Pupils are equal, round, and reactive to light. Right eye exhibits no discharge. Left eye exhibits no discharge. No scleral icterus.   Neck: Normal range of motion. Neck supple. No JVD present. No tracheal deviation present. No thyromegaly present.   Cardiovascular: Normal rate, regular rhythm and normal heart sounds.  Exam reveals no gallop and no friction rub.    No murmur heard.  Pulmonary/Chest: Effort normal. No accessory muscle usage or stridor. No respiratory distress. She has no decreased breath sounds (all lung fields). She has no wheezes (resolved ). She has no rales. She exhibits no tenderness.   adequate air entrey BL.    Abdominal: Soft. Bowel sounds are normal. She exhibits no distension and no mass. There is no tenderness. There is no rebound and no guarding. No hernia.   Musculoskeletal: Normal range of motion. She exhibits no edema, tenderness or deformity.   Neurological: She is alert and oriented to person, place, and time. No cranial nerve deficit. She exhibits normal muscle tone. Coordination normal.   Skin: Skin is warm and dry. No rash noted. No erythema. No pallor.   Psychiatric: She has a normal mood and affect. Her behavior is normal. Judgment and thought content normal.   Nursing note and vitals reviewed.      Recent Labs  Lab 04/29/17  0324 04/30/17 0442 05/01/17  0522   WBC 9.42 8.07 8.95   HGB 14.0 14.9 15.0   HCT 45.0 46.7 44.8   * 145* 136*       Recent Labs  Lab 04/29/17  0324 04/30/17  0442 05/01/17  0521    145 140   K 3.8 3.4* 5.1    102 101   CO2 30* 33* 28   BUN 13 16 12   CREATININE 0.7 0.8 0.7   CALCIUM 9.0 9.1 9.5           Assessment/Plan:      * COPD with  exacerbation  - COPD pathway with recent exacerbation back in 02/2017  - today with significant o2 sats drop we decided to add IV steroids and revaluate later today. Still pending home O2.   - DuoNebs Q4H scheduled  - Spiriva 1 puff daily  - Breo 1 puff daily  - will continue Prednisone 40 mg PO daily  - Smoking cessation counseling  - Nicotine patch 21 mg daily  - Home O2; no wheezing on exam; discharge home today    Acute respiratory failure with hypoxia  Tobacco abuse  - No acute processes seen on CXR  - Likely 2/2 to COPD exacerbation  - Continuous oxygen, titrating to pO2 of > 92%    Type 2 diabetes mellitus without complication, without long-term current use of insulin  HgA1c is 6.5   Informed abut the Dx  - sent microalbuminurea, ref opth  - she is thin and with COPD req O2 at rest. Advise against dieting and exercise.   - PCP follow up  - insulin ISS in hospital after steroid  yesterday, improving today, expected to increase after IV steroids today.      VTE Risk Mitigation         Ordered     enoxaparin injection 40 mg  Daily     Route:  Subcutaneous        04/28/17 0109     Medium Risk of VTE  Once      04/28/17 0109     Place sequential compression device  Until discontinued      04/28/17 0109     Place HOLLIE hose  Until discontinued      04/28/17 0109          Jose Joseph MD  Department of Hospital Medicine   [facility]

## 2017-05-01 NOTE — PLAN OF CARE
Problem: Patient Care Overview  Goal: Plan of Care Review  Outcome: Ongoing (interventions implemented as appropriate)  Pt is AA&O x 3. VSS. Denies pain. Denies SOB. Pt resting between care. Respiratory treatments received per RT.HS bg - 132; no coverage required. Telemetry monitoring; SR noted. No falls or injuries noted. Safety maintained with bed in lowest position and call light within reach.

## 2017-05-01 NOTE — PLAN OF CARE
Future Appointments  Date Time Provider Department Center   5/12/2017 10:00 AM PHYSICIAN, [facility]        05/01/17 1102   Final Note   Assessment Type Final Discharge Note   Discharge Disposition Home   What phone number can be called within the next 1-3 days to see how you are doing after discharge? [phone]   Hospital Follow Up  Appt(s) scheduled? Yes   Discharge/Hospital Encounter Summary to (non[facility]) PCP n/a   Referral to Outpatient Case Management complete? n/a   Referral to / orders for Home Health Complete? n/a   30 day supply of medicines given at discharge, if documented non-compliance / non-adherence? n/a   Any social issues identified prior to discharge? No   Did you assess the readiness or willingness of the family or caregiver to support self management of care? Yes   Right Care Referral Info   Post Acute Recommendation No Care

## 2017-05-01 NOTE — PLAN OF CARE
Problem: Physical Therapy Goal  Goal: Physical Therapy Goal  Goals to be met by: 17     Patient will increase functional independence with mobility by performin. Gait x 1000 feet with Supervision using Rolling Walker PRN.   2. Ascend/descend 4 stair with bilateral Handrails Supervision .   3. Lower extremity exercise program x 20 reps per handout, with assistance as needed.   Outcome: Outcome(s) achieved Date Met:  17  Pt mod (I) to (I) with functional mobility and reports feeling comfortable with stair training upon return to home.  Pt has no further questions/ concerns for PT and agreeable to discharge.     Mami Henley, PT, DPT  2017  Pager: [phone]

## 2017-05-01 NOTE — DISCHARGE SUMMARY
DISCHARGE SUMMARY  Hospital Medicine    Team: [facility] HOSP MED 2    Patient Name: Ann Canavan  YOB: 1960    Admit Date: 4/27/2017    Discharge Date: 05/01/2017    Discharge Attending Physician: No att. providers found     Resident on Service: Dr. Mary    Chief Complaint: COPD Exacerbation    Princilpal Diagnoses:  Active Hospital Problems    Diagnosis  POA    *COPD with exacerbation [J44.1]  Yes    Type 2 diabetes mellitus without complication, without long-term current use of insulin [E11.9]  Yes     HgA1c 6.5      Acute respiratory failure with hypoxia [J96.01]  Yes    Tobacco abuse [Z72.0]  Yes      Resolved Hospital Problems    Diagnosis Date Resolved POA   No resolved problems to display.       Discharged Condition: Admit problems have stabilized     HOSPITAL COURSE:      Initial Presentation:    Ann Canavan is a 56 y.o. female w/ a significant PMHx of COPD, continued smoker (1 ppd), who presents to [facility] ED for progressive shortness of breath over the past week with severe episode of hypoxia noticed today by the patient. Patient checked her O2 sats prior to presentation and states that her sats were in the high 60's. Patient states she has been compliant with her home COPD regimen and have not skipped any treatments. Patient cannot recall anything that triggered this episode other than a recent trip to [place] where the air quality is poor and extremely bruce. Patient denies any associated symptoms including productive cough, fevers, chills, recent sick contacts, sinus congestion, sore throat, chest pain. Patient was previously on home oxygen therapy but had it  because she was no longer using it.      In the ED patient receive Solumedrol 125 mg IV x1, as well as breathing treatments. CXR was done which demonstrated findings consistent with a COPD patient however no acute lung processes were seen. UA and urine microscopy were also performed which showed trace leukocytes and  moderate bacteria. Patient is currently asymptomatic.    Course of Principle Problem for Admission:    04/28/17: Patient admitted to hospital medicine team 1 for further observation and optimization of respiratory status.   4/29: doing better today, still with bouts of productive cough and SOB. On 3 LNC, dests to 85% at rest off O2.   4/30: patient report BOGGS and drop of O2 sats to ~60s after going for rest room. No chest pain or rest SOB. Cough improved.   5/1 -- Cough resolved. Lungs clear. No wheezing or crackles. Oxygen at bedside. Plan to discharge home today.    Other Medical Problems Addressed in the Hospital:    N/A    CONSULTS: None    Last CBC/BMP/HgbA1c (if applicable):  Recent Results (from the past 336 hour(s))   CBC with Automated Differential    Collection Time: 05/01/17  5:22 AM   Result Value Ref Range    WBC 8.95 3.90 - 12.70 K/uL    Hemoglobin 15.0 12.0 - 16.0 g/dL    Hematocrit 44.8 37.0 - 48.5 %    Platelets 136 (L) 150 - 350 K/uL   CBC with Automated Differential    Collection Time: 04/30/17  4:42 AM   Result Value Ref Range    WBC 8.07 3.90 - 12.70 K/uL    Hemoglobin 14.9 12.0 - 16.0 g/dL    Hematocrit 46.7 37.0 - 48.5 %    Platelets 145 (L) 150 - 350 K/uL   CBC with Automated Differential    Collection Time: 04/29/17  3:24 AM   Result Value Ref Range    WBC 9.42 3.90 - 12.70 K/uL    Hemoglobin 14.0 12.0 - 16.0 g/dL    Hematocrit 45.0 37.0 - 48.5 %    Platelets 137 (L) 150 - 350 K/uL     Recent Results (from the past 336 hour(s))   Basic Metabolic Panel (BMP)    Collection Time: 05/01/17  5:21 AM   Result Value Ref Range    Sodium 140 136 - 145 mmol/L    Potassium 5.1 3.5 - 5.1 mmol/L    Chloride 101 95 - 110 mmol/L    CO2 28 23 - 29 mmol/L    BUN, Bld 12 6 - 20 mg/dL    Creatinine 0.7 0.5 - 1.4 mg/dL    Calcium 9.5 8.7 - 10.5 mg/dL    Anion Gap 11 8 - 16 mmol/L   Basic Metabolic Panel (BMP)    Collection Time: 04/30/17  4:42 AM   Result Value Ref Range    Sodium 145 136 - 145 mmol/L     Potassium 3.4 (L) 3.5 - 5.1 mmol/L    Chloride 102 95 - 110 mmol/L    CO2 33 (H) 23 - 29 mmol/L    BUN, Bld 16 6 - 20 mg/dL    Creatinine 0.8 0.5 - 1.4 mg/dL    Calcium 9.1 8.7 - 10.5 mg/dL    Anion Gap 10 8 - 16 mmol/L   Basic Metabolic Panel (BMP)    Collection Time: 04/29/17  3:24 AM   Result Value Ref Range    Sodium 141 136 - 145 mmol/L    Potassium 3.8 3.5 - 5.1 mmol/L    Chloride 102 95 - 110 mmol/L    CO2 30 (H) 23 - 29 mmol/L    BUN, Bld 13 6 - 20 mg/dL    Creatinine 0.7 0.5 - 1.4 mg/dL    Calcium 9.0 8.7 - 10.5 mg/dL    Anion Gap 9 8 - 16 mmol/L     Lab Results   Component Value Date    HGBA1C 6.5 (H) 02/08/2017         Disposition:  Home        Future Scheduled Appointments:  Future Appointments  Date Time Provider Department Center   5/12/2017 10:00 AM PHYSICIAN, [facility]       Follow-up Plans from This Hospitalization:      Discharge Medication List:       Canavan, Ann   Home Medication Instructions TERRI:[id number]    Printed on:05/01/17 3883   Medication Information                      albuterol 90 mcg/actuation inhaler  Inhale 2 puffs into the lungs every 6 (six) hours as needed for Wheezing. Rescue             aspirin (ECOTRIN) 81 MG EC tablet  Take 81 mg by mouth once daily.             fluticasone (FLONASE) 50 mcg/actuation nasal spray  1 spray by Each Nare route once daily.             fluticasone-salmeterol 250-50 mcg/dose (ADVAIR) 250-50 mcg/dose diskus inhaler  Inhale 1 puff into the lungs 2 (two) times daily.             guaifenesin (MUCINEX) 600 mg 12 hr tablet  Take 600 mg by mouth every morning.             nicotine (NICODERM CQ) 21 mg/24 hr  Place 1 patch onto the skin once daily.             predniSONE (DELTASONE) 20 MG tablet  Take 2 tablets (40 mg total) by mouth once daily.             tiotropium (SPIRIVA) 18 mcg inhalation capsule  Inhale 1 capsule (18 mcg total) into the lungs once daily.                 Patient Instructions:    Discharge Procedure  "Orders  OXYGEN FOR HOME USE   Order Specific Question Answer Comments   Liter Flow 2    Duration Continuous    Qualifying SpO2: 85% RA   Testing done at: Rest    Route nasal cannula    Portable mode: pulse dose acceptable    Device home concentrator with portable unit    Length of need (in months): 99 mos    Patient condition with qualifying saturation COPD    Height: 5' 6" (1.676 m)    Weight: 53.5 kg (118 lb)    Does patient have medical equipment at home? none    Alternative treatment measures have been tried or considered and deemed clinically ineffective. Yes    Vendor: [facility]   Expected Date of Delivery: 4/30/2017      Ambulatory referral to Smoking Cessation Program   Referral Priority: Routine Referral Type: Consultation   Referral Reason: Specialty Services Required    Requested Specialty: CTTS    Number of Visits Requested: 1      Ambulatory Referral to Optometry   Referral Priority: Routine Referral Type: Vision (Optometry)   Referral Reason: Specialty Services Required    Requested Specialty: Optometry    Number of Visits Requested: 1      Diet general     Activity as tolerated     Call MD for:  temperature >100.4     Call MD for:  persistent nausea and vomiting or diarrhea     Call MD for:  severe uncontrolled pain     Call MD for:  redness, tenderness, or signs of infection (pain, swelling, redness, odor or green/yellow discharge around incision site)     Call MD for:  difficulty breathing or increased cough     Call MD for:  severe persistent headache     Call MD for:  worsening rash     Call MD for:  persistent dizziness, light-headedness, or visual disturbances     Call MD for:  increased confusion or weakness         At the time of discharge patient was told to take all medications as prescribed, to keep all followup appointments, and to call their primary care physician or return to the emergency room if they have any worsening or concerning symptoms.    Signing Physician:  Jose RODRIGUEZ" MD [place]

## 2017-05-01 NOTE — NURSING
Patient discharged with AVS and education. Prescription sent to pharmacy. IV and telemetry removed. Dr. Boyer visited with patient around noon. Wheelchair ordered for patient to go home with oxygen tank.

## 2017-05-01 NOTE — SUBJECTIVE & OBJECTIVE
Review of Systems   Constitutional: Negative for activity change, appetite change, chills, diaphoresis, fatigue and fever.   HENT: Negative for congestion, rhinorrhea, sore throat and trouble swallowing.    Eyes: Negative for photophobia, redness and visual disturbance.   Respiratory: Positive for cough (chronic, non-productive), chest tightness and shortness of breath. Negative for wheezing.    Cardiovascular: Negative for chest pain, palpitations and leg swelling.   Gastrointestinal: Negative for abdominal distention, abdominal pain, blood in stool, constipation, diarrhea, nausea and vomiting.   Endocrine: Negative for polyuria.   Genitourinary: Negative for decreased urine volume, difficulty urinating, dysuria, frequency and hematuria.   Musculoskeletal: Negative for arthralgias, back pain, joint swelling and myalgias.   Skin: Negative for rash and wound.   Allergic/Immunologic: Negative for immunocompromised state.   Neurological: Negative for dizziness, tremors, seizures, weakness, light-headedness and headaches.   Hematological: Does not bruise/bleed easily.   Psychiatric/Behavioral: Negative for dysphoric mood and sleep disturbance. The patient is not nervous/anxious.      Objective:     Vital Signs (Most Recent):  Temp: 98 °F (36.7 °C) (05/01/17 1117)  Pulse: 99 (05/01/17 1204)  Resp: 15 (05/01/17 1204)  BP: (!) 148/84 (05/01/17 1117)  SpO2: 98 % (05/01/17 1204) Vital Signs (24h Range):  Temp:  [97.9 °F (36.6 °C)-98.4 °F (36.9 °C)] 98 °F (36.7 °C)  Pulse:  [] 99  Resp:  [15-18] 15  SpO2:  [92 %-99 %] 98 %  BP: (127-148)/(74-84) 148/84     Weight: 53.5 kg (118 lb)  Body mass index is 19.05 kg/(m^2).    Intake/Output Summary (Last 24 hours) at 05/01/17 1352  Last data filed at 04/30/17 1800   Gross per 24 hour   Intake              240 ml   Output                0 ml   Net              240 ml      Physical Exam   Constitutional: She is oriented to person, place, and time. She appears well-developed  and well-nourished.   HENT:   Head: Normocephalic and atraumatic.   Right Ear: External ear normal.   Left Ear: External ear normal.   Eyes: Conjunctivae and EOM are normal. Pupils are equal, round, and reactive to light. Right eye exhibits no discharge. Left eye exhibits no discharge. No scleral icterus.   Neck: Normal range of motion. Neck supple. No JVD present. No tracheal deviation present. No thyromegaly present.   Cardiovascular: Normal rate, regular rhythm and normal heart sounds.  Exam reveals no gallop and no friction rub.    No murmur heard.  Pulmonary/Chest: Effort normal. No accessory muscle usage or stridor. No respiratory distress. She has no decreased breath sounds (all lung fields). She has no wheezes (resolved ). She has no rales. She exhibits no tenderness.   adequate air entrey BL.    Abdominal: Soft. Bowel sounds are normal. She exhibits no distension and no mass. There is no tenderness. There is no rebound and no guarding. No hernia.   Musculoskeletal: Normal range of motion. She exhibits no edema, tenderness or deformity.   Neurological: She is alert and oriented to person, place, and time. No cranial nerve deficit. She exhibits normal muscle tone. Coordination normal.   Skin: Skin is warm and dry. No rash noted. No erythema. No pallor.   Psychiatric: She has a normal mood and affect. Her behavior is normal. Judgment and thought content normal.   Nursing note and vitals reviewed.

## 2017-05-03 ENCOUNTER — PATIENT OUTREACH (OUTPATIENT)
Dept: ADMINISTRATIVE | Facility: CLINIC | Age: 57
End: 2017-05-03
Payer: MEDICAID

## 2017-05-03 NOTE — PATIENT INSTRUCTIONS
COPD Flare    You have had a flare-up of your COPD.  COPD, or chronic obstructive pulmonary disease, is a common lung disease. It causes your airways to become irritated and narrower. This makes it harder for you to breathe. Emphysema and chronic bronchitis are both types of COPD. This is a chronic condition, which means you always have it. Sometimes it gets worse. When this happens, it is called a flare-up.  Symptoms of COPD  People with COPD may have symptoms most of the time. In a flare-up, your symptoms get worse. These symptoms may mean you are having a flare-up:  · Shortness of breath, shallow or rapid breathing, or wheezing that gets worse  · Lung infection  · Cough that gets worse  · More mucus, thicker mucus or mucus of a different color  · Tiredness, decreased energy, or trouble doing your usual activities  · Fever  · Chest tightness  · Your symptoms dont get better even when you use your usual medicines, inhalers, and nebulizer  · Trouble talking  · You feel confused  Causes of flare-ups  Unfortunately, a flare-up can happen even though you did everything right, and you followed your doctors instructions. Some causes of flare-ups are:  · Smoking or secondhand smoke  · Colds, the flu, or respiratory infections  · Air pollution  · Sudden change in the weather  · Dust, irritating chemicals, or strong fumes  · Not taking your medicines as prescribed  Home care  Here are some things you can do at home to treat a flare-up:  · Try not to panic. This makes it harder to breathe, and keeps you from doing the right things.  · Dont smoke or be around others who are smoking.  · Try to drink more fluids than usual during a flare-up, unless your doctor has told you not to because of heart and kidney problems. More fluids can help loosen the mucus.  · Use your inhalers and nebulizer, if you have one, as you have been told to.  · If you were given antibiotics, take them until they are used up or your doctor tells you  to stop. Its important to finish the antibiotics, even though you feel better. This will make sure the infection has cleared.  · If you were given prednisone or another steroid, finish it even if you feel better.  Preventing a flare-up  Even though flare-ups happen, the best way to treat one is to prevent it before it starts. Here are some pointers:  · Dont smoke or be around others who are smoking.  · Take your medicines as you have been told.  · Talk with your doctor about getting a flu shot every year. Also find out if you need a pneumonia shot.  · If there is a weather advisory warning to stay indoors, try to stay inside when possible.  · Try to eat healthy and get plenty of sleep.  · Try to avoid things that usually set you off, like dust, chemical fumes, hairsprays, or strong perfumes.  Follow-up care  Follow up with your healthcare provider, or as advised.  If a culture was done, you will be told if your treatment needs to be changed. You can call as directed for the results.  If X-rays were done, you will be notified of any new findings that may affect your care.  Call 911  Call 911 if any of these occur:  · You have trouble breathing  · You feel confused or its difficult to wake you up  · You faint or lose consciousness  · You have a rapid heart rate  · You have new pain in your chest, arm, shoulder, neck or upper back  When to seek medical advice  Call your healthcare provider right away if any of these occur:  · Wheezing or shortness of breath gets worse  · You need to use your inhalers more often than usual without relief  · Fever of 100.4°F (38ºC) or higher, or as directed by your healthcare provider  · Coughing up lots of dark-colored or bloody mucus (sputum)  · Chest pain with each breath  · You do not start to get better within 24 hours  · Swelling of your ankles gets worse  · Dizziness or weakness  Date Last Reviewed: 9/1/2016  © 1004-4396 [employer]. [address] [facility],  [place] [address]. All rights reserved. This information is not intended as a substitute for professional medical care. Always follow your healthcare professional's instructions.

## 2017-05-05 ENCOUNTER — NURSE TRIAGE (OUTPATIENT)
Dept: ADMINISTRATIVE | Facility: CLINIC | Age: 57
End: 2017-05-05

## 2017-05-05 ENCOUNTER — TELEPHONE (OUTPATIENT)
Dept: MEDSURG UNIT | Facility: HOSPITAL | Age: 57
End: 2017-05-05

## 2017-05-05 DIAGNOSIS — J44.1 COPD EXACERBATION: Primary | ICD-10-CM

## 2017-05-05 RX ORDER — PREDNISOLONE 5 MG/1
50 TABLET ORAL DAILY
Qty: 70 TABLET | Refills: 0 | Status: ON HOLD | OUTPATIENT
Start: 2017-05-05 | End: 2017-05-07

## 2017-05-05 RX ORDER — DOXYCYCLINE 100 MG/1
100 CAPSULE ORAL 2 TIMES DAILY
Qty: 16 CAPSULE | Refills: 0 | Status: SHIPPED | OUTPATIENT
Start: 2017-05-05 | End: 2017-05-12 | Stop reason: ALTCHOICE

## 2017-05-05 NOTE — TELEPHONE ENCOUNTER
Patient's message was given to the d/c service resident Dr. Castano - PGY2. MD will f/u with patient at phone number [phone].

## 2017-05-05 NOTE — TELEPHONE ENCOUNTER
----- Message from Nicolasa Krishna sent at 5/5/2017  4:35 PM CDT -----  Contact: Patient  The patient was released from the hospital Monday, 5/1/17, and she does not have a PCP yet. She would like to get a shower chair and would like her hospital physician to order this for her.      You can reach the patient at [phone].    Thsnk!

## 2017-05-05 NOTE — TELEPHONE ENCOUNTER
Called the patient, she describe no improvements since discharge. She still have BOGGS with minimal execration. She have her home O2, but no insurance yet. She got her priority clinic follow up in 5/12. Agree with patient that she need more steroids and sent for Abx.   Advise patient to come the ER if she have fever, hypoxia on home pulse ox despite 3 LNC at rest or feeling unwell with tight chest.

## 2017-05-06 ENCOUNTER — HOSPITAL ENCOUNTER (INPATIENT)
Facility: HOSPITAL | Age: 57
LOS: 3 days | Discharge: HOME | DRG: 191 | End: 2017-05-09
Attending: EMERGENCY MEDICINE | Admitting: EMERGENCY MEDICINE
Payer: MEDICAID

## 2017-05-06 DIAGNOSIS — R06.02 SHORTNESS OF BREATH: ICD-10-CM

## 2017-05-06 DIAGNOSIS — J44.1 COPD EXACERBATION: ICD-10-CM

## 2017-05-06 DIAGNOSIS — Z72.0 TOBACCO ABUSE: ICD-10-CM

## 2017-05-06 DIAGNOSIS — J44.1 COPD WITH EXACERBATION: Primary | ICD-10-CM

## 2017-05-06 DIAGNOSIS — R07.89 CHEST TIGHTNESS: ICD-10-CM

## 2017-05-06 DIAGNOSIS — E11.9 TYPE 2 DIABETES MELLITUS WITHOUT COMPLICATION, WITHOUT LONG-TERM CURRENT USE OF INSULIN: ICD-10-CM

## 2017-05-06 DIAGNOSIS — D72.829 LEUKOCYTOSIS, UNSPECIFIED TYPE: ICD-10-CM

## 2017-05-06 LAB
ALBUMIN SERPL BCP-MCNC: 3.7 G/DL
ALP SERPL-CCNC: 91 U/L
ALT SERPL W/O P-5'-P-CCNC: 15 U/L
ANION GAP SERPL CALC-SCNC: 15 MMOL/L
AST SERPL-CCNC: 18 U/L
BASOPHILS # BLD AUTO: 0.02 K/UL
BASOPHILS NFR BLD: 0.1 %
BILIRUB SERPL-MCNC: 0.6 MG/DL
BNP SERPL-MCNC: 17 PG/ML
BUN SERPL-MCNC: 11 MG/DL
CALCIUM SERPL-MCNC: 10.3 MG/DL
CHLORIDE SERPL-SCNC: 95 MMOL/L
CO2 SERPL-SCNC: 30 MMOL/L
CREAT SERPL-MCNC: 0.7 MG/DL
DIFFERENTIAL METHOD: ABNORMAL
EOSINOPHIL # BLD AUTO: 0.1 K/UL
EOSINOPHIL NFR BLD: 0.4 %
ERYTHROCYTE [DISTWIDTH] IN BLOOD BY AUTOMATED COUNT: 13.6 %
EST. GFR  (AFRICAN AMERICAN): >60 ML/MIN/1.73 M^2
EST. GFR  (NON AFRICAN AMERICAN): >60 ML/MIN/1.73 M^2
GLUCOSE SERPL-MCNC: 110 MG/DL
HCT VFR BLD AUTO: 50.5 %
HGB BLD-MCNC: 17 G/DL
LYMPHOCYTES # BLD AUTO: 1.5 K/UL
LYMPHOCYTES NFR BLD: 7.8 %
MCH RBC QN AUTO: 32.8 PG
MCHC RBC AUTO-ENTMCNC: 33.7 %
MCV RBC AUTO: 97 FL
MONOCYTES # BLD AUTO: 1.6 K/UL
MONOCYTES NFR BLD: 8 %
NEUTROPHILS # BLD AUTO: 16.1 K/UL
NEUTROPHILS NFR BLD: 83.3 %
PLATELET # BLD AUTO: 184 K/UL
PMV BLD AUTO: 11.6 FL
POTASSIUM SERPL-SCNC: 4.7 MMOL/L
PROCALCITONIN SERPL IA-MCNC: <0.09 NG/ML
PROT SERPL-MCNC: 8 G/DL
RBC # BLD AUTO: 5.19 M/UL
SODIUM SERPL-SCNC: 140 MMOL/L
WBC # BLD AUTO: 19.36 K/UL

## 2017-05-06 PROCEDURE — 94640 AIRWAY INHALATION TREATMENT: CPT

## 2017-05-06 PROCEDURE — 96361 HYDRATE IV INFUSION ADD-ON: CPT

## 2017-05-06 PROCEDURE — 99285 EMERGENCY DEPT VISIT HI MDM: CPT | Mod: ,,, | Performed by: EMERGENCY MEDICINE

## 2017-05-06 PROCEDURE — 12000002 HC ACUTE/MED SURGE SEMI-PRIVATE ROOM

## 2017-05-06 PROCEDURE — 83880 ASSAY OF NATRIURETIC PEPTIDE: CPT

## 2017-05-06 PROCEDURE — 94761 N-INVAS EAR/PLS OXIMETRY MLT: CPT

## 2017-05-06 PROCEDURE — 84145 PROCALCITONIN (PCT): CPT

## 2017-05-06 PROCEDURE — 25500020 PHARM REV CODE 255: Performed by: EMERGENCY MEDICINE

## 2017-05-06 PROCEDURE — 80053 COMPREHEN METABOLIC PANEL: CPT

## 2017-05-06 PROCEDURE — 25000003 PHARM REV CODE 250: Performed by: EMERGENCY MEDICINE

## 2017-05-06 PROCEDURE — 63600175 PHARM REV CODE 636 W HCPCS: Performed by: EMERGENCY MEDICINE

## 2017-05-06 PROCEDURE — 99285 EMERGENCY DEPT VISIT HI MDM: CPT | Mod: 25

## 2017-05-06 PROCEDURE — 27000221 HC OXYGEN, UP TO 24 HOURS

## 2017-05-06 PROCEDURE — 85025 COMPLETE CBC W/AUTO DIFF WBC: CPT

## 2017-05-06 PROCEDURE — 25000242 PHARM REV CODE 250 ALT 637 W/ HCPCS: Performed by: EMERGENCY MEDICINE

## 2017-05-06 PROCEDURE — 83036 HEMOGLOBIN GLYCOSYLATED A1C: CPT

## 2017-05-06 PROCEDURE — 94760 N-INVAS EAR/PLS OXIMETRY 1: CPT

## 2017-05-06 PROCEDURE — 96374 THER/PROPH/DIAG INJ IV PUSH: CPT

## 2017-05-06 RX ORDER — PREDNISONE 20 MG/1
40 TABLET ORAL DAILY
Status: DISCONTINUED | OUTPATIENT
Start: 2017-05-07 | End: 2017-05-09 | Stop reason: HOSPADM

## 2017-05-06 RX ORDER — IBUPROFEN 200 MG
24 TABLET ORAL
Status: DISCONTINUED | OUTPATIENT
Start: 2017-05-06 | End: 2017-05-09 | Stop reason: HOSPADM

## 2017-05-06 RX ORDER — DOXYCYCLINE HYCLATE 100 MG
100 TABLET ORAL EVERY 12 HOURS
Status: DISCONTINUED | OUTPATIENT
Start: 2017-05-07 | End: 2017-05-09

## 2017-05-06 RX ORDER — DEXTROSE 50 % IN WATER (D50W) INTRAVENOUS SYRINGE
25
Status: DISCONTINUED | OUTPATIENT
Start: 2017-05-06 | End: 2017-05-09 | Stop reason: HOSPADM

## 2017-05-06 RX ORDER — DEXTROSE 50 % IN WATER (D50W) INTRAVENOUS SYRINGE
12.5
Status: DISCONTINUED | OUTPATIENT
Start: 2017-05-06 | End: 2017-05-09 | Stop reason: HOSPADM

## 2017-05-06 RX ORDER — METHYLPREDNISOLONE SOD SUCC 125 MG
125 VIAL (EA) INJECTION EVERY 6 HOURS
Status: COMPLETED | OUTPATIENT
Start: 2017-05-07 | End: 2017-05-07

## 2017-05-06 RX ORDER — IBUPROFEN 200 MG
16 TABLET ORAL
Status: DISCONTINUED | OUTPATIENT
Start: 2017-05-06 | End: 2017-05-09 | Stop reason: HOSPADM

## 2017-05-06 RX ORDER — IPRATROPIUM BROMIDE AND ALBUTEROL SULFATE 2.5; .5 MG/3ML; MG/3ML
3 SOLUTION RESPIRATORY (INHALATION) EVERY 4 HOURS
Status: DISCONTINUED | OUTPATIENT
Start: 2017-05-07 | End: 2017-05-09 | Stop reason: HOSPADM

## 2017-05-06 RX ORDER — TIOTROPIUM BROMIDE 18 UG/1
1 CAPSULE ORAL; RESPIRATORY (INHALATION) DAILY
Status: DISCONTINUED | OUTPATIENT
Start: 2017-05-07 | End: 2017-05-09 | Stop reason: HOSPADM

## 2017-05-06 RX ORDER — PREDNISONE 20 MG/1
60 TABLET ORAL DAILY
Qty: 15 TABLET | Refills: 0 | Status: ON HOLD | OUTPATIENT
Start: 2017-05-06 | End: 2017-05-07

## 2017-05-06 RX ORDER — METHYLPREDNISOLONE SOD SUCC 125 MG
125 VIAL (EA) INJECTION
Status: COMPLETED | OUTPATIENT
Start: 2017-05-06 | End: 2017-05-06

## 2017-05-06 RX ORDER — IPRATROPIUM BROMIDE AND ALBUTEROL SULFATE 2.5; .5 MG/3ML; MG/3ML
3 SOLUTION RESPIRATORY (INHALATION)
Status: COMPLETED | OUTPATIENT
Start: 2017-05-06 | End: 2017-05-06

## 2017-05-06 RX ORDER — FLUTICASONE FUROATE AND VILANTEROL 100; 25 UG/1; UG/1
1 POWDER RESPIRATORY (INHALATION) DAILY
Status: DISCONTINUED | OUTPATIENT
Start: 2017-05-07 | End: 2017-05-09 | Stop reason: HOSPADM

## 2017-05-06 RX ORDER — AZITHROMYCIN 500 MG/1
500 TABLET, FILM COATED ORAL DAILY
Qty: 3 TABLET | Refills: 0 | Status: ON HOLD | OUTPATIENT
Start: 2017-05-06 | End: 2017-05-07

## 2017-05-06 RX ORDER — GUAIFENESIN 600 MG/1
600 TABLET, EXTENDED RELEASE ORAL EVERY MORNING
Status: DISCONTINUED | OUTPATIENT
Start: 2017-05-07 | End: 2017-05-07

## 2017-05-06 RX ORDER — DOXYCYCLINE HYCLATE 100 MG
100 TABLET ORAL
Status: COMPLETED | OUTPATIENT
Start: 2017-05-06 | End: 2017-05-06

## 2017-05-06 RX ORDER — GLUCAGON 1 MG
1 KIT INJECTION
Status: DISCONTINUED | OUTPATIENT
Start: 2017-05-06 | End: 2017-05-09 | Stop reason: HOSPADM

## 2017-05-06 RX ORDER — ENOXAPARIN SODIUM 100 MG/ML
40 INJECTION SUBCUTANEOUS EVERY 24 HOURS
Status: DISCONTINUED | OUTPATIENT
Start: 2017-05-06 | End: 2017-05-09 | Stop reason: HOSPADM

## 2017-05-06 RX ADMIN — DOXYCYCLINE HYCLATE 100 MG: 100 TABLET, COATED ORAL at 09:05

## 2017-05-06 RX ADMIN — IPRATROPIUM BROMIDE AND ALBUTEROL SULFATE 3 ML: .5; 3 SOLUTION RESPIRATORY (INHALATION) at 07:05

## 2017-05-06 RX ADMIN — IOHEXOL 75 ML: 350 INJECTION, SOLUTION INTRAVENOUS at 09:05

## 2017-05-06 RX ADMIN — METHYLPREDNISOLONE SODIUM SUCCINATE 125 MG: 125 INJECTION, POWDER, FOR SOLUTION INTRAMUSCULAR; INTRAVENOUS at 05:05

## 2017-05-06 RX ADMIN — IPRATROPIUM BROMIDE AND ALBUTEROL SULFATE 3 ML: .5; 3 SOLUTION RESPIRATORY (INHALATION) at 10:05

## 2017-05-06 RX ADMIN — SODIUM CHLORIDE 1000 ML: 0.9 INJECTION, SOLUTION INTRAVENOUS at 08:05

## 2017-05-06 RX ADMIN — SODIUM CHLORIDE 1000 ML: 0.9 INJECTION, SOLUTION INTRAVENOUS at 07:05

## 2017-05-06 NOTE — IP AVS SNAPSHOT
[facility]  [address]  [facility] [address]  Phone: [phone]           Patient Discharge Instructions   Our goal is to set you up for success. This packet includes information on your condition, medications, and your home care.  It will help you care for yourself to prevent having to return to the hospital.     Please ask your nurse if you have any questions.      There are many details to remember when preparing to leave the hospital. Here is what you will need to do:    1. Take your medicine. If you are prescribed medications, review your Medication List on the following pages. You may have new medications to  at the pharmacy and others that you'll need to stop taking. Review the instructions for how and when to take your medications. Talk with your doctor or nurses if you are unsure of what to do.     2. Go to your follow-up appointments. Specific follow-up information is listed in the following pages. Your may be contacted by a nurse or clinical provider about future appointments. Be sure we have all of the phone numbers to reach you. Please contact your provider's office if you are unable to make an appointment.     3. Watch for warning signs. Your doctor or nurse will give you detailed warning signs to watch for and when to call for assistance. These instructions may also include educational information about your condition. If you experience any of warning signs to your health, call your doctor.           [facility]  Unless otherwise directed by your provider, please   contact [facility], our nurse care line   that is available for 24/7 assistance.     [phone] (toll-free)     Registered nurses in the [facility]   provide: appointment scheduling, clinical advisement, health education, and other advisory services.                  ** Verify the list of medication(s) below is accurate and up to date. Carry this with you in case of  emergency. If your medications have changed, please notify your healthcare provider.             Medication List      START taking these medications        Additional Info                      albuterol-ipratropium 2.5mg-0.5mg/3mL 0.5 mg-3 mg(2.5 mg base)/3 mL nebulizer solution   Commonly known as:  DUO-NEB   Quantity:  99 vial   Refills:  0   Dose:  3 mL    Last time this was given:  3 mLs on 5/9/2017 12:59 PM   Instructions:  Take 3 mLs by nebulization every 6 (six) hours as needed for Wheezing or Shortness of Breath. Rescue     Begin Date    AM    Noon    PM    Bedtime       predniSONE 20 MG tablet   Commonly known as:  DELTASONE   Quantity:  6 tablet   Refills:  0   Dose:  40 mg    Last time this was given:  40 mg on 5/9/2017  8:57 AM   Instructions:  Take 2 tablets (40 mg total) by mouth once daily.     Begin Date    AM    Noon    PM    Bedtime         CONTINUE taking these medications        Additional Info                      albuterol 90 mcg/actuation inhaler   Quantity:  18 g   Refills:  11   Dose:  2 puff    Instructions:  Inhale 2 puffs into the lungs every 6 (six) hours as needed for Wheezing. Rescue     Begin Date    AM    Noon    PM    Bedtime       aspirin 81 MG EC tablet   Commonly known as:  ECOTRIN   Refills:  0   Dose:  81 mg    Instructions:  Take 81 mg by mouth once daily.     Begin Date    AM    Noon    PM    Bedtime       doxycycline 100 MG Cap   Commonly known as:  VIBRAMYCIN   Quantity:  16 capsule   Refills:  0   Dose:  100 mg    Instructions:  Take 1 capsule (100 mg total) by mouth 2 (two) times daily.     Begin Date    AM    Noon    PM    Bedtime       fluticasone 50 mcg/actuation nasal spray   Commonly known as:  FLONASE   Quantity:  1 Bottle   Refills:  1   Dose:  1 spray    Instructions:  1 spray by Each Nare route once daily.     Begin Date    AM    Noon    PM    Bedtime       fluticasone-salmeterol 250-50 mcg/dose 250-50 mcg/dose diskus inhaler   Commonly known as:  ADVAIR    Quantity:  60 each   Refills:  11   Dose:  1 puff    Instructions:  Inhale 1 puff into the lungs 2 (two) times daily.     Begin Date    AM    Noon    PM    Bedtime       guaifenesin 600 mg 12 hr tablet   Commonly known as:  MUCINEX   Refills:  0   Dose:  600 mg    Last time this was given:  600 mg on 5/9/2017  8:57 AM   Instructions:  Take 600 mg by mouth 2 (two) times daily.     Begin Date    AM    Noon    PM    Bedtime       loratadine 10 mg tablet   Commonly known as:  CLARITIN   Refills:  0   Dose:  10 mg    Instructions:  Take 10 mg by mouth daily as needed for Allergies.     Begin Date    AM    Noon    PM    Bedtime       nicotine 21 mg/24 hr   Commonly known as:  NICODERM CQ   Quantity:  21 patch   Refills:  0   Dose:  1 patch    Instructions:  Place 1 patch onto the skin once daily.     Begin Date    AM    Noon    PM    Bedtime       tiotropium 18 mcg inhalation capsule   Commonly known as:  SPIRIVA   Quantity:  30 capsule   Refills:  11   Dose:  18 mcg    Last time this was given:  18 mcg on 5/9/2017  8:58 AM   Instructions:  Inhale 1 capsule (18 mcg total) into the lungs once daily.     Begin Date    AM    Noon    PM    Bedtime            Where to Get Your Medications      These medications were sent to [facility] - [place], [place] - [address], [place] [address]     Phone:  [phone]     predniSONE 20 MG tablet         These medications were sent to [facility] [address] [place] [address] [address] AT [facility] [address]  [address] [facility] [address]    Hours:  24-hours Phone:  [phone]     albuterol-ipratropium 2.5mg-0.5mg/3mL 0.5 mg-3 mg(2.5 mg base)/3 mL nebulizer solution                  Please bring to all follow up appointments:    1. A copy of your discharge instructions.  2. All medicines you are currently taking in their original bottles.  3. Identification and insurance  "card.    Please arrive 15 minutes ahead of scheduled appointment time.    Please call 24 hours in advance if you must reschedule your appointment and/or time.        Your Scheduled Appointments     May 12, 2017 10:00 AM T   Hospital Follow Up with PHYSICIAN, [facility]   [facility])    [address]  [facility] [id number] [phone]              Follow-up Information     Follow up with [facility].    Specialty:  DME Provider    Contact information:    [address]  [address]  [facility] [address]  [phone]        Referrals     Future Orders    Ambulatory Referral to Pulmonology     Ambulatory referral to Smoking Cessation Program         Discharge Instructions     Future Orders    Activity as tolerated     Call MD for:  difficulty breathing or increased cough     Call MD for:  increased confusion or weakness     Call MD for:  persistent dizziness, light-headedness, or visual disturbances     Call MD for:  persistent nausea and vomiting or diarrhea     Call MD for:  redness, tenderness, or signs of infection (pain, swelling, redness, odor or green/yellow discharge around incision site)     Call MD for:  severe persistent headache     Call MD for:  severe uncontrolled pain     Call MD for:  temperature >100.4     Call MD for:  worsening rash     Diet general     Questions:    Total calories:      Fat restriction, if any:      Protein restriction, if any:      Na restriction, if any:      Fluid restriction:      Additional restrictions:      NEBULIZER FOR HOME USE     Questions:    Height:  5' 6" (1.676 m)    Weight:  61.2 kg (135 lb)    Does patient have medical equipment at home?:  oxygen    Length of need (1-99 months):  99    Vendor:  [facility] Comment - Pauline delivered to pts bedside    Expected Date of Delivery:  5/8/2017    Expected Time of Delivery:          Primary Diagnosis     Your primary diagnosis was:  " "Chronic Bronchitis      Admission Information     Date & Time Provider Department CSN    5/6/2017  4:39 PM Sarah Krueger MD [facility] [id number]      Care Providers     Provider Role Specialty Primary office phone    Sarah Krueger MD Attending Provider Hospitalist [phone]    Sarah Krueger MD Team Attending  Hospitalist [phone]      Your Vitals Were     BP Pulse Temp Resp Height Weight    134/84 (BP Location: Left arm, Patient Position: Sitting, BP Method: Automatic) 99 98.8 °F (37.1 °C) (Oral) 18 5' 6" (1.676 m) 55.3 kg (121 lb 14.4 oz)    Last Period SpO2 BMI          (LMP Unknown) 96% 19.68 kg/m2        Recent Lab Values        2/8/2017 5/6/2017                        4:00 AM 10:53 PM          A1C 6.5 (H) 5.8          Comment for A1C at  4:00 AM on 2/8/2017:  According to ADA guidelines, hemoglobin A1C <7.0% represents  optimal control in non-pregnant diabetic patients.  Different  metrics may apply to specific populations.   Standards of Medical Care in Diabetes - 2016.  For the purpose of screening for the presence of diabetes:  <5.7%     Consistent with the absence of diabetes  5.7-6.4%  Consistent with increasing risk for diabetes   (prediabetes)  >or=6.5%  Consistent with diabetes  Currently no consensus exists for use of hemoglobin A1C  for diagnosis of diabetes for children.      Comment for A1C at 10:53 PM on 5/6/2017:  According to ADA guidelines, hemoglobin A1C <7.0% represents  optimal control in non-pregnant diabetic patients.  Different  metrics may apply to specific populations.   Standards of Medical Care in Diabetes - 2016.  For the purpose of screening for the presence of diabetes:  <5.7%     Consistent with the absence of diabetes  5.7-6.4%  Consistent with increasing risk for diabetes   (prediabetes)  >or=6.5%  Consistent with diabetes  Currently no consensus exists for use of hemoglobin A1C  for diagnosis of diabetes for children.        Allergies as of " 5/9/2017        Reactions    Avelox [Moxifloxacin] Itching, Rash    IV      Advance Directives     An advance directive is a document which, in the event you are no longer able to make decisions for yourself, tells your healthcare team what kind of treatment you do or do not want to receive, or who you would like to make those decisions for you.  If you do not currently have an advance directive, [facility] encourages you to create one.  For more information call:  [phone]), [phone] ([phone]),  or log on to [email or web address].        Smoking Cessation     If you would like to quit smoking:   You may be eligible for free services if you are a [place] resident and started smoking cigarettes before September 1, 1988.  Call the Smoking Cessation Trust (SCT) toll free at (544) 611-1252 or (072) 937-9420.   Call 5-041-QUIT-NOW if you do not meet the above criteria.   Contact us via email: [email or web address]   View our website for more information: [email or web address]        Language Assistance Services     ATTENTION: Language assistance services are available, free of charge. Please call [phone].      ATENCIÓN: Si habla español, tiene a puente disposición servicios gratuitos de asistencia lingüística. Llame al [phone].     CHÚ Ý: N?u b?n nói Ti?ng Vi?t, có các d?ch v? h? tr? ngôn ng? mi?n phí dành cho b?n. G?i s? [phone].        Diabetes Discharge Instructions                                   [facility] Sign-Up     Activating your [facility] account is as easy as 1-2-3!     1) Visit [email or web address], select Sign Up Now, enter this activation code and your date of birth, then select Next.  [email or web address]  Expires: 6/12/2017  3:08 PM      2) Create a username and password to use when you visit [facility] in the future and select a security question in case you lose your password and select Next.    3) Enter your e-mail address and click Sign  Up!    Additional Information  If you have questions, please e-mail [email or web address] or call [phone] to talk to our [facility] staff. Remember, [facility] is NOT to be used for urgent needs. For medical emergencies, dial 911.          [facility] complies with applicable Federal civil rights laws and does not discriminate on the basis of race, color, national origin, age, disability, or sex.

## 2017-05-06 NOTE — ED PROVIDER NOTES
Encounter Date: 5/6/2017    SCRIBE #1 NOTE: I, Mei Johnson, am scribing for, and in the presence of, Dr. Soto.       History     Chief Complaint   Patient presents with    Shortness of Breath     Review of patient's allergies indicates:   Allergen Reactions    Avelox [moxifloxacin] Itching and Rash     IV     HPI Comments: Time patient was seen by the provider: 4:41 PM      The patient is a 56 y.o. female with hx of: COPD that presents to the ED with a complaint of acute on chronic SOB and increased work of breathing. Pt unable to get up and brush her teeth and perform daily tasks (even on O2) due to SOB/BOGGS. She uses albuterol BID (used immediately PTA) and is on 3L continuous home O2. No known fever. Pt reports non-productive cough. Pt hypoxic to 70 in triage. Pt was discharged 5 days ago after admission for COPD exacerbation.    The history is provided by the patient.     Past Medical History:   Diagnosis Date    COPD (chronic obstructive pulmonary disease)      History reviewed. No pertinent surgical history.   History reviewed. No pertinent family history.  Social History   Substance Use Topics    Smoking status: Heavy Tobacco Smoker     Packs/day: 1.00     Types: Cigarettes    Smokeless tobacco: None    Alcohol use 0.5 oz/week     1 drink(s) per week     Review of Systems   Constitutional: Negative for fever.   HENT: Negative for nosebleeds.    Eyes: Negative for visual disturbance.   Respiratory: Positive for cough and shortness of breath.    Cardiovascular: Negative for chest pain.   Gastrointestinal: Negative for vomiting.   Genitourinary: Negative for hematuria.   Musculoskeletal: Negative for neck stiffness.   Skin: Negative for rash.   Neurological: Negative for syncope.       Physical Exam   Initial Vitals   BP Pulse Resp Temp SpO2   05/06/17 1637 05/06/17 1637 05/06/17 1637 05/06/17 1637 05/06/17 1637   141/81 144 32 98 °F (36.7 °C) 70 %     Physical Exam    Nursing note and vitals  reviewed.  Constitutional: She appears well-developed and well-nourished. She appears distressed.   HENT:   Head: Normocephalic and atraumatic.   Eyes: Conjunctivae are normal.   Neck: Normal range of motion.   Cardiovascular: Regular rhythm and normal heart sounds. Tachycardia present.    Pulmonary/Chest: Tachypnea noted. She is in respiratory distress. She has no wheezes.   Hypoxic, not cyanotic. Coarse breath sounds with minimal air movement.   Abdominal: Soft. She exhibits no distension. There is no tenderness.   Musculoskeletal: Normal range of motion.   Neurological: She is alert and oriented to person, place, and time.   Skin: Skin is warm and dry.         ED Course   Procedures  Labs Reviewed   COMPREHENSIVE METABOLIC PANEL - Abnormal; Notable for the following:        Result Value    CO2 30 (*)     All other components within normal limits   CBC W/ AUTO DIFFERENTIAL - Abnormal; Notable for the following:     WBC 19.36 (*)     Hemoglobin 17.0 (*)     Hematocrit 50.5 (*)     MCH 32.8 (*)     Gran # 16.1 (*)     Mono # 1.6 (*)     Gran% 83.3 (*)     Lymph% 7.8 (*)     All other components within normal limits   B-TYPE NATRIURETIC PEPTIDE   PROCALCITONIN   POCT GLUCOSE MONITORING CONTINUOUS     EKG Readings: (Independently Interpreted)   Initial Reading: No STEMI. Rhythm: Sinus Tachycardia. Heart Rate: 126. Axis: Right Axis Deviation.       X-Rays:   Independently Interpreted Readings:   Chest X-Ray: Normal heart size.  No infiltrates.  No acute abnormalities.     Medical Decision Making:   History:   Old Medical Records: I decided to obtain old medical records.  Initial Assessment:   This is an emergent evaluation of a 56 y.o. female presenting with SOB. Pt has COPD and is oxygen dependent. She arrived not wearing her oxygen. Concerns include respiratory failure, COPD exacerbation, PNA. Pt's O2 sat responded appropriately to normal levels with supplemental O2. Will check labs and CXR. I will give  steroids.  Clinical Tests:   Lab Tests: Reviewed and Ordered  Radiological Study: Reviewed and Ordered  Medical Tests: Reviewed and Ordered  Other:   I have discussed this case with another health care provider.            Scribe Attestation:   Scribe #1: I performed the above scribed service and the documentation accurately describes the services I performed. I attest to the accuracy of the note.    Attending Attestation:           Physician Attestation for Scribe:  Physician Attestation Statement for Scribe #1: I, Dr. Soto, reviewed documentation, as scribed by Mei Johnson in my presence, and it is both accurate and complete.         Attending ED Notes:   7:01 PM  Symptoms not significantly improved and pt is persistently tachycardic. Patient refused ABG. Will send for CTA to evaluate, give IV fluids and a breathing treatment.    Update: CTA negative. Will admit for further antibiotics, breathing treatments.           ED Course     Clinical Impression:   The primary encounter diagnosis was COPD with exacerbation. Diagnoses of Shortness of breath, Leukocytosis, unspecified type, Type 2 diabetes mellitus without complication, without long-term current use of insulin, and COPD exacerbation were also pertinent to this visit.    Disposition:   Disposition: Admitted  Condition: Fair       Sylvester Soto MD  05/07/17 1025       Sylvester Soto MD  05/07/17 1026

## 2017-05-06 NOTE — TELEPHONE ENCOUNTER
Reason for Disposition   Health Information question, no triage required and triager able to answer question    Protocols used: ST INFORMATION ONLY CALL-A-AH  pt has question about adjusting O2 flow on her machine/ she doesn't know what setting is on now or how to adjust    Advised her to call # on machine for advice    Ximena Mccollum RN

## 2017-05-06 NOTE — ED TRIAGE NOTES
Patient states she is here for increased SOB.  Patient states she was just diagnosed with COPD and is on breathing treatments at home.  Patient states she has been using her albuterol once or twice a day.  Patient denies coughing anything up.  Patient states her oxygen was in the 70's at home with oxygen.

## 2017-05-06 NOTE — PROGRESS NOTES
"Pt. Refused ABG; pt. was explained ABG is beneficial to have CO2 and oxygenation levels; pt. still refused and stated "I have had that done before, and it is painful." Will continue to monitor pt; O2 sats 99% on 3LNC.  "

## 2017-05-07 PROBLEM — R91.1 PULMONARY NODULE SEEN ON IMAGING STUDY: Status: ACTIVE | Noted: 2017-05-07

## 2017-05-07 LAB
ALBUMIN SERPL BCP-MCNC: 2.8 G/DL
ALP SERPL-CCNC: 71 U/L
ALT SERPL W/O P-5'-P-CCNC: 13 U/L
ANION GAP SERPL CALC-SCNC: 8 MMOL/L
AST SERPL-CCNC: 10 U/L
BASOPHILS # BLD AUTO: 0 K/UL
BASOPHILS NFR BLD: 0 %
BILIRUB SERPL-MCNC: 0.3 MG/DL
BUN SERPL-MCNC: 13 MG/DL
CALCIUM SERPL-MCNC: 9 MG/DL
CHLORIDE SERPL-SCNC: 99 MMOL/L
CO2 SERPL-SCNC: 33 MMOL/L
CREAT SERPL-MCNC: 0.7 MG/DL
DIFFERENTIAL METHOD: ABNORMAL
EOSINOPHIL # BLD AUTO: 0 K/UL
EOSINOPHIL NFR BLD: 0 %
ERYTHROCYTE [DISTWIDTH] IN BLOOD BY AUTOMATED COUNT: 13.3 %
EST. GFR  (AFRICAN AMERICAN): >60 ML/MIN/1.73 M^2
EST. GFR  (NON AFRICAN AMERICAN): >60 ML/MIN/1.73 M^2
ESTIMATED AVG GLUCOSE: 120 MG/DL
GLUCOSE SERPL-MCNC: 271 MG/DL
HBA1C MFR BLD HPLC: 5.8 %
HCT VFR BLD AUTO: 44.5 %
HGB BLD-MCNC: 14.6 G/DL
LYMPHOCYTES # BLD AUTO: 0.5 K/UL
LYMPHOCYTES NFR BLD: 4.5 %
MAGNESIUM SERPL-MCNC: 1.7 MG/DL
MCH RBC QN AUTO: 31.9 PG
MCHC RBC AUTO-ENTMCNC: 32.8 %
MCV RBC AUTO: 97 FL
MONOCYTES # BLD AUTO: 0.2 K/UL
MONOCYTES NFR BLD: 1.6 %
NEUTROPHILS # BLD AUTO: 9.6 K/UL
NEUTROPHILS NFR BLD: 93.7 %
PHOSPHATE SERPL-MCNC: 2.7 MG/DL
PLATELET # BLD AUTO: 141 K/UL
PMV BLD AUTO: 11.5 FL
POCT GLUCOSE: 209 MG/DL (ref 70–110)
POCT GLUCOSE: 212 MG/DL (ref 70–110)
POCT GLUCOSE: 213 MG/DL (ref 70–110)
POCT GLUCOSE: 241 MG/DL (ref 70–110)
POCT GLUCOSE: 277 MG/DL (ref 70–110)
POTASSIUM SERPL-SCNC: 4.2 MMOL/L
PROT SERPL-MCNC: 6 G/DL
RBC # BLD AUTO: 4.57 M/UL
SODIUM SERPL-SCNC: 140 MMOL/L
WBC # BLD AUTO: 10.26 K/UL

## 2017-05-07 PROCEDURE — 83735 ASSAY OF MAGNESIUM: CPT

## 2017-05-07 PROCEDURE — 80053 COMPREHEN METABOLIC PANEL: CPT

## 2017-05-07 PROCEDURE — 94640 AIRWAY INHALATION TREATMENT: CPT

## 2017-05-07 PROCEDURE — 63600175 PHARM REV CODE 636 W HCPCS: Performed by: STUDENT IN AN ORGANIZED HEALTH CARE EDUCATION/TRAINING PROGRAM

## 2017-05-07 PROCEDURE — 84100 ASSAY OF PHOSPHORUS: CPT

## 2017-05-07 PROCEDURE — 99406 BEHAV CHNG SMOKING 3-10 MIN: CPT

## 2017-05-07 PROCEDURE — 25000003 PHARM REV CODE 250: Performed by: STUDENT IN AN ORGANIZED HEALTH CARE EDUCATION/TRAINING PROGRAM

## 2017-05-07 PROCEDURE — 85025 COMPLETE CBC W/AUTO DIFF WBC: CPT

## 2017-05-07 PROCEDURE — 36415 COLL VENOUS BLD VENIPUNCTURE: CPT

## 2017-05-07 PROCEDURE — 25000242 PHARM REV CODE 250 ALT 637 W/ HCPCS: Performed by: STUDENT IN AN ORGANIZED HEALTH CARE EDUCATION/TRAINING PROGRAM

## 2017-05-07 PROCEDURE — 11000001 HC ACUTE MED/SURG PRIVATE ROOM

## 2017-05-07 PROCEDURE — 27000221 HC OXYGEN, UP TO 24 HOURS

## 2017-05-07 PROCEDURE — 25000003 PHARM REV CODE 250: Performed by: INTERNAL MEDICINE

## 2017-05-07 PROCEDURE — 99900035 HC TECH TIME PER 15 MIN (STAT)

## 2017-05-07 PROCEDURE — 94761 N-INVAS EAR/PLS OXIMETRY MLT: CPT

## 2017-05-07 PROCEDURE — 99232 SBSQ HOSP IP/OBS MODERATE 35: CPT | Mod: ,,, | Performed by: HOSPITALIST

## 2017-05-07 RX ORDER — GUAIFENESIN 600 MG/1
600 TABLET, EXTENDED RELEASE ORAL 2 TIMES DAILY
Status: DISCONTINUED | OUTPATIENT
Start: 2017-05-07 | End: 2017-05-09 | Stop reason: HOSPADM

## 2017-05-07 RX ORDER — INSULIN ASPART 100 [IU]/ML
0-5 INJECTION, SOLUTION INTRAVENOUS; SUBCUTANEOUS
Status: DISCONTINUED | OUTPATIENT
Start: 2017-05-07 | End: 2017-05-09 | Stop reason: HOSPADM

## 2017-05-07 RX ORDER — LORATADINE 10 MG/1
10 TABLET ORAL DAILY PRN
COMMUNITY
End: 2020-11-19

## 2017-05-07 RX ADMIN — IPRATROPIUM BROMIDE AND ALBUTEROL SULFATE 3 ML: .5; 3 SOLUTION RESPIRATORY (INHALATION) at 01:05

## 2017-05-07 RX ADMIN — INSULIN ASPART 2 UNITS: 100 INJECTION, SOLUTION INTRAVENOUS; SUBCUTANEOUS at 05:05

## 2017-05-07 RX ADMIN — PANTOPRAZOLE SODIUM 600 MG: 40 TABLET, DELAYED RELEASE ORAL at 06:05

## 2017-05-07 RX ADMIN — IPRATROPIUM BROMIDE AND ALBUTEROL SULFATE 3 ML: .5; 3 SOLUTION RESPIRATORY (INHALATION) at 07:05

## 2017-05-07 RX ADMIN — DOXYCYCLINE HYCLATE 100 MG: 100 TABLET, COATED ORAL at 09:05

## 2017-05-07 RX ADMIN — TIOTROPIUM BROMIDE 18 MCG: 18 CAPSULE ORAL; RESPIRATORY (INHALATION) at 09:05

## 2017-05-07 RX ADMIN — FLUTICASONE FUROATE AND VILANTEROL TRIFENATATE 1 PUFF: 100; 25 POWDER RESPIRATORY (INHALATION) at 10:05

## 2017-05-07 RX ADMIN — METHYLPREDNISOLONE SODIUM SUCCINATE 125 MG: 125 INJECTION, POWDER, FOR SOLUTION INTRAMUSCULAR; INTRAVENOUS at 06:05

## 2017-05-07 RX ADMIN — PANTOPRAZOLE SODIUM 600 MG: 40 TABLET, DELAYED RELEASE ORAL at 09:05

## 2017-05-07 RX ADMIN — IPRATROPIUM BROMIDE AND ALBUTEROL SULFATE 3 ML: .5; 3 SOLUTION RESPIRATORY (INHALATION) at 11:05

## 2017-05-07 RX ADMIN — METHYLPREDNISOLONE SODIUM SUCCINATE 125 MG: 125 INJECTION, POWDER, FOR SOLUTION INTRAMUSCULAR; INTRAVENOUS at 01:05

## 2017-05-07 RX ADMIN — PREDNISONE 40 MG: 20 TABLET ORAL at 09:05

## 2017-05-07 RX ADMIN — INSULIN ASPART 1 UNITS: 100 INJECTION, SOLUTION INTRAVENOUS; SUBCUTANEOUS at 10:05

## 2017-05-07 RX ADMIN — IPRATROPIUM BROMIDE AND ALBUTEROL SULFATE 3 ML: .5; 3 SOLUTION RESPIRATORY (INHALATION) at 03:05

## 2017-05-07 RX ADMIN — INSULIN ASPART 2 UNITS: 100 INJECTION, SOLUTION INTRAVENOUS; SUBCUTANEOUS at 09:05

## 2017-05-07 RX ADMIN — DOXYCYCLINE HYCLATE 100 MG: 100 TABLET, COATED ORAL at 10:05

## 2017-05-07 RX ADMIN — ENOXAPARIN SODIUM 40 MG: 100 INJECTION SUBCUTANEOUS at 01:05

## 2017-05-07 RX ADMIN — ENOXAPARIN SODIUM 40 MG: 100 INJECTION SUBCUTANEOUS at 04:05

## 2017-05-07 RX ADMIN — INSULIN ASPART 3 UNITS: 100 INJECTION, SOLUTION INTRAVENOUS; SUBCUTANEOUS at 01:05

## 2017-05-07 NOTE — ASSESSMENT & PLAN NOTE
- Patient presented with symptoms suggestive of COPD Exacerbation with no high concern of infection, recent discharge on 5/1/17 after being admitted for COPD exacerbation but was not on antibiotics during prior admission  - Home medications include Albuterol, Spiriva and Advair, and she reports compliance with medication.  - Will continue Breathing treatment based on COPD Pathway  - Received methylprednisolone 125 mg IV Q 6 hours for two doses then will receive Prednisone 40 mg PO daily, Mucinex to help her COPD.  - Chest X ray showed subtle increased perihilar and bibasilar interstitial prominence suggestive of pulmonary edema, with interstitial pneumonia not excluded. Will continue doxycycline for a total of 10 days (pt allergic to moxifloxacin)   CT Chest showed no evidence of PE as pt was initially hypoxic, tachycardic, and tachypneic   - will f/u 2D Echo to assess for any pulmonary hypertension  - No previous PFT to assess the severity of her FEV1; however her COPD Assessment Test (CAT) showed > 10 points which is suggestive of significant symptoms.  - Continue continuous oxygen treatment, and wean off as tolerated.   - Involve PT/OT and pulmonary rehabilitation to prevent Readmission in future

## 2017-05-07 NOTE — ASSESSMENT & PLAN NOTE
-0.4 cm pulmonary nodule within the left upper lobe is nonspecific, however given underlying parenchymal emphysematous changes, one year followup is recommended

## 2017-05-07 NOTE — SUBJECTIVE & OBJECTIVE
Interval History: See above    Review of Systems   Constitutional: Negative for activity change, appetite change, chills, diaphoresis, fatigue and fever.   HENT: Negative for dental problem and drooling.    Respiratory: Positive for cough (nonproductive), chest tightness (improved after IV solumedrol), shortness of breath and wheezing. Negative for choking and stridor.    Cardiovascular: Negative for chest pain, palpitations and leg swelling.   Gastrointestinal: Negative for abdominal distention and abdominal pain.   Genitourinary: Negative for difficulty urinating, dysuria and hematuria.   Musculoskeletal: Negative for arthralgias.   Neurological: Negative for weakness.   Psychiatric/Behavioral: Negative for agitation and behavioral problems.     Objective:     Vital Signs (Most Recent):  Temp: 98.7 °F (37.1 °C) (05/07/17 1556)  Pulse: 109 (05/07/17 1556)  Resp: 20 (05/07/17 1556)  BP: (!) 155/75 (05/07/17 1556)  SpO2: (!) 90 % (05/07/17 1556) Vital Signs (24h Range):  Temp:  [97.7 °F (36.5 °C)-98.7 °F (37.1 °C)] 98.7 °F (37.1 °C)  Pulse:  [] 109  Resp:  [16-42] 20  SpO2:  [90 %-99 %] 90 %  BP: (120-156)/(69-85) 155/75     Weight: 61.2 kg (135 lb)  Body mass index is 21.79 kg/(m^2).    Intake/Output Summary (Last 24 hours) at 05/07/17 1832  Last data filed at 05/07/17 1228   Gross per 24 hour   Intake              840 ml   Output                0 ml   Net              840 ml      Physical Exam   Constitutional: She is oriented to person, place, and time. She appears well-developed and well-nourished. No distress.   HENT:   Head: Normocephalic and atraumatic.   Mouth/Throat: No oropharyngeal exudate.   Eyes: Pupils are equal, round, and reactive to light. No scleral icterus.   Neck: Normal range of motion.   Cardiovascular: Normal rate and regular rhythm.  Exam reveals no friction rub.    No murmur heard.        Pulmonary/Chest: Effort normal. No stridor. No respiratory distress. She has wheezes.   Abdominal:  Soft. Bowel sounds are normal.   Musculoskeletal: Normal range of motion. She exhibits no edema or deformity.   Neurological: She is alert and oriented to person, place, and time. No cranial nerve deficit. Coordination normal.   Skin: She is not diaphoretic.   Vitals reviewed.      Significant Labs:   ABGs: No results for input(s): PH, PCO2, HCO3, POCSATURATED, BE in the last 48 hours.  BMP:   Recent Labs  Lab 05/07/17  0943   *      K 4.2   CL 99   CO2 33*   BUN 13   CREATININE 0.7   CALCIUM 9.0   MG 1.7     CBC:   Recent Labs  Lab 05/06/17  1700 05/07/17  0943   WBC 19.36* 10.26   HGB 17.0* 14.6   HCT 50.5* 44.5    141*     CMP:   Recent Labs  Lab 05/06/17  1700 05/07/17  0943    140   K 4.7 4.2   CL 95 99   CO2 30* 33*    271*   BUN 11 13   CREATININE 0.7 0.7   CALCIUM 10.3 9.0   PROT 8.0 6.0   ALBUMIN 3.7 2.8*   BILITOT 0.6 0.3   ALKPHOS 91 71   AST 18 10   ALT 15 13   ANIONGAP 15 8   EGFRNONAA >60.0 >60.0     Cardiac Markers:   Recent Labs  Lab 05/06/17  1700   BNP 17     POCT Glucose:   Recent Labs  Lab 05/07/17  1213 05/07/17  1310 05/07/17  1656   POCTGLUCOSE 209* 277* 241*       Significant Imaging: I have reviewed all pertinent imaging results/findings within the past 24 hours.

## 2017-05-07 NOTE — PROGRESS NOTES
Complain soreness from IV site with flushing . Informed patient if site is sore will need to place at different location . Patient refuse to allow resite of Iv. Explain that this may be sign site need to be resited.OnContinue to refuse to to be restuck.  When flush not redness nor swelling noted. Pt stated i may change my mind later but just leave it for now.

## 2017-05-07 NOTE — SUBJECTIVE & OBJECTIVE
Past Medical History:   Diagnosis Date    COPD (chronic obstructive pulmonary disease)        History reviewed. No pertinent surgical history.    Review of patient's allergies indicates:   Allergen Reactions    Avelox [moxifloxacin] Itching and Rash     IV       No current facility-administered medications on file prior to encounter.      Current Outpatient Prescriptions on File Prior to Encounter   Medication Sig    albuterol 90 mcg/actuation inhaler Inhale 2 puffs into the lungs every 6 (six) hours as needed for Wheezing. Rescue    tiotropium (SPIRIVA) 18 mcg inhalation capsule Inhale 1 capsule (18 mcg total) into the lungs once daily.    aspirin (ECOTRIN) 81 MG EC tablet Take 81 mg by mouth once daily.    azithromycin (ZITHROMAX) 500 MG tablet Take 1 tablet (500 mg total) by mouth once daily.    doxycycline (VIBRAMYCIN) 100 MG Cap Take 1 capsule (100 mg total) by mouth 2 (two) times daily.    fluticasone (FLONASE) 50 mcg/actuation nasal spray 1 spray by Each Nare route once daily.    fluticasone-salmeterol 250-50 mcg/dose (ADVAIR) 250-50 mcg/dose diskus inhaler Inhale 1 puff into the lungs 2 (two) times daily.    guaifenesin (MUCINEX) 600 mg 12 hr tablet Take 600 mg by mouth every morning.    nicotine (NICODERM CQ) 21 mg/24 hr Place 1 patch onto the skin once daily.    predniSOLONE (MILLIPRED) 5 mg Tab Take 10 tablets by mouth once daily.    predniSONE (DELTASONE) 20 MG tablet Take 3 tablets (60 mg total) by mouth once daily.     Family History     None        Social History Main Topics    Smoking status: Heavy Tobacco Smoker     Packs/day: 1.00     Types: Cigarettes    Smokeless tobacco: Not on file    Alcohol use 0.5 oz/week     1 drink(s) per week    Drug use: No    Sexual activity: Not on file     Review of Systems   Constitutional: Negative for activity change, appetite change, chills, diaphoresis, fatigue and fever.   HENT: Negative for dental problem and drooling.    Respiratory: Positive  for cough, chest tightness, shortness of breath and wheezing. Negative for choking and stridor.    Cardiovascular: Negative for chest pain, palpitations and leg swelling.   Gastrointestinal: Negative for abdominal distention and abdominal pain.   Genitourinary: Negative for difficulty urinating, dysuria and hematuria.   Musculoskeletal: Negative for arthralgias.   Neurological: Negative for weakness.   Psychiatric/Behavioral: Negative for agitation and behavioral problems.     Objective:     Vital Signs (Most Recent):  Temp: 98.4 °F (36.9 °C) (05/06/17 2120)  Pulse: (!) 118 (05/06/17 2331)  Resp: (!) 24 (05/06/17 2205)  BP: 120/74 (05/06/17 2331)  SpO2: (!) 93 % (05/06/17 2331) Vital Signs (24h Range):  Temp:  [98 °F (36.7 °C)-98.4 °F (36.9 °C)] 98.4 °F (36.9 °C)  Pulse:  [112-144] 118  Resp:  [21-42] 24  SpO2:  [70 %-99 %] 93 %  BP: (120-177)/(69-97) 120/74     Weight: 61.2 kg (135 lb)  Body mass index is 21.79 kg/(m^2).    Physical Exam   Constitutional: She is oriented to person, place, and time. She appears well-developed and well-nourished. No distress.   HENT:   Head: Normocephalic and atraumatic.   Mouth/Throat: No oropharyngeal exudate.   Cardiovascular: Regular rhythm.  Exam reveals no friction rub.    No murmur heard.  Tachycardia      Pulmonary/Chest: Effort normal. No respiratory distress. She has wheezes.   Musculoskeletal: Normal range of motion. She exhibits no edema or deformity.   Neurological: She is alert and oriented to person, place, and time. No cranial nerve deficit. Coordination normal.   Skin: She is not diaphoretic.   Vitals reviewed.       Admission on 05/06/2017   Component Date Value Ref Range Status    Sodium 05/06/2017 140  136 - 145 mmol/L Final    Potassium 05/06/2017 4.7  3.5 - 5.1 mmol/L Final    Chloride 05/06/2017 95  95 - 110 mmol/L Final    CO2 05/06/2017 30* 23 - 29 mmol/L Final    Glucose 05/06/2017 110  70 - 110 mg/dL Final    BUN, Bld 05/06/2017 11  6 - 20 mg/dL Final     Creatinine 05/06/2017 0.7  0.5 - 1.4 mg/dL Final    Calcium 05/06/2017 10.3  8.7 - 10.5 mg/dL Final    Total Protein 05/06/2017 8.0  6.0 - 8.4 g/dL Final    Albumin 05/06/2017 3.7  3.5 - 5.2 g/dL Final    Total Bilirubin 05/06/2017 0.6  0.1 - 1.0 mg/dL Final    Alkaline Phosphatase 05/06/2017 91  55 - 135 U/L Final    AST 05/06/2017 18  10 - 40 U/L Final    ALT 05/06/2017 15  10 - 44 U/L Final    Anion Gap 05/06/2017 15  8 - 16 mmol/L Final    eGFR if African American 05/06/2017 >60.0  >60 mL/min/1.73 m^2 Final    eGFR if non African American 05/06/2017 >60.0  >60 mL/min/1.73 m^2 Final    WBC 05/06/2017 19.36* 3.90 - 12.70 K/uL Final    RBC 05/06/2017 5.19  4.00 - 5.40 M/uL Final    Hemoglobin 05/06/2017 17.0* 12.0 - 16.0 g/dL Final    Hematocrit 05/06/2017 50.5* 37.0 - 48.5 % Final    MCV 05/06/2017 97  82 - 98 fL Final    MCH 05/06/2017 32.8* 27.0 - 31.0 pg Final    MCHC 05/06/2017 33.7  32.0 - 36.0 % Final    RDW 05/06/2017 13.6  11.5 - 14.5 % Final    Platelets 05/06/2017 184  150 - 350 K/uL Final    MPV 05/06/2017 11.6  9.2 - 12.9 fL Final    Gran # 05/06/2017 16.1* 1.8 - 7.7 K/uL Final    Lymph # 05/06/2017 1.5  1.0 - 4.8 K/uL Final    Mono # 05/06/2017 1.6* 0.3 - 1.0 K/uL Final    Eos # 05/06/2017 0.1  0.0 - 0.5 K/uL Final    Baso # 05/06/2017 0.02  0.00 - 0.20 K/uL Final    Gran% 05/06/2017 83.3* 38.0 - 73.0 % Final    Lymph% 05/06/2017 7.8* 18.0 - 48.0 % Final    Mono% 05/06/2017 8.0  4.0 - 15.0 % Final    Eosinophil% 05/06/2017 0.4  0.0 - 8.0 % Final    Basophil% 05/06/2017 0.1  0.0 - 1.9 % Final    Differential Method 05/06/2017 Automated   Final    BNP 05/06/2017 17  0 - 99 pg/mL Final

## 2017-05-07 NOTE — PROGRESS NOTES
Pt received lunch tray and did not notify nurse. Ate 100%. Blood sugar checked and was 209. Unable to give another dose of insulin until 1315. Dr. Krueger paged. Ordered to re-check sugar in an hour and give pt insulin based on that result. Re-inforced with pt to call RN before each meal for sugar checks.

## 2017-05-07 NOTE — PLAN OF CARE
Problem: Patient Care Overview  Goal: Plan of Care Review  Outcome: Ongoing (interventions implemented as appropriate)  Pt is AAOx4. VSS. No falls/injury as pt calls for assistance when needed. Pt able to ambulate independently. No s/s of skin breakdown as pt is independent with re-positioning self. No complaints of pain at this time. PO prednisone given as scheduled. Accu checks ACHS and insulin given according to sliding scale. Pt re-instructed on calling RN before eating meals. Bed in lowest position. Call light within reach. Will continue to monitor.

## 2017-05-07 NOTE — PROGRESS NOTES
[facility] Hospital Medicine  Progress Note    Patient Name: Ann Canavan  MRN: [id number]  Patient Class: IP- Inpatient   Admission Date: 5/6/2017  Length of Stay: 1 days  Attending Physician: Sarah Krueger MD  Primary Care Provider: Primary Doctor [facility] Medicine Team[place] [facility] [place] 2 Mary Krueger MD    Subjective:     Principal Problem: COPD exacerbation    HPI:  Ann Canavan is a 56 y.o. female, known to have COPD with previous COPD exacerbations, ex smoker,Diabetes mellitus not on medications. She presented to ED with shortness of breath, dyspnea on exertion for the last couple of days. This was associated with dry cough, and feeling of chest tightness. She denies fever, phlegm production, chest pain, palpitation or exposure to smoker for the last couple of days. Of note, she was discharged on 5/1/2017 after 5 days course of hospital admission for COPD exacerbation. She was treated with breathing treatment, steroids and was qualified for oxygen for home use. No antibiotic given on the last admission; however, recent antibiotic was prescribed yesterday (Doxycyclin).     Hospital Course:  In ED, pt was hypoxic to 70% on RA but subsequently okay on 2L NC, also tachy to 144, tachypneic at 32. She was given her recently prescribed Doxycycline, breathing treatment and continuous oxygen therapy. CTA showed no PE, CXR showed no opacification concerning for pneumonia. Pt admitted to hospital medicine for COPD exacerbation.     Interval History: See above    Review of Systems   Constitutional: Negative for activity change, appetite change, chills, diaphoresis, fatigue and fever.   HENT: Negative for dental problem and drooling.    Respiratory: Positive for cough (nonproductive), chest tightness (improved after IV solumedrol), shortness of breath and wheezing. Negative for choking and stridor.    Cardiovascular: Negative for chest pain, palpitations and leg swelling.   Gastrointestinal:  Negative for abdominal distention and abdominal pain.   Genitourinary: Negative for difficulty urinating, dysuria and hematuria.   Musculoskeletal: Negative for arthralgias.   Neurological: Negative for weakness.   Psychiatric/Behavioral: Negative for agitation and behavioral problems.     Objective:     Vital Signs (Most Recent):  Temp: 98.7 °F (37.1 °C) (05/07/17 1556)  Pulse: 109 (05/07/17 1556)  Resp: 20 (05/07/17 1556)  BP: (!) 155/75 (05/07/17 1556)  SpO2: (!) 90 % (05/07/17 1556) Vital Signs (24h Range):  Temp:  [97.7 °F (36.5 °C)-98.7 °F (37.1 °C)] 98.7 °F (37.1 °C)  Pulse:  [] 109  Resp:  [16-42] 20  SpO2:  [90 %-99 %] 90 %  BP: (120-156)/(69-85) 155/75     Weight: 61.2 kg (135 lb)  Body mass index is 21.79 kg/(m^2).    Intake/Output Summary (Last 24 hours) at 05/07/17 1832  Last data filed at 05/07/17 1228   Gross per 24 hour   Intake              840 ml   Output                0 ml   Net              840 ml      Physical Exam   Constitutional: She is oriented to person, place, and time. She appears well-developed and well-nourished. No distress.   HENT:   Head: Normocephalic and atraumatic.   Mouth/Throat: No oropharyngeal exudate.   Eyes: Pupils are equal, round, and reactive to light. No scleral icterus.   Neck: Normal range of motion.   Cardiovascular: Normal rate and regular rhythm.  Exam reveals no friction rub.    No murmur heard.        Pulmonary/Chest: Effort normal. No stridor. No respiratory distress. She has wheezes.   Abdominal: Soft. Bowel sounds are normal.   Musculoskeletal: Normal range of motion. She exhibits no edema or deformity.   Neurological: She is alert and oriented to person, place, and time. No cranial nerve deficit. Coordination normal.   Skin: She is not diaphoretic.   Vitals reviewed.      Significant Labs:   ABGs: No results for input(s): PH, PCO2, HCO3, POCSATURATED, BE in the last 48 hours.  BMP:   Recent Labs  Lab 05/07/17  0943   *      K 4.2   CL 99    CO2 33*   BUN 13   CREATININE 0.7   CALCIUM 9.0   MG 1.7     CBC:   Recent Labs  Lab 05/06/17  1700 05/07/17  0943   WBC 19.36* 10.26   HGB 17.0* 14.6   HCT 50.5* 44.5    141*     CMP:   Recent Labs  Lab 05/06/17  1700 05/07/17  0943    140   K 4.7 4.2   CL 95 99   CO2 30* 33*    271*   BUN 11 13   CREATININE 0.7 0.7   CALCIUM 10.3 9.0   PROT 8.0 6.0   ALBUMIN 3.7 2.8*   BILITOT 0.6 0.3   ALKPHOS 91 71   AST 18 10   ALT 15 13   ANIONGAP 15 8   EGFRNONAA >60.0 >60.0     Cardiac Markers:   Recent Labs  Lab 05/06/17  1700   BNP 17     POCT Glucose:   Recent Labs  Lab 05/07/17  1213 05/07/17  1310 05/07/17  1656   POCTGLUCOSE 209* 277* 241*       Significant Imaging: I have reviewed all pertinent imaging results/findings within the past 24 hours.    Assessment/Plan:      Tobacco abuse  -former smoker but has since stopped smoking  -does not need nicotine patch      COPD with exacerbation  - Patient presented with symptoms suggestive of COPD Exacerbation with no high concern of infection, recent discharge on 5/1/17 after being admitted for COPD exacerbation but was not on antibiotics during prior admission  - Home medications include Albuterol, Spiriva and Advair, and she reports compliance with medication.  - Will continue Breathing treatment based on COPD Pathway  - Received methylprednisolone 125 mg IV Q 6 hours for two doses then will receive Prednisone 40 mg PO daily, Mucinex to help her COPD.  - Chest X ray showed subtle increased perihilar and bibasilar interstitial prominence suggestive of pulmonary edema, with interstitial pneumonia not excluded. Will continue doxycycline for a total of 10 days (pt allergic to moxifloxacin)   CT Chest showed no evidence of PE as pt was initially hypoxic, tachycardic, and tachypneic   - will f/u 2D Echo to assess for any pulmonary hypertension  - No previous PFT to assess the severity of her FEV1; however her COPD Assessment Test (CAT) showed > 10 points  which is suggestive of significant symptoms.  - Continue continuous oxygen treatment, and wean off as tolerated.   - Involve PT/OT and pulmonary rehabilitation to prevent Readmission in future      Type 2 diabetes mellitus without complication, without long-term current use of insulin  -last HbA1c 6.5  -pt not on medications at home, will start on LDSSI      Leukocytosis  - Ddx could be steroids induced de marginalization or superimposed infection.  - SIRS initially 3/4 (tachycardia, tachypnea, leukocytosis) but likely 2/2 COPD exacerbation symptoms as pt no longer tachycardic or tachypneic after received IV solumedrol x1  - CXray with subtle increased perihilar and bibasilar interstitial prominence suggestive of pulmonary edema, with interstitial pneumonia not excluded. No focal consolidation.  - No fever, Pro calcitonin negative, will continue her Doxycycline that was started yesterday as outpatient       Pulmonary nodule seen on imaging study  -0.4 cm pulmonary nodule within the left upper lobe is nonspecific, however given underlying parenchymal emphysematous changes, one year followup is recommended       VTE Risk Mitigation         Ordered     enoxaparin injection 40 mg  Daily     Route:  Subcutaneous        05/06/17 2237     Medium Risk of VTE  Once      05/06/17 2237          Mary Krueger MD  Department of Hospital Medicine   [facility]

## 2017-05-07 NOTE — H&P
[facility]  History & Physical    Subjective:      Chief Complaint/Reason for Admission: COPD Exacerbation     Ann Canavan is a 56 y.o. female, known to have COPD with previous COPD exacerbations, ex smoker,Diabetes mellitus not on medications. She presented to ED with shortness of breath, dyspnea on exertion for the last couple of days. This was associated with dry cough, and feeling of chest tightness. She denies fever, phlegm production, chest pain, palpitation or exposure to smoker for the last couple of days. Of note, she was discharged on 5/1/2017 after 5 days course of hospital admission for COPD exacerbation. She was treated with breathing treatment, steroids and was qualified for oxygen for home use. No antibiotic given on the last admission; however, recent antibiotic was prescribed yesterday (Doxycyclin).     Past Medical History:   Diagnosis Date    COPD (chronic obstructive pulmonary disease)      History reviewed. No pertinent surgical history.  History reviewed. No pertinent family history.  Social History   Substance Use Topics    Smoking status: Heavy Tobacco Smoker     Packs/day: 1.00     Types: Cigarettes    Smokeless tobacco: None    Alcohol use 0.5 oz/week     1 drink(s) per week         (Not in a hospital admission)  Review of patient's allergies indicates:   Allergen Reactions    Avelox [moxifloxacin] Itching and Rash     IV      Review of Systems   Constitutional: Negative for chills, diaphoresis, fever, malaise/fatigue and weight loss.   HENT: Negative for hearing loss.    Eyes: Negative for blurred vision and double vision.   Respiratory: Positive for cough, shortness of breath and wheezing. Negative for hemoptysis and sputum production.    Cardiovascular: Negative for chest pain, palpitations, orthopnea and claudication.   Gastrointestinal: Negative for abdominal pain, diarrhea, heartburn, nausea and vomiting.   Genitourinary: Negative for dysuria and urgency.    Musculoskeletal: Negative for back pain, myalgias and neck pain.   Skin: Negative for rash.   Neurological: Negative for dizziness, tingling and headaches.   Endo/Heme/Allergies: Does not bruise/bleed easily.       Objective:      Vital Signs (Most Recent)  Temp: 98 °F (36.7 °C) (05/06/17 1637)  Pulse: (!) 119 (05/06/17 2221)  Resp: (!) 24 (05/06/17 2205)  BP: (!) 148/71 (05/06/17 2231)  SpO2: 98 % (05/06/17 2221)    Vital Signs Range (Last 24H):  Temp:  [98 °F (36.7 °C)]   Pulse:  [112-144]   Resp:  [21-42]   BP: (136-177)/(71-97)   SpO2:  [70 %-99 %]     Physical Exam   Constitutional: She is oriented to person, place, and time. She appears well-developed and well-nourished. No distress.   HENT:   Head: Normocephalic and atraumatic.   Mouth/Throat: No oropharyngeal exudate.   Cardiovascular: Regular rhythm.  Exam reveals no friction rub.    No murmur heard.  Tachycardia    Pulmonary/Chest: Effort normal and breath sounds normal. No respiratory distress. She has no wheezes.   Musculoskeletal: Normal range of motion. She exhibits no edema or deformity.   Neurological: She is alert and oriented to person, place, and time. No cranial nerve deficit. Coordination normal.   Skin: She is not diaphoretic.   Vitals reviewed.      CXR:  Suggestive of COPD exacerbation, no focal opacities.    CTA: Negative for pulmonary embolism    Assessment:      Active Hospital Problems    Diagnosis  POA    COPD with exacerbation [J44.1]  Yes     Priority: 1 - High    Leukocytosis [D72.829]  Yes    Type 2 diabetes mellitus without complication, without long-term current use of insulin [E11.9]  Yes     HgA1c 6.5      Glucose intolerance (impaired glucose tolerance) [R73.02]  Yes     A1C 6.5%      Tobacco abuse [Z72.0]  Yes      Resolved Hospital Problems    Diagnosis Date Resolved POA   No resolved problems to display.       Plan:      This is Ann Canavan is a 56 y.o. female, known to have COPD with previous COPD exacerbations, ex  smoker,Diabetes mellitus not on medications.    COPD Exacerbation  - Patient presented with symptoms suggestive of COPD Exacerbation with no high concern of infection.  - Home medications include Albuterol, Spiriva and Advair, and she reports compliance with medication.  - Will continue Breathing treatment based on COPD Pathway  - Chest X ray and CT Chest showed no PE, and no focal consolidation concerning for anemia, no strong indication for antibiotics.  - No previous PFT to assess the severity of her FEV1; however her COPD Assessment Test (CAT) showed > 10 points which is suggestive of significant symptoms.  - Continue continuous oxygen treatment, and wean off as tolerated.   - Involve PT/OT and pulmonary rehabilitation to prevent Readmission in future.    Leukocytosis  - Differential diagnosis could be steroids induced de marginalization or superimposed infection.  - Her SIRS is 2/4 (tachycardia and Tachypnea) but that could be from her COPD exacerbation symptoms.  - No fever, negative Pro calcitonin, suggests no strong indications for antibiotic to be given at this time.     Diabetes Mellitus   - Based on Hb A1c from 2/2017 that showed 6.5%, repeat Hb A1c   - Currently not on diabetic medication, based on the new HbA1c will decide to start her on treatment     The patient Ann Canavan was seen, assessed, evaluated and examined with the presence of the attending provider Dr. Olson. Attestation to follow.     Pablo Puckett MD  Internal Medicine Resident (PGY-I)  Pager: [phone]      I HAVE PERSONALLY TAKEN THE HISTORY, EXAMINED THIS PATIENT,  AND AGREE WITH THE RESIDENT'S NOTE AS STATED ABOVE WITH THE FOLLOWING EXCEPTIONS: Patient seen and examined with the intern at 11:30pm on 5/6/17    Ms. Canavan is a 55yo lady with COPD from tobacco abuse (no longer smokes).  She was recently admitted early this month for COPD exacerbation and discharged on Prednisone (not tapered).  After going home she declined due to  paroxysms of cough, sob and wheezing.  She had Doxy called in yesterday, though it has not helped.    Temp:  [98 °F (36.7 °C)-98.4 °F (36.9 °C)]   Pulse:  [112-144]   Resp:  [21-42]   BP: (130-177)/(69-97)   SpO2:  [70 %-99 %]    PE:  NAD  Few scattered expiratory wheezes and squeaks.  RR with no murmurs  Abd soft, nt, nd    Recent Results (from the past 24 hour(s))   Comprehensive metabolic panel    Collection Time: 05/06/17  5:00 PM   Result Value Ref Range    Sodium 140 136 - 145 mmol/L    Potassium 4.7 3.5 - 5.1 mmol/L    Chloride 95 95 - 110 mmol/L    CO2 30 (H) 23 - 29 mmol/L    Glucose 110 70 - 110 mg/dL    BUN, Bld 11 6 - 20 mg/dL    Creatinine 0.7 0.5 - 1.4 mg/dL    Calcium 10.3 8.7 - 10.5 mg/dL    Total Protein 8.0 6.0 - 8.4 g/dL    Albumin 3.7 3.5 - 5.2 g/dL    Total Bilirubin 0.6 0.1 - 1.0 mg/dL    Alkaline Phosphatase 91 55 - 135 U/L    AST 18 10 - 40 U/L    ALT 15 10 - 44 U/L    Anion Gap 15 8 - 16 mmol/L    eGFR if African American >60.0 >60 mL/min/1.73 m^2    eGFR if non African American >60.0 >60 mL/min/1.73 m^2   CBC auto differential    Collection Time: 05/06/17  5:00 PM   Result Value Ref Range    WBC 19.36 (H) 3.90 - 12.70 K/uL    RBC 5.19 4.00 - 5.40 M/uL    Hemoglobin 17.0 (H) 12.0 - 16.0 g/dL    Hematocrit 50.5 (H) 37.0 - 48.5 %    MCV 97 82 - 98 fL    MCH 32.8 (H) 27.0 - 31.0 pg    MCHC 33.7 32.0 - 36.0 %    RDW 13.6 11.5 - 14.5 %    Platelets 184 150 - 350 K/uL    MPV 11.6 9.2 - 12.9 fL    Gran # 16.1 (H) 1.8 - 7.7 K/uL    Lymph # 1.5 1.0 - 4.8 K/uL    Mono # 1.6 (H) 0.3 - 1.0 K/uL    Eos # 0.1 0.0 - 0.5 K/uL    Baso # 0.02 0.00 - 0.20 K/uL    Gran% 83.3 (H) 38.0 - 73.0 %    Lymph% 7.8 (L) 18.0 - 48.0 %    Mono% 8.0 4.0 - 15.0 %    Eosinophil% 0.4 0.0 - 8.0 %    Basophil% 0.1 0.0 - 1.9 %    Differential Method Automated    Brain natriuretic peptide    Collection Time: 05/06/17  5:00 PM   Result Value Ref Range    BNP 17 0 - 99 pg/mL     CTA CHEST:  IMPRESSION:      1. No convincing evidence  of pulmonary thromboembolism noting exam limitations above.     2. Scattered regions of groundglass attenuation with associated tree in bud nodular opacity, could reflect edema, or infectious/non-infectious inflammation, clinical correlation is recommended. 0.4 cm pulmonary nodule within the left upper lobe is nonspecific, however given underlying parenchymal emphysematous changes, one year followup is recommended versus comparison with any more recent exams.     3. Probable right left renal cysts however ultrasound could be performed to confirm cystic nature as these measure slightly higher attenuation than would be expected for simple cysts.     4. Several additional findings above.        Electronically signed by: JACK SAMAYOA MD  Date:     05/06/17  Time:    21:36        Signed By: Jack Samayoa MD on 5/6/2017 9:36 PM     ASSESSMENT AND PLAN:  COPD EXACERBATION  -Solumedrol 125mg iv q6 hours x 2, then Prednisone 40mg po qday with taper   -Duonebs q4 scheduled and q4 prn, Breo, Spiriva  -Check 2d echo cfd to rule out pulmonary htn    ACUTE BRONCHITIS  -Doxy 100mg po bid total 10 days    CHRONIC HYPERCAPNIC RESPIRATORY FAILURE  -Elevated bicarb - check abg    SMALL PULMONARY NODULE  -0.4 cm pulmonary nodule within the left upper lobe is nonspecific, however given underlying parenchymal emphysematous changes, one year followup is recommended     PERSONAL HISTORY OF TOBACCO ABUSE  -No longer smokes

## 2017-05-07 NOTE — PLAN OF CARE
PATHWAY - IP COPD Exacerbation - [facility]      COPD Step 1       RN: Right Care Tool completed upon admission Met       RN: Smoking cessation education completed upon admission, unless the patient is not a smoker Met       RN: Nutrition risk assessment completed upon admission Met       RN: Clinical Outcome: patient is alert & oriented per baseline on this shift Met       RN: Oxygen education at time of titration Met       RN: Education on medication management provided with medication administration Met       RN: Learning Assessment completed upon admission Met

## 2017-05-07 NOTE — PHARMACY MED REC
[facility] Medication Reconciliation  Template    Patient was admitted on 5/6/2017 for <principal problem not specified>.      Patient's prior to admission medication regimen was as follows:  Prescriptions Prior to Admission   Medication Sig Dispense Refill Last Dose    albuterol 90 mcg/actuation inhaler Inhale 2 puffs into the lungs every 6 (six) hours as needed for Wheezing. Rescue 18 g 11 5/6/2017    aspirin (ECOTRIN) 81 MG EC tablet Take 81 mg by mouth once daily.   Taking    doxycycline (VIBRAMYCIN) 100 MG Cap Take 1 capsule (100 mg total) by mouth 2 (two) times daily. 16 capsule 0     fluticasone (FLONASE) 50 mcg/actuation nasal spray 1 spray by Each Nare route once daily. 1 Bottle 1 Taking    fluticasone-salmeterol 250-50 mcg/dose (ADVAIR) 250-50 mcg/dose diskus inhaler Inhale 1 puff into the lungs 2 (two) times daily. 60 each 11 Taking    guaifenesin (MUCINEX) 600 mg 12 hr tablet Take 600 mg by mouth 2 (two) times daily.    Taking    loratadine (CLARITIN) 10 mg tablet Take 10 mg by mouth daily as needed for Allergies.       nicotine (NICODERM CQ) 21 mg/24 hr Place 1 patch onto the skin once daily. 21 patch 0 Taking    tiotropium (SPIRIVA) 18 mcg inhalation capsule Inhale 1 capsule (18 mcg total) into the lungs once daily. 30 capsule 11 5/6/2017         Please add appropriate    SmartPhrase below: Admission Medication Reconciliation - Pharmacy Consult Note    The home medication history was taken by the medication reconciliation technician- Karrie Lebron.  The gathered information has been subsequently reviewed by a clinical pharmacist.     No issues noted with the medication reconciliation.      Potential issues to be addressed PRIOR TO DISCHARGE  o None    PHARMACIST NAME Cassie Sung  EXT [phone]

## 2017-05-07 NOTE — ASSESSMENT & PLAN NOTE
- Ddx could be steroids induced de marginalization or superimposed infection.  - SIRS initially 3/4 (tachycardia, tachypnea, leukocytosis) but likely 2/2 COPD exacerbation symptoms as pt no longer tachycardic or tachypneic after received IV solumedrol x1  - CXray with subtle increased perihilar and bibasilar interstitial prominence suggestive of pulmonary edema, with interstitial pneumonia not excluded. No focal consolidation.  - No fever, Pro calcitonin negative, will continue her Doxycycline that was started yesterday as outpatient

## 2017-05-07 NOTE — PLAN OF CARE
Breath sounds diminished throughout all lung fields.  Pt stated that she has had a poor appetite prior to admission (states that her appetite has improved since steroids started).  Recommended that pt ask her doctor if nutritional supplements specifically for pulmonary patients may be appropriate.  Also recommended that patient inquire about pulmonary rehabilitation program upon discharge.

## 2017-05-07 NOTE — H&P
[facility] Hospital Medicine  History & Physical    Patient Name: Ann Canavan  MRN: [id number]  Admission Date: 5/6/2017  Attending Physician: Sarah Krueger MD   Primary Care Provider: Primary Doctor [facility] Medicine Team: [facility]OSP MED 2 Bijan Puckett MD     Patient information was obtained from patient and ER records.     Subjective:     Principal Problem:<principal problem not specified>    Chief Complaint:   Chief Complaint   Patient presents with    Shortness of Breath        HPI: Ann Canavan is a 56 y.o. female, known to have COPD with previous COPD exacerbations, ex smoker,Diabetes mellitus not on medications. She presented to ED with shortness of breath, dyspnea on exertion for the last couple of days. This was associated with dry cough, and feeling of chest tightness. She denies fever, phlegm production, chest pain, palpitation or exposure to smoker for the last couple of days. Of note, she was discharged on 5/1/2017 after 5 days course of hospital admission for COPD exacerbation. She was treated with breathing treatment, steroids and was qualified for oxygen for home use. No antibiotic given on the last admission; however, recent antibiotic was prescribed yesterday (Doxycyclin).     Past Medical History:   Diagnosis Date    COPD (chronic obstructive pulmonary disease)        History reviewed. No pertinent surgical history.    Review of patient's allergies indicates:   Allergen Reactions    Avelox [moxifloxacin] Itching and Rash     IV       No current facility-administered medications on file prior to encounter.      Current Outpatient Prescriptions on File Prior to Encounter   Medication Sig    albuterol 90 mcg/actuation inhaler Inhale 2 puffs into the lungs every 6 (six) hours as needed for Wheezing. Rescue    tiotropium (SPIRIVA) 18 mcg inhalation capsule Inhale 1 capsule (18 mcg total) into the lungs once daily.    aspirin (ECOTRIN) 81 MG EC tablet Take 81 mg by mouth  once daily.    azithromycin (ZITHROMAX) 500 MG tablet Take 1 tablet (500 mg total) by mouth once daily.    doxycycline (VIBRAMYCIN) 100 MG Cap Take 1 capsule (100 mg total) by mouth 2 (two) times daily.    fluticasone (FLONASE) 50 mcg/actuation nasal spray 1 spray by Each Nare route once daily.    fluticasone-salmeterol 250-50 mcg/dose (ADVAIR) 250-50 mcg/dose diskus inhaler Inhale 1 puff into the lungs 2 (two) times daily.    guaifenesin (MUCINEX) 600 mg 12 hr tablet Take 600 mg by mouth every morning.    nicotine (NICODERM CQ) 21 mg/24 hr Place 1 patch onto the skin once daily.    predniSOLONE (MILLIPRED) 5 mg Tab Take 10 tablets by mouth once daily.    predniSONE (DELTASONE) 20 MG tablet Take 3 tablets (60 mg total) by mouth once daily.     Family History     None        Social History Main Topics    Smoking status: Heavy Tobacco Smoker     Packs/day: 1.00     Types: Cigarettes    Smokeless tobacco: Not on file    Alcohol use 0.5 oz/week     1 drink(s) per week    Drug use: No    Sexual activity: Not on file     Review of Systems   Constitutional: Negative for activity change, appetite change, chills, diaphoresis, fatigue and fever.   HENT: Negative for dental problem and drooling.    Respiratory: Positive for cough, chest tightness, shortness of breath and wheezing. Negative for choking and stridor.    Cardiovascular: Negative for chest pain, palpitations and leg swelling.   Gastrointestinal: Negative for abdominal distention and abdominal pain.   Genitourinary: Negative for difficulty urinating, dysuria and hematuria.   Musculoskeletal: Negative for arthralgias.   Neurological: Negative for weakness.   Psychiatric/Behavioral: Negative for agitation and behavioral problems.     Objective:     Vital Signs (Most Recent):  Temp: 98.4 °F (36.9 °C) (05/06/17 2120)  Pulse: (!) 118 (05/06/17 2331)  Resp: (!) 24 (05/06/17 2205)  BP: 120/74 (05/06/17 2331)  SpO2: (!) 93 % (05/06/17 2331) Vital Signs (24h  Range):  Temp:  [98 °F (36.7 °C)-98.4 °F (36.9 °C)] 98.4 °F (36.9 °C)  Pulse:  [112-144] 118  Resp:  [21-42] 24  SpO2:  [70 %-99 %] 93 %  BP: (120-177)/(69-97) 120/74     Weight: 61.2 kg (135 lb)  Body mass index is 21.79 kg/(m^2).    Physical Exam   Constitutional: She is oriented to person, place, and time. She appears well-developed and well-nourished. No distress.   HENT:   Head: Normocephalic and atraumatic.   Mouth/Throat: No oropharyngeal exudate.   Cardiovascular: Regular rhythm.  Exam reveals no friction rub.    No murmur heard.  Tachycardia      Pulmonary/Chest: Effort normal. No respiratory distress. She has wheezes.   Musculoskeletal: Normal range of motion. She exhibits no edema or deformity.   Neurological: She is alert and oriented to person, place, and time. No cranial nerve deficit. Coordination normal.   Skin: She is not diaphoretic.   Vitals reviewed.       Admission on 05/06/2017   Component Date Value Ref Range Status    Sodium 05/06/2017 140  136 - 145 mmol/L Final    Potassium 05/06/2017 4.7  3.5 - 5.1 mmol/L Final    Chloride 05/06/2017 95  95 - 110 mmol/L Final    CO2 05/06/2017 30* 23 - 29 mmol/L Final    Glucose 05/06/2017 110  70 - 110 mg/dL Final    BUN, Bld 05/06/2017 11  6 - 20 mg/dL Final    Creatinine 05/06/2017 0.7  0.5 - 1.4 mg/dL Final    Calcium 05/06/2017 10.3  8.7 - 10.5 mg/dL Final    Total Protein 05/06/2017 8.0  6.0 - 8.4 g/dL Final    Albumin 05/06/2017 3.7  3.5 - 5.2 g/dL Final    Total Bilirubin 05/06/2017 0.6  0.1 - 1.0 mg/dL Final    Alkaline Phosphatase 05/06/2017 91  55 - 135 U/L Final    AST 05/06/2017 18  10 - 40 U/L Final    ALT 05/06/2017 15  10 - 44 U/L Final    Anion Gap 05/06/2017 15  8 - 16 mmol/L Final    eGFR if African American 05/06/2017 >60.0  >60 mL/min/1.73 m^2 Final    eGFR if non African American 05/06/2017 >60.0  >60 mL/min/1.73 m^2 Final    WBC 05/06/2017 19.36* 3.90 - 12.70 K/uL Final    RBC 05/06/2017 5.19  4.00 - 5.40 M/uL  Final    Hemoglobin 05/06/2017 17.0* 12.0 - 16.0 g/dL Final    Hematocrit 05/06/2017 50.5* 37.0 - 48.5 % Final    MCV 05/06/2017 97  82 - 98 fL Final    MCH 05/06/2017 32.8* 27.0 - 31.0 pg Final    MCHC 05/06/2017 33.7  32.0 - 36.0 % Final    RDW 05/06/2017 13.6  11.5 - 14.5 % Final    Platelets 05/06/2017 184  150 - 350 K/uL Final    MPV 05/06/2017 11.6  9.2 - 12.9 fL Final    Gran # 05/06/2017 16.1* 1.8 - 7.7 K/uL Final    Lymph # 05/06/2017 1.5  1.0 - 4.8 K/uL Final    Mono # 05/06/2017 1.6* 0.3 - 1.0 K/uL Final    Eos # 05/06/2017 0.1  0.0 - 0.5 K/uL Final    Baso # 05/06/2017 0.02  0.00 - 0.20 K/uL Final    Gran% 05/06/2017 83.3* 38.0 - 73.0 % Final    Lymph% 05/06/2017 7.8* 18.0 - 48.0 % Final    Mono% 05/06/2017 8.0  4.0 - 15.0 % Final    Eosinophil% 05/06/2017 0.4  0.0 - 8.0 % Final    Basophil% 05/06/2017 0.1  0.0 - 1.9 % Final    Differential Method 05/06/2017 Automated   Final    BNP 05/06/2017 17  0 - 99 pg/mL Final         Assessment/Plan:     COPD with exacerbation  - Patient presented with symptoms suggestive of COPD Exacerbation with no high concern of infection.  - Home medications include Albuterol, Spiriva and Advair, and she reports compliance with medication.  - Will continue Breathing treatment based on COPD Pathway  - Chest X ray and CT Chest showed no PE, and no focal consolidation concerning for anemia, no strong indication for antibiotics.  - No previous PFT to assess the severity of her FEV1; however her COPD Assessment Test (CAT) showed > 10 points which is suggestive of significant symptoms.  - Will start on Methylprednisolone 125 mg IV Q 6 hours for two dose then put her on Prednisone 40 mg PO daily, Mucinex to help her COPD.  - Continue continuous oxygen treatment, and wean off as tolerated.   - Involve PT/OT and pulmonary rehabilitation to prevent Readmission in future.    Leukocytosis  - Differential diagnosis could be steroids induced de marginalization or  superimposed infection.  - Her SIRS is 2/4 (tachycardia and Tachypnea) but that could be from her COPD exacerbation symptoms.  - No fever, pending Pro calcitonin  - Will continue her Doxycycline that started yesterday.    Diabetes Mellitus   - Based on Hb A1c from 2/2017 that showed 6.5%, repeat Hb A1c   - Currently not on diabetic medication, based on the new HbA1c will decide to start her on treatment       VTE Risk Mitigation         Ordered     enoxaparin injection 40 mg  Daily     Route:  Subcutaneous        05/06/17 2237     Medium Risk of VTE  Once      05/06/17 2237        Bijan Puckett MD  Department of Hospital Medicine   [facility]    I HAVE PERSONALLY TAKEN THE HISTORY, EXAMINED THIS PATIENT, AND AGREE WITH THE RESIDENT'S NOTE AS STATED ABOVE WITH THE FOLLOWING EXCEPTIONS: Patient seen and examined with the intern at 11:30pm on 5/6/17     Ms. Canavan is a 55yo lady with COPD from tobacco abuse (no longer smokes). She was recently admitted early this month for COPD exacerbation and discharged on Prednisone (not tapered). After going home she declined due to paroxysms of cough, sob and wheezing. She had Doxy called in yesterday, though it has not helped.     Temp: [98 °F (36.7 °C)-98.4 °F (36.9 °C)]   Pulse: [112-144]   Resp: [21-42]   BP: (130-177)/(69-97)   SpO2: [70 %-99 %]    PE:  NAD  Few scattered expiratory wheezes and squeaks.  RR with no murmurs  Abd soft, nt, nd      Recent Results          Recent Results (from the past 24 hour(s))   Comprehensive metabolic panel     Collection Time: 05/06/17 5:00 PM   Result Value Ref Range     Sodium 140 136 - 145 mmol/L     Potassium 4.7 3.5 - 5.1 mmol/L     Chloride 95 95 - 110 mmol/L     CO2 30 (H) 23 - 29 mmol/L     Glucose 110 70 - 110 mg/dL     BUN, Bld 11 6 - 20 mg/dL     Creatinine 0.7 0.5 - 1.4 mg/dL     Calcium 10.3 8.7 - 10.5 mg/dL     Total Protein 8.0 6.0 - 8.4 g/dL     Albumin 3.7 3.5 - 5.2 g/dL     Total Bilirubin 0.6 0.1 -  1.0 mg/dL     Alkaline Phosphatase 91 55 - 135 U/L     AST 18 10 - 40 U/L     ALT 15 10 - 44 U/L     Anion Gap 15 8 - 16 mmol/L     eGFR if African American >60.0 >60 mL/min/1.73 m^2     eGFR if non African American >60.0 >60 mL/min/1.73 m^2   CBC auto differential     Collection Time: 05/06/17 5:00 PM   Result Value Ref Range     WBC 19.36 (H) 3.90 - 12.70 K/uL     RBC 5.19 4.00 - 5.40 M/uL     Hemoglobin 17.0 (H) 12.0 - 16.0 g/dL     Hematocrit 50.5 (H) 37.0 - 48.5 %     MCV 97 82 - 98 fL     MCH 32.8 (H) 27.0 - 31.0 pg     MCHC 33.7 32.0 - 36.0 %     RDW 13.6 11.5 - 14.5 %     Platelets 184 150 - 350 K/uL     MPV 11.6 9.2 - 12.9 fL     Gran # 16.1 (H) 1.8 - 7.7 K/uL     Lymph # 1.5 1.0 - 4.8 K/uL     Mono # 1.6 (H) 0.3 - 1.0 K/uL     Eos # 0.1 0.0 - 0.5 K/uL     Baso # 0.02 0.00 - 0.20 K/uL     Gran% 83.3 (H) 38.0 - 73.0 %     Lymph% 7.8 (L) 18.0 - 48.0 %     Mono% 8.0 4.0 - 15.0 %     Eosinophil% 0.4 0.0 - 8.0 %     Basophil% 0.1 0.0 - 1.9 %     Differential Method Automated     Brain natriuretic peptide     Collection Time: 05/06/17 5:00 PM   Result Value Ref Range     BNP 17 0 - 99 pg/mL         CTA CHEST:  IMPRESSION:       1. No convincing evidence of pulmonary thromboembolism noting exam limitations above.      2. Scattered regions of groundglass attenuation with associated tree in bud nodular opacity, could reflect edema, or infectious/non-infectious inflammation, clinical correlation is recommended. 0.4 cm pulmonary nodule within the left upper lobe is nonspecific, however given underlying parenchymal emphysematous changes, one year followup is recommended versus comparison with any more recent exams.      3. Probable right left renal cysts however ultrasound could be performed to confirm cystic nature as these measure slightly higher attenuation than would be expected for simple cysts.      4. Several additional findings above.          Electronically signed by: JACK TAM MD  Date: 05/06/17  Time:  21:36         Signed By: Uriah Samayoa MD on 5/6/2017 9:36 PM      ASSESSMENT AND PLAN:  COPD EXACERBATION  -Solumedrol 125mg iv q6 hours x 2, then Prednisone 40mg po qday with taper   -Duonebs q4 scheduled and q4 prn, Breo, Spiriva  -Check 2d echo cfd to rule out pulmonary htn     ACUTE BRONCHITIS  -Doxy 100mg po bid total 10 days     CHRONIC HYPERCAPNIC RESPIRATORY FAILURE  -Elevated bicarb - check abg     SMALL PULMONARY NODULE  -0.4 cm pulmonary nodule within the left upper lobe is nonspecific, however given underlying parenchymal emphysematous changes, one year followup is recommended      PERSONAL HISTORY OF TOBACCO ABUSE  -No longer smokes

## 2017-05-07 NOTE — PROGRESS NOTES
Pt. Allowed 0600 solumedrol to be given iv and denied any pain . Site free of redness nor any swelling noted.O2sat remain greater than 92%. Discuss with on call Md Dr Puckett about possible ordering patient telemetry since has order for cont pulse ox. to prevent pt from being hook up to portable sat monitoring box. Md informed o2 sats this shift has been greater than 92%. Md stated to have day shift Md to address just do intermittent sat checks

## 2017-05-08 LAB
ALBUMIN SERPL BCP-MCNC: 2.8 G/DL
ALP SERPL-CCNC: 64 U/L
ALT SERPL W/O P-5'-P-CCNC: 12 U/L
ANION GAP SERPL CALC-SCNC: 8 MMOL/L
AST SERPL-CCNC: 9 U/L
BASOPHILS # BLD AUTO: 0.01 K/UL
BASOPHILS NFR BLD: 0.1 %
BILIRUB SERPL-MCNC: 0.3 MG/DL
BUN SERPL-MCNC: 15 MG/DL
CALCIUM SERPL-MCNC: 9.6 MG/DL
CHLORIDE SERPL-SCNC: 102 MMOL/L
CO2 SERPL-SCNC: 34 MMOL/L
CREAT SERPL-MCNC: 0.7 MG/DL
DIASTOLIC DYSFUNCTION: NO
DIFFERENTIAL METHOD: ABNORMAL
EOSINOPHIL # BLD AUTO: 0 K/UL
EOSINOPHIL NFR BLD: 0.1 %
ERYTHROCYTE [DISTWIDTH] IN BLOOD BY AUTOMATED COUNT: 13.2 %
EST. GFR  (AFRICAN AMERICAN): >60 ML/MIN/1.73 M^2
EST. GFR  (NON AFRICAN AMERICAN): >60 ML/MIN/1.73 M^2
GLUCOSE SERPL-MCNC: 118 MG/DL
HCT VFR BLD AUTO: 43.5 %
HGB BLD-MCNC: 14.1 G/DL
LYMPHOCYTES # BLD AUTO: 1.4 K/UL
LYMPHOCYTES NFR BLD: 9.2 %
MAGNESIUM SERPL-MCNC: 1.9 MG/DL
MCH RBC QN AUTO: 31.5 PG
MCHC RBC AUTO-ENTMCNC: 32.4 %
MCV RBC AUTO: 97 FL
MONOCYTES # BLD AUTO: 0.7 K/UL
MONOCYTES NFR BLD: 4.3 %
NEUTROPHILS # BLD AUTO: 13.5 K/UL
NEUTROPHILS NFR BLD: 86 %
PHOSPHATE SERPL-MCNC: 2.3 MG/DL
PLATELET # BLD AUTO: 167 K/UL
PMV BLD AUTO: 11.2 FL
POCT GLUCOSE: 108 MG/DL (ref 70–110)
POCT GLUCOSE: 122 MG/DL (ref 70–110)
POCT GLUCOSE: 194 MG/DL (ref 70–110)
POCT GLUCOSE: 207 MG/DL (ref 70–110)
POTASSIUM SERPL-SCNC: 4.1 MMOL/L
PROT SERPL-MCNC: 5.9 G/DL
RBC # BLD AUTO: 4.47 M/UL
RETIRED EF AND QEF - SEE NOTES: 55 (ref 55–65)
SODIUM SERPL-SCNC: 144 MMOL/L
WBC # BLD AUTO: 15.67 K/UL

## 2017-05-08 PROCEDURE — 36415 COLL VENOUS BLD VENIPUNCTURE: CPT

## 2017-05-08 PROCEDURE — 94668 MNPJ CHEST WALL SBSQ: CPT

## 2017-05-08 PROCEDURE — 80053 COMPREHEN METABOLIC PANEL: CPT

## 2017-05-08 PROCEDURE — 84100 ASSAY OF PHOSPHORUS: CPT

## 2017-05-08 PROCEDURE — 97165 OT EVAL LOW COMPLEX 30 MIN: CPT

## 2017-05-08 PROCEDURE — 93010 ELECTROCARDIOGRAM REPORT: CPT | Mod: ,,, | Performed by: INTERNAL MEDICINE

## 2017-05-08 PROCEDURE — 99900031 HC PATIENT EDUCATION (STAT)

## 2017-05-08 PROCEDURE — 63600175 PHARM REV CODE 636 W HCPCS: Performed by: STUDENT IN AN ORGANIZED HEALTH CARE EDUCATION/TRAINING PROGRAM

## 2017-05-08 PROCEDURE — 93306 TTE W/DOPPLER COMPLETE: CPT

## 2017-05-08 PROCEDURE — 93306 TTE W/DOPPLER COMPLETE: CPT | Mod: 26,,, | Performed by: INTERNAL MEDICINE

## 2017-05-08 PROCEDURE — 27000221 HC OXYGEN, UP TO 24 HOURS

## 2017-05-08 PROCEDURE — 25000003 PHARM REV CODE 250: Performed by: INTERNAL MEDICINE

## 2017-05-08 PROCEDURE — 99233 SBSQ HOSP IP/OBS HIGH 50: CPT | Mod: ,,, | Performed by: HOSPITALIST

## 2017-05-08 PROCEDURE — 83735 ASSAY OF MAGNESIUM: CPT

## 2017-05-08 PROCEDURE — 11000001 HC ACUTE MED/SURG PRIVATE ROOM

## 2017-05-08 PROCEDURE — 25000242 PHARM REV CODE 250 ALT 637 W/ HCPCS: Performed by: STUDENT IN AN ORGANIZED HEALTH CARE EDUCATION/TRAINING PROGRAM

## 2017-05-08 PROCEDURE — 99900035 HC TECH TIME PER 15 MIN (STAT)

## 2017-05-08 PROCEDURE — 94640 AIRWAY INHALATION TREATMENT: CPT

## 2017-05-08 PROCEDURE — 93005 ELECTROCARDIOGRAM TRACING: CPT

## 2017-05-08 PROCEDURE — 97161 PT EVAL LOW COMPLEX 20 MIN: CPT

## 2017-05-08 PROCEDURE — 25000003 PHARM REV CODE 250: Performed by: STUDENT IN AN ORGANIZED HEALTH CARE EDUCATION/TRAINING PROGRAM

## 2017-05-08 PROCEDURE — 85025 COMPLETE CBC W/AUTO DIFF WBC: CPT

## 2017-05-08 PROCEDURE — 94761 N-INVAS EAR/PLS OXIMETRY MLT: CPT

## 2017-05-08 RX ORDER — IPRATROPIUM BROMIDE AND ALBUTEROL SULFATE 2.5; .5 MG/3ML; MG/3ML
3 SOLUTION RESPIRATORY (INHALATION) EVERY 6 HOURS PRN
Qty: 99 VIAL | Refills: 0 | Status: SHIPPED | OUTPATIENT
Start: 2017-05-08 | End: 2017-05-26 | Stop reason: SDUPTHER

## 2017-05-08 RX ADMIN — PANTOPRAZOLE SODIUM 600 MG: 40 TABLET, DELAYED RELEASE ORAL at 08:05

## 2017-05-08 RX ADMIN — TIOTROPIUM BROMIDE 18 MCG: 18 CAPSULE ORAL; RESPIRATORY (INHALATION) at 08:05

## 2017-05-08 RX ADMIN — PANTOPRAZOLE SODIUM 600 MG: 40 TABLET, DELAYED RELEASE ORAL at 09:05

## 2017-05-08 RX ADMIN — IPRATROPIUM BROMIDE AND ALBUTEROL SULFATE 3 ML: .5; 3 SOLUTION RESPIRATORY (INHALATION) at 04:05

## 2017-05-08 RX ADMIN — DOXYCYCLINE HYCLATE 100 MG: 100 TABLET, COATED ORAL at 09:05

## 2017-05-08 RX ADMIN — IPRATROPIUM BROMIDE AND ALBUTEROL SULFATE 3 ML: .5; 3 SOLUTION RESPIRATORY (INHALATION) at 11:05

## 2017-05-08 RX ADMIN — IPRATROPIUM BROMIDE AND ALBUTEROL SULFATE 3 ML: .5; 3 SOLUTION RESPIRATORY (INHALATION) at 12:05

## 2017-05-08 RX ADMIN — ENOXAPARIN SODIUM 40 MG: 100 INJECTION SUBCUTANEOUS at 06:05

## 2017-05-08 RX ADMIN — PREDNISONE 40 MG: 20 TABLET ORAL at 08:05

## 2017-05-08 RX ADMIN — IPRATROPIUM BROMIDE AND ALBUTEROL SULFATE 3 ML: .5; 3 SOLUTION RESPIRATORY (INHALATION) at 01:05

## 2017-05-08 RX ADMIN — FLUTICASONE FUROATE AND VILANTEROL TRIFENATATE 1 PUFF: 100; 25 POWDER RESPIRATORY (INHALATION) at 08:05

## 2017-05-08 RX ADMIN — IPRATROPIUM BROMIDE AND ALBUTEROL SULFATE 3 ML: .5; 3 SOLUTION RESPIRATORY (INHALATION) at 08:05

## 2017-05-08 RX ADMIN — DOXYCYCLINE HYCLATE 100 MG: 100 TABLET, COATED ORAL at 08:05

## 2017-05-08 NOTE — PLAN OF CARE
"COPD Step 2     RT: Education on rescue albuterol vs. preventative albuterol completed at the time of therapy Met     RT: Oxygen education completed with aerosol treatment or every shift if no therapy scheduled Met     RT: Education on COPD disease provided each shift Met     RT: "When to seek help for flare-up" education provided every shift Met     RT: Clincal Outcome: Minimal wheezing with adequate air movement at time of aerosol treatment Met     RT: Clinical Outcome Respiratory rate < 20 at time of aerosol treatment Met          "

## 2017-05-08 NOTE — PROGRESS NOTES
The following was demonstrated/instructed by [facility].    Nebulizer:   ? Setting up the equipment  ? Use of equipment  ? How often to replace items    Pt was instructed on how to use the nebulizer and instructions were given. Pt fully understands. If pt has any questions, she was given a number to call.

## 2017-05-08 NOTE — PLAN OF CARE
Problem: Occupational Therapy Goal  Goal: Occupational Therapy Goal  Outcome: Outcome(s) achieved Date Met:  05/08/17  OT evaluation completed. Pt was mod (I) with ADLs and mobility during evaluation and presents close to PLOF baseline. She has been fully educated on safety at home and bathroom and has no further questions for skilled OT services at this time. Pt will be discharged from acute OT services at this time.     MARU Acevedo  5/8/2017

## 2017-05-08 NOTE — PLAN OF CARE
Allan received call from Leisa with [facility], Pt's nebulizer to be delivered to the room prior to d/. Allan did inform nurse.

## 2017-05-08 NOTE — PT/OT/SLP EVAL
"Occupational Therapy  Evaluation/ Discharge    Ann Canavan   MRN: [id number]   Admitting Diagnosis: COPD with exacerbation    OT Date of Treatment: 17   OT Start Time: 757  OT Stop Time: 809  OT Total Time (min): 12 min    Billable Minutes:  Evaluation 12    Diagnosis: COPD with exacerbation       Past Medical History:   Diagnosis Date    COPD (chronic obstructive pulmonary disease)       History reviewed. No pertinent surgical history.    Referring physician: Bijan Puckett MD   Date referred to OT: 2017    General Precautions: Standard, fall (cardiac)  Orthopedic Precautions: N/A  Braces: N/A          Patient History:  Living Environment  Lives With: alone  Living Arrangements: house  Home Accessibility: stairs to enter home  Home Layout: Able to live on 1st floor  Number of Stairs to Enter Home: 4  Stair Railings at Home: none  Transportation Available: family or friend will provide  Living Environment Comment: Pt lives alone in [place] [facility] with 4 CHILAN[place]. PTA, pt was (I) with all ADLs and mobility. Owns O2 at home. Has no assistance upon d/c.   Equipment Currently Used at Home: oxygen    Prior level of function:   Bed Mobility/Transfers: independent  Grooming: independent  Bathing: independent  Upper Body Dressing: independent  Lower Body Dressing: independent  Toileting: independent  Driving License: Yes  Mode of Transportation: Car     Dominant hand: right    Subjective:  Communicated with RN prior to session.    Pt agreeable to OT/PT evaluation     Chief Complaint: "I don't have coffee."  Patient/Family stated goals: return home    Pain Ratin/10  Pain Rating Post-Intervention: 0/10    Objective:  Patient found with: telemetry, pulse ox (continuous), oxygen    Cognitive Exam:  Oriented to: Person, Place, Time and Situation  Follows Commands/attention: Follows multistep  commands  Communication: clear/fluent  Memory:  No Deficits noted  Safety awareness/insight to disability: intact  Coping " skills/emotional control: Appropriate to situation    Visual/perceptual:  Intact    Physical Exam:  Postural examination/scapula alignment: Rounded shoulder  Skin integrity: Visible skin intact  Edema: None noted in BUE    Sensation:   Intact in BUE    Upper Extremity Range of Motion:  Right Upper Extremity: WFL  Left Upper Extremity: WFL    Upper Extremity Strength:  Right Upper Extremity: WFL  Left Upper Extremity: WFL   Strength: good    Fine motor coordination:   Intact    Gross motor coordination: WFL    Functional Mobility:  Bed Mobility: HOB flat  Scooting/Bridging: Modified Independent (towards EOB)  Supine to Sit: Modified Independent    Transfers:  Sit <> Stand Assistance: Modified Independent (2 trials from EOB)  Sit <> Stand Assistive Device: No Assistive Device  Bed <> Chair Technique:  (no chair present in room)  Toilet Transfer Technique: Stand Pivot  Toilet Transfer Assistance: Modified Independent  Toilet Transfer Assistive Device: No Assistive Device    Functional Ambulation: ~200 ft with supervision using no AD. O2 in tow    Activities of Daily Living:    LE Dressing Level of Assistance: Modified independent (for donning/doffing socks using figure four technique from EOB)    Grooming Position: Standing at sink  Grooming Level of Assistance: Modified independent (for washing hands )    Balance:   Static Sit: NORMAL: No deviations seen in posture held statically  Dynamic Sit: NORMAL: No deviations seen in posture held dynamically  Static Stand: NORMAL: No deviations seen in posture held statically      Therapeutic Activities and Exercises:  Pt educated on OT role/discharge, bathroom safety, safety in home, and fatigue management. Verbalized understanding.       AM-PAC 6 CLICK ADL  How much help from another person does this patient currently need?  1 = Unable, Total/Dependent Assistance  2 = A lot, Maximum/Moderate Assistance  3 = A little, Minimum/Contact Guard/Supervision  4 = None, Modified  "[place]/Independent    Putting on and taking off regular lower body clothing? : 4  Bathing (including washing, rinsing, drying)?: 4  Toileting, which includes using toilet, bedpan, or urinal? : 4  Putting on and taking off regular upper body clothing?: 4  Taking care of personal grooming such as brushing teeth?: 4  Eating meals?: 4  Total Score: 24    AM-PAC Raw Score CMS "G-Code Modifier Level of Impairment Assistance   6 % Total / Unable   7 - 9 CM 80 - 100% Maximal Assist   10 - 14 CL 60 - 80% Moderate Assist   15 - 19 CK 40 - 60% Moderate Assist   20 - 22 CJ 20 - 40% Minimal Assist   23 CI 1-20% SBA / CGA   24 CH 0% Independent/ Mod I       Patient left EOB fully educated on saftey with all lines intact, call button in reach and RN notified    Assessment:  Ann Canavan is a 56 y.o. female with a medical diagnosis of COPD with exacerbation. Session tolerated well with no c/o of pain or fatigue. Pt was mod (I) with ADLs and transfers in hospital setting and presents close to PLOF baseline. She has been fully educated on safety at home and bathroom, verbalizing understanding with no further questions. Additionally, pt was educated on she will be discharge from OT and will no longer be followed by service, she verbalized understanding. Pt will be discharged from acute OT services at this time with the recommendation of home no needs.    Pt presented with a low complexity OT evaluation.  Pt required a brief an expanded review of medical history and occupational profile.  Pt demod none performance deficits (physical, cognitive, or psychosocial) resulting in limitations and engagement restrictions.  Clinical decision making required low analytical complexity with no treatment options.  Pt with presents with stable OT presentation with no modification of task/assistance with assessment.      Physical- skills refer to impairments of body structure or functions, balance, mobility; strength, endurance, FMC, " GMC, sensation, dexterity, and posture.  Cognitive- skills refer to ability to attend, communicate, perceive, think, understand, problem solve, mentally sequence, learn, and remember resulting in ability to organize occupational performance in timely and safe manner.    Psychosocial- skills refer to interpersonal interactions, habits, routines, and behaviors, active use of coping strategies, and environmental adaptations to appropriately participate in everyday tasks and situations.     Discharge recommendations: Discharge Facility/Level Of Care Needs: home     Barriers to discharge: Barriers to Discharge: Decreased caregiver support, Inaccessible home environment    Equipment recommendations: shower chair     GOALS:   Occupational Therapy Goals     Not on file      Multidisciplinary Problems (Resolved)        Problem: Occupational Therapy Goal    Goal Priority Disciplines Outcome Interventions   Occupational Therapy Goal   (Resolved)     OT, PT/OT Outcome(s) achieved              PLAN: DC from skilled OT services       MARU Bryant  05/08/2017

## 2017-05-08 NOTE — PT/OT/SLP EVAL
Physical Therapy  Evaluation    Ann Canavan   MRN: [id number]   Admitting Diagnosis: COPD with exacerbation    PT Received On: 17  PT Start Time: 757     PT Stop Time: 08    PT Total Time (min): 12 min       Billable Minutes:  Evaluation 12    Diagnosis: COPD with exacerbation    Past Medical History:   Diagnosis Date    COPD (chronic obstructive pulmonary disease)       History reviewed. No pertinent surgical history.    Referring physician: DUDLEY Krueger  Date referred to PT: 2017    General Precautions: Standard, fall  Orthopedic Precautions: N/A   Braces: N/A            Patient History:  Lives With: alone  Living Arrangements: house  Home Accessibility: stairs to enter home  Home Layout: Able to live on 1st floor  Number of Stairs to Enter Home: 4  Stair Railings at Home: none  Living Environment Comment: Pt lives alone in 1-story house with 4 CHILANGO. Pt (I) with amb and mod (I) with ADLs with increased time.   Equipment Currently Used at Home: oxygen  DME owned (not currently used): none    Previous Level of Function:  Ambulation Skills: independent  Transfer Skills: independent  ADL Skills: independent    Subjective:  Communicated with RN prior to session.  Pt agreeable to therapy session.   Chief Complaint: SOB  Patient goals: return home    Pain Ratin/10               Pain Rating Post-Intervention: 0/10    Objective:   Patient found with: telemetry, pulse ox (continuous), oxygen     Cognitive Exam:  Oriented to: Person, Place, Time and Situation    Follows Commands/attention: Follows multistep  commands  Communication: clear/fluent  Safety awareness/insight to disability: intact    Physical Exam:  Postural examination/scapula alignment: Rounded shoulder    Skin integrity: Visible skin intact  Edema: None noted B LE    Sensation:   Intact  light/touch B LE    Lower Extremity Range of Motion:  Right Lower Extremity: WFL  Left Lower Extremity: WFL    Lower Extremity Strength:  Right Lower Extremity:  WFL  Left Lower Extremity: WFL     Gross motor coordination: WFL    Functional Mobility:  Bed Mobility:  Supine to Sit: Modified Independent  Sit to Supine: Modified Independent    Transfers:  Sit <> Stand Assistance: Modified Independent  Sit <> Stand Assistive Device: No Assistive Device    Gait:   Gait Distance: ~200ft with 1 LOB at beginning of amb, pt able to self correct; mod SOB with O2 intact   Assistance 1: Supervision  Gait Assistive Device: No device  Gait Pattern: reciprocal    Stairs:  Pt refused stairs 2* fatigue after amb.     Balance:   Static Sit: GOOD: Takes MODERATE challenges from all directions  Dynamic Sit: GOOD: Maintains balance through MODERATE excursions of active trunk movement  Static Stand: GOOD-: Takes MODERATE challenges from all directions inconsistently  Dynamic stand: FAIR+: Needs CLOSE SUPERVISION during gait and is able to right self with minor LOB    Therapeutic Activities and Exercises:  Pt educated on role of PT/POC.  Pt safe to amb with RN staff.     AM-PAC 6 CLICK MOBILITY  How much help from another person does this patient currently need?   1 = Unable, Total/Dependent Assistance  2 = A lot, Maximum/Moderate Assistance  3 = A little, Minimum/Contact Guard/Supervision  4 = None, Modified [place]/Independent    Turning over in bed (including adjusting bedclothes, sheets and blankets)?: 4  Sitting down on and standing up from a chair with arms (e.g., wheelchair, bedside commode, etc.): 4  Moving from lying on back to sitting on the side of the bed?: 4  Moving to and from a bed to a chair (including a wheelchair)?: 4  Need to walk in hospital room?: 3  Climbing 3-5 steps with a railing?: 3  Total Score: 22     AM-PAC Raw Score CMS G-Code Modifier Level of Impairment Assistance   6 % Total / Unable   7 - 9 CM 80 - 100% Maximal Assist   10 - 14 CL 60 - 80% Moderate Assist   15 - 19 CK 40 - 60% Moderate Assist   20 - 22 CJ 20 - 40% Minimal Assist   23 CI 1-20% SBA /  CGA   24 CH 0% Independent/ Mod I     Patient left supine with all lines intact, call button in reach and RN notified.    Assessment:   Ann Canavan is a 56 y.o. female with a medical diagnosis of COPD with exacerbation and presents with decreased endurance and overall functional mobility. Pt performed bed mobility and transfers mod (I). Pt amb ~200ft S without AD, 1 LOB at beginning of amb, pt able to self correct; mod SOB with O2 intact. Pt will benefit from skilled PT to improve deficits and increase overall functional mobility.     Rehab identified problem list/impairments: Rehab identified problem list/impairments: impaired balance, impaired endurance, impaired functional mobilty, gait instability    Rehab potential is good.    Activity tolerance: Good    Discharge recommendations: Discharge Facility/Level Of Care Needs: home     Barriers to discharge: Barriers to Discharge: Inaccessible home environment, Decreased caregiver support    Equipment recommendations: Equipment Needed After Discharge: shower chair     GOALS:   Physical Therapy Goals        Problem: Physical Therapy Goal    Goal Priority Disciplines Outcome Goal Variances Interventions   Physical Therapy Goal     PT/OT, PT Ongoing (interventions implemented as appropriate)     Description:  Goals to be met by: 2017     Patient will increase functional independence with mobility by performin. Gait  x 200 feet with Modified [place] without AD.   2. Ascend/descend 4 stair without Handrails Supervision.                 PLAN:    Patient to be seen 3 x/week to address the above listed problems via gait training, therapeutic activities, therapeutic exercises  Plan of Care expires: 17  Plan of Care reviewed with: patient          NICKI CHANTELL, PT  2017

## 2017-05-08 NOTE — PLAN OF CARE
Problem: Physical Therapy Goal  Goal: Physical Therapy Goal  Goals to be met by: 2017     Patient will increase functional independence with mobility by performin. Gait x 200 feet with Modified [place] without AD.   2. Ascend/descend 4 stair without Handrails Supervision.   Outcome: Ongoing (interventions implemented as appropriate)  Pt evaluation complete.      NICKI ABDUL, PT  2017

## 2017-05-08 NOTE — PLAN OF CARE
05/08/17 1127   Readmission Questionnaire   At the time of your discharge, did someone talk to you about what your health problems were? Yes   At the time of discharge, did someone talk to you about what to watch out for regarding worsening of your health problem? Yes   At the time of discharge, did someone talk to you about what to do if you experienced worsening of your health problem? Yes   At the time of discharge, did someone talk to you about which medication to take when you left the hospital and which ones to stop taking? Yes   At the time of discharge, did someone talk to you about when and where to follow up with a doctor after you left the hospital? Yes   What do you believe caused you to be sick enough to be re-admitted? I was having trouble breathing.   Do you have problems taking your medications as prescribed? No   Do you have any problems affording any of  your prescribed medications? To be determined   Do you have problems obtaining/receiving your medications? No   Does the patient have transportation to healthcare appointments? Yes   Lives With alone   Living Arrangements house   Does the patient have family/friends to help with healtcare needs after discharge? yes

## 2017-05-08 NOTE — PLAN OF CARE
[facility] [address] [place], [place] - [address] AT [facility]  [address]  [facility] [address]  Phone: [phone] Fax: [phone]      Payor: MEDICAID / Plan: [facility] / Product Type: Managed Medicaid /         05/08/17 1125   Discharge Assessment   Assessment Type Discharge Planning Assessment   Confirmed/corrected address and phone number on facesheet? Yes   Assessment information obtained from? Patient   Expected Length of Stay (days) 3   Communicated expected length of stay with patient/caregiver yes   Prior to hospitilization cognitive status: Alert/Oriented   Prior to hospitalization functional status: Independent   Current cognitive status: Alert/Oriented   Current Functional Status: Independent   Arrived From home or self-care   Lives With alone   Able to Return to Prior Arrangements yes   Is patient able to care for self after discharge? Yes   Patient's perception of discharge disposition home or selfcare   Readmission Within The Last 30 Days current reason for admission unrelated to previous admission   Patient currently being followed by outpatient case management? No   Patient currently receives home health services? No   Does the patient currently use HME? Yes   Patient currently receives private duty nursing? No   Patient currently receives any other outside agency services? No   Equipment Currently Used at Home oxygen   Do you have any problems affording any of your prescribed medications? TBD   Is the patient taking medications as prescribed? yes   Do you have any financial concerns preventing you from receiving the healthcare you need? No   Does the patient have transportation to healthcare appointments? Yes   Transportation Available car;family or friend will provide   On Dialysis? No   Does the patient receive services at the Coumadin Clinic? No   Discharge Plan A Home   Patient/Family In Agreement With Plan yes

## 2017-05-08 NOTE — PLAN OF CARE
Murray received a call from Leisa with DME @ 2:48 and delivered @ 3:01 a nebulizer , i phoned Felipa with respiratory @ 2:57  And spoke to her to go to patients room  and give them instructions        Bethany/murray

## 2017-05-08 NOTE — PLAN OF CARE
PATHWAY - IP COPD Exacerbation - [facility]      COPD Step 1       RN: Right Care Tool completed upon admission Met       RN: Smoking cessation education completed upon admission, unless the patient is not a smoker Met       RN: Nutrition risk assessment completed upon admission Met       RN: COPD questionaire completed upon admission, unless patient is not a smoker Met       RN: Clinical Outcome: patient is alert & oriented per baseline on this shift Met       RN: Oxygen education at time of titration Met       RN: Learning Assessment completed upon admission Met          COPD Step 1     RN: Right Care Tool completed upon admission Met     RN: Smoking cessation education completed upon admission, unless the patient is not a smoker Met     RN: Nutrition risk assessment completed upon admission Met     RN: COPD questionaire completed upon admission, unless patient is not a smoker Met     RN: Clinical Outcome: patient is alert & oriented per baseline on this shift Met     RN: Oxygen education at time of titration Met     RN: Learning Assessment completed upon admission Met          Chronic Obstructive Pulmonary Disease (Adult)     Signs and Symptoms of Listed Potential Problems Will be Absent, Minimized or Managed (Chronic Obstructive Pulmonary Disease) Ongoing (interventions implemented as appropriate)          Diabetes, Type 2 (Adult)     Signs and Symptoms of Listed Potential Problems Will be Absent, Minimized or Managed (Diabetes, Type 2) Ongoing (interventions implemented as appropriate)          Patient Care Overview     Plan of Care Review Ongoing (interventions implemented as appropriate)     Individualization & Mutuality Ongoing (interventions implemented as appropriate)     Discharge Needs Assessment Ongoing (interventions implemented as appropriate)     Interdisciplinary Rounds/Family Conf Ongoing (interventions implemented as appropriate)

## 2017-05-09 VITALS
DIASTOLIC BLOOD PRESSURE: 84 MMHG | TEMPERATURE: 99 F | SYSTOLIC BLOOD PRESSURE: 134 MMHG | HEART RATE: 82 BPM | WEIGHT: 121.88 LBS | HEIGHT: 66 IN | BODY MASS INDEX: 19.59 KG/M2 | RESPIRATION RATE: 16 BRPM | OXYGEN SATURATION: 96 %

## 2017-05-09 LAB
ALBUMIN SERPL BCP-MCNC: 2.8 G/DL
ALP SERPL-CCNC: 65 U/L
ALT SERPL W/O P-5'-P-CCNC: 12 U/L
ANION GAP SERPL CALC-SCNC: 6 MMOL/L
AST SERPL-CCNC: 11 U/L
BASOPHILS # BLD AUTO: 0.01 K/UL
BASOPHILS NFR BLD: 0.1 %
BILIRUB SERPL-MCNC: 0.3 MG/DL
BUN SERPL-MCNC: 14 MG/DL
CALCIUM SERPL-MCNC: 9 MG/DL
CHLORIDE SERPL-SCNC: 101 MMOL/L
CO2 SERPL-SCNC: 36 MMOL/L
CREAT SERPL-MCNC: 0.7 MG/DL
DIFFERENTIAL METHOD: ABNORMAL
EOSINOPHIL # BLD AUTO: 0.1 K/UL
EOSINOPHIL NFR BLD: 0.7 %
ERYTHROCYTE [DISTWIDTH] IN BLOOD BY AUTOMATED COUNT: 13.3 %
EST. GFR  (AFRICAN AMERICAN): >60 ML/MIN/1.73 M^2
EST. GFR  (NON AFRICAN AMERICAN): >60 ML/MIN/1.73 M^2
GLUCOSE SERPL-MCNC: 90 MG/DL
HCT VFR BLD AUTO: 42.3 %
HGB BLD-MCNC: 13.7 G/DL
LYMPHOCYTES # BLD AUTO: 1.8 K/UL
LYMPHOCYTES NFR BLD: 24.1 %
MAGNESIUM SERPL-MCNC: 1.7 MG/DL
MCH RBC QN AUTO: 31.6 PG
MCHC RBC AUTO-ENTMCNC: 32.4 %
MCV RBC AUTO: 98 FL
MONOCYTES # BLD AUTO: 0.3 K/UL
MONOCYTES NFR BLD: 4.3 %
NEUTROPHILS # BLD AUTO: 5.3 K/UL
NEUTROPHILS NFR BLD: 70.5 %
PHOSPHATE SERPL-MCNC: 3 MG/DL
PLATELET # BLD AUTO: 146 K/UL
PMV BLD AUTO: 11.2 FL
POCT GLUCOSE: 101 MG/DL (ref 70–110)
POCT GLUCOSE: 200 MG/DL (ref 70–110)
POTASSIUM SERPL-SCNC: 3.6 MMOL/L
PROT SERPL-MCNC: 5.6 G/DL
RBC # BLD AUTO: 4.33 M/UL
SODIUM SERPL-SCNC: 143 MMOL/L
WBC # BLD AUTO: 7.48 K/UL

## 2017-05-09 PROCEDURE — 36415 COLL VENOUS BLD VENIPUNCTURE: CPT

## 2017-05-09 PROCEDURE — 99900035 HC TECH TIME PER 15 MIN (STAT)

## 2017-05-09 PROCEDURE — 80053 COMPREHEN METABOLIC PANEL: CPT

## 2017-05-09 PROCEDURE — 94761 N-INVAS EAR/PLS OXIMETRY MLT: CPT

## 2017-05-09 PROCEDURE — 25000003 PHARM REV CODE 250: Performed by: INTERNAL MEDICINE

## 2017-05-09 PROCEDURE — 63600175 PHARM REV CODE 636 W HCPCS: Performed by: STUDENT IN AN ORGANIZED HEALTH CARE EDUCATION/TRAINING PROGRAM

## 2017-05-09 PROCEDURE — 27000221 HC OXYGEN, UP TO 24 HOURS

## 2017-05-09 PROCEDURE — 85025 COMPLETE CBC W/AUTO DIFF WBC: CPT

## 2017-05-09 PROCEDURE — 25000242 PHARM REV CODE 250 ALT 637 W/ HCPCS: Performed by: STUDENT IN AN ORGANIZED HEALTH CARE EDUCATION/TRAINING PROGRAM

## 2017-05-09 PROCEDURE — 84100 ASSAY OF PHOSPHORUS: CPT

## 2017-05-09 PROCEDURE — 25000003 PHARM REV CODE 250: Performed by: STUDENT IN AN ORGANIZED HEALTH CARE EDUCATION/TRAINING PROGRAM

## 2017-05-09 PROCEDURE — 94640 AIRWAY INHALATION TREATMENT: CPT

## 2017-05-09 PROCEDURE — 83735 ASSAY OF MAGNESIUM: CPT

## 2017-05-09 PROCEDURE — 99238 HOSP IP/OBS DSCHRG MGMT 30/<: CPT | Mod: ,,, | Performed by: HOSPITALIST

## 2017-05-09 RX ORDER — DOXYCYCLINE HYCLATE 100 MG
100 TABLET ORAL EVERY 12 HOURS
Status: DISCONTINUED | OUTPATIENT
Start: 2017-05-09 | End: 2017-05-09

## 2017-05-09 RX ORDER — PREDNISONE 20 MG/1
40 TABLET ORAL DAILY
Qty: 6 TABLET | Refills: 0 | Status: SHIPPED | OUTPATIENT
Start: 2017-05-09 | End: 2017-05-12 | Stop reason: ALTCHOICE

## 2017-05-09 RX ORDER — DOXYCYCLINE HYCLATE 100 MG
100 TABLET ORAL EVERY 12 HOURS
Status: DISCONTINUED | OUTPATIENT
Start: 2017-05-09 | End: 2017-05-09 | Stop reason: HOSPADM

## 2017-05-09 RX ADMIN — PANTOPRAZOLE SODIUM 600 MG: 40 TABLET, DELAYED RELEASE ORAL at 08:05

## 2017-05-09 RX ADMIN — IPRATROPIUM BROMIDE AND ALBUTEROL SULFATE 3 ML: .5; 3 SOLUTION RESPIRATORY (INHALATION) at 09:05

## 2017-05-09 RX ADMIN — IPRATROPIUM BROMIDE AND ALBUTEROL SULFATE 3 ML: .5; 3 SOLUTION RESPIRATORY (INHALATION) at 04:05

## 2017-05-09 RX ADMIN — DOXYCYCLINE HYCLATE 100 MG: 100 TABLET, COATED ORAL at 08:05

## 2017-05-09 RX ADMIN — TIOTROPIUM BROMIDE 18 MCG: 18 CAPSULE ORAL; RESPIRATORY (INHALATION) at 08:05

## 2017-05-09 RX ADMIN — FLUTICASONE FUROATE AND VILANTEROL TRIFENATATE 1 PUFF: 100; 25 POWDER RESPIRATORY (INHALATION) at 08:05

## 2017-05-09 RX ADMIN — IPRATROPIUM BROMIDE AND ALBUTEROL SULFATE 3 ML: .5; 3 SOLUTION RESPIRATORY (INHALATION) at 12:05

## 2017-05-09 RX ADMIN — PREDNISONE 40 MG: 20 TABLET ORAL at 08:05

## 2017-05-09 RX ADMIN — IPRATROPIUM BROMIDE AND ALBUTEROL SULFATE 3 ML: .5; 3 SOLUTION RESPIRATORY (INHALATION) at 03:05

## 2017-05-09 NOTE — SUBJECTIVE & OBJECTIVE
Interval History: See above    Review of Systems   Constitutional: Negative for activity change, appetite change, chills, diaphoresis, fatigue and fever.   HENT: Negative for dental problem and drooling.    Respiratory: Positive for cough (nonproductive) and chest tightness (improved after IV solumedrol). Negative for choking, shortness of breath, wheezing and stridor.    Cardiovascular: Negative for chest pain, palpitations and leg swelling.   Gastrointestinal: Negative for abdominal distention and abdominal pain.   Genitourinary: Negative for difficulty urinating, dysuria and hematuria.   Musculoskeletal: Negative for arthralgias.   Neurological: Negative for weakness.   Psychiatric/Behavioral: Negative for agitation and behavioral problems.     Objective:     Vital Signs (Most Recent):  Temp: 98 °F (36.7 °C) (05/08/17 1517)  Pulse: 99 (05/08/17 1900)  Resp: (!) 24 (05/08/17 1601)  BP: 133/82 (05/08/17 1517)  SpO2: (!) 88 % (05/08/17 1601) Vital Signs (24h Range):  Temp:  [98 °F (36.7 °C)-98.6 °F (37 °C)] 98 °F (36.7 °C)  Pulse:  [] 99  Resp:  [16-24] 24  SpO2:  [88 %-97 %] 88 %  BP: (124-145)/(70-82) 133/82     Weight: 61.2 kg (135 lb)  Body mass index is 21.79 kg/(m^2).    Intake/Output Summary (Last 24 hours) at 05/08/17 1950  Last data filed at 05/08/17 0600   Gross per 24 hour   Intake              120 ml   Output                0 ml   Net              120 ml      Physical Exam   Constitutional: She is oriented to person, place, and time. She appears well-developed and well-nourished. No distress.   HENT:   Head: Normocephalic and atraumatic.   Mouth/Throat: No oropharyngeal exudate.   Eyes: Pupils are equal, round, and reactive to light. No scleral icterus.   Neck: Normal range of motion.   Cardiovascular: Normal rate and regular rhythm.  Exam reveals no friction rub.    No murmur heard.        Pulmonary/Chest: Effort normal. No stridor. No respiratory distress. She has no wheezes.   Abdominal: Soft.  Bowel sounds are normal.   Musculoskeletal: Normal range of motion. She exhibits no edema or deformity.   Neurological: She is alert and oriented to person, place, and time. No cranial nerve deficit. Coordination normal.   Skin: She is not diaphoretic.   Vitals reviewed.      Significant Labs:   ABGs: No results for input(s): PH, PCO2, HCO3, POCSATURATED, BE in the last 48 hours.  BMP:   Recent Labs  Lab 05/08/17  0417   *      K 4.1      CO2 34*   BUN 15   CREATININE 0.7   CALCIUM 9.6   MG 1.9     CBC:   Recent Labs  Lab 05/07/17  0943 05/08/17 0417   WBC 10.26 15.67*   HGB 14.6 14.1   HCT 44.5 43.5   * 167     CMP:   Recent Labs  Lab 05/07/17  0943 05/08/17 0417    144   K 4.2 4.1   CL 99 102   CO2 33* 34*   * 118*   BUN 13 15   CREATININE 0.7 0.7   CALCIUM 9.0 9.6   PROT 6.0 5.9*   ALBUMIN 2.8* 2.8*   BILITOT 0.3 0.3   ALKPHOS 71 64   AST 10 9*   ALT 13 12   ANIONGAP 8 8   EGFRNONAA >60.0 >60.0     POCT Glucose:   Recent Labs  Lab 05/08/17  0818 05/08/17  1212 05/08/17  1807   POCTGLUCOSE 108 194* 207*     Troponin: No results for input(s): TROPONINI in the last 48 hours.    Significant Imaging: I have reviewed all pertinent imaging results/findings within the past 24 hours.

## 2017-05-09 NOTE — PROGRESS NOTES
[facility] Hospital Medicine  Progress Note    Patient Name: Ann Canavan  MRN: [id number]  Patient Class: IP- Inpatient   Admission Date: 5/6/2017  Length of Stay: 2 days  Attending Physician: Sarah Krueger MD  Primary Care Provider: Primary Doctor [facility] Medicine Team: [facility] 2 Mary Krueger MD    Subjective:     Principal Problem:COPD with exacerbation    HPI:  Ann Canavan is a 56 y.o. female, known to have COPD with previous COPD exacerbations, ex smoker,Diabetes mellitus not on medications. She presented to ED with shortness of breath, dyspnea on exertion for the last couple of days. This was associated with dry cough, and feeling of chest tightness. She denies fever, phlegm production, chest pain, palpitation or exposure to smoker for the last couple of days. Of note, she was discharged on 5/1/2017 after 5 days course of hospital admission for COPD exacerbation. She was treated with breathing treatment, steroids and was qualified for oxygen for home use. No antibiotic given on the last admission; however, recent antibiotic was prescribed yesterday (Doxycyclin).     Hospital Course:  In ED, pt was hypoxic to 70% on RA but subsequently okay on 2L NC, also tachy to 144, tachypneic at 32. She was given her recently prescribed Doxycycline, breathing treatment and continuous oxygen therapy. CTA showed no PE, CXR showed no opacification concerning for pneumonia. Pt admitted to hospital medicine for COPD exacerbation.     5/8/17: NAEON. But pt states she feels her chest is tighter, however she felt better after receiving her neb treatments. Pt later in the day complained of chest pain, EKG showed sinus tachycardia and RA enlargement. Will monitor patient overnight and discharge tomorrow.      Interval History: See above    Review of Systems   Constitutional: Negative for activity change, appetite change, chills, diaphoresis, fatigue and fever.   HENT: Negative for dental problem  and drooling.    Respiratory: Positive for cough (nonproductive) and chest tightness (improved after IV solumedrol). Negative for choking, shortness of breath, wheezing and stridor.    Cardiovascular: Negative for chest pain, palpitations and leg swelling.   Gastrointestinal: Negative for abdominal distention and abdominal pain.   Genitourinary: Negative for difficulty urinating, dysuria and hematuria.   Musculoskeletal: Negative for arthralgias.   Neurological: Negative for weakness.   Psychiatric/Behavioral: Negative for agitation and behavioral problems.     Objective:     Vital Signs (Most Recent):  Temp: 98 °F (36.7 °C) (05/08/17 1517)  Pulse: 99 (05/08/17 1900)  Resp: (!) 24 (05/08/17 1601)  BP: 133/82 (05/08/17 1517)  SpO2: (!) 88 % (05/08/17 1601) Vital Signs (24h Range):  Temp:  [98 °F (36.7 °C)-98.6 °F (37 °C)] 98 °F (36.7 °C)  Pulse:  [] 99  Resp:  [16-24] 24  SpO2:  [88 %-97 %] 88 %  BP: (124-145)/(70-82) 133/82     Weight: 61.2 kg (135 lb)  Body mass index is 21.79 kg/(m^2).    Intake/Output Summary (Last 24 hours) at 05/08/17 1950  Last data filed at 05/08/17 0600   Gross per 24 hour   Intake              120 ml   Output                0 ml   Net              120 ml      Physical Exam   Constitutional: She is oriented to person, place, and time. She appears well-developed and well-nourished. No distress.   HENT:   Head: Normocephalic and atraumatic.   Mouth/Throat: No oropharyngeal exudate.   Eyes: Pupils are equal, round, and reactive to light. No scleral icterus.   Neck: Normal range of motion.   Cardiovascular: Normal rate and regular rhythm.  Exam reveals no friction rub.    No murmur heard.        Pulmonary/Chest: Effort normal. No stridor. No respiratory distress. She has no wheezes.   Abdominal: Soft. Bowel sounds are normal.   Musculoskeletal: Normal range of motion. She exhibits no edema or deformity.   Neurological: She is alert and oriented to person, place, and time. No cranial nerve  deficit. Coordination normal.   Skin: She is not diaphoretic.   Vitals reviewed.      Significant Labs:   ABGs: No results for input(s): PH, PCO2, HCO3, POCSATURATED, BE in the last 48 hours.  BMP:   Recent Labs  Lab 05/08/17  0417   *      K 4.1      CO2 34*   BUN 15   CREATININE 0.7   CALCIUM 9.6   MG 1.9     CBC:   Recent Labs  Lab 05/07/17  0943 05/08/17  0417   WBC 10.26 15.67*   HGB 14.6 14.1   HCT 44.5 43.5   * 167     CMP:   Recent Labs  Lab 05/07/17  0943 05/08/17  0417    144   K 4.2 4.1   CL 99 102   CO2 33* 34*   * 118*   BUN 13 15   CREATININE 0.7 0.7   CALCIUM 9.0 9.6   PROT 6.0 5.9*   ALBUMIN 2.8* 2.8*   BILITOT 0.3 0.3   ALKPHOS 71 64   AST 10 9*   ALT 13 12   ANIONGAP 8 8   EGFRNONAA >60.0 >60.0     POCT Glucose:   Recent Labs  Lab 05/08/17  0818 05/08/17  1212 05/08/17  1807   POCTGLUCOSE 108 194* 207*     Troponin: No results for input(s): TROPONINI in the last 48 hours.    Significant Imaging: I have reviewed all pertinent imaging results/findings within the past 24 hours.    Assessment/Plan:   COPD with exacerbation  - Patient presented with symptoms suggestive of COPD Exacerbation with no high concern of infection, recent discharge on 5/1/17 after being admitted for COPD exacerbation but was not on antibiotics during prior admission  - Home medications include Albuterol, Spiriva and Advair, and she reports compliance with medication.  - Will continue Breathing treatment based on COPD Pathway  - Received methylprednisolone 125 mg IV Q 6 hours for two doses then will receive Prednisone 40 mg PO daily, Mucinex to help her COPD.  - Chest X ray showed subtle increased perihilar and bibasilar interstitial prominence suggestive of pulmonary edema, with interstitial pneumonia not excluded. Will continue doxycycline for a total of 10 days (pt allergic to moxifloxacin)   CT Chest showed no evidence of PE as pt was initially hypoxic, tachycardic, and tachypneic   -  will f/u 2D Echo to assess for any pulmonary hypertension  - No previous PFT to assess the severity of her FEV1; however her COPD Assessment Test (CAT) showed > 10 points which is suggestive of significant symptoms.  - Continue continuous oxygen treatment, and wean off as tolerated.   - Involve PT/OT and pulmonary rehabilitation to prevent Readmission in future        Type 2 diabetes mellitus without complication, without long-term current use of insulin  -last HbA1c 6.5  -pt not on medications at home, will start on LDSSI        Leukocytosis  - Ddx could be steroids induced de marginalization or superimposed infection.  - SIRS initially 3/4 (tachycardia, tachypnea, leukocytosis) but likely 2/2 COPD exacerbation symptoms as pt no longer tachycardic or tachypneic after received IV solumedrol x1  - CXray with subtle increased perihilar and bibasilar interstitial prominence suggestive of pulmonary edema, with interstitial pneumonia not excluded. No focal consolidation.  - No fever, Pro calcitonin negative, will continue her Doxycycline that was started yesterday as outpatient         Sinus Tachycardia  - pt complained of chest pain on 5/8/17 but troponin negative, EKG showed sinus tachycardia and right atrial enlargement indicative of underlying pulmonary disease. Will follow up 2D echo  - will monitor     Pulmonary nodule seen on imaging study  -0.4 cm pulmonary nodule within the left upper lobe is nonspecific, however given underlying parenchymal emphysematous changes, one year followup is recommended      Tobacco abuse  -former smoker but has since stopped smoking  -does not need nicotine patch      VTE Risk Mitigation         Ordered     enoxaparin injection 40 mg  Daily     Route:  Subcutaneous        05/06/17 2237     Medium Risk of VTE  Once      05/06/17 2237          Mary Krueger MD  Department of Hospital Medicine   [facility]

## 2017-05-09 NOTE — DISCHARGE SUMMARY
DISCHARGE SUMMARY  Hospital Medicine    Team: [facility] HOSP MED 2    Patient Name: Ann Canavan  YOB: 1960    Admit Date: 5/6/2017    Discharge Date: 05/09/2017    Discharge Attending Physician: Sarah Krueger MD     Resident on Service: Dr. Rai    Chief Complaint: COPD Exacerbation    Princilpal Diagnoses:  Active Hospital Problems    Diagnosis  POA    *COPD with exacerbation [J44.1]  Yes    Pulmonary nodule seen on imaging study [R91.1]  Yes    Leukocytosis [D72.829]  Yes    Type 2 diabetes mellitus without complication, without long-term current use of insulin [E11.9]  Yes     - Based on Hb A1c from 2/2017 that showed 6.5%, repeat Hb A1c   - Currently not on diabetic medication, based on the new HbA1c will decide to start her on treatment ,      Tobacco abuse [Z72.0]  Yes      Resolved Hospital Problems    Diagnosis Date Resolved POA   No resolved problems to display.       Discharged Condition: Admit problems have stabilized    HOSPITAL COURSE:      Initial Presentation:    Ann Canavan is a 56 y.o. female, known to have COPD with previous COPD exacerbations, ex smoker,Diabetes mellitus not on medications. She presented to ED with shortness of breath, dyspnea on exertion for the last couple of days. This was associated with dry cough, and feeling of chest tightness. She denies fever, phlegm production, chest pain, palpitation or exposure to smoker for the last couple of days. Of note, she was discharged on 5/1/2017 after 5 days course of hospital admission for COPD exacerbation. She was treated with breathing treatment, steroids and was qualified for oxygen for home use. No antibiotic given on the last admission; however, recent antibiotic was prescribed yesterday (Doxycyclin).     Course of Principle Problem for Admission:    In ED, pt was hypoxic to 70% on RA but subsequently okay on 2L NC, also tachy to 144, tachypneic at 32. She was given her recently prescribed Doxycycline, breathing  treatment and continuous oxygen therapy. CTA showed no PE, CXR showed no opacification concerning for pneumonia. Pt admitted to hospital medicine for COPD exacerbation.      5/8/17: NAEON. But pt states she feels her chest is tighter, however she felt better after receiving her neb treatments. Pt later in the day complained of chest pain, EKG showed sinus tachycardia and RA enlargement. Will monitor patient overnight and discharge tomorrow.     5/9: Pt resting comfortably in bed. No chest pain or SOB. No wheezing on physical exam. Discharge home today with Doxycycline and Prednisone. Nebulizer at bedside.       CONSULTS: None    Last CBC/BMP/HgbA1c (if applicable):  Recent Results (from the past 336 hour(s))   CBC auto differential    Collection Time: 05/09/17  4:40 AM   Result Value Ref Range    WBC 7.48 3.90 - 12.70 K/uL    Hemoglobin 13.7 12.0 - 16.0 g/dL    Hematocrit 42.3 37.0 - 48.5 %    Platelets 146 (L) 150 - 350 K/uL   CBC auto differential    Collection Time: 05/08/17  4:17 AM   Result Value Ref Range    WBC 15.67 (H) 3.90 - 12.70 K/uL    Hemoglobin 14.1 12.0 - 16.0 g/dL    Hematocrit 43.5 37.0 - 48.5 %    Platelets 167 150 - 350 K/uL   CBC auto differential    Collection Time: 05/07/17  9:43 AM   Result Value Ref Range    WBC 10.26 3.90 - 12.70 K/uL    Hemoglobin 14.6 12.0 - 16.0 g/dL    Hematocrit 44.5 37.0 - 48.5 %    Platelets 141 (L) 150 - 350 K/uL     Recent Results (from the past 336 hour(s))   Basic Metabolic Panel (BMP)    Collection Time: 05/01/17  5:21 AM   Result Value Ref Range    Sodium 140 136 - 145 mmol/L    Potassium 5.1 3.5 - 5.1 mmol/L    Chloride 101 95 - 110 mmol/L    CO2 28 23 - 29 mmol/L    BUN, Bld 12 6 - 20 mg/dL    Creatinine 0.7 0.5 - 1.4 mg/dL    Calcium 9.5 8.7 - 10.5 mg/dL    Anion Gap 11 8 - 16 mmol/L   Basic Metabolic Panel (BMP)    Collection Time: 04/30/17  4:42 AM   Result Value Ref Range    Sodium 145 136 - 145 mmol/L    Potassium 3.4 (L) 3.5 - 5.1 mmol/L    Chloride 102  95 - 110 mmol/L    CO2 33 (H) 23 - 29 mmol/L    BUN, Bld 16 6 - 20 mg/dL    Creatinine 0.8 0.5 - 1.4 mg/dL    Calcium 9.1 8.7 - 10.5 mg/dL    Anion Gap 10 8 - 16 mmol/L   Basic Metabolic Panel (BMP)    Collection Time: 04/29/17  3:24 AM   Result Value Ref Range    Sodium 141 136 - 145 mmol/L    Potassium 3.8 3.5 - 5.1 mmol/L    Chloride 102 95 - 110 mmol/L    CO2 30 (H) 23 - 29 mmol/L    BUN, Bld 13 6 - 20 mg/dL    Creatinine 0.7 0.5 - 1.4 mg/dL    Calcium 9.0 8.7 - 10.5 mg/dL    Anion Gap 9 8 - 16 mmol/L     Lab Results   Component Value Date    HGBA1C 5.8 05/06/2017           Disposition:  Home        Future Scheduled Appointments:  Future Appointments  Date Time Provider Department Center   5/12/2017 10:00 AM PHYSICIAN, [facility]       Follow-up Plans from This Hospitalization:      Discharge Medication List:       Canavan, Ann   Home Medication Instructions ETRRI:[id number]    Printed on:05/09/17 2480   Medication Information                      albuterol 90 mcg/actuation inhaler  Inhale 2 puffs into the lungs every 6 (six) hours as needed for Wheezing. Rescue             albuterol-ipratropium 2.5mg-0.5mg/3mL (DUO-NEB) 0.5 mg-3 mg(2.5 mg base)/3 mL nebulizer solution  Take 3 mLs by nebulization every 6 (six) hours as needed for Wheezing or Shortness of Breath. Rescue             aspirin (ECOTRIN) 81 MG EC tablet  Take 81 mg by mouth once daily.             doxycycline (VIBRAMYCIN) 100 MG Cap  Take 1 capsule (100 mg total) by mouth 2 (two) times daily.             fluticasone (FLONASE) 50 mcg/actuation nasal spray  1 spray by Each Nare route once daily.             fluticasone-salmeterol 250-50 mcg/dose (ADVAIR) 250-50 mcg/dose diskus inhaler  Inhale 1 puff into the lungs 2 (two) times daily.             guaifenesin (MUCINEX) 600 mg 12 hr tablet  Take 600 mg by mouth 2 (two) times daily.              loratadine (CLARITIN) 10 mg tablet  Take 10 mg by mouth daily as needed for  "Allergies.             nicotine (NICODERM CQ) 21 mg/24 hr  Place 1 patch onto the skin once daily.             predniSONE (DELTASONE) 20 MG tablet  Take 2 tablets (40 mg total) by mouth once daily.             tiotropium (SPIRIVA) 18 mcg inhalation capsule  Inhale 1 capsule (18 mcg total) into the lungs once daily.                 Patient Instructions:    Discharge Procedure Orders  NEBULIZER FOR HOME USE   Order Specific Question Answer Comments   Height: 5' 6" (1.676 m)    Weight: 61.2 kg (135 lb)    Does patient have medical equipment at home? oxygen    Length of need (1-99 months): 99    Vendor: [facility] delivered to pts bedside   Expected Date of Delivery: 5/8/2017      Ambulatory referral to Smoking Cessation Program   Referral Priority: Routine Referral Type: Consultation   Referral Reason: Specialty Services Required    Requested Specialty: CTTS    Number of Visits Requested: 1      Ambulatory Referral to Pulmonology   Referral Priority: Routine Referral Type: Consultation   Referral Reason: Specialty Services Required    Requested Specialty: Pulmonary Disease    Number of Visits Requested: 1      Diet general     Activity as tolerated     Call MD for:  temperature >100.4     Call MD for:  persistent nausea and vomiting or diarrhea     Call MD for:  severe uncontrolled pain     Call MD for:  redness, tenderness, or signs of infection (pain, swelling, redness, odor or green/yellow discharge around incision site)     Call MD for:  difficulty breathing or increased cough     Call MD for:  severe persistent headache     Call MD for:  worsening rash     Call MD for:  persistent dizziness, light-headedness, or visual disturbances     Call MD for:  increased confusion or weakness         At the time of discharge patient was told to take all medications as prescribed, to keep all followup appointments, and to call their primary care physician or return to the emergency room if they have any worsening or " concerning symptoms.    Signing Physician:  Jose Joseph MD

## 2017-05-09 NOTE — CONSULTS
[facility]  Adult Nutrition  Consult Note    SUMMARY     Recommendations    Recommendation/Intervention: 1.Continue with current diet, adding Boost glucose control to address elevated protein needs  2 Encourage Good PO intake of High Value Protein 3. PO intake >75% 4. RD to follow  Goals: PO intake >75%; weight gain  Nutrition Goal Status: new    Reason for Assessment    Reason for Assessment: physician consult  Diagnosis:  (COPD pathway)  Relevent Medical History: COPD   Interdisciplinary Rounds: did not attend  General Information Comments: Pt states has had a decreased appetite, but it is improving due to steroids. Pt has loss of 10-15 lbs over the last few months, states she does not have the energy to prepare food. educated Pt on COPD diet and low sodium (see education)  Nutrition Discharge Planning: d/c Cardiac on low sodium diet      Nutrition Prescription Ordered    Current Diet Order: Diabetic 2000kcal  Nutrition Order Comments: Recommend adding Boost Glucose Control.     Nutrition Risk Screen     Nutrition Risk Screen: reduced oral intake over the last month, unintentional loss of 10 lbs or more in the past 2 mos    Nutrition/Diet History    Patient Reported Diet/Restrictions/Preferences: general  Food Preferences: No  Zoroastrianism or  cultural preferances   Factors Affecting Nutritional Intake: decreased appetite    Labs/Tests/Procedures/Meds    Pertinent Labs Reviewed: reviewed  Pertinent Labs Comments: Glu-118 POCT Glu-212  Pertinent Medications Reviewed: reviewed  Pertinent Medications Comments: insluin     Physical Findings    Overall Physical Appearance: lethargic, weak, underweight  Oral/Mouth Cavity: all teeth present (age appropriate)  Skin: intact    Anthropometrics    Height Method: Stated  Height (inches): 65.98 in  Weight Method: Bed Scale  Weight (kg): 55.3 kg  Ideal Body Weight (IBW), Female: 129.9 lb  % Ideal Body Weight, Female (lb): 93.86 lb  BMI (kg/m2): 19.69  BMI  Grade: 17 - 18.4 protein-energy malnutrition grade I  % Weight Change: 10 kg  Weight Loss: unintentional  Estimated/Assessed Needs    Weight Used For Calorie Calculations: 55.3 kg (121 lb 14.6 oz)   Height (cm): 167.6 cm  Energy Need Method: [place]-St Jeor (x 1.25(PAL) 1529kcal)   RMR ([place]-St. Jeor Equation): 1168.35   Weight Used For Protein Calculations: 55.3 kg (121 lb 14.6 oz)  Protein Requirements: 66-83g/day (1.2-1.5g/kg per protocol)    Assessment and Plan    Malnutrition r/t decreased intake 2/2 decrease energy output AEB wt loss of 10-15lbs in 2 mo, pt states to weak to prepare food, Physical examination for malnutrition.      Monitor and Evaluation    Food and Nutrient Intake: energy intake, food and beverage intake  Food and Nutrient Adminstration: diet order  Physical Activity and Function: nutrition-related ADLs and IADLs, factors affecting access to physical activity  Anthropometric Measurements: weight, weight change  Biochemical Data, Medical Tests and Procedures: electrolyte and renal panel, gastrointestinal profile, glucose/endocrine profile, inflammatory profile  Nutrition-Focused Physical Findings: overall appearance, extremities, muscles and bones, skin, head and eyes  % Intake of Estimated Energy Needs: 25 - 50 %  % Meal Intake: 50%    Nutrition Risk    Level of Risk:  (f/u 2x week)    Nutrition Follow-Up    RD Follow-up?: Yes     Hector Keys RD

## 2017-05-09 NOTE — SUBJECTIVE & OBJECTIVE
Review of Systems   Constitutional: Negative for activity change, appetite change, chills, diaphoresis, fatigue and fever.   HENT: Negative for dental problem and drooling.    Respiratory: Positive for cough (nonproductive) and chest tightness (improved after IV solumedrol). Negative for choking, shortness of breath, wheezing and stridor.    Cardiovascular: Negative for chest pain, palpitations and leg swelling.   Gastrointestinal: Negative for abdominal distention and abdominal pain.   Genitourinary: Negative for difficulty urinating, dysuria and hematuria.   Musculoskeletal: Negative for arthralgias.   Neurological: Negative for weakness.   Psychiatric/Behavioral: Negative for agitation and behavioral problems.     Objective:     Vital Signs (Most Recent):  Temp: 98.8 °F (37.1 °C) (05/09/17 1108)  Pulse: 99 (05/09/17 1259)  Resp: 18 (05/09/17 1259)  BP: 134/84 (05/09/17 1108)  SpO2: 96 % (05/09/17 1259) Vital Signs (24h Range):  Temp:  [97.7 °F (36.5 °C)-99.1 °F (37.3 °C)] 98.8 °F (37.1 °C)  Pulse:  [] 99  Resp:  [16-24] 18  SpO2:  [88 %-99 %] 96 %  BP: (120-140)/(73-84) 134/84     Weight: 55.3 kg (121 lb 14.4 oz)  Body mass index is 19.68 kg/(m^2).  No intake or output data in the 24 hours ending 05/09/17 1515   Physical Exam   Constitutional: She is oriented to person, place, and time. She appears well-developed and well-nourished. No distress.   HENT:   Head: Normocephalic and atraumatic.   Mouth/Throat: No oropharyngeal exudate.   Eyes: Pupils are equal, round, and reactive to light. No scleral icterus.   Neck: Normal range of motion.   Cardiovascular: Normal rate and regular rhythm.  Exam reveals no friction rub.    No murmur heard.        Pulmonary/Chest: Effort normal. No stridor. No respiratory distress. She has no wheezes.   Abdominal: Soft. Bowel sounds are normal.   Musculoskeletal: Normal range of motion. She exhibits no edema or deformity.   Neurological: She is alert and oriented to person,  place, and time. No cranial nerve deficit. Coordination normal.   Skin: She is not diaphoretic.   Vitals reviewed.

## 2017-05-09 NOTE — PROGRESS NOTES
[facility] Hospital Medicine  Progress Note    Patient Name: Ann Canavan  MRN: [id number]  Patient Class: IP- Inpatient   Admission Date: 5/6/2017  Length of Stay: 3 days  Attending Physician: Sarah Krueger MD  Primary Care Provider: Primary Doctor [facility] Medicine Team: [facility] HOSP MED 2 Jose Joseph MD    Subjective:     Principal Problem:COPD with exacerbation    HPI:  Ann Canavan is a 56 y.o. female, known to have COPD with previous COPD exacerbations, ex smoker,Diabetes mellitus not on medications. She presented to ED with shortness of breath, dyspnea on exertion for the last couple of days. This was associated with dry cough, and feeling of chest tightness. She denies fever, phlegm production, chest pain, palpitation or exposure to smoker for the last couple of days. Of note, she was discharged on 5/1/2017 after 5 days course of hospital admission for COPD exacerbation. She was treated with breathing treatment, steroids and was qualified for oxygen for home use. No antibiotic given on the last admission; however, recent antibiotic was prescribed yesterday (Doxycyclin).     Hospital Course:  In ED, pt was hypoxic to 70% on RA but subsequently okay on 2L NC, also tachy to 144, tachypneic at 32. She was given her recently prescribed Doxycycline, breathing treatment and continuous oxygen therapy. CTA showed no PE, CXR showed no opacification concerning for pneumonia. Pt admitted to hospital medicine for COPD exacerbation.     5/8/17: NAEON. But pt states she feels her chest is tighter, however she felt better after receiving her neb treatments. Pt later in the day complained of chest pain, EKG showed sinus tachycardia and RA enlargement. Will monitor patient overnight and discharge tomorrow.    5/9: Pt resting comfortably in bed. No chest pain or SOB. No wheezing on physical exam. Discharge home today with Doxycycline and Prednisone. Nebulizer at bedside.           Review of Systems    Constitutional: Negative for activity change, appetite change, chills, diaphoresis, fatigue and fever.   HENT: Negative for dental problem and drooling.    Respiratory: Positive for cough (nonproductive) and chest tightness (improved after IV solumedrol). Negative for choking, shortness of breath, wheezing and stridor.    Cardiovascular: Negative for chest pain, palpitations and leg swelling.   Gastrointestinal: Negative for abdominal distention and abdominal pain.   Genitourinary: Negative for difficulty urinating, dysuria and hematuria.   Musculoskeletal: Negative for arthralgias.   Neurological: Negative for weakness.   Psychiatric/Behavioral: Negative for agitation and behavioral problems.     Objective:     Vital Signs (Most Recent):  Temp: 98.8 °F (37.1 °C) (05/09/17 1108)  Pulse: 99 (05/09/17 1259)  Resp: 18 (05/09/17 1259)  BP: 134/84 (05/09/17 1108)  SpO2: 96 % (05/09/17 1259) Vital Signs (24h Range):  Temp:  [97.7 °F (36.5 °C)-99.1 °F (37.3 °C)] 98.8 °F (37.1 °C)  Pulse:  [] 99  Resp:  [16-24] 18  SpO2:  [88 %-99 %] 96 %  BP: (120-140)/(73-84) 134/84     Weight: 55.3 kg (121 lb 14.4 oz)  Body mass index is 19.68 kg/(m^2).  No intake or output data in the 24 hours ending 05/09/17 1515   Physical Exam   Constitutional: She is oriented to person, place, and time. She appears well-developed and well-nourished. No distress.   HENT:   Head: Normocephalic and atraumatic.   Mouth/Throat: No oropharyngeal exudate.   Eyes: Pupils are equal, round, and reactive to light. No scleral icterus.   Neck: Normal range of motion.   Cardiovascular: Normal rate and regular rhythm.  Exam reveals no friction rub.    No murmur heard.        Pulmonary/Chest: Effort normal. No stridor. No respiratory distress. She has no wheezes.   Abdominal: Soft. Bowel sounds are normal.   Musculoskeletal: Normal range of motion. She exhibits no edema or deformity.   Neurological: She is alert and oriented to person, place, and time. No  cranial nerve deficit. Coordination normal.   Skin: She is not diaphoretic.   Vitals reviewed.      Recent Labs  Lab 05/07/17  0943 05/08/17  0417 05/09/17  0440   WBC 10.26 15.67* 7.48   HGB 14.6 14.1 13.7   HCT 44.5 43.5 42.3   * 167 146*       Recent Labs  Lab 05/07/17  0943 05/08/17  0417 05/09/17  0440    144 143   K 4.2 4.1 3.6   CL 99 102 101   CO2 33* 34* 36*   BUN 13 15 14   CREATININE 0.7 0.7 0.7   CALCIUM 9.0 9.6 9.0   PROT 6.0 5.9* 5.6*   BILITOT 0.3 0.3 0.3   ALKPHOS 71 64 65   ALT 13 12 12   AST 10 9* 11         Assessment/Plan:      * COPD with exacerbation  - Patient presented with symptoms suggestive of COPD Exacerbation with no high concern of infection, recent discharge on 5/1/17 after being admitted for COPD exacerbation but was not on antibiotics during prior admission  - Home medications include Albuterol, Spiriva and Advair, and she reports compliance with medication.  - Will continue Breathing treatment based on COPD Pathway  - Received methylprednisolone 125 mg IV Q 6 hours for two doses then will receive Prednisone 40 mg PO daily, Mucinex to help her COPD.  - Chest X ray showed subtle increased perihilar and bibasilar interstitial prominence suggestive of pulmonary edema, with interstitial pneumonia not excluded. Will continue doxycycline for a total of 10 days (pt allergic to moxifloxacin)   CT Chest showed no evidence of PE as pt was initially hypoxic, tachycardic, and tachypneic   - will f/u 2D Echo to assess for any pulmonary hypertension  - No previous PFT to assess the severity of her FEV1; however her COPD Assessment Test (CAT) showed > 10 points which is suggestive of significant symptoms.  - Continue continuous oxygen treatment, and wean off as tolerated.   - Involve PT/OT and pulmonary rehabilitation to prevent Readmission in future  -- Amb Referral sent to Pulmonology  -- Discharge with short-course prednisone and doxycycline; new nebulizer at bedside    Tobacco  abuse  -former smoker but has since stopped smoking  -does not need nicotine patch      Type 2 diabetes mellitus without complication, without long-term current use of insulin  -last HbA1c 6.5  -pt not on medications at home, will start on LDSSI      Pulmonary nodule seen on imaging study  -0.4 cm pulmonary nodule within the left upper lobe is nonspecific, however given underlying parenchymal emphysematous changes, one year followup is recommended       VTE Risk Mitigation         Ordered     enoxaparin injection 40 mg  Daily     Route:  Subcutaneous        05/06/17 2237     Medium Risk of VTE  Once      05/06/17 2237          Jose Joseph MD  Department of Hospital Medicine   [facility]

## 2017-05-12 ENCOUNTER — LAB VISIT (OUTPATIENT)
Dept: LAB | Facility: HOSPITAL | Age: 57
End: 2017-05-12
Attending: INTERNAL MEDICINE
Payer: MEDICAID

## 2017-05-12 ENCOUNTER — OFFICE VISIT (OUTPATIENT)
Dept: PRIMARY CARE CLINIC | Facility: CLINIC | Age: 57
End: 2017-05-12
Payer: MEDICAID

## 2017-05-12 ENCOUNTER — OUTPATIENT CASE MANAGEMENT (OUTPATIENT)
Dept: ADMINISTRATIVE | Facility: OTHER | Age: 57
End: 2017-05-12

## 2017-05-12 VITALS
HEIGHT: 66 IN | BODY MASS INDEX: 19.07 KG/M2 | HEART RATE: 110 BPM | SYSTOLIC BLOOD PRESSURE: 116 MMHG | WEIGHT: 118.63 LBS | DIASTOLIC BLOOD PRESSURE: 72 MMHG | OXYGEN SATURATION: 93 %

## 2017-05-12 DIAGNOSIS — R73.02 GLUCOSE INTOLERANCE (IMPAIRED GLUCOSE TOLERANCE): ICD-10-CM

## 2017-05-12 DIAGNOSIS — F17.200 TOBACCO DEPENDENCE: ICD-10-CM

## 2017-05-12 DIAGNOSIS — E46 MALNUTRITION: ICD-10-CM

## 2017-05-12 DIAGNOSIS — J43.1 PANLOBULAR EMPHYSEMA: ICD-10-CM

## 2017-05-12 DIAGNOSIS — H90.11 CONDUCTIVE HEARING LOSS OF RIGHT EAR WITH UNRESTRICTED HEARING OF LEFT EAR: ICD-10-CM

## 2017-05-12 DIAGNOSIS — F33.2 SEVERE EPISODE OF RECURRENT MAJOR DEPRESSIVE DISORDER, WITHOUT PSYCHOTIC FEATURES: ICD-10-CM

## 2017-05-12 DIAGNOSIS — J96.12 CHRONIC RESPIRATORY FAILURE WITH HYPOXIA AND HYPERCAPNIA: Primary | ICD-10-CM

## 2017-05-12 DIAGNOSIS — I73.9 CLAUDICATION IN PERIPHERAL VASCULAR DISEASE: ICD-10-CM

## 2017-05-12 DIAGNOSIS — F43.21 COMPLICATED GRIEVING: ICD-10-CM

## 2017-05-12 DIAGNOSIS — J43.9 PULMONARY EMPHYSEMA, UNSPECIFIED EMPHYSEMA TYPE: ICD-10-CM

## 2017-05-12 DIAGNOSIS — H80.91 OTOSCLEROSIS OF RIGHT EAR: ICD-10-CM

## 2017-05-12 DIAGNOSIS — Q61.02 MULTIPLE RENAL CYSTS: ICD-10-CM

## 2017-05-12 DIAGNOSIS — M79.604 RIGHT LEG PAIN: ICD-10-CM

## 2017-05-12 DIAGNOSIS — E78.2 MIXED HYPERLIPIDEMIA: ICD-10-CM

## 2017-05-12 DIAGNOSIS — E78.5 HYPERLIPIDEMIA, UNSPECIFIED HYPERLIPIDEMIA TYPE: ICD-10-CM

## 2017-05-12 DIAGNOSIS — R53.81 DEBILITY: ICD-10-CM

## 2017-05-12 DIAGNOSIS — R91.1 PULMONARY NODULE SEEN ON IMAGING STUDY: ICD-10-CM

## 2017-05-12 DIAGNOSIS — E11.9 TYPE 2 DIABETES MELLITUS WITHOUT COMPLICATION, WITHOUT LONG-TERM CURRENT USE OF INSULIN: ICD-10-CM

## 2017-05-12 DIAGNOSIS — J96.11 CHRONIC RESPIRATORY FAILURE WITH HYPOXIA AND HYPERCAPNIA: Primary | ICD-10-CM

## 2017-05-12 DIAGNOSIS — J96.11 CHRONIC RESPIRATORY FAILURE WITH HYPOXIA AND HYPERCAPNIA: ICD-10-CM

## 2017-05-12 DIAGNOSIS — J96.12 CHRONIC RESPIRATORY FAILURE WITH HYPOXIA AND HYPERCAPNIA: ICD-10-CM

## 2017-05-12 PROBLEM — D72.829 LEUKOCYTOSIS: Status: RESOLVED | Noted: 2017-05-06 | Resolved: 2017-05-12

## 2017-05-12 LAB
ALBUMIN SERPL BCP-MCNC: 3.5 G/DL
ALP SERPL-CCNC: 68 U/L
ALT SERPL W/O P-5'-P-CCNC: 20 U/L
ANION GAP SERPL CALC-SCNC: 8 MMOL/L
AST SERPL-CCNC: 14 U/L
BILIRUB SERPL-MCNC: 0.5 MG/DL
BUN SERPL-MCNC: 11 MG/DL
CALCIUM SERPL-MCNC: 9.7 MG/DL
CHLORIDE SERPL-SCNC: 95 MMOL/L
CHOLEST/HDLC SERPL: 2.7 {RATIO}
CO2 SERPL-SCNC: 35 MMOL/L
CREAT SERPL-MCNC: 0.8 MG/DL
EST. GFR  (AFRICAN AMERICAN): >60 ML/MIN/1.73 M^2
EST. GFR  (NON AFRICAN AMERICAN): >60 ML/MIN/1.73 M^2
GLUCOSE SERPL-MCNC: 162 MG/DL
HDL/CHOLESTEROL RATIO: 36.5 %
HDLC SERPL-MCNC: 252 MG/DL
HDLC SERPL-MCNC: 92 MG/DL
LDLC SERPL CALC-MCNC: 129.2 MG/DL
NONHDLC SERPL-MCNC: 160 MG/DL
POTASSIUM SERPL-SCNC: 4.2 MMOL/L
PREALB SERPL-MCNC: 24 MG/DL
PROT SERPL-MCNC: 6.4 G/DL
SODIUM SERPL-SCNC: 138 MMOL/L
TRIGL SERPL-MCNC: 154 MG/DL
TSH SERPL DL<=0.005 MIU/L-ACNC: 1.94 UIU/ML
VIT B12 SERPL-MCNC: >2000 PG/ML

## 2017-05-12 PROCEDURE — 84443 ASSAY THYROID STIM HORMONE: CPT

## 2017-05-12 PROCEDURE — 36415 COLL VENOUS BLD VENIPUNCTURE: CPT

## 2017-05-12 PROCEDURE — 82607 VITAMIN B-12: CPT

## 2017-05-12 PROCEDURE — 80061 LIPID PANEL: CPT

## 2017-05-12 PROCEDURE — 99999 PR PBB SHADOW E&M-EST. PATIENT-LVL IV: CPT | Mod: PBBFAC,,,

## 2017-05-12 PROCEDURE — 80053 COMPREHEN METABOLIC PANEL: CPT

## 2017-05-12 PROCEDURE — 84134 ASSAY OF PREALBUMIN: CPT

## 2017-05-12 RX ORDER — ATORVASTATIN CALCIUM 40 MG/1
40 TABLET, FILM COATED ORAL DAILY
Qty: 30 TABLET | Refills: 3 | Status: SHIPPED | OUTPATIENT
Start: 2017-05-12 | End: 2018-06-22 | Stop reason: SDUPTHER

## 2017-05-12 RX ORDER — FLUTICASONE FUROATE AND VILANTEROL 100; 25 UG/1; UG/1
1 POWDER RESPIRATORY (INHALATION) DAILY
Qty: 60 EACH | Refills: 5 | Status: SHIPPED | OUTPATIENT
Start: 2017-05-12 | End: 2017-06-01 | Stop reason: SDUPTHER

## 2017-05-12 NOTE — PROGRESS NOTES
HOME HEALTH ORDERS  FACE TO FACE ENCOUNTER    Patient Name: Ann Elizabeth Canavan  YOB: 1960    PCP: Trinh Bowles MD   PCP Address: [address] / [place] [address]  PCP Phone Number: [phone]  PCP Fax: [phone]    Encounter Date: 05/12/2017    Admit to Home Health    Diagnoses:  Chronic respiratory failure with hypoxia and hypercapnia  Panlobular emphysema  Type 2 diabetes mellitus without complication, without long-term current use of insulin  Tobacco dependence  Glucose intolerance (impaired glucose tolerance)  Hyperlipidemia  Severe episode of recurrent major depressive disorder, without psychotic features  Complicated grieving  Right leg pain  Malnutrition  Debility  Claudication in peripheral vascular disease  Otosclerosis of right ear  Conductive hearing loss of right ear with unrestricted hearing of left ear  Multiple renal cysts  Pulmonary nodule seen on imaging study    Future Appointments  Date Time Provider Department Center   6/1/2017 1:00 PM GERHARD Bernal MD [facility] Govind rafael   6/8/2017 9:20 AM Trinh Bowles MD [facility] PC[facility]       I have seen and examined this patient face to face today. My clinical findings that support the need for the home health skilled services and home bound status are the following:  Weakness/numbness causing balance and gait disturbance due to Weakness/Debility and Peripheral Vascular Disease and COPD/Emphysema on Oxygen making it taxing to leave home.    Allergies:  Review of patient's allergies indicates:   Allergen Reactions    Avelox [moxifloxacin] Itching and Rash     IV     Diet: diabetic diet: 2000 calorie and supplements: Boost Glucose Control TID with meals    Activities: activity as tolerated and as directed by PT    Nursing:   SN to complete comprehensive assessment including routine vital signs. Instruct on disease process and s/s of complications to report to MD. Review/verify medication list sent home with the patient  at time of discharge and instruct patient/caregiver as needed. Frequency may be adjusted depending on start of care date.    Notify MD if SBP > 160 or < 90; DBP > 90 or < 50; HR > 120 or < 50; Temp > 101;       CONSULTS:    Physical Therapy to evaluate and treat. Evaluate for home safety and equipment needs; Establish/upgrade home exercise program. Perform / instruct on therapeutic exercises, gait training, transfer training, and Range of Motion.  Occupational Therapy to evaluate and treat. Evaluate home environment for safety and equipment needs. Perform/Instruct on transfers, ADL training, ROM, and therapeutic exercises.   to evaluate for community resources/long-range planning.  Aide to provide assistance with personal care, ADLs, and vital signs.     MISCELLANEOUS CARE:  Home Oxygen:  Oxygen at 2.5 L/min nasal canula to be used:  At bedtime, As needed for SOB and and with Exertion. and Assess oxygen saturation via pulse oximeter as needed for increase in SOB.    WOUND CARE ORDERS  n/a    Medications: Review discharge medications with patient and family and provide education.         Medication List          This list is accurate as of: 5/12/17  6:39 PM.  Always use your most recent med list.                     albuterol 90 mcg/actuation inhaler   Inhale 2 puffs into the lungs every 6 (six) hours as needed for Wheezing. Rescue       albuterol-ipratropium 2.5mg-0.5mg/3mL 0.5 mg-3 mg(2.5 mg base)/3 mL nebulizer solution   Commonly known as:  DUO-NEB   Take 3 mLs by nebulization every 6 (six) hours as needed for Wheezing or Shortness of Breath. Rescue       aspirin 81 MG EC tablet   Commonly known as:  ECOTRIN       atorvastatin 40 MG tablet   Commonly known as:  LIPITOR   Take 1 tablet (40 mg total) by mouth once daily.       fluticasone 50 mcg/actuation nasal spray   Commonly known as:  FLONASE   1 spray by Each Nare route once daily.       fluticasone-vilanterol 100-25 mcg/dose diskus inhaler    Commonly known as:  BREO   Inhale 1 puff into the lungs once daily. Controller       guaifenesin 600 mg 12 hr tablet   Commonly known as:  MUCINEX       loratadine 10 mg tablet   Commonly known as:  CLARITIN       nicotine 21 mg/24 hr   Commonly known as:  NICODERM CQ   Place 1 patch onto the skin once daily.       umeclidinium 62.5 mcg/actuation Dsdv   Inhale 62.5 mcg into the lungs once daily. Controller            Where to Get Your Medications      You can get these medications from any pharmacy     Bring a paper prescription for each of these medications     atorvastatin 40 MG tablet    fluticasone-vilanterol 100-25 mcg/dose diskus inhaler    umeclidinium 62.5 mcg/actuation Dsdv           I certify that this patient is confined to her home and needs intermittent skilled nursing care, physical therapy and occupational therapy.      Fallon Tovar MD    [facility]  Phone: [phone]  Fax: [phone]

## 2017-05-12 NOTE — PATIENT INSTRUCTIONS
Dear Ann Elizabeth Canavan,     Thank you for choosing [facility] for your healthcare needs.    COPD/Emphysema: START using your home Oxygen with exertion and at night. Continue taking your medications as prescribed. I've written prescriptions for Encruse (to replace Spiriva) and Breo (to replace Advair) for you to use in the future. Follow up with Pulmonology as scheduled.   Peripheral Artery Disease: START taking Atorvastatin 40mg nightly, which will help with your cholesterol and right leg pain with walking. Continue taking aspirin every day.  Tobacco dependence: Continue your efforts to stop smoking. We have referred you to the tobacco cessation clinic.   Depression: We've referred you to Psychiatry and Psychology. Please call them to set up an appointment.     We've referred you to Outpatient Case Management and home health for additional support. We will call you if any changes need to be made, and feel free to call us if you have any questions or need assistance.     It was a pleasure taking care of you, and we wish you the best health!     Sincerely,     Fallon Tovar MD  Hospital Medicine  [facility]     Peripheral Artery Disease (PAD)    Peripheral artery disease (PAD) occurs when the arteries that carry blood to the limbs are narrowed or blocked. This is usually due to a buildup of a fatty substance called plaque in the walls of the arteries.  PAD most often affects the arteries in the legs. When these arteries are narrowed or blocked, blood flow to the legs is reduced. This can cause leg and foot pain and other symptoms. If severe enough, reduced blood flow to the legs can lead to tissue death (gangrene) and the loss of a toe, foot, or leg. Having PAD also makes it more likely that arteries in other body areas are blocked. For instance, arteries that carry blood to the heart or brain may be affected. This raises the chances of heart attack and stroke.  Risk factors  Certain  factors can make PAD more likely. They include:  · Smoking  · Diabetes  · High blood pressure  · Unhealthy cholesterol levels  · Obesity  · Inactive lifestyle  · Older age  · Family history of PAD  Symptoms  Many people with PAD have no symptoms. If symptoms do occur, they can include:  · Pain in the muscles of the calves, thighs or hips that gets worse with activity and better with rest (intermittent claudication)  · Achy, tired, or heavy feeling in the legs  · Weakness, numbness, tingling, or loss of feeling in the legs  · Changes in skin color of the legs  · Sores on the legs and feet  · Cold leg, feet, or toes  · Pain the feet or toes even when lying down (rest pain)  Home care  PAD is a chronic (lifelong) condition. Treatment is focused on managing your condition and lowering your health risks. This may include doing the following:  · If you smoke, quit. This helps prevent further damage to your arteries and lowers your health risks. Ask your provider about medicines or products that can help you quit smoking. Also consider joining a stop-smoking program or support group.  · Be more active. This helps you lose weight and manage problems such as high blood pressure and unhealthy cholesterol levels. Start a walking program if advised to by your provider. Your provider may also help you form a safe exercise program that is right for your needs.  · Make healthy eating changes. This includes eating less fat, salt, and sugar.  · Take medicines for high blood pressure, unhealthy cholesterol levels, and diabetes as directed.  · Have your blood pressure and cholesterol levels checked as often as directed.  · If you have diabetes, try to keep your blood sugar well controlled. Test your blood sugar as directed.  · If you are overweight, talk to your provider about forming a weight-loss plan.  · Watch for cuts, scrapes, or open sores on your feet. Poor blood flow to the feet may slow healing and increase the risk of  infection from these problems.   Follow-up care  Follow up with your healthcare provider, or as advised. If imaging tests such as ultrasound were done, they will be reviewed by a doctor. You will be told the results and any new findings that may affect your care.  When to seek medical advice   Call your healthcare provider right away if any of these occur:  · Sudden severe pain in the legs or feet  · Sudden cold, pale or blue color in the legs or feet  · Weakness or numbness in the legs or feet that worsens  · Any sore or wound in the legs or feet that wont heal  · Weak pulse in your legs or feet  Know the Signs of Heart Attack and Stroke  People with PAD are at high risk for heart attack and stroke. Knowing the signs of these problems can help you protect your health and get help when you need it. Call 911 right away if you have any of the following:  Signs of a Heart Attack  · Chest discomfort, such as pain, aching, tightness, or pressure that lasts more than a few minutes, or that comes and goes  · Pain or discomfort in the arms, back, shoulders, neck, or jaw  · Shortness of breath  · Sweating (often a cold, clammy sweat)  · Nausea  · Lightheadedness  Signs of a Stroke  · Sudden numbness or weakness of the face, arms, or legs, especially on one side  · Sudden confusion or trouble speaking or understanding  · Sudden trouble seeing in one or both eyes  · Sudden trouble walking, dizziness, or loss of balance  · Sudden, severe headache with no known cause   Date Last Reviewed: 9/21/2015  © 3717-5776 [employer]. [address] [place] [address], [place] [address]. All rights reserved. This information is not intended as a substitute for professional medical care. Always follow your healthcare professional's instructions.

## 2017-05-12 NOTE — PROGRESS NOTES
[facility]  New Visit Progress Note   Recent Hospital Discharge     PRESENTING HISTORY     Chief Complaint/Reason for Visit:  Follow up Hospital Discharge   Chief Complaint   Patient presents with    Hospital Follow Up     PCP: Primary Doctor No    History of Present Illness: Ms. Ann Elizabeth Canavan is a 56 y.o. female who was recently admitted to the hospital.    Admit Date: 5/6/2017  Discharge Date: 05/09/2017  Discharge Attending Physician: Sarah Krueger MD   Resident on Service: Dr. Rai     Chief Complaint: COPD Exacerbation     HOSPITAL COURSE:   Initial Presentation:  Ann Canavan is a 56 y.o. female, known to have COPD with previous COPD exacerbations, ex smoker,Diabetes mellitus not on medications. She presented to ED with shortness of breath, dyspnea on exertion for the last couple of days. This was associated with dry cough, and feeling of chest tightness. She denies fever, phlegm production, chest pain, palpitation or exposure to smoker for the last couple of days. Of note, she was discharged on 5/1/2017 after 5 days course of hospital admission for COPD exacerbation. She was treated with breathing treatment, steroids and was qualified for oxygen for home use. No antibiotic given on the last admission; however, recent antibiotic was prescribed yesterday (Doxycyclin).      Course of Principle Problem for Admission:  In ED, pt was hypoxic to 70% on RA but subsequently okay on 2L NC, also tachy to 144, tachypneic at 32. She was given her recently prescribed Doxycycline, breathing treatment and continuous oxygen therapy. CTA showed no PE, CXR showed no opacification concerning for pneumonia. Pt admitted to hospital medicine for COPD exacerbation.       5/8/17: NAEON. But pt states she feels her chest is tighter, however she felt better after receiving her neb treatments. Pt later in the day complained of chest pain, EKG showed sinus tachycardia and RA enlargement. Will monitor patient overnight  and discharge tomorrow.  5/9: Pt resting comfortably in bed. No chest pain or SOB. No wheezing on physical exam. Discharge home today with Doxycycline and Prednisone. Nebulizer at bedside.       ___________________________________________________________________    Today:  This week patient is feeling better for the first time in months.     COPD  Reports BOGGS, but no SOB at rest. Reports getting SOB if walking about 50 meters. Patient is unable to vaccuum at home without having to sit down and catch her breath. Gets associated palpitations. Denies CP.   Patient has home O2, using 2.5L home O2 at night for sleep and occasionally after exertion when she feels winded. O2 sat at home ranges from high 70s - low 90's on room air.    Today is last day of abx and steroid. Denies cough currently. Reports COPD symptoms started in 2010, diagnosis given in 2012 or 2014. Patient last saw pcp in [place] more than 2 years ago.    Trying to quit smoking, currently 5 cigs per day.    R Leg Pain  Reports pain in R leg that has been going on for years, but is now worsening. Reports burning R calf pain with exertion for the last year. Now gets R thigh pain too, which is burning in nature as well and occurs only with movement. Pain is 10/10 pain when it comes on exertion, requiring patient to sit down and rest about 1-2 minutes, then it resolves. Denies swelling in leg, rash, or night time pain. Was taking cardio for life which helped dull her pain, but has stopped taking this.     Depression  Has had a lot of recent stressors. Reports that she's been depressed for years, however it's worsened now in setting of former boss dying (see SH section for more details), recent move, lack of support system, and medical illness. Reports loss of appetite, anhedonia, and anxiety. Has stopped going places due to fear of getting SOB and pain in R leg. Denies SI/HI/AH/VH. Patient is quite tearful throughout encounter.    WT Loss  -145 a year ago per  patient. WT 55.3 kg (121 lb 14.4 oz) on 5/9. Today weighs 118 lbs. Previous assessment by nutrition while inpatient in March: Malnutrition r/t decreased intake 2/2 decrease energy output AEB wt loss of 10-15lbs in 2 mo, pt states to weak to prepare food.     Review of Systems:  Review of Systems   Constitutional: Positive for activity change, appetite change (loss of appetitie) and unexpected weight change. Negative for fatigue and fever.   HENT: Positive for hearing loss. Negative for congestion (resolved with mucinex) and sore throat.    Respiratory: Positive for shortness of breath (on exertion). Negative for cough and wheezing.    Cardiovascular: Positive for palpitations. Negative for chest pain and leg swelling.   Gastrointestinal: Negative for abdominal pain, blood in stool, constipation, nausea and vomiting.   Endocrine: Negative for cold intolerance and polydipsia (drinks about 1.5L water a day).   Genitourinary: Negative for dysuria and hematuria.   Musculoskeletal: Positive for gait problem (pain with walking) and myalgias (cramping in right leg intermittently). Negative for back pain.   Skin: Negative for pallor and rash.   Neurological: Positive for weakness and light-headedness (with standing). Negative for dizziness, numbness and headaches.   Psychiatric/Behavioral: Positive for decreased concentration and dysphoric mood. Negative for confusion, hallucinations, sleep disturbance (improving now) and suicidal ideas. The patient is nervous/anxious.    All other systems reviewed and are negative.    PAST HISTORY:     Past Medical History:   Diagnosis Date    Chronic respiratory failure with hypoxia and hypercapnia 5/4/2014    COPD (chronic obstructive pulmonary disease)     Depression     Hyperlipidemia 5/12/2017    Otosclerosis of right ear 5/12/2017    40% hearing back in 1988, had surgery with some improvement, but hearing loss persists.      PAD (peripheral artery disease)     Post-menopausal      Pulmonary emphysema 2017    Tobacco dependence 2014    Type 2 diabetes mellitus without complication, without long-term current use of insulin 2017    Hb A1c 6.5% in 2017. Diet controlled.      Past Surgical History:   Procedure Laterality Date    GANGLION CYST EXCISION Right     STAPEDES SURGERY Right      Family History   Problem Relation Age of Onset    Diabetes Mother     Heart disease Father      Strong FH of CAD/CHF on fathers side    Peripheral vascular disease Brother      With necrosis of leg    Diabetes Maternal Uncle     Cancer Neg Hx     Thyroid disease Neg Hx       Social History     Social History    Marital status:      Spouse name: N/A    Number of children: N/A    Years of education: N/A     Social History Main Topics    Smoking status: Heavy Tobacco Smoker     Packs/day: 1.00     Types: Cigarettes    Smokeless tobacco: None    Alcohol use 0.5 oz/week     1 drink(s) per week    Drug use: No    Sexual activity: Not Asked     Other Topics Concern    None     Social History Narrative    Reports 6-8 years of sedentary lifestyle, where she lived where she worked, only walking 20 paces per day. 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 Hasn't worked this year due to illness and recent move.         Has had a lot of recent stressors. 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 Reports environment there is terrible, with a lot of dust . For the past 2-3 years, patient had been living alone in [place], and boss lived in [place] in the patient's house that she'd bought. Patient came to [place] in January when she was sick, and at that time she decided not to move back to [place].         Patient is Kisw[place]ili.      MEDICATIONS & ALLERGIES:     Current Outpatient Prescriptions on File Prior to Visit   Medication Sig Dispense Refill     "albuterol-ipratropium 2.5mg-0.5mg/3mL (DUO-NEB) 0.5 mg-3 mg(2.5 mg base)/3 mL nebulizer solution Take 3 mLs by nebulization every 6 (six) hours as needed for Wheezing or Shortness of Breath. Rescue 99 vial 0    aspirin (ECOTRIN) 81 MG EC tablet Take 81 mg by mouth once daily.      fluticasone (FLONASE) 50 mcg/actuation nasal spray 1 spray by Each Nare route once daily. 1 Bottle 1    fluticasone-salmeterol 250-50 mcg/dose (ADVAIR) 250-50 mcg/dose diskus inhaler Inhale 1 puff into the lungs 2 (two) times daily. 60 each 11    loratadine (CLARITIN) 10 mg tablet Take 10 mg by mouth daily as needed for Allergies.      nicotine (NICODERM CQ) 21 mg/24 hr Place 1 patch onto the skin once daily. 21 patch 0    tiotropium (SPIRIVA) 18 mcg inhalation capsule Inhale 1 capsule (18 mcg total) into the lungs once daily. 30 capsule 11    albuterol 90 mcg/actuation inhaler Inhale 2 puffs into the lungs every 6 (six) hours as needed for Wheezing. Rescue 18 g 11    guaifenesin (MUCINEX) 600 mg 12 hr tablet Take 600 mg by mouth 2 (two) times daily.       [DISCONTINUED] doxycycline (VIBRAMYCIN) 100 MG Cap Take 1 capsule (100 mg total) by mouth 2 (two) times daily. 16 capsule 0    [DISCONTINUED] predniSONE (DELTASONE) 20 MG tablet Take 2 tablets (40 mg total) by mouth once daily. 6 tablet 0     No current facility-administered medications on file prior to visit.      Review of patient's allergies indicates:   Allergen Reactions    Avelox [moxifloxacin] Itching and Rash     IV     Medications have been reviewed and reconciled.    OBJECTIVE:     Vital Signs:  Vitals:    05/12/17 1006   BP: 116/72   BP Location: Left arm   Patient Position: Sitting   BP Method: Manual   Pulse: 110   SpO2: (!) 93%   Weight: 53.8 kg (118 lb 9.7 oz)   Height: 5' 6" (1.676 m)     /78  with standing, no symptoms of orthostasis.  Desated to 82% on RA with exertion.     Wt Readings from Last 1 Encounters:   05/12/17 1006 53.8 kg (118 lb 9.7 " oz)   55.3 kg (121 lb 14.4 oz) at discharge on 5/9.   Body mass index is 19.14 kg/(m^2).     Physical Exam:  Physical Exam   Constitutional: She appears distressed (crying frequently during encounter).   Thin   HENT:   Head: Normocephalic and atraumatic.   Right Ear: Decreased hearing is noted.   Left Ear: No decreased hearing is noted.   Mouth/Throat: Dental caries present.   Eyes: EOM are normal.   Cardiovascular: Regular rhythm, normal heart sounds and intact distal pulses.  Tachycardia present.  Exam reveals decreased pulses.    Pulses:       Radial pulses are 2+ on the right side, and 2+ on the left side.        Dorsalis pedis pulses are 1+ on the right side, and 1+ on the left side.   Pulmonary/Chest: Effort normal. No accessory muscle usage. She has no wheezes. She has no rhonchi. She has no rales.   Abdominal: Soft. She exhibits no distension. There is no tenderness.   Musculoskeletal: She exhibits no edema or tenderness.   Neurological: She is alert.   Skin: Skin is dry and intact. There is pallor.   Psychiatric: Her speech is normal. Her mood appears anxious. She exhibits a depressed mood. She expresses no homicidal and no suicidal ideation. She expresses no suicidal plans and no homicidal plans.     Laboratory  Lab Results   Component Value Date    WBC 7.48 05/09/2017    HGB 13.7 05/09/2017    HCT 42.3 05/09/2017    MCV 98 05/09/2017     (L) 05/09/2017     BMP  Lab Results   Component Value Date     05/09/2017    K 3.6 05/09/2017     05/09/2017    CO2 36 (H) 05/09/2017    BUN 14 05/09/2017    CREATININE 0.7 05/09/2017    CALCIUM 9.0 05/09/2017    ANIONGAP 6 (L) 05/09/2017    ESTGFRAFRICA >60.0 05/09/2017    EGFRNONAA >60.0 05/09/2017     Lab Results   Component Value Date    ALT 12 05/09/2017    AST 11 05/09/2017    ALKPHOS 65 05/09/2017    BILITOT 0.3 05/09/2017     Lab Results   Component Value Date    INR 0.9 04/27/2017     Lab Results   Component Value Date    HGBA1C 5.8 05/06/2017      Recent Labs      05/09/17   1154   POCTGLUCOSE  200*     Diagnostic Results:  CTA 5/6/2017  Findings:  The structures at the base of the neck are grossly unremarkable.  There is no significant mediastinal lymphadenopathy.  There is atherosclerotic calcification of the aorta and its branches, and minimally in the distribution of the coronary arteries.  The heart is not enlarged.  No significant pericardial fluid.  The esophagus is grossly unremarkable.  Limited dilation of the abdominal content including the spleen, pancreas, liver, and adrenal glands are grossly unremarkable.  There are 2 low attenuating lesions arising from the interpolar region of the left kidney, the largest measures approximately 2.8 cm, attenuation of these lesions is slightly high and would be expected for simple cysts, could reflect high protein content or hemorrhagic cyst although nonspecific.  Bolus timing is suboptimal for the evaluation of pulmonary thromboembolism, however no convincing pulmonary arterial filling defect to the level of the lobar arteries and several segmental arteries bilaterally to suggest pulmonary thromboembolism.  The airways are patent.  There is diffuse emphysematous change of the lungs bilaterally.  There are several scattered regions of groundglass attenuation, and tree in bud nodular opacity, nonspecific, could reflect spell of small airways disease, edema, or other infectious or inflammatory pneumonitis.  No large focal consolidation.  There is a pulmonary nodule within the left upper lobe measuring 0.4 cm.  There is compressive atelectasis of the right lower lobe, adjacent to a small focus of diaphragmatic eventration.  No significant pleural fluid.  Degenerative changes are noted of the thoracic spine without focal osseous destructive process.  No significant axillary lymphadenopathy.  Impression   1.  No convincing evidence of pulmonary thromboembolism noting exam limitations above.  2.  Scattered  regions of groundglass attenuation with associated tree in bud nodular opacity, could reflect edema, or infectious/non-infectious inflammation, clinical correlation is recommended.  0.4 cm pulmonary nodule within the left upper lobe is nonspecific, however given underlying parenchymal emphysematous changes, one year followup is recommended versus comparison with any more recent exams.  3.  Probable left renal cysts however ultrasound could be performed to confirm cystic nature as these measure slightly higher attenuation than would be expected for simple cysts.  4.  Several additional findings above.    TTE 5/8/2017    1 - Normal left ventricular systolic function (EF 55-60%).     2 - No wall motion abnormalities.     3 - Normal left ventricular diastolic function.     4 - Mildly depressed right ventricular systolic function .     5 - Increased central venous pressure.     DELORIS's 5/1/2014  Right:   Rest:  - right ankle 111/78  - right arm 142/91  - DELORIS 0.8    Left:  Rest:  -left ankle 116/59  -left arm 130/86  - DELORIS 0.8    Impression:  Bilateral decreased ABIs indicating likely moderate stenosis bilaterally    TRANSITION OF CARE:     [facility] On Call Contact Note: 5/11/2017    Family and/or Caretaker present at visit?  No.  Diagnostic tests reviewed/disposition: No diagnosic tests pending after this hospitalization.  Disease/illness education: COPD/Emphysema, Smoking Cessation, HLD, Medications  Home health/community services discussion/referrals: Patient does not have home health established from hospital visit.  They do need home health.  If needed, we will set up home health for the patient.   Establishment or re-establishment of referral orders for community resources: Pulmonology, Primary Care Establishment, Case Management, Tobacco Cessation.   Discussion with other health care providers: Will route this note to patient's providers. .     Medications Reconciliation:   I have reconciled the patient's home  medications and discharge medications with the patient/family. I have updated all changes.  Refer to After-Visit Medication List.    ASSESSMENT & PLAN:     HIGH RISK CONDITION(S):  Patient has a condition that poses threat to life and bodily function: Chronic Respiratory Failure    Chronic respiratory failure with hypoxia and hypercapnia  Panlobular emphysema  COPD   Worsening O2 requirements s/p recent hospitalization. No wheezing on exam today, currently on last day of doxy/prednisone. CTA reviewed.   -     Increase Home O2 to 2.5L with exertion in addition to nightly.   - Continue current medications, however once out of Spriva, switch to Incruse, and once out of Advair, switch to Breo as these are preferred for pt's insurance  - Counseled on tobacco cessation and risks of smoking with home O2  - Patient due for PPSV23 vaccine, she's s/p PCV13 5/2/2014 and influenza 05/2017   - F/U with Pulmonary scheduled for 6/1, patient in need of 6 minute walk test, O2 needs reassessment, PFTs, and may benefit from Pulmonary rehab  -  Consider Occupational Medicine Referral to for full assessment of patient's environmental exposures if needed    RX:  -     umeclidinium 62.5 mcg/actuation DsDv; Inhale 62.5 mcg into the lungs once daily. Controller  Dispense: 30 each; Refill: 11  -     fluticasone-vilanterol (BREO) 100-25 mcg/dose diskus inhaler; Inhale 1 puff into the lungs once daily. Controller  Dispense: 60 each; Refill: 5    Tobacco dependence   Persists, however patient is motivated to quit.   -     Counseled patient on tobacco cessation  - Continue Nicotine Patches  -     Ambulatory referral to Smoking Cessation Program  -  Patient may benefit from starting varenicline or bupropion, however deferred initiation at this time until a more comprehensive psychiatric evaluation can be done    Type 2 diabetes mellitus without complication, without long-term current use of insulin  Glucose intolerance (impaired glucose  tolerance)  Improved. Diet controlled. A1C 5.8 in 5/2017 from 6.5 in 2/2017.   -    Continue Diabetic Diet   - Recommend continued monitoring of A1C given impaired glucose tolerance and frequent need for steroids for COPD exacerbation    Hyperlipidemia, mixed  Persists. In need of statin.   Evaluation:  -     Lipid panel; Future; Expected date: 5/12/17  -  Result -  HDL 92 .2    Plan:   -     Start atorvastatin (LIPITOR) 40 MG tablet; Take 1 tablet (40 mg total) by mouth once daily.  Dispense: 90 tablet; Refill: 3  -  Recommend follow up lipids in 3 months by PCP and titrate dose up if needed    Severe episode of recurrent major depressive disorder, without psychotic features  Complicated Grieving  Severe. Depressive symptoms worsened now in setting of former boss dying (see SH section for more details), recent move, lack of support system, and medical illness. Has associated WT loss, lost appetite, anhedonia, and anxiety. Denies SI/HI or psychotic symptoms.   -     F/U TSH; Future; Expected date: 5/12/17  -     F/U Vitamin B12; Future; Expected date: 5/12/17  - Ambulatory referral to Psychiatry placed  -     Ambulatory Referral to Psychology placed  - Ambulatory referral to Outpatient Case Management    Malnutrition/WT Loss  Suspect related to depression, however severity of lung disease and chronic illness is yet to be established, and patient needs age appropriate CA screening.   -     Start Boost Glucose Control Supplements TID with meals  -  In addition to above: F/U PREALBUMIN; Future; Expected date: 5/12/17  -  Patent may benefit from mirtazepine, however defer initiation until psych examination  -  Recommend age appropriate CA screening  - Ambulatory referral to nutrition   -  Ambulatory referral to Outpatient Case Management    Claudication in peripheral vascular disease  Right leg pain  Worsening. DELORIS's 0.8 bilaterally in 2014. Lucy Stage 2B (claudication at a distance of less than  200 meters). No rest pain or night pain. No signs of critical limb ischemia, tissue loss, or ulcers.   -  Risk factor reduction: Aspirin, tobacco cessation, start Statin as above, keep A1c < 7, BP control  -  Start PT for exercise program  - Consider PMNR consult for evaluation of whether patient would benefit from rehab  - Consider referral to Vascular for reassessment if no improvement      Debility  -     Ambulatory referral to Outpatient Case Management  -     BATH/SHOWER CHAIR FOR HOME USE  -     COMPREHENSIVE METABOLIC PANEL; Future; Expected date: 5/12/17    Claudication in peripheral vascular disease  -     COMPREHENSIVE METABOLIC PANEL; Future; Expected date: 5/12/17  -     atorvastatin (LIPITOR) 40 MG tablet; Take 1 tablet (40 mg total) by mouth once daily.  Dispense: 90 tablet; Refill: 3    Otosclerosis of right ear  Conductive hearing loss of right ear with unrestricted hearing of left ear  Persists.   -     Ambulatory consult to ENT placed for reevaluation and audiometry    Multiple renal cysts  CT with 2 probable Left Renal Cysts, largest 2.8 cm, with higher attenuation than would be expected for simple cysts, could reflect high protein content or hemorrhagic cyst although nonspecific.       -  Consider ultrasound to confirm cystic nature as recommended by radiology    Pulmonary nodule seen on imaging study  Persists. CTA 5/6/2017 showed 0.4 cm pulmonary nodule within the left upper lobe.        -  Radiology recommends f/u in 1 year.        - F/U with Pulmonology and PCP as scheduled       - Recommend age appropriate CA screening    Appointment made with Dr. Bowles for primary care f/u, routine health maintenance, and to establish care.     Instructions for the patient:  COPD/Emphysema: START using your home Oxygen with exertion and at night. Continue taking your medications as prescribed. I've written prescriptions for Encruse (to replace Spiriva) and Breo (to replace Advair) for you to use in the future.  Follow up with Pulmonology as scheduled.   Peripheral Artery Disease: START taking Atorvastatin 40mg nightly, which will help with your cholesterol and right leg pain with walking. Continue taking aspirin every day.  Tobacco dependence: Continue your efforts to stop smoking. We have referred you to the tobacco cessation clinic.   Depression: We've referred you to Psychiatry and Psychology. Please call them to set up an appointment.    We've referred you to Outpatient Case Management and home health for additional support. We will call you if any changes need to be made, and feel free to call us if you have any questions or need assistance.   (Please see AVS for complete patient instructions and educational materials provided to patient at this visit)    Scheduled Follow-up :  Future Appointments  Date Time Provider Department Center   6/1/2017 1:00 PM GERHARD Bernal MD [facility]   6/8/2017 9:20 AM Trinh Bowles MD [facility] IM [facility]       After Visit Medication List :     Medication List          This list is accurate as of: 5/12/17  8:16 PM.  Always use your most recent med list.                     albuterol 90 mcg/actuation inhaler   Inhale 2 puffs into the lungs every 6 (six) hours as needed for Wheezing. Rescue       albuterol-ipratropium 2.5mg-0.5mg/3mL 0.5 mg-3 mg(2.5 mg base)/3 mL nebulizer solution   Commonly known as:  DUO-NEB   Take 3 mLs by nebulization every 6 (six) hours as needed for Wheezing or Shortness of Breath. Rescue       aspirin 81 MG EC tablet   Commonly known as:  ECOTRIN       atorvastatin 40 MG tablet   Commonly known as:  LIPITOR   Take 1 tablet (40 mg total) by mouth once daily.       fluticasone 50 mcg/actuation nasal spray   Commonly known as:  FLONASE   1 spray by Each Nare route once daily.       fluticasone-vilanterol 100-25 mcg/dose diskus inhaler   Commonly known as:  BREO   Inhale 1 puff into the lungs once daily. Controller       guaifenesin 600 mg 12 hr tablet    Commonly known as:  MUCINEX       loratadine 10 mg tablet   Commonly known as:  CLARITIN       nicotine 21 mg/24 hr   Commonly known as:  NICODERM CQ   Place 1 patch onto the skin once daily.       umeclidinium 62.5 mcg/actuation Dsdv   Inhale 62.5 mcg into the lungs once daily. Controller            Where to Get Your Medications      You can get these medications from any pharmacy     Bring a paper prescription for each of these medications     atorvastatin 40 MG tablet    fluticasone-vilanterol 100-25 mcg/dose diskus inhaler    umeclidinium 62.5 mcg/actuation Dsdv           Signing Physician:  Fallon Tovar MD     Transition of Care Visit:  I have reviewed and updated the history and problem list.  I have reconciled the medication list.  I have discussed the hospitalization and current medical issues, prognosis and plans with the patient/family.  I  spent more than 50% of time discussing the care with the patient/family.  Total Encounter in the [facility]: 60 minutes.

## 2017-05-12 NOTE — PROGRESS NOTES
Please note the following patient's information has been forwarded to the [employer] for Case Management or .     Please see patient's  for additional referral information.    Please contact Outpatient Case Management at Ext. [phone] with questions.    Thank you,  Alyssia Hope, SSC

## 2017-05-12 NOTE — PROGRESS NOTES
PharmD [facility] Note      Date of Discharge: 5/9/17  Patient presents to clinic for follow-up after recent discharge from the hospital on 5/9/17. Patient was admitted on 5/6/17 for COPD exacerbation.    Patient is new to the area and struggling with inhaler cost. Has previously been obtaining inhaler RX from [facility] at $700/3 months but now reports recent qualification for Medicaid/[employer]. Patient is stopping last dose of doxycycline and prednisone today. Smoking cessation was discussed, now down to 5 cigarettes a day and attempting to avoid triggers. Reports some symptom relief with patch but still struggling with completely stopping. Patient reported she could not qualify for smoking cessation class as she does not have a [place] 's license.       Wt Readings from Last 3 Encounters:   05/12/17 53.8 kg (118 lb 9.7 oz)   05/09/17 55.3 kg (121 lb 14.4 oz)   04/28/17 53.5 kg (118 lb)     Temp Readings from Last 3 Encounters:   05/09/17 98.8 °F (37.1 °C) (Oral)   05/01/17 98 °F (36.7 °C) (Oral)   02/12/17 98.1 °F (36.7 °C) (Oral)     BP Readings from Last 3 Encounters:   05/12/17 116/72   05/09/17 134/84   05/01/17 (!) 148/84     Pulse Readings from Last 3 Encounters:   05/12/17 110   05/09/17 82   05/01/17 99     Recent Results (from the past 336 hour(s))   Basic Metabolic Panel (BMP)    Collection Time: 05/01/17  5:21 AM   Result Value Ref Range    Sodium 140 136 - 145 mmol/L    Potassium 5.1 3.5 - 5.1 mmol/L    Chloride 101 95 - 110 mmol/L    CO2 28 23 - 29 mmol/L    BUN, Bld 12 6 - 20 mg/dL    Creatinine 0.7 0.5 - 1.4 mg/dL    Calcium 9.5 8.7 - 10.5 mg/dL    Anion Gap 11 8 - 16 mmol/L   Basic Metabolic Panel (BMP)    Collection Time: 04/30/17  4:42 AM   Result Value Ref Range    Sodium 145 136 - 145 mmol/L    Potassium 3.4 (L) 3.5 - 5.1 mmol/L    Chloride 102 95 - 110 mmol/L    CO2 33 (H) 23 - 29 mmol/L    BUN, Bld 16 6 - 20 mg/dL    Creatinine 0.8 0.5 - 1.4 mg/dL    Calcium 9.1 8.7 - 10.5 mg/dL    Anion Gap  10 8 - 16 mmol/L   Basic Metabolic Panel (BMP)    Collection Time: 04/29/17  3:24 AM   Result Value Ref Range    Sodium 141 136 - 145 mmol/L    Potassium 3.8 3.5 - 5.1 mmol/L    Chloride 102 95 - 110 mmol/L    CO2 30 (H) 23 - 29 mmol/L    BUN, Bld 13 6 - 20 mg/dL    Creatinine 0.7 0.5 - 1.4 mg/dL    Calcium 9.0 8.7 - 10.5 mg/dL    Anion Gap 9 8 - 16 mmol/L       Patient's medication regimen at discharge was as follows:      Discharge Medication List:     Medication List          This list is accurate as of: 5/12/17 10:51 AM.  Always use your most recent med list.                     albuterol 90 mcg/actuation inhaler   Inhale 2 puffs into the lungs every 6 (six) hours as needed for Wheezing. Rescue       albuterol-ipratropium 2.5mg-0.5mg/3mL 0.5 mg-3 mg(2.5 mg base)/3 mL nebulizer solution   Commonly known as:  DUO-NEB   Take 3 mLs by nebulization every 6 (six) hours as needed for Wheezing or Shortness of Breath. Rescue       aspirin 81 MG EC tablet   Commonly known as:  ECOTRIN       fluticasone 50 mcg/actuation nasal spray   Commonly known as:  FLONASE   1 spray by Each Nare route once daily.       fluticasone-salmeterol 250-50 mcg/dose 250-50 mcg/dose diskus inhaler   Commonly known as:  ADVAIR   Inhale 1 puff into the lungs 2 (two) times daily.       guaifenesin 600 mg 12 hr tablet   Commonly known as:  MUCINEX       loratadine 10 mg tablet   Commonly known as:  CLARITIN       nicotine 21 mg/24 hr   Commonly known as:  NICODERM CQ   Place 1 patch onto the skin once daily.       tiotropium 18 mcg inhalation capsule   Commonly known as:  SPIRIVA   Inhale 1 capsule (18 mcg total) into the lungs once daily.             Medications have been reviewed and reconciled.    In home Medication list is consistent with discharge medication regimen.       Medication Therapy Plan for Medication related problems since discharge and Associated Resolutions:    1.  Device Counseling: Reviewed inhaler technique and frequency for  Advair, Spiriva, and albuterol.     2.  Medication Access: Patient has been paying out of pocket through [employer]. However, now qualifies for Medicaid [employer]. It appears that Spiriva is not covered and Incruse is preferred Tier 2, Advair is not preferred and the following options may be considered: Breo or Dulera may be considered.       Medication Therapy Plan:  1. COPD: Controlled per patient report. Completed doxy/prednisone therapy. Adherent with Spiriva, Advair, and albuterol inhaler/nebulizer. Now covered by medicaid [employer], a review of formulary may require a switch of spiriva to Incruse and Advair to Breo, Anoro, and Dulera. Incruse and Breo prescribed to be started when patient completes current Advair/Spiriva Rx.     2. Smoking cessation: Adherent with nicotine 21mcg patch. Encouraged patient to attempt preparation by clearing house of rossi trays, avoiding triggers, etc. Has expressed interest in trying buproprion for smoking cessation mentioned by pulmonogist and denied hx of anorexia or seizures. Will defer to psych evaluation before beginning therapy.     3. Vaccinations: Patient received Prevnar 13 in 2014 and influenza 05/2017, is a candidate for PPSV23 x1 for COPD.     Mei Richmond, PharmD   Pharmacy Resident, PGY1

## 2017-05-12 NOTE — MR AVS SNAPSHOT
[facility]  [address]  [facility] [address]  Phone: [phone]                  Ann Elizabeth Canavan   2017 10:00 AM   Office Visit    Description:  Female : 1960   Provider:  PHYSICIAN, [facility]   Department:  [facility]           Reason for Visit     Hospital Follow Up           Diagnoses this Visit        Comments    Chronic respiratory failure with hypoxia and hypercapnia    -  Primary     Pulmonary emphysema, unspecified emphysema type         Type 2 diabetes mellitus without complication, without long-term current use of insulin         Tobacco dependence         Glucose intolerance (impaired glucose tolerance)         Hyperlipidemia, unspecified hyperlipidemia type         Severe episode of recurrent major depressive disorder, without psychotic features         Complicated grieving         Right leg pain         Malnutrition         Debility         Claudication in peripheral vascular disease                To Do List           Future Appointments        Provider Department Dept Phone    2017 1:00 PM MD [facility] - Pulmonary Services [phone]    2017 9:20 AM Trinh Bowles MD [facility] - Internal Medicine [phone]      Goals (5 Years of Data)     None       These Medications        Disp Refills Start End    atorvastatin (LIPITOR) 40 MG tablet 90 tablet 3 2017    Take 1 tablet (40 mg total) by mouth once daily. - Oral    Pharmacy: [facility] - [address] Ph #: [phone]       umeclidinium 62.5 mcg/actuation DsDv 30 each 11 2017     Inhale 62.5 mcg into the lungs once daily. Controller - Inhalation    Pharmacy: [facility] - [place], [place] - [address] Ph #: [phone]       fluticasone-vilanterol (BREO) 100-25 mcg/dose diskus inhaler 60 each 2017     Inhale 1 puff into the lungs once daily.  Controller - Inhalation    Pharmacy: [facility] - [place] [address] [address]  #: [phone]         [facility]     [facility] Nurse Care Line - 24/7 Assistance  Unless otherwise directed by your provider, please contact [facility], our nurse care line that is available for 24/7 assistance.     Registered nurses in the [facility] provide: appointment scheduling, clinical advisement, health education, and other advisory services.  Call: [phone] (toll free)               Medications           Message regarding Medications     Verify the changes and/or additions to your medication regime listed below are the same as discussed with your clinician today.  If any of these changes or additions are incorrect, please notify your healthcare provider.        START taking these NEW medications        Refills    atorvastatin (LIPITOR) 40 MG tablet 3    Sig: Take 1 tablet (40 mg total) by mouth once daily.    Class: Print    Route: Oral    umeclidinium 62.5 mcg/actuation DsDv 11    Sig: Inhale 62.5 mcg into the lungs once daily. Controller    Class: Print    Route: Inhalation    fluticasone-vilanterol (BREO) 100-25 mcg/dose diskus inhaler 5    Sig: Inhale 1 puff into the lungs once daily. Controller    Class: Print    Route: Inhalation      STOP taking these medications     doxycycline (VIBRAMYCIN) 100 MG Cap Take 1 capsule (100 mg total) by mouth 2 (two) times daily.    predniSONE (DELTASONE) 20 MG tablet Take 2 tablets (40 mg total) by mouth once daily.    tiotropium (SPIRIVA) 18 mcg inhalation capsule Inhale 1 capsule (18 mcg total) into the lungs once daily.    fluticasone-salmeterol 250-50 mcg/dose (ADVAIR) 250-50 mcg/dose diskus inhaler Inhale 1 puff into the lungs 2 (two) times daily.           Verify that the below list of medications is an accurate representation of the medications you are currently taking.  If none reported, the list may be blank. If  "incorrect, please contact your healthcare provider. Carry this list with you in case of emergency.           Current Medications     albuterol-ipratropium 2.5mg-0.5mg/3mL (DUO-NEB) 0.5 mg-3 mg(2.5 mg base)/3 mL nebulizer solution Take 3 mLs by nebulization every 6 (six) hours as needed for Wheezing or Shortness of Breath. Rescue    aspirin (ECOTRIN) 81 MG EC tablet Take 81 mg by mouth once daily.    fluticasone (FLONASE) 50 mcg/actuation nasal spray 1 spray by Each Nare route once daily.    loratadine (CLARITIN) 10 mg tablet Take 10 mg by mouth daily as needed for Allergies.    nicotine (NICODERM CQ) 21 mg/24 hr Place 1 patch onto the skin once daily.    albuterol 90 mcg/actuation inhaler Inhale 2 puffs into the lungs every 6 (six) hours as needed for Wheezing. Rescue    atorvastatin (LIPITOR) 40 MG tablet Take 1 tablet (40 mg total) by mouth once daily.    fluticasone-vilanterol (BREO) 100-25 mcg/dose diskus inhaler Inhale 1 puff into the lungs once daily. Controller    guaifenesin (MUCINEX) 600 mg 12 hr tablet Take 600 mg by mouth 2 (two) times daily.     umeclidinium 62.5 mcg/actuation DsDv Inhale 62.5 mcg into the lungs once daily. Controller           Clinical Reference Information           Your Vitals Were     BP Pulse Height Weight Last Period SpO2    116/72 (BP Location: Left arm, Patient Position: Sitting, BP Method: Manual) 110 5' 6" (1.676 m) 53.8 kg (118 lb 9.7 oz) (LMP Unknown) 93%    BMI                19.14 kg/m2          Blood Pressure          Most Recent Value    BP  116/72      Allergies as of 5/12/2017     Avelox [Moxifloxacin]      Immunizations Administered on Date of Encounter - 5/12/2017     None      Orders Placed During Today's Visit      Normal Orders This Visit    Ambulatory referral to Outpatient Case Management     Ambulatory referral to Psychiatry     Ambulatory Referral to Psychology     Ambulatory referral to Smoking Cessation Program     BATH/SHOWER CHAIR FOR HOME USE     Future " Labs/Procedures Expected by Expires    COMPREHENSIVE METABOLIC PANEL  5/12/2017 7/11/2018    Lipid panel  5/12/2017 5/12/2018    PREALBUMIN  5/12/2017 7/11/2018    TSH  5/12/2017 7/11/2018    Vitamin B12  5/12/2017 5/12/2018      [facility] Sign-Up     Activating your [facility] account is as easy as 1-2-3!     1) Visit [email or web address], select Sign Up Now, enter this activation code and your date of birth, then select Next.  [email or web address]  Expires: 6/12/2017  3:08 PM      2) Create a username and password to use when you visit [facility] in the future and select a security question in case you lose your password and select Next.    3) Enter your e-mail address and click Sign Up!    Additional Information  If you have questions, please e-mail [email or web address] or call [phone] to talk to our [facility] staff. Remember, [facility] is NOT to be used for urgent needs. For medical emergencies, dial 911.         Instructions    Dear Ann Elizabeth Canavan,     Thank you for choosing [facility] for your healthcare needs.    COPD/Emphysema: START using your home Oxygen with exertion and at night. Continue taking your medications as prescribed. I've written prescriptions for Encruse (to replace Spiriva) and Breo (to replace Advair) for you to use in the future. Follow up with Pulmonology as scheduled.   Peripheral Artery Disease: START taking Atorvastatin 40mg nightly, which will help with your cholesterol and right leg pain with walking. Continue taking aspirin every day.  Tobacco dependence: Continue your efforts to stop smoking. We have referred you to the tobacco cessation clinic.   Depression: We've referred you to Psychiatry and Psychology. Please call them to set up an appointment.     We've referred you to Outpatient Case Management and home health for additional support. We will call you if any changes need to be made, and feel free to call us if you have any questions or need assistance.     It was  a pleasure taking care of you, and we wish you the best health!     Sincerely,     Fallon Tovar MD  Hospital Medicine [facility]     Peripheral Artery Disease (PAD)    Peripheral artery disease (PAD) occurs when the arteries that carry blood to the limbs are narrowed or blocked. This is usually due to a buildup of a fatty substance called plaque in the walls of the arteries.  PAD most often affects the arteries in the legs. When these arteries are narrowed or blocked, blood flow to the legs is reduced. This can cause leg and foot pain and other symptoms. If severe enough, reduced blood flow to the legs can lead to tissue death (gangrene) and the loss of a toe, foot, or leg. Having PAD also makes it more likely that arteries in other body areas are blocked. For instance, arteries that carry blood to the heart or brain may be affected. This raises the chances of heart attack and stroke.  Risk factors  Certain factors can make PAD more likely. They include:  · Smoking  · Diabetes  · High blood pressure  · Unhealthy cholesterol levels  · Obesity  · Inactive lifestyle  · Older age  · Family history of PAD  Symptoms  Many people with PAD have no symptoms. If symptoms do occur, they can include:  · Pain in the muscles of the calves, thighs or hips that gets worse with activity and better with rest (intermittent claudication)  · Achy, tired, or heavy feeling in the legs  · Weakness, numbness, tingling, or loss of feeling in the legs  · Changes in skin color of the legs  · Sores on the legs and feet  · Cold leg, feet, or toes  · Pain the feet or toes even when lying down (rest pain)  Home care  PAD is a chronic (lifelong) condition. Treatment is focused on managing your condition and lowering your health risks. This may include doing the following:  · If you smoke, quit. This helps prevent further damage to your arteries and lowers your health risks. Ask your provider about medicines or products that can  help you quit smoking. Also consider joining a stop-smoking program or support group.  · Be more active. This helps you lose weight and manage problems such as high blood pressure and unhealthy cholesterol levels. Start a walking program if advised to by your provider. Your provider may also help you form a safe exercise program that is right for your needs.  · Make healthy eating changes. This includes eating less fat, salt, and sugar.  · Take medicines for high blood pressure, unhealthy cholesterol levels, and diabetes as directed.  · Have your blood pressure and cholesterol levels checked as often as directed.  · If you have diabetes, try to keep your blood sugar well controlled. Test your blood sugar as directed.  · If you are overweight, talk to your provider about forming a weight-loss plan.  · Watch for cuts, scrapes, or open sores on your feet. Poor blood flow to the feet may slow healing and increase the risk of infection from these problems.   Follow-up care  Follow up with your healthcare provider, or as advised. If imaging tests such as ultrasound were done, they will be reviewed by a doctor. You will be told the results and any new findings that may affect your care.  When to seek medical advice   Call your healthcare provider right away if any of these occur:  · Sudden severe pain in the legs or feet  · Sudden cold, pale or blue color in the legs or feet  · Weakness or numbness in the legs or feet that worsens  · Any sore or wound in the legs or feet that wont heal  · Weak pulse in your legs or feet  Know the Signs of Heart Attack and Stroke  People with PAD are at high risk for heart attack and stroke. Knowing the signs of these problems can help you protect your health and get help when you need it. Call 911 right away if you have any of the following:  Signs of a Heart Attack  · Chest discomfort, such as pain, aching, tightness, or pressure that lasts more than a few minutes, or that comes and  goes  · Pain or discomfort in the arms, back, shoulders, neck, or jaw  · Shortness of breath  · Sweating (often a cold, clammy sweat)  · Nausea  · Lightheadedness  Signs of a Stroke  · Sudden numbness or weakness of the face, arms, or legs, especially on one side  · Sudden confusion or trouble speaking or understanding  · Sudden trouble seeing in one or both eyes  · Sudden trouble walking, dizziness, or loss of balance  · Sudden, severe headache with no known cause   Date Last Reviewed: 9/21/2015  © 7056-3061 [employer]. [address] [place] [address]. All rights reserved. This information is not intended as a substitute for professional medical care. Always follow your healthcare professional's instructions.             Smoking Cessation     If you would like to quit smoking:   You may be eligible for free services if you are a [place] resident and started smoking cigarettes before September 1, 1988.  Call the Smoking Cessation Trust ([facility]) toll free at (691) 035-8649 or (951) 568-7929.   Call 1-800-QUIT-NOW if you do not meet the above criteria.   Contact us via email: [email or web address]   View our website for more information: [email or web address]        Language Assistance Services     ATTENTION: Language assistance services are available, free of charge. Please call [phone].      ATENCIÓN: Si habla español, tiene a puente disposición servicios gratuitos de asistencia lingüística. Llame al [phone].     CHÚ Ý: N?u b?n nói Ti?ng Vi?t, có các d?ch v? h? tr? ngôn ng? mi?n phí dành cho b?n. G?i s? [phone].         [facility] complies with applicable Federal civil rights laws and does not discriminate on the basis of race, color, national origin, age, disability, or sex.

## 2017-05-15 ENCOUNTER — TELEPHONE (OUTPATIENT)
Dept: INTERNAL MEDICINE | Facility: CLINIC | Age: 57
End: 2017-05-15

## 2017-05-15 NOTE — TELEPHONE ENCOUNTER
----- Message from Fallon Tovar MD sent at 5/12/2017  8:38 PM CDT -----  [facility] Visit (Post Discharge Follow-up) Today:   - Our clinic physicians and nurses plan to follow the patient up for any medical issues in the [facility] for 30 days post discharge.  - Recommend Pneumovax at next presentation to [facility], patient is overdue      Future Appointments:  6/1/2017   1:00 PM    GERHARD Bernal MD      [facility]   [facility]  6/8/2017   9:20 AM    Trinh Bowles MD                [facility]        [facility]

## 2017-05-20 ENCOUNTER — NURSE TRIAGE (OUTPATIENT)
Dept: ADMINISTRATIVE | Facility: CLINIC | Age: 57
End: 2017-05-20

## 2017-05-20 NOTE — TELEPHONE ENCOUNTER
"    Reason for Disposition   [1] MODERATE difficulty breathing (e.g., speaks in phrases, SOB even at rest, pulse 100-120) AND [2] NEW-onset or WORSE than normal    Protocols used: ST BREATHING DIFFICULTY-A-AH    Patient reports that she is having difficulty breathing and was just released from the hospital several days ago. She feels like she is "getting worse". Patient advised to go to ED and not give herself the breathing treatments every 3 hrs as she wants to. Patient is not being managed by pulmonary yet. Patient verbalized understanding.   "

## 2017-05-21 ENCOUNTER — HOSPITAL ENCOUNTER (EMERGENCY)
Facility: HOSPITAL | Age: 57
Discharge: HOME | End: 2017-05-21
Attending: EMERGENCY MEDICINE
Payer: MEDICAID

## 2017-05-21 VITALS
OXYGEN SATURATION: 96 % | SYSTOLIC BLOOD PRESSURE: 131 MMHG | DIASTOLIC BLOOD PRESSURE: 75 MMHG | HEART RATE: 99 BPM | TEMPERATURE: 98 F | RESPIRATION RATE: 18 BRPM

## 2017-05-21 DIAGNOSIS — R06.02 SOB (SHORTNESS OF BREATH): ICD-10-CM

## 2017-05-21 DIAGNOSIS — J44.9 COPD (CHRONIC OBSTRUCTIVE PULMONARY DISEASE): ICD-10-CM

## 2017-05-21 LAB
ALBUMIN SERPL BCP-MCNC: 3.8 G/DL
ALLENS TEST: ABNORMAL
ALP SERPL-CCNC: 91 U/L
ALT SERPL W/O P-5'-P-CCNC: 19 U/L
ANION GAP SERPL CALC-SCNC: 10 MMOL/L
AST SERPL-CCNC: 14 U/L
BASOPHILS # BLD AUTO: 0.03 K/UL
BASOPHILS NFR BLD: 0.3 %
BILIRUB SERPL-MCNC: 0.4 MG/DL
BNP SERPL-MCNC: <10 PG/ML
BUN SERPL-MCNC: 9 MG/DL
CALCIUM SERPL-MCNC: 9.9 MG/DL
CHLORIDE SERPL-SCNC: 102 MMOL/L
CO2 SERPL-SCNC: 29 MMOL/L
CREAT SERPL-MCNC: 0.8 MG/DL
DELSYS: ABNORMAL
DIFFERENTIAL METHOD: ABNORMAL
EOSINOPHIL # BLD AUTO: 0.3 K/UL
EOSINOPHIL NFR BLD: 2.4 %
ERYTHROCYTE [DISTWIDTH] IN BLOOD BY AUTOMATED COUNT: 13.4 %
ERYTHROCYTE [SEDIMENTATION RATE] IN BLOOD BY WESTERGREN METHOD: 12 MM/H
EST. GFR  (AFRICAN AMERICAN): >60 ML/MIN/1.73 M^2
EST. GFR  (NON AFRICAN AMERICAN): >60 ML/MIN/1.73 M^2
FIO2: 32
FLOW: 3
GLUCOSE SERPL-MCNC: 133 MG/DL
HCO3 UR-SCNC: 32.7 MMOL/L (ref 24–28)
HCT VFR BLD AUTO: 47.7 %
HGB BLD-MCNC: 15.2 G/DL
LACTATE SERPL-SCNC: 1 MMOL/L
LYMPHOCYTES # BLD AUTO: 1.7 K/UL
LYMPHOCYTES NFR BLD: 16.6 %
MCH RBC QN AUTO: 31.7 PG
MCHC RBC AUTO-ENTMCNC: 31.9 %
MCV RBC AUTO: 100 FL
MODE: ABNORMAL
MONOCYTES # BLD AUTO: 0.5 K/UL
MONOCYTES NFR BLD: 4.7 %
NEUTROPHILS # BLD AUTO: 7.9 K/UL
NEUTROPHILS NFR BLD: 75.7 %
PCO2 BLDA: 68.3 MMHG (ref 35–45)
PH SMN: 7.29 [PH] (ref 7.35–7.45)
PLATELET # BLD AUTO: 188 K/UL
PMV BLD AUTO: 11.1 FL
PO2 BLDA: 29 MMHG (ref 40–60)
POC BE: 6 MMOL/L
POC SATURATED O2: 45 % (ref 95–100)
POC TCO2: 35 MMOL/L (ref 24–29)
POTASSIUM SERPL-SCNC: 3.9 MMOL/L
PROT SERPL-MCNC: 7.3 G/DL
RBC # BLD AUTO: 4.79 M/UL
SAMPLE: ABNORMAL
SITE: ABNORMAL
SODIUM SERPL-SCNC: 141 MMOL/L
SP02: 100
WBC # BLD AUTO: 10.44 K/UL

## 2017-05-21 PROCEDURE — 99900035 HC TECH TIME PER 15 MIN (STAT)

## 2017-05-21 PROCEDURE — 83880 ASSAY OF NATRIURETIC PEPTIDE: CPT

## 2017-05-21 PROCEDURE — 27000221 HC OXYGEN, UP TO 24 HOURS

## 2017-05-21 PROCEDURE — 94761 N-INVAS EAR/PLS OXIMETRY MLT: CPT

## 2017-05-21 PROCEDURE — 93010 ELECTROCARDIOGRAM REPORT: CPT | Mod: ,,, | Performed by: INTERNAL MEDICINE

## 2017-05-21 PROCEDURE — 85025 COMPLETE CBC W/AUTO DIFF WBC: CPT

## 2017-05-21 PROCEDURE — 80053 COMPREHEN METABOLIC PANEL: CPT

## 2017-05-21 PROCEDURE — 82803 BLOOD GASES ANY COMBINATION: CPT

## 2017-05-21 PROCEDURE — 94640 AIRWAY INHALATION TREATMENT: CPT | Mod: 76

## 2017-05-21 PROCEDURE — 96374 THER/PROPH/DIAG INJ IV PUSH: CPT

## 2017-05-21 PROCEDURE — 99283 EMERGENCY DEPT VISIT LOW MDM: CPT | Mod: ,,, | Performed by: EMERGENCY MEDICINE

## 2017-05-21 PROCEDURE — 25000242 PHARM REV CODE 250 ALT 637 W/ HCPCS: Performed by: EMERGENCY MEDICINE

## 2017-05-21 PROCEDURE — 93005 ELECTROCARDIOGRAM TRACING: CPT

## 2017-05-21 PROCEDURE — 83605 ASSAY OF LACTIC ACID: CPT

## 2017-05-21 PROCEDURE — 99284 EMERGENCY DEPT VISIT MOD MDM: CPT | Mod: 25

## 2017-05-21 PROCEDURE — 63600175 PHARM REV CODE 636 W HCPCS: Performed by: EMERGENCY MEDICINE

## 2017-05-21 RX ORDER — METHYLPREDNISOLONE SOD SUCC 125 MG
125 VIAL (EA) INJECTION
Status: COMPLETED | OUTPATIENT
Start: 2017-05-21 | End: 2017-05-21

## 2017-05-21 RX ORDER — PREDNISONE 20 MG/1
40 TABLET ORAL
Status: COMPLETED | OUTPATIENT
Start: 2017-05-21 | End: 2017-05-21

## 2017-05-21 RX ORDER — IPRATROPIUM BROMIDE AND ALBUTEROL SULFATE 2.5; .5 MG/3ML; MG/3ML
3 SOLUTION RESPIRATORY (INHALATION)
Status: COMPLETED | OUTPATIENT
Start: 2017-05-21 | End: 2017-05-21

## 2017-05-21 RX ORDER — PREDNISONE 10 MG/1
10 TABLET ORAL DAILY
Qty: 21 TABLET | Refills: 0 | Status: SHIPPED | OUTPATIENT
Start: 2017-05-21 | End: 2017-05-31

## 2017-05-21 RX ADMIN — IPRATROPIUM BROMIDE AND ALBUTEROL SULFATE 3 ML: .5; 3 SOLUTION RESPIRATORY (INHALATION) at 05:05

## 2017-05-21 RX ADMIN — IPRATROPIUM BROMIDE AND ALBUTEROL SULFATE 3 ML: .5; 3 SOLUTION RESPIRATORY (INHALATION) at 04:05

## 2017-05-21 RX ADMIN — PREDNISONE 40 MG: 20 TABLET ORAL at 09:05

## 2017-05-21 RX ADMIN — METHYLPREDNISOLONE SODIUM SUCCINATE 125 MG: 125 INJECTION, POWDER, FOR SOLUTION INTRAMUSCULAR; INTRAVENOUS at 07:05

## 2017-05-21 NOTE — ED PROVIDER NOTES
Encounter Date: 5/21/2017       History     Chief Complaint   Patient presents with    Shortness of Breath     Intermittent SOB for the past several weeks. Pt has been taking steroids and nebs at home over the past few days. SOB is back.   55 yo female with h/o COPD (smoker, multiple exacerbations) presenting with a week of worsening respiratory distress since ceasing oral steroids. Discharged 5/9/2017 ago for COPD exacerbation. Was using Breo and Spiriva at home, without resolution of wheezing and requiring frequent Albuterol. On 2L home O2. She reports increased exposure to dust on trip to [place] and smoking 6 cigarettes q-day, with increase in wheezing episodes this year.   Denies fever, chest pain, chills, alteration in mental status or increased sputum production. Improved with 3X duo-nebs given on ED arrival     Review of patient's allergies indicates:   Allergen Reactions    Avelox [moxifloxacin] Itching and Rash     IV     HPI  Past Medical History:   Diagnosis Date    Chronic respiratory failure with hypoxia and hypercapnia 5/4/2014    COPD (chronic obstructive pulmonary disease)     Depression     Hyperlipidemia 5/12/2017    Otosclerosis of right ear 5/12/2017    40% hearing back in 1988, had surgery with some improvement, but hearing loss persists.      PAD (peripheral artery disease)     Post-menopausal 2008    Pulmonary emphysema 4/28/2017    Tobacco dependence 5/2/2014    Type 2 diabetes mellitus without complication, without long-term current use of insulin 4/29/2017    Hb A1c 6.5% in 2/2017. Diet controlled.      Past Surgical History:   Procedure Laterality Date    GANGLION CYST EXCISION Right 1988    STAPEDES SURGERY Right 1988     Family History   Problem Relation Age of Onset    Diabetes Mother     Heart disease Father      Strong FH of CAD/CHF on fathers side    Peripheral vascular disease Brother      With necrosis of leg    Diabetes Maternal Uncle     Cancer Neg Hx      Thyroid disease Neg Hx      Social History   Substance Use Topics    Smoking status: Heavy Tobacco Smoker     Packs/day: 1.00     Types: Cigarettes    Smokeless tobacco: Not on file    Alcohol use 0.5 oz/week     1 drink(s) per week     Review of Systems   Constitutional: Negative for chills.   HENT: Negative for congestion.    Eyes: Negative for redness.   Respiratory: Positive for shortness of breath and wheezing.    Cardiovascular: Negative for chest pain.   Gastrointestinal: Negative for abdominal pain.   Genitourinary: Negative for flank pain.   Musculoskeletal: Negative for back pain.   Skin: Negative for rash.   Neurological: Negative for headaches.   Psychiatric/Behavioral: Negative for confusion.       Physical Exam     Initial Vitals [05/21/17 0224]   BP Pulse Resp Temp SpO2   134/72 84 18 98.2 °F (36.8 °C) 99 %     Physical Exam    Constitutional: She appears well-developed. She is not diaphoretic.   HENT:   Nose: Nose normal.   Mouth/Throat: Oropharynx is clear and moist.   Neck: Normal range of motion. Neck supple.   Cardiovascular: Regular rhythm and normal heart sounds. Exam reveals no gallop and no friction rub.    No murmur heard.  tachycardic   Pulmonary/Chest: She has wheezes. She has no rhonchi. She has no rales. She exhibits no tenderness.   Decreased air movement diffusely   Musculoskeletal: She exhibits no edema.   Neurological: She is alert and oriented to person, place, and time. She has normal strength. No cranial nerve deficit.   Skin: Skin is warm and dry.         ED Course   Procedures  Labs Reviewed   COMPREHENSIVE METABOLIC PANEL - Abnormal; Notable for the following:        Result Value    Glucose 133 (*)     All other components within normal limits   CBC W/ AUTO DIFFERENTIAL - Abnormal; Notable for the following:      (*)     MCH 31.7 (*)     MCHC 31.9 (*)     Gran # 7.9 (*)     Gran% 75.7 (*)     Lymph% 16.6 (*)     All other components within normal limits   ISTAT  PROCEDURE - Abnormal; Notable for the following:     POC PH 7.288 (*)     POC PCO2 68.3 (*)     POC PO2 29 (*)     POC HCO3 32.7 (*)     POC SATURATED O2 45 (*)     POC TCO2 35 (*)     All other components within normal limits   B-TYPE NATRIURETIC PEPTIDE   LACTIC ACID, PLASMA             Medical Decision Making:   Initial Assessment:       H/o COPD, recent admission for exacerbation, reports recurrent daily wheezing and SOB despite COPD maintenance meds and frequent ALbuterol use. Given 5 day course of Prednisone, and feels symptoms recurred when it finished one week ago. No cough/fever or infectious etiology, increased COPD exacerbations this year possible allergic or due to continued smoking. Tx with pam on ED arrival with improvement, will add Solumedrol, check basic labs and CXR, and re-assess.    8am  Pt feels much improved; workup with no acute process on CXR/labs except worse chronic hypercapnea and resp acidosis on initial VBG. No AMS, pt offered admission for observation but prefers discharge. Pt ambulatory in ED at baseline with no hypoxia or wheezing, feels comfortable with outpatient management. Will Rx 10 day Prednisone slow taper, with close f/u and strict return precautions.                       ED Course     Clinical Impression:   Diagnoses of SOB (shortness of breath) and COPD (chronic obstructive pulmonary disease) were pertinent to this visit.          Jose D Guzman MD  05/22/17 7572

## 2017-05-21 NOTE — ED NOTES
Ambulated in hallway without O2. Placed on pulse ox when she got back to her room ---O2 sat 97% on room air. Dr. Jaquez..aware

## 2017-05-21 NOTE — ED TRIAGE NOTES
Ann Elizabeth Canavan, a 56 y.o. female presents to the ED      Chief Complaint   Patient presents with    Shortness of Breath     Intermittent SOB for the past several weeks. Pt has been taking steroids and nebs at home over the past few days. SOB is back.     Review of patient's allergies indicates:   Allergen Reactions    Avelox [moxifloxacin] Itching and Rash     IV     Past Medical History:   Diagnosis Date    Chronic respiratory failure with hypoxia and hypercapnia 5/4/2014    COPD (chronic obstructive pulmonary disease)     Depression     Hyperlipidemia 5/12/2017    Otosclerosis of right ear 5/12/2017    40% hearing back in 1988, had surgery with some improvement, but hearing loss persists.      PAD (peripheral artery disease)     Post-menopausal 2008    Pulmonary emphysema 4/28/2017    Tobacco dependence 5/2/2014    Type 2 diabetes mellitus without complication, without long-term current use of insulin 4/29/2017    Hb A1c 6.5% in 2/2017. Diet controlled.

## 2017-05-21 NOTE — ED NOTES
Care assumed. Pt resting comfortably in room in NAD at this time. Pt on continuous cardiac, BP and O2 monitoring and 2L O2. Will continue to monitor.

## 2017-05-26 ENCOUNTER — CLINICAL SUPPORT (OUTPATIENT)
Dept: SMOKING CESSATION | Facility: CLINIC | Age: 57
End: 2017-05-26
Payer: COMMERCIAL

## 2017-05-26 ENCOUNTER — TELEPHONE (OUTPATIENT)
Dept: INTERNAL MEDICINE | Facility: CLINIC | Age: 57
End: 2017-05-26

## 2017-05-26 DIAGNOSIS — F17.200 NICOTINE DEPENDENCE: Primary | ICD-10-CM

## 2017-05-26 DIAGNOSIS — J44.9 CHRONIC OBSTRUCTIVE PULMONARY DISEASE, UNSPECIFIED COPD TYPE: Primary | ICD-10-CM

## 2017-05-26 PROCEDURE — 90837 PSYTX W PT 60 MINUTES: CPT | Mod: S$GLB,,,

## 2017-05-26 PROCEDURE — 99999 PR PBB SHADOW E&M-EST. PATIENT-LVL I: CPT | Mod: PBBFAC,,,

## 2017-05-26 RX ORDER — IBUPROFEN 200 MG
1 TABLET ORAL DAILY
Qty: 28 PATCH | Refills: 1 | Status: SHIPPED | OUTPATIENT
Start: 2017-05-26 | End: 2017-11-16

## 2017-05-26 RX ORDER — BUPROPION HYDROCHLORIDE 150 MG/1
150 TABLET, EXTENDED RELEASE ORAL DAILY
Qty: 30 TABLET | Refills: 1 | Status: ON HOLD | OUTPATIENT
Start: 2017-05-26 | End: 2017-06-26

## 2017-05-26 RX ORDER — IPRATROPIUM BROMIDE AND ALBUTEROL SULFATE 2.5; .5 MG/3ML; MG/3ML
3 SOLUTION RESPIRATORY (INHALATION) EVERY 6 HOURS PRN
Qty: 99 VIAL | Refills: 0 | Status: SHIPPED | OUTPATIENT
Start: 2017-05-26 | End: 2018-05-04 | Stop reason: SDUPTHER

## 2017-05-26 RX ORDER — ALBUTEROL SULFATE 90 UG/1
2 AEROSOL, METERED RESPIRATORY (INHALATION) EVERY 6 HOURS PRN
Qty: 18 G | Refills: 11 | Status: SHIPPED | OUTPATIENT
Start: 2017-05-26 | End: 2021-05-27 | Stop reason: SDUPTHER

## 2017-05-26 NOTE — PROGRESS NOTES
Subjective:       Patient ID: Ann Elizabeth Canavan is a 56 y.o. female.    Chief Complaint: No chief complaint on file.    HPI  Review of Systems    Objective:      Physical Exam    Assessment:       1. Nicotine dependence        Plan:       Pt agrees to take prescribed nrt medication Wellbutrin SR 150mg QD and Nicotine Patches 21mg, follow up w/ [facility] in 2 weeks.

## 2017-06-01 ENCOUNTER — HOSPITAL ENCOUNTER (OUTPATIENT)
Dept: PULMONOLOGY | Facility: CLINIC | Age: 57
Discharge: HOME | End: 2017-06-01
Payer: MEDICAID

## 2017-06-01 ENCOUNTER — OFFICE VISIT (OUTPATIENT)
Dept: PULMONOLOGY | Facility: CLINIC | Age: 57
End: 2017-06-01
Payer: MEDICAID

## 2017-06-01 VITALS
BODY MASS INDEX: 18.14 KG/M2 | DIASTOLIC BLOOD PRESSURE: 82 MMHG | HEIGHT: 66 IN | RESPIRATION RATE: 16 BRPM | SYSTOLIC BLOOD PRESSURE: 124 MMHG | OXYGEN SATURATION: 91 % | HEART RATE: 129 BPM | WEIGHT: 112.88 LBS

## 2017-06-01 DIAGNOSIS — F17.200 TOBACCO DEPENDENCE: Chronic | ICD-10-CM

## 2017-06-01 DIAGNOSIS — J43.2 CENTRILOBULAR EMPHYSEMA: ICD-10-CM

## 2017-06-01 DIAGNOSIS — J96.12 CHRONIC RESPIRATORY FAILURE WITH HYPOXIA AND HYPERCAPNIA: ICD-10-CM

## 2017-06-01 DIAGNOSIS — R91.1 PULMONARY NODULE SEEN ON IMAGING STUDY: ICD-10-CM

## 2017-06-01 DIAGNOSIS — J43.2 CENTRILOBULAR EMPHYSEMA: Primary | ICD-10-CM

## 2017-06-01 DIAGNOSIS — J43.1 PANLOBULAR EMPHYSEMA: ICD-10-CM

## 2017-06-01 DIAGNOSIS — J96.11 CHRONIC RESPIRATORY FAILURE WITH HYPOXIA AND HYPERCAPNIA: ICD-10-CM

## 2017-06-01 LAB
POST FEV1 FVC: 0.41
POST FEV1: 0.67
POST FVC: 1.63
PRE FEV1 FVC: 41
PRE FEV1: 0.64
PRE FVC: 1.55
PREDICTED FEV1 FVC: 80
PREDICTED FEV1: 2.68
PREDICTED FVC: 3.32

## 2017-06-01 PROCEDURE — 99204 OFFICE O/P NEW MOD 45 MIN: CPT | Mod: 25,S$PBB,, | Performed by: INTERNAL MEDICINE

## 2017-06-01 PROCEDURE — 94729 DIFFUSING CAPACITY: CPT | Mod: 26,S$PBB,, | Performed by: INTERNAL MEDICINE

## 2017-06-01 PROCEDURE — 99999 PR PBB SHADOW E&M-EST. PATIENT-LVL IV: CPT | Mod: PBBFAC,,, | Performed by: INTERNAL MEDICINE

## 2017-06-01 PROCEDURE — 94060 EVALUATION OF WHEEZING: CPT | Mod: 26,S$PBB,, | Performed by: INTERNAL MEDICINE

## 2017-06-01 RX ORDER — FLUTICASONE FUROATE AND VILANTEROL 100; 25 UG/1; UG/1
1 POWDER RESPIRATORY (INHALATION) DAILY
Qty: 60 EACH | Refills: 5 | Status: SHIPPED | OUTPATIENT
Start: 2017-06-01 | End: 2017-06-01 | Stop reason: SDUPTHER

## 2017-06-01 RX ORDER — PREDNISONE 10 MG/1
10 TABLET ORAL DAILY
Qty: 30 TABLET | Refills: 1 | Status: ON HOLD | OUTPATIENT
Start: 2017-06-01 | End: 2017-06-26

## 2017-06-01 RX ORDER — FLUTICASONE FUROATE AND VILANTEROL 100; 25 UG/1; UG/1
1 POWDER RESPIRATORY (INHALATION) DAILY
Qty: 60 EACH | Refills: 5 | Status: SHIPPED | OUTPATIENT
Start: 2017-06-01 | End: 2018-05-04 | Stop reason: SDUPTHER

## 2017-06-01 NOTE — PATIENT INSTRUCTIONS
Pre/Post Spirometry, DLCO, and A1AT phenotype/level (phone report).  Extend Prednisone 10 mg QAM x 3-4 weeks, then stop if resp symptoms more stable.  Continue present respiratory medications (roles reviewed at today's visit).  Continue O2 during sleep.  Urged to stop smoking.    Pulmonary rehab referral.  Return visit 6-8 weeks.

## 2017-06-01 NOTE — LETTER
June 1, 2017      Jose Joseph MD  [address] [facility] [address]           [facility]  [address]  [facility] [address]  Phone: [phone]          Patient: Ann Elizabeth Canavan   MR Number: [id number]   YOB: 1960   Date of Visit: 6/1/2017       Dear Dr. Jose Joseph:    Thank you for referring Ann Canavan to me for evaluation. Attached you will find relevant portions of my assessment and plan of care.    If you have questions, please do not hesitate to call me. I look forward to following Ann Canavan along with you.    Sincerely,    GERHARD Bernal MD    Enclosure  CC:  No Recipients    If you would like to receive this communication electronically, please contact [email or web address] or [phone] to request more information on [facility] access.    For providers and/or their staff who would like to refer a patient to [facility], please contact us through our one-stop-shop provider referral line, [facility], at [phone].    If you feel you have received this communication in error or would no longer like to receive these types of communications, please e-mail [email or web address]

## 2017-06-01 NOTE — PROGRESS NOTES
Subjective:       Patient ID: Ann Elizabeth Canavan is a 56 y.o. female.    Chief Complaint: COPD and Shortness of Breath    HPI Mrs. Canavan is a 56-year-old smoker who comes for help with symptoms related to   advanced chronic obstructive lung disease.  She reports daily shortness of   breath, cough and, episodes of panic.  She has been evaluated and treated in the   Emergency Department and the hospital here on multiple occasions during the past   several months due to her ongoing respiratory distress.  She is not having any   purulent sputum, pleuritic chest pain, or hemoptysis.  She is aware of   intermittent wheezing.    Mrs. Canavan believes that she was first prescribed bronchodilator medications   in 2014.  During the last several months, she feels that she has had much more   difficulty controlling her respiratory symptoms despite the regular use of   bronchodilator medications.  She is currently using Breo 100/25, Incruse, DuoNeb   aerosol treatments, and an albuterol HFA inhaler.  She also has oxygen, which   she uses at 2 L per minute intermittently during the day and continuously during sleep.    During the last several months, she has taken several short courses of an oral   corticosteroid and several courses of an antibiotic.  She feels that these   provide only temporary benefit.    Mrs. Canavan is not having any prominent upper GI or sinus symptoms.  She has had    an estimated 20-pound weight loss during the last several months,   which likely reflects her ongoing respiratory distress.  She does not know of   any past cardiac disease.    Mrs. Canavan has smoked approximately 1 pack of cigarettes per day for 40 years.    She has reduced her smoking recently to less than one-half pack per day.  She   does not know of any important past occupational exposures.  Her family history   is of note only in that she believes that she had 1 aunt with COPD.  Otherwise,   her family history is thought to be  negative for lung disease.    DATA:  I have reviewed the images from recent chest x-rays and a CT scan of the   chest.  There do not appear to be any acute cardiovascular abnormalities.  There   is extensive emphysema with an upper zone predominance.  There is a   subcentimeter nodule in the left upper lobe.  This appears very dense and is   likely calcified.    A capillary blood gas done today shows pH 7.41, pCO2 46, and base excess +3.1.    The carboxyhemoglobin in this sample is 3.3%, which is mildly elevated.      CB/HN  dd: 06/01/2017 21:37:47 (CDT)  td: 06/02/2017 14:25:37 (CDT)  Doc ID   #[id number]  Job ID #[id number]    CC:       Review of Systems   Constitutional: Positive for weight loss. Negative for fever and fatigue.   HENT: Positive for postnasal drip and congestion. Negative for sinus pressure and voice change.    Respiratory: Positive for cough, shortness of breath, wheezing and dyspnea on extertion. Negative for sputum production.    Cardiovascular: Negative for chest pain and leg swelling.   Genitourinary: Negative for difficulty urinating.   Musculoskeletal: Positive for arthralgias. Negative for back pain.   Skin: Negative for rash.   Gastrointestinal: Negative for abdominal pain and acid reflux.   Neurological: Negative for dizziness and weakness.   Hematological: Negative for adenopathy.   Psychiatric/Behavioral: The patient is nervous/anxious.        Objective:      Physical Exam   Constitutional: She is oriented to person, place, and time. She appears well-developed and well-nourished.   HENT:   Head: Normocephalic.   Nose: Nose normal.   Mouth/Throat: No oropharyngeal exudate.   Neck: Normal range of motion. No JVD present. No tracheal deviation present. No thyromegaly present.   Cardiovascular: Regular rhythm and normal heart sounds.    No murmur heard.  Regular Tachycardia.   Pulmonary/Chest: Hyperinflation. No stridor. She has decreased breath sounds. She has no wheezes. She has no rhonchi. She  has no rales. She exhibits no tenderness.   Abdominal: Soft. There is no tenderness.   Musculoskeletal: She exhibits no edema.   Lymphadenopathy:     She has no cervical adenopathy.   Neurological: She is alert and oriented to person, place, and time.   Skin: Skin is warm and dry. No rash noted. No erythema. Nails show no clubbing.   Psychiatric: She has a normal mood and affect.   Vitals reviewed.    Personal Diagnostic Review    No flowsheet data found.      Assessment:       1. Centrilobular emphysema    2. Tobacco dependence    3. Pulmonary nodule seen on imaging study    4. Chronic respiratory failure with hypoxia and hypercapnia    5. Panlobular emphysema        Outpatient Encounter Prescriptions as of 2017   Medication Sig Dispense Refill    albuterol 90 mcg/actuation inhaler Inhale 2 puffs into the lungs every 6 (six) hours as needed for Wheezing. Rescue 18 g 11    albuterol-ipratropium 2.5mg-0.5mg/3mL (DUO-NEB) 0.5 mg-3 mg(2.5 mg base)/3 mL nebulizer solution Take 3 mLs by nebulization every 6 (six) hours as needed for Wheezing or Shortness of Breath. Rescue 99 vial 0    aspirin (ECOTRIN) 81 MG EC tablet Take 81 mg by mouth once daily.      atorvastatin (LIPITOR) 40 MG tablet Take 1 tablet (40 mg total) by mouth once daily. 90 tablet 3    buPROPion (WELLBUTRIN SR) 150 MG TBSR 12 hr tablet Take 1 tablet (150 mg total) by mouth once daily. 30 tablet 1    fluticasone (FLONASE) 50 mcg/actuation nasal spray 1 spray by Each Nare route once daily. 1 Bottle 1    fluticasone-vilanterol (BREO) 100-25 mcg/dose diskus inhaler Inhale 1 puff into the lungs once daily. Controller 60 each 5    guaifenesin (MUCINEX) 600 mg 12 hr tablet Take 600 mg by mouth 2 (two) times daily.       loratadine (CLARITIN) 10 mg tablet Take 10 mg by mouth daily as needed for Allergies.      nicotine (NICODERM CQ) 21 mg/24 hr Place 1 patch onto the skin once daily. 28 patch 1    [] predniSONE (DELTASONE) 10 MG tablet Take  1 tablet (10 mg total) by mouth once daily. Take 4 tabs x 3 days, then  Take 2 tabs x 3 days, then   Take 1 tab x 3 days. 21 tablet 0    predniSONE (DELTASONE) 10 MG tablet Take 1 tablet (10 mg total) by mouth once daily. Take with food 30 tablet 1    umeclidinium 62.5 mcg/actuation DsDv Inhale 62.5 mcg into the lungs once daily. Controller 30 each 11    [DISCONTINUED] fluticasone-vilanterol (BREO) 100-25 mcg/dose diskus inhaler Inhale 1 puff into the lungs once daily. Controller 60 each 5    [DISCONTINUED] fluticasone-vilanterol (BREO) 100-25 mcg/dose diskus inhaler Inhale 1 puff into the lungs once daily. Controller 60 each 5    [DISCONTINUED] umeclidinium 62.5 mcg/actuation DsDv Inhale 62.5 mcg into the lungs once daily. Controller 30 each 11    [DISCONTINUED] umeclidinium 62.5 mcg/actuation DsDv Inhale 62.5 mcg into the lungs once daily. Controller 30 each 11     No facility-administered encounter medications on file as of 6/1/2017.      Orders Placed This Encounter   Procedures    Alpha 1 Antitrypsin Phenotype     Standing Status:   Future     Standing Expiration Date:   6/1/2018    Ambulatory Referral to Pulmonary Rehab     Referral Priority:   Routine     Referral Type:   Consultation     Referral Reason:   Specialty Services Required     Requested Specialty:   Respiratory Therapy     Number of Visits Requested:   1    CAPILLARY BLOOD GAS - PULM     Standing Status:   Future     Number of Occurrences:   1     Standing Expiration Date:   6/1/2018    Spirometry with/without bronchodilator     Standing Status:   Future     Number of Occurrences:   1     Standing Expiration Date:   6/1/2018    DLCO-Carbon Monoxide Diffusing Capacity     Standing Status:   Future     Number of Occurrences:   1     Standing Expiration Date:   6/1/2018     Plan:     Pre/Post Spirometry, DLCO, and A1AT phenotype/level (phone report).  Extend Prednisone 10 mg QAM x 3-4 weeks, then stop if resp symptoms more  stable.  Continue present respiratory medications (roles reviewed at today's visit).  Continue O2 during sleep.  Urged to stop smoking.    Pulmonary rehab referral.  Return visit 6-8 weeks.

## 2017-06-03 ENCOUNTER — TELEPHONE (OUTPATIENT)
Dept: INTERNAL MEDICINE | Facility: CLINIC | Age: 57
End: 2017-06-03

## 2017-06-06 ENCOUNTER — TELEPHONE (OUTPATIENT)
Dept: PULMONOLOGY | Facility: CLINIC | Age: 57
End: 2017-06-06

## 2017-06-06 DIAGNOSIS — J43.2 CENTRILOBULAR EMPHYSEMA: Primary | ICD-10-CM

## 2017-06-06 NOTE — TELEPHONE ENCOUNTER
Pt called to report increased shortness of breath during the past 12 hours.  She has been taking extra aerosol treatments.  She is not having assoc symptoms to suggest an acute resp infection.  On the phone she sounds more anxious than in resp distress.  She will increase Prednisone dose to 30 mg QAM x3 starting now, and then 20 mg QAM x 3, then resume 10 mg QD afterward.  Will need to come to ED if distress not improved.

## 2017-06-07 PROCEDURE — 99496 TRANSJ CARE MGMT HIGH F2F 7D: CPT | Mod: S$PBB,,, | Performed by: INTERNAL MEDICINE

## 2017-06-08 ENCOUNTER — OFFICE VISIT (OUTPATIENT)
Dept: INTERNAL MEDICINE | Facility: CLINIC | Age: 57
End: 2017-06-08
Payer: MEDICAID

## 2017-06-08 VITALS
HEIGHT: 66 IN | OXYGEN SATURATION: 95 % | RESPIRATION RATE: 17 BRPM | DIASTOLIC BLOOD PRESSURE: 75 MMHG | BODY MASS INDEX: 18.05 KG/M2 | WEIGHT: 112.31 LBS | SYSTOLIC BLOOD PRESSURE: 115 MMHG | TEMPERATURE: 99 F | HEART RATE: 108 BPM

## 2017-06-08 DIAGNOSIS — J44.9 CHRONIC OBSTRUCTIVE PULMONARY DISEASE, UNSPECIFIED COPD TYPE: ICD-10-CM

## 2017-06-08 DIAGNOSIS — F32.1 MODERATE SINGLE CURRENT EPISODE OF MAJOR DEPRESSIVE DISORDER: ICD-10-CM

## 2017-06-08 DIAGNOSIS — Z11.59 NEED FOR HEPATITIS C SCREENING TEST: ICD-10-CM

## 2017-06-08 DIAGNOSIS — Z12.31 ENCOUNTER FOR SCREENING MAMMOGRAM FOR BREAST CANCER: ICD-10-CM

## 2017-06-08 DIAGNOSIS — I70.0 ATHEROSCLEROSIS OF AORTA: ICD-10-CM

## 2017-06-08 DIAGNOSIS — I73.9 PAD (PERIPHERAL ARTERY DISEASE): ICD-10-CM

## 2017-06-08 DIAGNOSIS — R91.1 PULMONARY NODULE: ICD-10-CM

## 2017-06-08 DIAGNOSIS — Z12.4 CERVICAL CANCER SCREENING: ICD-10-CM

## 2017-06-08 DIAGNOSIS — Z00.00 ANNUAL PHYSICAL EXAM: Primary | ICD-10-CM

## 2017-06-08 DIAGNOSIS — Z23 NEED FOR TDAP VACCINATION: ICD-10-CM

## 2017-06-08 DIAGNOSIS — Z12.11 COLON CANCER SCREENING: ICD-10-CM

## 2017-06-08 DIAGNOSIS — Z72.0 TOBACCO USE: ICD-10-CM

## 2017-06-08 DIAGNOSIS — E11.9 CONTROLLED TYPE 2 DIABETES MELLITUS WITHOUT COMPLICATION, WITHOUT LONG-TERM CURRENT USE OF INSULIN: ICD-10-CM

## 2017-06-08 PROCEDURE — 90471 IMMUNIZATION ADMIN: CPT | Mod: PBBFAC,VFC

## 2017-06-08 PROCEDURE — 99396 PREV VISIT EST AGE 40-64: CPT | Mod: S$PBB,,, | Performed by: INTERNAL MEDICINE

## 2017-06-08 PROCEDURE — 99999 PR PBB SHADOW E&M-EST. PATIENT-LVL V: CPT | Mod: PBBFAC,,, | Performed by: INTERNAL MEDICINE

## 2017-06-08 PROCEDURE — 90715 TDAP VACCINE 7 YRS/> IM: CPT | Mod: PBBFAC,SL

## 2017-06-08 PROCEDURE — 99215 OFFICE O/P EST HI 40 MIN: CPT | Mod: PBBFAC,25 | Performed by: INTERNAL MEDICINE

## 2017-06-08 RX ORDER — SERTRALINE HYDROCHLORIDE 25 MG/1
25 TABLET, FILM COATED ORAL DAILY
Qty: 30 TABLET | Refills: 2 | Status: SHIPPED | OUTPATIENT
Start: 2017-06-08 | End: 2017-09-20 | Stop reason: SDUPTHER

## 2017-06-08 NOTE — PROGRESS NOTES
"INTERNAL MEDICINE INITIAL VISIT NOTE      CHIEF COMPLAINT     Chief Complaint   Patient presents with    [facility]       HPI     Ann Elizabeth Canavan is a 56 y.o. C female who presents to [facility] and annual    Advanced COPD (dx in 2014) w/ multiple ED visits and hospitalization. Medicaid does not cover spiriva and advair. Doesn't feel that breo last 24 hours. Feels she's still taking spiriva and advair.   Most recently hospitalized from 5/6-5/9 for COPD exacerbation.   On Breo 100/25mcg daily, incruse 62.5 daily, duoneb, albuterol prn, O2 NC 2L intermittently.  40 pack years and currently still a smoker. Down to about 1x/day.   Follows w/ Dr. Bernal, last seen 6/3/17 - extend steroid for another few weeks. Reports that she's very worried about tapering the steroids. Referred to pulm rehab.    History of DM - a1c 2/8/17 was 6.5.  Significant unintentional weight loss over the past several months. Most recent a1c 5.8, which is in the prediabetes range.  Foot - today  Eye -   MAC 4/29/17   5/12/17 on atorvastatin 40mg daily, which was started recently.  B PAD on DELORIS 5/2014 - likely moderate stenosis bilaterally. Does have claudication.     C/o weakness throughout. Thinks she'd benefit from pulm rehab.    CTA 5/6/17 - "There is atherosclerotic calcification of the aorta and its branches, and minimally in the distribution of the coronary arteries."    Pt was born and raised in [place]. Whole family in [place]. 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 Brother and mother lives in [place].     Pt feels very emotional. Cries all the time. Pt was referred to a psychologist ad psychiatrist. Pt reports that she was afraid to sleep as she didn't want to wake up in a panic attack. Feels better w/ the steroid. Anhedonia. Can't grocery shop and dependent on others to just grocery shop.     Past Medical History:  Past Medical History:   Diagnosis Date    Chronic respiratory failure with hypoxia and hypercapnia " 5/4/2014    COPD (chronic obstructive pulmonary disease)     Depression     Hyperlipidemia 5/12/2017    Otosclerosis of right ear 5/12/2017    40% hearing back in 1988, had surgery with some improvement, but hearing loss persists.      PAD (peripheral artery disease)     Post-menopausal 2008    Pulmonary emphysema 4/28/2017    Tobacco dependence 5/2/2014    Type 2 diabetes mellitus without complication, without long-term current use of insulin 4/29/2017    Hb A1c 6.5% in 2/2017. Diet controlled.        Past Surgical History:  Past Surgical History:   Procedure Laterality Date    GANGLION CYST EXCISION Right 1988    STAPEDES SURGERY Right 1988       Allergies:  Review of patient's allergies indicates:   Allergen Reactions    Avelox [moxifloxacin] Itching and Rash     IV       Home Medications:  Prior to Admission medications    Medication Sig Start Date End Date Taking? Authorizing Provider   albuterol 90 mcg/actuation inhaler Inhale 2 puffs into the lungs every 6 (six) hours as needed for Wheezing. Rescue 5/26/17 5/26/18 Yes VICKY Cantu   albuterol-ipratropium 2.5mg-0.5mg/3mL (DUO-NEB) 0.5 mg-3 mg(2.5 mg base)/3 mL nebulizer solution Take 3 mLs by nebulization every 6 (six) hours as needed for Wheezing or Shortness of Breath. Rescue 5/26/17 5/26/18 Yes VICKY Cantu   aspirin (ECOTRIN) 81 MG EC tablet Take 81 mg by mouth once daily.   Yes Historical Provider, MD   atorvastatin (LIPITOR) 40 MG tablet Take 1 tablet (40 mg total) by mouth once daily. 5/12/17 5/12/18 Yes Fallon Tovar MD   buPROPion (WELLBUTRIN SR) 150 MG TBSR 12 hr tablet Take 1 tablet (150 mg total) by mouth once daily. 5/26/17 5/26/18 Yes Johnny Grady MD   fluticasone (FLONASE) 50 mcg/actuation nasal spray 1 spray by Each Nare route once daily. 5/2/14  Yes Sarah Krueger MD   fluticasone-vilanterol (BREO) 100-25 mcg/dose diskus inhaler Inhale 1 puff into the lungs once daily. Controller 6/1/17  Yes  "GERHARD Bernal MD   guaifenesin (MUCINEX) 600 mg 12 hr tablet Take 600 mg by mouth 2 (two) times daily.    Yes Historical Provider, MD   loratadine (CLARITIN) 10 mg tablet Take 10 mg by mouth daily as needed for Allergies.   Yes Historical Provider, MD   nicotine (NICODERM CQ) 21 mg/24 hr Place 1 patch onto the skin once daily. 5/26/17  Yes Johnny Grady MD   predniSONE (DELTASONE) 10 MG tablet Take 1 tablet (10 mg total) by mouth once daily. Take with food  Patient taking differently: Take 30 mg by mouth once daily. Take with food 6/1/17  Yes GERHARD Bernal MD   umeclidinium 62.5 mcg/actuation DsDv Inhale 62.5 mcg into the lungs once daily. Controller 6/1/17  Yes GERHARD Bernal MD       Family History:  Family History   Problem Relation Age of Onset    Diabetes Mother     Heart disease Father      Strong FH of CAD/CHF on fathers side    Peripheral vascular disease Brother      With necrosis of leg    Diabetes Maternal Uncle     Cancer Neg Hx     Thyroid disease Neg Hx        Social History:  Social History   Substance Use Topics    Smoking status: Current Every Day Smoker     Packs/day: 1.00     Years: 40.00     Types: Cigarettes    Smokeless tobacco: Never Used      Comment: 3-5 a day    Alcohol use 0.5 oz/week     1 Standard drinks or equivalent per week       Review of Systems:  Review of Systems Comprehensive review of systems otherwise negative. See history/subjective section for more details.    Health Maintainence:   Td is not UTD  Flu is UTD  Pneumovax 5/2/14  MMG - NEEDS  C-SCOPE - needs but declines at this time. Ok W/ FOBT  Hep C screening needs    PHYSICAL EXAM     BP (!) 142/86   Pulse (!) 115   Temp 98.5 °F (36.9 °C) (Oral)   Resp 17   Ht 5' 6" (1.676 m)   Wt 51 kg (112 lb 5.2 oz)   LMP  (LMP Unknown)   SpO2 95%   BMI 18.13 kg/m²     GEN - A+OX4, NAD   HEENT - PERRL, EOMI, OP clear. MMM.   Neck - No thyromegaly or cervical LAD. No thyroid masses felt.  CV - RRR, no " m/r   Chest - normal work of breathing. Decreased BS throughout. No wheezing.   Abd - S/NT/ND/+BS.   Ext - Palp BDP and 2+ radial pulses. No LE edema. Clubbing of fingers.  Feet - normal sensation to monofilament. No open lesions.  Neuro - PERRL, EOMI, no nystagmus, eyebrow raise, facial sensation, hearing, m of mastication, smile, palatal raise, shoulder shrug, tongue protrusion symmetric and intact. 5/5 BUE and BLE strength. Sensation to light touch intact throughout. 2+ DTRs. Normal gait.   MSK - No spinal tenderness to palpation. Normal gait.   Skin - No rash.    LABS     Previous labs reviewed.    ASSESSMENT/PLAN     Ann Elizabeth Canavan is a 56 y.o. female with  Zoila was seen today for establish care.    Diagnoses and all orders for this visit:    Annual physical exam  -     Hemoglobin A1c; Future; Expected date: 06/08/2017  -     CBC auto differential; Future; Expected date: 06/08/2017  -     Comprehensive metabolic panel; Future; Expected date: 06/08/2017  -     Lipid panel; Future; Expected date: 06/08/2017  -     TSH; Future; Expected date: 06/08/2017    Controlled type 2 diabetes mellitus without complication, without long-term current use of insulin - diet control. On steroids. Will repeat a1c at next visit.   -     Diabetic Eye Screening Photo; Future  -     Hemoglobin A1c; Future; Expected date: 06/08/2017  -     Ambulatory referral to Outpatient Case Management    Chronic obstructive pulmonary disease, unspecified COPD type - rec to take breo and incruse at this time instead of old medicine. That way Dr. Bernal can adjust accordingly. Cont O2 supplement.   -     Ambulatory referral to Outpatient Case Management    PAD (peripheral artery disease) - cont asa and atorvastatin.  -     Comprehensive metabolic panel; Future; Expected date: 06/08/2017  -     Lipid panel; Future; Expected date: 06/08/2017  -     Ambulatory referral to Outpatient Case Management    Atherosclerosis of aorta - cont asa and  atorvastatin.  -     Comprehensive metabolic panel; Future; Expected date: 06/08/2017  -     Lipid panel; Future; Expected date: 06/08/2017    Moderate single current episode of major depressive disorder - discussed that symptoms can be worse before better. If SI/HI, go to ED and stop medicine. Pt to call me if any issues.  -     sertraline (ZOLOFT) 25 MG tablet; Take 1 tablet (25 mg total) by mouth once daily.  -     Comprehensive metabolic panel; Future; Expected date: 06/08/2017  -     Ambulatory referral to Outpatient Case Management    Need for hepatitis C screening test  -     Hepatitis C antibody; Future; Expected date: 06/08/2017    Tobacco use - in the process of cessation.     Encounter for screening mammogram for breast cancer  -     Mammo Digital Screening Bilateral With CAD; Future; Expected date: 06/08/2017    Cervical cancer screening  -     Ambulatory Referral to Obstetrics / Gynecology    Colon cancer screening  -     Occult blood x 1, stool; Future; Expected date: 06/08/2017  -     Occult blood x 1, stool; Future; Expected date: 06/08/2017  -     Occult blood x 1, stool; Future; Expected date: 06/08/2017    Need for Tdap vaccination  -     (In Office Administered) Tdap Vaccine    HECTOR pulm nodule - repeat CT in 3 mo.    RTC in 2 months, sooner if needed and depending on labs.    Trinh Bowles MD  Department of Internal Medicine - [facility]  9:52 AM

## 2017-06-09 ENCOUNTER — CLINICAL SUPPORT (OUTPATIENT)
Dept: SMOKING CESSATION | Facility: CLINIC | Age: 57
End: 2017-06-09
Payer: COMMERCIAL

## 2017-06-09 DIAGNOSIS — F17.200 NICOTINE DEPENDENCE: Primary | ICD-10-CM

## 2017-06-09 PROCEDURE — 90832 PSYTX W PT 30 MINUTES: CPT | Mod: S$GLB,,,

## 2017-06-09 PROCEDURE — 99999 PR PBB SHADOW E&M-EST. PATIENT-LVL I: CPT | Mod: PBBFAC,,,

## 2017-06-09 RX ORDER — DM/P-EPHED/ACETAMINOPH/DOXYLAM 30-7.5/3
2 LIQUID (ML) ORAL
Qty: 81 LOZENGE | Refills: 1 | Status: SHIPPED | OUTPATIENT
Start: 2017-06-09 | End: 2017-11-16

## 2017-06-09 NOTE — PROGRESS NOTES
"Individual Follow-Up Form    6/9/2017    Quit Date: TBD    Clinical Status of Patient: Outpatient    Length of Service: 30mins    Continuing Medication: Yes    Other Medications: N/A     Target Symptoms: Withdrawal and medication side effects. The following were  rated moderate (3) to severe (4) on TCRS:  · Moderate (3): Craving, Increased Appetite, Poor Concentration , Anxious, Poor Sleep  · Severe (4): Depressed    Comments: Pt is down from twelve cigarettes a day to between "one and three" cigarettes a day, states she is only taking "a few puffs", expressing concern about ongoing breathing issues, Pt tearful at times, Pt remains depressed, Pt continues to smoke in her home, LPC praised Pt for progress made thus far, advised Pt once again to make an effort to smoking outside, Pt expressed interest in trying Nicotine Lozenges, LPC advised Pt to use lozenges in place of cigarettes, Pt identifies being on the phone as one of her triggers, states she smokes more while on the phone, LPC reminded the Pt the goal is to be smoke free for three to four months in order to break her smoking habit.    Diagnosis: F17.200    Next Visit: 6/23/17  "

## 2017-06-10 ENCOUNTER — OUTPATIENT CASE MANAGEMENT (OUTPATIENT)
Dept: ADMINISTRATIVE | Facility: OTHER | Age: 57
End: 2017-06-10

## 2017-06-10 NOTE — PROGRESS NOTES
Thank you for the referral.  Patient has been assigned to SUZANNA Kline for low risk screening for Outpatient Case Management.     Reason for referral: Low Risk    Controlled type 2 diabetes mellitus without complication, without long-term current use of insulin [E11.9]  Chronic obstructive pulmonary disease, unspecified COPD type [J44.9]  PAD (peripheral artery disease) [I73.9]  Moderate single current episode of major depressive disorder [F32.1]    Thank you,    Amira Bowen, SSC

## 2017-06-13 ENCOUNTER — OUTPATIENT CASE MANAGEMENT (OUTPATIENT)
Dept: ADMINISTRATIVE | Facility: OTHER | Age: 57
End: 2017-06-13

## 2017-06-21 ENCOUNTER — HOSPITAL ENCOUNTER (INPATIENT)
Facility: HOSPITAL | Age: 57
LOS: 5 days | Discharge: HOME | DRG: 208 | End: 2017-06-26
Attending: EMERGENCY MEDICINE | Admitting: INTERNAL MEDICINE
Payer: MEDICAID

## 2017-06-21 DIAGNOSIS — J96.90 RESPIRATORY FAILURE: ICD-10-CM

## 2017-06-21 DIAGNOSIS — J96.22 ACUTE ON CHRONIC RESPIRATORY FAILURE WITH HYPOXIA AND HYPERCAPNIA: ICD-10-CM

## 2017-06-21 DIAGNOSIS — J96.21 ACUTE ON CHRONIC RESPIRATORY FAILURE WITH HYPOXIA AND HYPERCAPNIA: ICD-10-CM

## 2017-06-21 DIAGNOSIS — Z46.59 ENCOUNTER FOR NASOGASTRIC (NG) TUBE PLACEMENT: ICD-10-CM

## 2017-06-21 DIAGNOSIS — F33.2 SEVERE EPISODE OF RECURRENT MAJOR DEPRESSIVE DISORDER, WITHOUT PSYCHOTIC FEATURES: Primary | Chronic | ICD-10-CM

## 2017-06-21 DIAGNOSIS — J96.21 ACUTE AND CHRONIC RESPIRATORY FAILURE WITH HYPOXIA: ICD-10-CM

## 2017-06-21 DIAGNOSIS — F17.200 TOBACCO DEPENDENCE: Chronic | ICD-10-CM

## 2017-06-21 DIAGNOSIS — J43.2 CENTRILOBULAR EMPHYSEMA: ICD-10-CM

## 2017-06-21 DIAGNOSIS — F17.200 NICOTINE DEPENDENCE: ICD-10-CM

## 2017-06-21 DIAGNOSIS — J96.22 ACUTE AND CHRONIC RESPIRATORY FAILURE WITH HYPERCAPNIA: ICD-10-CM

## 2017-06-21 LAB
A1AT SERPL-MCNC: 136 MG/DL
ALBUMIN SERPL BCP-MCNC: 3.3 G/DL
ALBUMIN SERPL BCP-MCNC: 3.5 G/DL
ALLENS TEST: ABNORMAL
ALLENS TEST: ABNORMAL
ALP SERPL-CCNC: 106 U/L
ALP SERPL-CCNC: 125 U/L
ALT SERPL W/O P-5'-P-CCNC: 27 U/L
ALT SERPL W/O P-5'-P-CCNC: 45 U/L
AMPHET+METHAMPHET UR QL: NEGATIVE
ANION GAP SERPL CALC-SCNC: 15 MMOL/L
ANION GAP SERPL CALC-SCNC: 9 MMOL/L
ANISOCYTOSIS BLD QL SMEAR: SLIGHT
APTT BLDCRRT: <21 SEC
AST SERPL-CCNC: 36 U/L
AST SERPL-CCNC: 70 U/L
BACTERIA #/AREA URNS AUTO: ABNORMAL /HPF
BARBITURATES UR QL SCN>200 NG/ML: NEGATIVE
BASOPHILS # BLD AUTO: ABNORMAL K/UL
BASOPHILS NFR BLD: 0 %
BENZODIAZ UR QL SCN>200 NG/ML: NEGATIVE
BILIRUB SERPL-MCNC: 0.6 MG/DL
BILIRUB SERPL-MCNC: 0.6 MG/DL
BILIRUB UR QL STRIP: NEGATIVE
BNP SERPL-MCNC: 15 PG/ML
BUN SERPL-MCNC: 16 MG/DL
BUN SERPL-MCNC: 16 MG/DL
BZE UR QL SCN: NEGATIVE
CALCIUM SERPL-MCNC: 8 MG/DL
CALCIUM SERPL-MCNC: 9 MG/DL
CANNABINOIDS UR QL SCN: NEGATIVE
CHLORIDE SERPL-SCNC: 101 MMOL/L
CHLORIDE SERPL-SCNC: 102 MMOL/L
CLARITY UR REFRACT.AUTO: CLEAR
CO2 SERPL-SCNC: 22 MMOL/L
CO2 SERPL-SCNC: 25 MMOL/L
COLOR UR AUTO: YELLOW
CREAT SERPL-MCNC: 1.1 MG/DL
CREAT SERPL-MCNC: 1.1 MG/DL
CREAT UR-MCNC: 37 MG/DL
DELSYS: ABNORMAL
DELSYS: ABNORMAL
DIFFERENTIAL METHOD: ABNORMAL
EOSINOPHIL # BLD AUTO: ABNORMAL K/UL
EOSINOPHIL NFR BLD: 2 %
EP: 5
ERYTHROCYTE [DISTWIDTH] IN BLOOD BY AUTOMATED COUNT: 12.7 %
ERYTHROCYTE [SEDIMENTATION RATE] IN BLOOD BY WESTERGREN METHOD: 25 MM/H
EST. GFR  (AFRICAN AMERICAN): >60 ML/MIN/1.73 M^2
EST. GFR  (AFRICAN AMERICAN): >60 ML/MIN/1.73 M^2
EST. GFR  (NON AFRICAN AMERICAN): 56.7 ML/MIN/1.73 M^2
EST. GFR  (NON AFRICAN AMERICAN): 56.7 ML/MIN/1.73 M^2
ESTIMATED AVG GLUCOSE: 140 MG/DL
ETHANOL UR-MCNC: <10 MG/DL
FIO2: 25
FIO2: 60
GLUCOSE SERPL-MCNC: 367 MG/DL
GLUCOSE SERPL-MCNC: 367 MG/DL
GLUCOSE UR QL STRIP: ABNORMAL
HBA1C MFR BLD HPLC: 6.5 %
HCO3 UR-SCNC: 26.9 MMOL/L (ref 24–28)
HCO3 UR-SCNC: 29.5 MMOL/L (ref 24–28)
HCT VFR BLD AUTO: 45.6 %
HGB BLD-MCNC: 14.7 G/DL
HGB UR QL STRIP: ABNORMAL
HYALINE CASTS UR QL AUTO: 12 /LPF
HYPOCHROMIA BLD QL SMEAR: ABNORMAL
INR PPP: 0.9
IP: 10
KETONES UR QL STRIP: NEGATIVE
LACTATE SERPL-SCNC: 1.8 MMOL/L
LEUKOCYTE ESTERASE UR QL STRIP: NEGATIVE
LYMPHOCYTES # BLD AUTO: ABNORMAL K/UL
LYMPHOCYTES NFR BLD: 28 %
MAGNESIUM SERPL-MCNC: 3.1 MG/DL
MCH RBC QN AUTO: 31.7 PG
MCHC RBC AUTO-ENTMCNC: 32.2 %
MCV RBC AUTO: 99 FL
METHADONE UR QL SCN>300 NG/ML: NEGATIVE
MICROSCOPIC COMMENT: ABNORMAL
MODE: ABNORMAL
MODE: ABNORMAL
MONOCYTES # BLD AUTO: ABNORMAL K/UL
MONOCYTES NFR BLD: 4 %
NEUTROPHILS NFR BLD: 66 %
NITRITE UR QL STRIP: NEGATIVE
OPIATES UR QL SCN: NEGATIVE
OVALOCYTES BLD QL SMEAR: ABNORMAL
PCO2 BLDA: 54 MMHG (ref 35–45)
PCO2 BLDA: 94.6 MMHG (ref 35–45)
PCP UR QL SCN>25 NG/ML: NEGATIVE
PEEP: 5
PH SMN: 7.1 [PH] (ref 7.35–7.45)
PH SMN: 7.3 [PH] (ref 7.35–7.45)
PH UR STRIP: 6 [PH] (ref 5–8)
PHOSPHATE SERPL-MCNC: 5.7 MG/DL
PLATELET # BLD AUTO: 255 K/UL
PMV BLD AUTO: 11.2 FL
PO2 BLDA: 257 MMHG (ref 80–100)
PO2 BLDA: 68 MMHG (ref 80–100)
POC BE: 0 MMOL/L
POC BE: 1 MMOL/L
POC SATURATED O2: 100 % (ref 95–100)
POC SATURATED O2: 91 % (ref 95–100)
POC TCO2: 28 MMOL/L (ref 23–27)
POC TCO2: 32 MMOL/L (ref 23–27)
POCT GLUCOSE: 113 MG/DL (ref 70–110)
POCT GLUCOSE: 285 MG/DL (ref 70–110)
POIKILOCYTOSIS BLD QL SMEAR: SLIGHT
POLYCHROMASIA BLD QL SMEAR: ABNORMAL
POTASSIUM SERPL-SCNC: 4.7 MMOL/L
POTASSIUM SERPL-SCNC: 4.8 MMOL/L
PROT SERPL-MCNC: 6.2 G/DL
PROT SERPL-MCNC: 6.6 G/DL
PROT UR QL STRIP: ABNORMAL
PROTHROMBIN TIME: 10.1 SEC
RBC # BLD AUTO: 4.63 M/UL
RBC #/AREA URNS AUTO: 3 /HPF (ref 0–4)
SAMPLE: ABNORMAL
SAMPLE: ABNORMAL
SITE: ABNORMAL
SITE: ABNORMAL
SODIUM SERPL-SCNC: 136 MMOL/L
SODIUM SERPL-SCNC: 138 MMOL/L
SP GR UR STRIP: 1.01 (ref 1–1.03)
SP02: 90
SP02: 98
TOXICOLOGY INFORMATION: NORMAL
TROPONIN I SERPL DL<=0.01 NG/ML-MCNC: <0.006 NG/ML
URN SPEC COLLECT METH UR: ABNORMAL
UROBILINOGEN UR STRIP-ACNC: NEGATIVE EU/DL
VT: 350
WBC # BLD AUTO: 20.43 K/UL
WBC #/AREA URNS AUTO: 2 /HPF (ref 0–5)
YEAST UR QL AUTO: ABNORMAL

## 2017-06-21 PROCEDURE — 94660 CPAP INITIATION&MGMT: CPT

## 2017-06-21 PROCEDURE — 31500 INSERT EMERGENCY AIRWAY: CPT | Mod: ,,, | Performed by: EMERGENCY MEDICINE

## 2017-06-21 PROCEDURE — 25000003 PHARM REV CODE 250: Performed by: STUDENT IN AN ORGANIZED HEALTH CARE EDUCATION/TRAINING PROGRAM

## 2017-06-21 PROCEDURE — 83605 ASSAY OF LACTIC ACID: CPT

## 2017-06-21 PROCEDURE — 25000003 PHARM REV CODE 250: Performed by: INTERNAL MEDICINE

## 2017-06-21 PROCEDURE — 85610 PROTHROMBIN TIME: CPT

## 2017-06-21 PROCEDURE — 85730 THROMBOPLASTIN TIME PARTIAL: CPT

## 2017-06-21 PROCEDURE — 83880 ASSAY OF NATRIURETIC PEPTIDE: CPT

## 2017-06-21 PROCEDURE — 96375 TX/PRO/DX INJ NEW DRUG ADDON: CPT

## 2017-06-21 PROCEDURE — 93010 ELECTROCARDIOGRAM REPORT: CPT | Mod: ,,, | Performed by: INTERNAL MEDICINE

## 2017-06-21 PROCEDURE — 82803 BLOOD GASES ANY COMBINATION: CPT

## 2017-06-21 PROCEDURE — 0BH17EZ INSERTION OF ENDOTRACHEAL AIRWAY INTO TRACHEA, VIA NATURAL OR ARTIFICIAL OPENING: ICD-10-PCS | Performed by: EMERGENCY MEDICINE

## 2017-06-21 PROCEDURE — 87632 RESP VIRUS 6-11 TARGETS: CPT

## 2017-06-21 PROCEDURE — 5A1935Z RESPIRATORY VENTILATION, LESS THAN 24 CONSECUTIVE HOURS: ICD-10-PCS | Performed by: EMERGENCY MEDICINE

## 2017-06-21 PROCEDURE — 87086 URINE CULTURE/COLONY COUNT: CPT

## 2017-06-21 PROCEDURE — 94761 N-INVAS EAR/PLS OXIMETRY MLT: CPT

## 2017-06-21 PROCEDURE — 27000221 HC OXYGEN, UP TO 24 HOURS

## 2017-06-21 PROCEDURE — 99900035 HC TECH TIME PER 15 MIN (STAT)

## 2017-06-21 PROCEDURE — 99223 1ST HOSP IP/OBS HIGH 75: CPT | Mod: AI,,, | Performed by: INTERNAL MEDICINE

## 2017-06-21 PROCEDURE — 94760 N-INVAS EAR/PLS OXIMETRY 1: CPT

## 2017-06-21 PROCEDURE — 63600175 PHARM REV CODE 636 W HCPCS: Performed by: EMERGENCY MEDICINE

## 2017-06-21 PROCEDURE — 83036 HEMOGLOBIN GLYCOSYLATED A1C: CPT

## 2017-06-21 PROCEDURE — 84100 ASSAY OF PHOSPHORUS: CPT

## 2017-06-21 PROCEDURE — 80307 DRUG TEST PRSMV CHEM ANLYZR: CPT

## 2017-06-21 PROCEDURE — 80053 COMPREHEN METABOLIC PANEL: CPT

## 2017-06-21 PROCEDURE — 25000003 PHARM REV CODE 250: Performed by: HOSPITALIST

## 2017-06-21 PROCEDURE — 25000242 PHARM REV CODE 250 ALT 637 W/ HCPCS: Performed by: INTERNAL MEDICINE

## 2017-06-21 PROCEDURE — 84484 ASSAY OF TROPONIN QUANT: CPT

## 2017-06-21 PROCEDURE — 63600175 PHARM REV CODE 636 W HCPCS: Performed by: INTERNAL MEDICINE

## 2017-06-21 PROCEDURE — 99291 CRITICAL CARE FIRST HOUR: CPT | Mod: 25

## 2017-06-21 PROCEDURE — 99900026 HC AIRWAY MAINTENANCE (STAT)

## 2017-06-21 PROCEDURE — 31500 INSERT EMERGENCY AIRWAY: CPT

## 2017-06-21 PROCEDURE — 87040 BLOOD CULTURE FOR BACTERIA: CPT

## 2017-06-21 PROCEDURE — 96365 THER/PROPH/DIAG IV INF INIT: CPT

## 2017-06-21 PROCEDURE — 36600 WITHDRAWAL OF ARTERIAL BLOOD: CPT

## 2017-06-21 PROCEDURE — 82103 ALPHA-1-ANTITRYPSIN TOTAL: CPT

## 2017-06-21 PROCEDURE — 96361 HYDRATE IV INFUSION ADD-ON: CPT

## 2017-06-21 PROCEDURE — 85007 BL SMEAR W/DIFF WBC COUNT: CPT

## 2017-06-21 PROCEDURE — 99291 CRITICAL CARE FIRST HOUR: CPT | Mod: 25,,, | Performed by: EMERGENCY MEDICINE

## 2017-06-21 PROCEDURE — 20000000 HC ICU ROOM

## 2017-06-21 PROCEDURE — 83735 ASSAY OF MAGNESIUM: CPT

## 2017-06-21 PROCEDURE — 97802 MEDICAL NUTRITION INDIV IN: CPT

## 2017-06-21 PROCEDURE — 25000003 PHARM REV CODE 250: Performed by: EMERGENCY MEDICINE

## 2017-06-21 PROCEDURE — 85027 COMPLETE CBC AUTOMATED: CPT

## 2017-06-21 PROCEDURE — 96372 THER/PROPH/DIAG INJ SC/IM: CPT

## 2017-06-21 PROCEDURE — 81001 URINALYSIS AUTO W/SCOPE: CPT

## 2017-06-21 PROCEDURE — 94002 VENT MGMT INPAT INIT DAY: CPT

## 2017-06-21 PROCEDURE — 63600175 PHARM REV CODE 636 W HCPCS

## 2017-06-21 PROCEDURE — 94640 AIRWAY INHALATION TREATMENT: CPT

## 2017-06-21 PROCEDURE — 80053 COMPREHEN METABOLIC PANEL: CPT | Mod: 91

## 2017-06-21 PROCEDURE — 27000190 HC CPAP FULL FACE MASK W/VALVE

## 2017-06-21 RX ORDER — SODIUM,POTASSIUM PHOSPHATES 280-250MG
2 POWDER IN PACKET (EA) ORAL
Status: DISCONTINUED | OUTPATIENT
Start: 2017-06-21 | End: 2017-06-22

## 2017-06-21 RX ORDER — LANOLIN ALCOHOL/MO/W.PET/CERES
800 CREAM (GRAM) TOPICAL
Status: DISCONTINUED | OUTPATIENT
Start: 2017-06-21 | End: 2017-06-22

## 2017-06-21 RX ORDER — POTASSIUM CHLORIDE 7.45 MG/ML
10 INJECTION INTRAVENOUS
Status: DISCONTINUED | OUTPATIENT
Start: 2017-06-21 | End: 2017-06-22

## 2017-06-21 RX ORDER — METHYLPREDNISOLONE SOD SUCC 125 MG
125 VIAL (EA) INJECTION EVERY 6 HOURS
Status: DISCONTINUED | OUTPATIENT
Start: 2017-06-21 | End: 2017-06-21

## 2017-06-21 RX ORDER — FENTANYL CITRATE 50 UG/ML
50 INJECTION, SOLUTION INTRAMUSCULAR; INTRAVENOUS
Status: DISCONTINUED | OUTPATIENT
Start: 2017-06-21 | End: 2017-06-22

## 2017-06-21 RX ORDER — ONDANSETRON 2 MG/ML
INJECTION INTRAMUSCULAR; INTRAVENOUS
Status: COMPLETED
Start: 2017-06-21 | End: 2017-06-21

## 2017-06-21 RX ORDER — ATORVASTATIN CALCIUM 20 MG/1
40 TABLET, FILM COATED ORAL DAILY
Status: DISCONTINUED | OUTPATIENT
Start: 2017-06-21 | End: 2017-06-26 | Stop reason: HOSPADM

## 2017-06-21 RX ORDER — PREDNISONE 20 MG/1
40 TABLET ORAL DAILY
Status: DISCONTINUED | OUTPATIENT
Start: 2017-06-21 | End: 2017-06-22

## 2017-06-21 RX ORDER — FENTANYL CITRAT/DEXTROSE 5%/PF 100 MCG/10
25 PATIENT CONTROLLED ANALGESIA SYRINGE INTRAVENOUS CONTINUOUS
Status: DISCONTINUED | OUTPATIENT
Start: 2017-06-21 | End: 2017-06-22

## 2017-06-21 RX ORDER — GLUCAGON 1 MG
1 KIT INJECTION
Status: DISCONTINUED | OUTPATIENT
Start: 2017-06-21 | End: 2017-06-21

## 2017-06-21 RX ORDER — FAMOTIDINE 10 MG/ML
20 INJECTION INTRAVENOUS 2 TIMES DAILY
Status: DISCONTINUED | OUTPATIENT
Start: 2017-06-21 | End: 2017-06-22

## 2017-06-21 RX ORDER — LORAZEPAM 2 MG/ML
0.5 INJECTION INTRAMUSCULAR
Status: COMPLETED | OUTPATIENT
Start: 2017-06-21 | End: 2017-06-21

## 2017-06-21 RX ORDER — ONDANSETRON 2 MG/ML
4 INJECTION INTRAMUSCULAR; INTRAVENOUS ONCE
Status: COMPLETED | OUTPATIENT
Start: 2017-06-21 | End: 2017-06-21

## 2017-06-21 RX ORDER — HEPARIN SODIUM 5000 [USP'U]/ML
5000 INJECTION, SOLUTION INTRAVENOUS; SUBCUTANEOUS EVERY 8 HOURS
Status: DISCONTINUED | OUTPATIENT
Start: 2017-06-21 | End: 2017-06-22

## 2017-06-21 RX ORDER — PANTOPRAZOLE SODIUM 40 MG/10ML
40 INJECTION, POWDER, LYOPHILIZED, FOR SOLUTION INTRAVENOUS DAILY
Status: DISCONTINUED | OUTPATIENT
Start: 2017-06-21 | End: 2017-06-21

## 2017-06-21 RX ORDER — ONDANSETRON 4 MG/1
4 TABLET, ORALLY DISINTEGRATING ORAL EVERY 6 HOURS PRN
Status: DISCONTINUED | OUTPATIENT
Start: 2017-06-21 | End: 2017-06-26 | Stop reason: HOSPADM

## 2017-06-21 RX ORDER — NAPROXEN SODIUM 220 MG/1
81 TABLET, FILM COATED ORAL DAILY
Status: DISCONTINUED | OUTPATIENT
Start: 2017-06-21 | End: 2017-06-22

## 2017-06-21 RX ORDER — EPINEPHRINE 1 MG/ML
0.3 INJECTION, SOLUTION INTRACARDIAC; INTRAMUSCULAR; INTRAVENOUS; SUBCUTANEOUS
Status: COMPLETED | OUTPATIENT
Start: 2017-06-21 | End: 2017-06-21

## 2017-06-21 RX ORDER — ENOXAPARIN SODIUM 100 MG/ML
40 INJECTION SUBCUTANEOUS EVERY 24 HOURS
Status: DISCONTINUED | OUTPATIENT
Start: 2017-06-21 | End: 2017-06-21

## 2017-06-21 RX ORDER — INSULIN ASPART 100 [IU]/ML
0-5 INJECTION, SOLUTION INTRAVENOUS; SUBCUTANEOUS EVERY 6 HOURS PRN
Status: DISCONTINUED | OUTPATIENT
Start: 2017-06-21 | End: 2017-06-21

## 2017-06-21 RX ORDER — EPINEPHRINE 1 MG/ML
INJECTION, SOLUTION INTRACARDIAC; INTRAMUSCULAR; INTRAVENOUS; SUBCUTANEOUS
Status: COMPLETED
Start: 2017-06-21 | End: 2017-06-21

## 2017-06-21 RX ORDER — BUPROPION HYDROCHLORIDE 75 MG/1
75 TABLET ORAL 2 TIMES DAILY
Status: DISCONTINUED | OUTPATIENT
Start: 2017-06-21 | End: 2017-06-22

## 2017-06-21 RX ORDER — PREDNISONE 20 MG/1
40 TABLET ORAL DAILY
Status: DISCONTINUED | OUTPATIENT
Start: 2017-06-21 | End: 2017-06-21

## 2017-06-21 RX ORDER — IPRATROPIUM BROMIDE AND ALBUTEROL SULFATE 2.5; .5 MG/3ML; MG/3ML
3 SOLUTION RESPIRATORY (INHALATION) EVERY 4 HOURS
Status: DISCONTINUED | OUTPATIENT
Start: 2017-06-21 | End: 2017-06-26 | Stop reason: HOSPADM

## 2017-06-21 RX ORDER — GLUCAGON 1 MG
1 KIT INJECTION
Status: DISCONTINUED | OUTPATIENT
Start: 2017-06-21 | End: 2017-06-25

## 2017-06-21 RX ORDER — SUCCINYLCHOLINE CHLORIDE 20 MG/ML
INJECTION INTRAMUSCULAR; INTRAVENOUS
Status: DISPENSED
Start: 2017-06-21 | End: 2017-06-21

## 2017-06-21 RX ORDER — ASPIRIN 81 MG/1
81 TABLET ORAL DAILY
Status: DISCONTINUED | OUTPATIENT
Start: 2017-06-21 | End: 2017-06-21

## 2017-06-21 RX ORDER — SODIUM CHLORIDE 0.9 % (FLUSH) 0.9 %
3 SYRINGE (ML) INJECTION EVERY 8 HOURS
Status: DISCONTINUED | OUTPATIENT
Start: 2017-06-21 | End: 2017-06-26 | Stop reason: HOSPADM

## 2017-06-21 RX ORDER — SERTRALINE HYDROCHLORIDE 25 MG/1
25 TABLET, FILM COATED ORAL DAILY
Status: DISCONTINUED | OUTPATIENT
Start: 2017-06-21 | End: 2017-06-22

## 2017-06-21 RX ORDER — MAGNESIUM SULFATE HEPTAHYDRATE 40 MG/ML
2 INJECTION, SOLUTION INTRAVENOUS
Status: DISCONTINUED | OUTPATIENT
Start: 2017-06-21 | End: 2017-06-22

## 2017-06-21 RX ORDER — CHLORHEXIDINE GLUCONATE ORAL RINSE 1.2 MG/ML
15 SOLUTION DENTAL 2 TIMES DAILY
Status: DISCONTINUED | OUTPATIENT
Start: 2017-06-21 | End: 2017-06-22

## 2017-06-21 RX ORDER — ETOMIDATE 2 MG/ML
INJECTION INTRAVENOUS
Status: DISPENSED
Start: 2017-06-21 | End: 2017-06-21

## 2017-06-21 RX ORDER — POTASSIUM CHLORIDE 20 MEQ/15ML
40 SOLUTION ORAL
Status: DISCONTINUED | OUTPATIENT
Start: 2017-06-21 | End: 2017-06-22

## 2017-06-21 RX ORDER — SERTRALINE HYDROCHLORIDE 25 MG/1
25 TABLET, FILM COATED ORAL DAILY
Status: DISCONTINUED | OUTPATIENT
Start: 2017-06-21 | End: 2017-06-21

## 2017-06-21 RX ORDER — INSULIN ASPART 100 [IU]/ML
1-10 INJECTION, SOLUTION INTRAVENOUS; SUBCUTANEOUS EVERY 6 HOURS PRN
Status: DISCONTINUED | OUTPATIENT
Start: 2017-06-21 | End: 2017-06-25

## 2017-06-21 RX ORDER — BUPROPION HYDROCHLORIDE 150 MG/1
150 TABLET ORAL DAILY
Status: DISCONTINUED | OUTPATIENT
Start: 2017-06-21 | End: 2017-06-21

## 2017-06-21 RX ADMIN — BUPROPION HYDROCHLORIDE 75 MG: 75 TABLET, FILM COATED ORAL at 11:06

## 2017-06-21 RX ADMIN — EPINEPHRINE 0.3 MG: 1 INJECTION, SOLUTION INTRAMUSCULAR; SUBCUTANEOUS at 04:06

## 2017-06-21 RX ADMIN — IPRATROPIUM BROMIDE AND ALBUTEROL SULFATE 3 ML: .5; 3 SOLUTION RESPIRATORY (INHALATION) at 03:06

## 2017-06-21 RX ADMIN — Medication 3 ML: at 09:06

## 2017-06-21 RX ADMIN — SODIUM CHLORIDE 1000 ML: 0.9 INJECTION, SOLUTION INTRAVENOUS at 04:06

## 2017-06-21 RX ADMIN — Medication 25 MCG/HR: at 05:06

## 2017-06-21 RX ADMIN — HEPARIN SODIUM 5000 UNITS: 5000 INJECTION, SOLUTION INTRAVENOUS; SUBCUTANEOUS at 02:06

## 2017-06-21 RX ADMIN — ATORVASTATIN CALCIUM 40 MG: 20 TABLET, FILM COATED ORAL at 09:06

## 2017-06-21 RX ADMIN — HEPARIN SODIUM 5000 UNITS: 5000 INJECTION, SOLUTION INTRAVENOUS; SUBCUTANEOUS at 09:06

## 2017-06-21 RX ADMIN — SERTRALINE HYDROCHLORIDE 25 MG: 25 TABLET ORAL at 11:06

## 2017-06-21 RX ADMIN — Medication 3 ML: at 02:06

## 2017-06-21 RX ADMIN — CHLORHEXIDINE GLUCONATE 15 ML: 1.2 RINSE ORAL at 09:06

## 2017-06-21 RX ADMIN — BUPROPION HYDROCHLORIDE 75 MG: 75 TABLET, FILM COATED ORAL at 09:06

## 2017-06-21 RX ADMIN — IPRATROPIUM BROMIDE AND ALBUTEROL SULFATE 3 ML: .5; 3 SOLUTION RESPIRATORY (INHALATION) at 07:06

## 2017-06-21 RX ADMIN — FAMOTIDINE 20 MG: 10 INJECTION, SOLUTION INTRAVENOUS at 09:06

## 2017-06-21 RX ADMIN — ASPIRIN 81 MG CHEWABLE TABLET 81 MG: 81 TABLET CHEWABLE at 09:06

## 2017-06-21 RX ADMIN — Medication 50 MCG/HR: at 05:06

## 2017-06-21 RX ADMIN — EPINEPHRINE 0.3 MG: 1 INJECTION, SOLUTION INTRACARDIAC; INTRAMUSCULAR; INTRAVENOUS; SUBCUTANEOUS at 04:06

## 2017-06-21 RX ADMIN — IPRATROPIUM BROMIDE AND ALBUTEROL SULFATE 3 ML: .5; 3 SOLUTION RESPIRATORY (INHALATION) at 11:06

## 2017-06-21 RX ADMIN — LORAZEPAM 0.5 MG: 2 INJECTION INTRAMUSCULAR; INTRAVENOUS at 04:06

## 2017-06-21 RX ADMIN — Medication 500 MG: at 10:06

## 2017-06-21 RX ADMIN — ONDANSETRON 4 MG: 2 INJECTION INTRAMUSCULAR; INTRAVENOUS at 11:06

## 2017-06-21 RX ADMIN — PREDNISONE 40 MG: 20 TABLET ORAL at 09:06

## 2017-06-21 NOTE — CONSULTS
"  [facility]  Adult Nutrition  Consult Note    SUMMARY     Recommendations  Recommendation/Intervention:   1. As medically appropriate, recommend initiating TF of Glucerna 1.5 at 35 mL/hr - to provide 1260 kcal/day, 69 g protein/day, and 638 mL free fluid/day.   2. When able to extubate, ADAT to Diabetic 1800, Low Na + Boost Glucose Control OS with all meals, with texture per SLP.   RD to monitor.    Goals: Patient to receive nutrition within 48 hours  Nutrition Goal Status: new  Communication of RD Recs: reviewed with RN    Reason for Assessment  Reason for Assessment: physician consult  Diagnosis: pulmonary disease (acute on chronic respiratory failure)  Relevent Medical History: COPD, HLD, PAD, type 2 DM    General Information Comments: Patient intubated, on vent.   Nutrition Discharge Planning: Unable to determine at this time.    Nutrition Prescription Ordered  Current Diet Order: NPO       Nutrition/Diet History  Patient Reported Diet/Restrictions/Preferences:  (VALERY)  Typical Food/Fluid Intake: VALERY  Food Preferences: VALERY   Factors Affecting Nutritional Intake: NPO, on mechanical ventilation     Labs/Tests/Procedures/Meds   Pertinent Labs Reviewed: reviewed  Pertinent Labs Comments: Glu 367, POCT Glu 200, HgbA1c 6.5, Ca 8.0, Mag 3.1, Alb 3.3, AST 70, ALT 45  Pertinent Medications Reviewed: reviewed  Pertinent Medications Comments: famotidine, prednisone, fentanyl    Physical Findings  Overall Physical Appearance: underweight, on ventilator support  Tubes: orogastric tube   Skin: intact    Anthropometrics  Height: 5' 6" (167.6 cm)  Weight Method: Bed Scale  Weight: 51.1 kg (112 lb 10.5 oz)   Ideal Body Weight (IBW), Female: 130 lb   % Ideal Body Weight, Female (lb): 86.66 lb  BMI (Calculated): 18.2  BMI Grade: 17 - 18.4 protein-energy malnutrition grade I   Usual Body Weight (UBW), k.8 kg (per chart review 2017)  % Usual Body Weight: 77.82  Weight Change Amount: 32 lb 6.5 oz  % Weight " Change: 28.77 %  Weight Loss: unintentional     Estimated/Assessed Needs  Weight Used For Calorie Calculations: 51.1 kg (112 lb 10.5 oz)   Energy Need Method: Cornel State (1199 kcal/day)  RMR (Otero-St. Jeor Equation): 1122.75   Weight Used For Protein Calculations: 51.1 kg (112 lb 10.5 oz)  Protein Requirements: 61-77 g/day (1.2-1.5 g/kg)  Fluid Need Method: RDA Method (1 mL/kcal or per MD)     Monitor and Evaluation  Food and Nutrient Intake: energy intake, food and beverage intake, enteral nutrition intake  Food and Nutrient Adminstration: diet order   Anthropometric Measurements: weight, weight change  Biochemical Data, Medical Tests and Procedures: electrolyte and renal panel, gastrointestinal profile, glucose/endocrine profile, inflammatory profile  Nutrition-Focused Physical Findings: overall appearance  % Intake of Estimated Energy Needs: 0 - 25 %  % Meal Intake: NPO    Nutrition Risk  Level of Risk:  (2x/week)    Nutrition Follow-Up  RD Follow-up?: Yes    Nutrition Diagnosis  Problem: Inadequate energy intake  Etiology: decreased ability to consume sufficient energy  Signs/Symptoms: NPO with no alternate means of nutrition  Nutrition Diagnosis Status: New

## 2017-06-21 NOTE — PLAN OF CARE
Trinh Bowles MD  [address] [facility] [address]    [facility] - [place], [place] - [address]  [address] [address]  Phone: [phone] Fax: [phone]    Payor: MEDICAID / Plan: AETNA [facility] / Product Type: Managed Medicaid /     Future Appointments  Date Time Provider Department Center   6/23/2017 2:00 PM Donaldo Mohan [facility]   8/9/2017 10:20 AM Trinh Bowles MD [facility]     Extended Emergency Contact Information  Primary Emergency Contact: Ramírez Hernandez   [place]  Mobile Phone: [phone]  Relation: Friend     06/21/17 1529   Discharge Assessment   Assessment Type Discharge Planning Assessment   Confirmed/corrected address and phone number on facesheet? Yes   Assessment information obtained from? Patient   Expected Length of Stay (days) 4   Communicated expected length of stay with patient/caregiver no   Prior to hospitilization cognitive status: Alert/Oriented   Arrived From home or self-care   Lives With alone   Able to Return to Prior Arrangements yes   Is patient able to care for self after discharge? Unable to determine at this time (comments)   How many people do you have in your home that can help with your care after discharge? 0   Who are your caregiver(s) and their phone number(s)? Ramírez Hernandez (Friend) [phone]    Patient's perception of discharge disposition home or selfcare   Readmission Within The Last 30 Days no previous admission in last 30 days   Patient currently being followed by outpatient case management? No  (OPCM signed off)   Patient currently receives home health services? No   Does the patient currently use HME? Yes   Patient currently receives private duty nursing? N/A   Patient currently receives any other outside agency services? No   Equipment Currently Used at Home oxygen   Do you have any problems affording any of your prescribed medications?  TBD  (some COPD meds not covered by insurance)   Is the patient taking medications as prescribed? yes   Do you have any financial concerns preventing you from receiving the healthcare you need? No   Does the patient have transportation to healthcare appointments? Yes   Transportation Available car;family or friend will provide   On Dialysis? No   Does the patient receive services at the Coumadin Clinic? No   Are there any open cases? No   Discharge Plan A Home   Discharge Plan B Rehab   Patient/Family In Agreement With Plan yes   Moira Egan RN, BSN  Case Management  [facility]  Ext. [phone]

## 2017-06-21 NOTE — PROGRESS NOTES
Pt extubated per Dr. Casey' order. Pt placed on a BiPAP of 18/5 with an FiO2 of 30%. Pt is maintaining an SpO2 of 94%. Will continue to monitor.

## 2017-06-21 NOTE — SIGNIFICANT EVENT
Pt admitted to ER via EMS. Arrived with CPAP mask. Pt awake, coherent with severe WOB . Pt placed on bipap. ABG drawn, results shown to Dr. Obed HELTON. CCCS consulted and decided to intubate. Intubated with sz7.0 ETT at 24cm. Xray sited and ETT  Advanced 2cm to 26 cm per CCCS. Last RT aware of pt coming to ICU and transported via bagging on 100% fio2.

## 2017-06-21 NOTE — ED PROVIDER NOTES
Encounter Date: 6/21/2017       History     Chief Complaint   Patient presents with    Respiratory Distress     arrives on CPAP via EMS.      HPI   56 y.o. female with pertinent PMHx of COPD, PAD, DM2, pulmonary emphysema, and HLD, who presents to the ED on CPAP via EMS with complaint of severe shortness of breath and hypoxia with associated decreased mental status with unknown time of onset tonight. Tonight the shortness of breath became to great for her to handle to she came into the ED. EMS put her on CPAP and gave her a dose of solumedrol en route. She was in her usual state of health until she started to have the shortness of breath this morning that woke her from her sleep. Of note  When EMS arrived pt was found to be poorly responsive and hypoxic. They gave her solumedrol, magnesium, nebulizer's, and started noninvasive ventilation. These improved her mental status and upon arrival to the ED the pt is more awake than her initial presentation per EMS. There are no other associated symptoms or mitigating/aggravating factors reported at this time.         Review of patient's allergies indicates:   Allergen Reactions    Avelox [moxifloxacin] Itching and Rash     IV     Past Medical History:   Diagnosis Date    Chronic respiratory failure with hypoxia and hypercapnia 5/4/2014    COPD (chronic obstructive pulmonary disease)     Depression     Hyperlipidemia 5/12/2017    Otosclerosis of right ear 5/12/2017    40% hearing back in 1988, had surgery with some improvement, but hearing loss persists.      PAD (peripheral artery disease)     Post-menopausal 2008    Pulmonary emphysema 4/28/2017    Tobacco dependence 5/2/2014    Type 2 diabetes mellitus without complication, without long-term current use of insulin 4/29/2017    Hb A1c 6.5% in 2/2017. Diet controlled.      Past Surgical History:   Procedure Laterality Date    GANGLION CYST EXCISION Right 1988    STAPEDES SURGERY Right 1988     Family History    Problem Relation Age of Onset    Diabetes Mother     Heart disease Father      Strong FH of CAD/CHF on fathers side    Peripheral vascular disease Brother      With necrosis of leg    Diabetes Maternal Uncle     Cancer Neg Hx     Thyroid disease Neg Hx      Social History   Substance Use Topics    Smoking status: Current Every Day Smoker     Packs/day: 1.00     Years: 40.00     Types: Cigarettes    Smokeless tobacco: Never Used      Comment: 3-5 a day    Alcohol use 0.5 oz/week     1 Standard drinks or equivalent per week     Review of Systems   Constitutional: Negative for activity change and appetite change.   HENT: Negative for congestion and ear pain.    Eyes: Negative for pain and redness.   Respiratory: Positive for shortness of breath. Negative for cough.    Cardiovascular: Negative for chest pain and leg swelling.   Gastrointestinal: Negative for abdominal distention and abdominal pain.   Genitourinary: Negative for difficulty urinating and dysuria.   Musculoskeletal: Negative for arthralgias and back pain.   Skin: Negative for color change and pallor.   Neurological: Negative for light-headedness and headaches.   All other systems reviewed and are negative.      Physical Exam     Initial Vitals   BP Pulse Resp Temp SpO2   -- -- -- -- --     Physical Exam    Nursing note and vitals reviewed.  Constitutional: She appears well-developed and well-nourished. She is not diaphoretic. She appears distressed.   HENT:   Head: Normocephalic and atraumatic.   Right Ear: External ear normal.   Left Ear: External ear normal.   Eyes: Conjunctivae and EOM are normal.   Neck: Normal range of motion. Neck supple. No JVD present.   Cardiovascular: Regular rhythm, normal heart sounds and intact distal pulses. Tachycardia present.  Exam reveals no friction rub.    No murmur heard.  Pulmonary/Chest: No stridor. She is in respiratory distress.   Decreased breath sounds throughout    Abdominal: Soft. She exhibits no  distension. There is no tenderness. There is no rebound.   Musculoskeletal: Normal range of motion. She exhibits no edema or tenderness.   Neurological: She is alert and oriented to person, place, and time. She has normal strength.   Skin: Skin is warm and dry. No rash and no abscess noted. No erythema. No pallor.         ED Course   Intubation  Date/Time: 6/21/2017 5:23 AM  Performed by: CK KRAUSE  Authorized by: OLIVERIO HESTER   Indications: respiratory distress,  hypoxemia and  respiratory failure  Intubation method: direct  Patient status: paralyzed (RSI)  Preoxygenation: BVM  Sedatives: etomidate  Paralytic: succinylcholine  Laryngoscope size: Mac 4  Tube size: 7.0 mm  Tube type: cuffed  Number of attempts: 2  Ventilation between attempts: BVM  Cricoid pressure: yes  Cords visualized: yes  Post-procedure assessment: chest rise,  ETCO2 monitor and CO2 detector  Breath sounds: clear  Cuff inflated: yes  ETT to lip: 24 cm  Tube secured with: ETT martinez  Patient tolerance: Patient tolerated the procedure well with no immediate complications        Labs Reviewed   CBC W/ AUTO DIFFERENTIAL - Abnormal; Notable for the following:        Result Value    MCV 99 (*)     MCH 31.7 (*)     All other components within normal limits   COMPREHENSIVE METABOLIC PANEL - Abnormal; Notable for the following:     CO2 22 (*)     Glucose 367 (*)     eGFR if non  56.7 (*)     All other components within normal limits   ISTAT PROCEDURE - Abnormal; Notable for the following:     POC PH 7.103 (*)     POC PCO2 94.6 (*)     POC PO2 257 (*)     POC HCO3 29.5 (*)     POC TCO2 32 (*)     All other components within normal limits   TROPONIN I   LACTIC ACID, PLASMA   MAGNESIUM   PHOSPHORUS   PROTIME-INR   URINALYSIS   LACTIC ACID, PLASMA                   APC / Resident Notes:   HOII MDM  A/P: 56 yr old with shortness of breath. Ddx includes but is not limited to COPD exacerbation, pneumonia, URI. Work up CBC, CMP,  CXR. Currently pending results.   Santhosh Lal PGY-2 [employer] [facility]  06/21/2017 4:32 AM    Update:   Pt acidotic, with elevated Pc02, pt's sats now in the 92-93's. Critical care has been consulted. Awaiting their rec's.  Santhosh Lal PGY-2 [employer] [facility]  06/21/2017 5:01 AM    Update:   Pt started to become increasing hypoxic,  Her o2 sats dropped to the 82's, the decision was made to intubate the pt. She was intubated without difficulty. The pt is now admitted to critical care.   Santhosh Lal PGY-2 [employer] [facility]  06/21/2017 5:23 AM                ED Course     Clinical Impression:   Diagnoses of Respiratory failure and Encounter for nasogastric (NG) tube placement were pertinent to this visit.                           Santhosh Lal MD  Resident  06/21/17 0528       Santhosh Lal MD  Resident  06/21/17 0534

## 2017-06-21 NOTE — ASSESSMENT & PLAN NOTE
- Typically diet controlled with A1C of 6.5% in February  - Pt with elevated glucose on admit likely secondary to continued PO corticosteroid  - Will place on low-dose SSI with hypoglycemia protocols in place  - Continue to monitor

## 2017-06-21 NOTE — ED TRIAGE NOTES
Ann Elizabeth Canavan, a 56 y.o. female presents to the ED via EMS on bi-pap after an episode of unresponsiveness and continued severe respiratory distress.       Chief Complaint   Patient presents with    Respiratory Distress     arrives on CPAP via EMS.      Review of patient's allergies indicates:   Allergen Reactions    Avelox [moxifloxacin] Itching and Rash     IV     Past Medical History:   Diagnosis Date    Chronic respiratory failure with hypoxia and hypercapnia 5/4/2014    COPD (chronic obstructive pulmonary disease)     Depression     Hyperlipidemia 5/12/2017    Otosclerosis of right ear 5/12/2017    40% hearing back in 1988, had surgery with some improvement, but hearing loss persists.      PAD (peripheral artery disease)     Post-menopausal 2008    Pulmonary emphysema 4/28/2017    Tobacco dependence 5/2/2014    Type 2 diabetes mellitus without complication, without long-term current use of insulin 4/29/2017    Hb A1c 6.5% in 2/2017. Diet controlled.

## 2017-06-21 NOTE — PHYSICIAN QUERY
PT Name: Ann Elizabeth Canavan  MR #: [id number]    Physician Query Form - Nutrition Clarification     CDS/: Fallon Hernandez               Contact information: [email or web address]    This form is a permanent document in the medical record.     Query Date: 2017    By submitting this query, we are merely seeking further clarification of documentation.. Please utilize your independent clinical judgment when addressing the question(s) below.    The Medical record contains the following:   Indicators  Supporting Clinical Findings Location in Medical Record   x % of Estimated Energy Intake over a time frame from p.o., TF, or TPN 0 - 25 % RD consult    x Weight Status over a time frame Weight Loss: unintentional  Underweight    Malnutrition  Pt with 20lb weight loss over recent time period per pulmonology clinic   note dated 17; likely 2/2 pulmonary disease and increased metabolic demand of respiration RD consult       H&P     Subcutaneous Fat and/or Muscle Loss      Fluid Accumulation or Edema      Reduced  Strength     x Wt / BMI / Usual Body Weight Weight: 51.1 kg (112 lb 10.5 oz)   BMI (Calculated): 18.2  Usual Body Weight (UBW), k.8 kg (per chart review 2017) RD consult     Delayed Wound Healing / Failure to Thrive     x Acute or Chronic Illness      Medication     x Treatment As medically appropriate, recommend initiating TF of Glucerna 1.5 at 35 mL/hr  RD consult    x Other protein-energy malnutrition grade I  RD consult      AND / ASPEN Clinical Characteristics (2011)  A minimum of two characteristics is recommended for diagnosing either moderate or severe malnutrition   Mild Malnutrition Moderate Malnutrition Severe Malnutrition   Energy Intake from p.o., TF or TPN. < 75% intake of estimated energy needs for less than 7 days < 75% intake of estimated energy needs for greater than 7 days < 50% intake of estimated energy needs for > 5 days   Weight Loss  1-2% in 1 month  5% in 3 months  7.5% in 6 months  10% in 1 year 1-2 % in 1 week  5% in 1 month  7.5% in 3 months  10% in 6 months  20% in 1 year > 2% in 1 week  > 5% in 1 month  > 7.5% in 3 months  > 10% in 6 months  > 20% in 1 year   Physical Findings     None *Mild subcutaneous fat and/or muscle loss  *Mild fluid accumulation  *Stage II decubitus  *Surgical wound or non-healing wound *Mod/severe subcutaneous fat and/or muscle loss  *Mod/severe fluid accumulation  *Stage III or IV decubitus  *Non-healing surgical wound     Provider, please specify diagnosis or diagnoses associated with above clinical findings.    [ ] Mild Protein-Calorie Malnutrition  [x ] Moderate Protein-Calorie Malnutrition  [ ] Severe Protein-Calorie Malnutrition  [ ] Cachexia  [ ] Anorexia  [ ] Abnormal Weight Loss  [ ] Underweight  [ ] Other Nutritional Diagnosis (please specify): ____________________________________  [ ] Other: ________________________________  [ ] Clinically Undetermined    Please document in your progress notes daily for the duration of treatment until resolved and include in your discharge summary.

## 2017-06-21 NOTE — ASSESSMENT & PLAN NOTE
- Pt with 20lb weight loss over recent time period per pulmonology clinic note dated 6/1/17; likely 2/2 pulmonary disease and increased metabolic demand of respiration  - Prior lung nodule seen on CT  - Nutrition consult  - NG tube placement for potential feeding while intubated

## 2017-06-21 NOTE — SUBJECTIVE & OBJECTIVE
Past Medical History:   Diagnosis Date    Chronic respiratory failure with hypoxia and hypercapnia 5/4/2014    COPD (chronic obstructive pulmonary disease)     Depression     Hyperlipidemia 5/12/2017    Otosclerosis of right ear 5/12/2017    40% hearing back in 1988, had surgery with some improvement, but hearing loss persists.      PAD (peripheral artery disease)     Post-menopausal 2008    Pulmonary emphysema 4/28/2017    Tobacco dependence 5/2/2014    Type 2 diabetes mellitus without complication, without long-term current use of insulin 4/29/2017    Hb A1c 6.5% in 2/2017. Diet controlled.        Past Surgical History:   Procedure Laterality Date    GANGLION CYST EXCISION Right 1988    STAPEDES SURGERY Right 1988       Review of patient's allergies indicates:   Allergen Reactions    Avelox [moxifloxacin] Itching and Rash     IV       Family History     Problem Relation (Age of Onset)    Diabetes Mother, Maternal Uncle    Heart disease Father    Peripheral vascular disease Brother        Social History Main Topics    Smoking status: Current Every Day Smoker     Packs/day: 1.00     Years: 40.00     Types: Cigarettes    Smokeless tobacco: Never Used      Comment: 3-5 a day    Alcohol use 0.5 oz/week     1 Standard drinks or equivalent per week    Drug use: No    Sexual activity: Not on file      Review of Systems   Unable to perform ROS: Intubated   Eyes: Negative for discharge.   Respiratory: Positive for shortness of breath and wheezing.    Psychiatric/Behavioral: Positive for confusion and decreased concentration.     Objective:     Vital Signs (Most Recent):  Pulse: (!) 131 (06/21/17 0456)  Resp: (!) 0 (06/21/17 0456)  BP: (!) 142/81 (06/21/17 0452)  SpO2: (!) 93 % (06/21/17 0456) Vital Signs (24h Range):  Pulse:  [131-138] 131  Resp:  [0-37] 0  SpO2:  [92 %-97 %] 93 %  BP: (142-203)/() 142/81      There is no height or weight on file to calculate BMI.    No intake or output data in the  24 hours ending 06/21/17 0547    Physical exam was performed immediately prior to intubation; limited due to altered mental status  Physical Exam   Constitutional: She appears well-developed. She appears lethargic. She appears cachectic. She appears ill. She appears distressed. She is intubated.   HENT:   Head: Normocephalic and atraumatic.   Intubated   Eyes: Conjunctivae are normal. Pupils are equal, round, and reactive to light. Right eye exhibits no discharge. Left eye exhibits no discharge. No scleral icterus.   Neck: No tracheal deviation present. No thyromegaly present.   Cardiovascular: Regular rhythm and normal pulses.  Tachycardia present.    No murmur heard.  Pulmonary/Chest: Accessory muscle usage present. Tachypnea noted. She is intubated. She is in respiratory distress. She has decreased breath sounds. She has wheezes.   Abdominal: Soft. Bowel sounds are normal. She exhibits no distension. There is no tenderness.   Musculoskeletal: She exhibits no edema.   Neurological: She appears lethargic. She is disoriented. GCS eye subscore is 2. GCS verbal subscore is 1. GCS motor subscore is 4.   GCS 7T   Skin: Skin is warm and dry. Rash (bilateral lower extremities) noted.   Psychiatric: Cognition and memory are impaired.       Vents:  Oxygen Concentration (%): 40 (06/21/17 0456)  Lines/Drains/Airways     Drain                 NG/OG Tube 06/21/17 0528 [place] sump 18 Fr. Center mouth less than 1 day         Urethral Catheter 06/21/17 0528 Non-latex 16 Fr. less than 1 day              Significant Labs:    CBC/Anemia Profile:    Recent Labs  Lab 06/21/17 0422   HGB 14.7   HCT 45.6      MCV 99*   RDW 12.7        Chemistries:    Recent Labs  Lab 06/21/17 0422      K 4.7      CO2 22*   BUN 16   CREATININE 1.1   CALCIUM 9.0   ALBUMIN 3.5   PROT 6.6   BILITOT 0.6   ALKPHOS 125   ALT 27   AST 36     ABG    Recent Labs  Lab 06/21/17  0445   PH 7.103*   PO2 257*   PCO2 94.6*   HCO3 29.5*   BE 0        Significant Imaging: I have reviewed all pertinent imaging results/findings within the past 24 hours.     X-Ray Chest AP Portable 6/21/17    Narrative Chest AP portable    Indication:Chest Pain.    Comparison:May 21, 2017.    Findings:     Hyperinflated lungs with emphysematous architecture, unchanged.    Heart and lungs unchanged when allowing for differences in technique and positioning.    Impression No acute findings in the chest. No significant change from prior.        Electronically signed by: SIOMARA PALUMBO MD  Date:     06/21/17  Time:    04:43

## 2017-06-21 NOTE — HPI
Pt is a 56 y.o. F with a PMH significant for COPD, PAD, and DM who is being admitted to the medical ICU for COPD exacerbation requiring intubation. Pt was transported to ED on CPAP via EMS after calling for help and being found almost unresponsive and hypoxic. In route, patient was administered solumedrol, magnesium, nebulizer's, and started on noninvasive ventilation (EMS considered intubation, however deemed likely to be unsuccessful in the field and therefore held off). She arrived complaining of severe dyspnea, confusion, and fatigue, however patient showed some minor improvement in mental status. Pt was placed on BiPAP, ABG was abnormal showing acidosis/CO2 retention, and patient eventually showed repeat mental status decline with hypoxia requiring intubation.    Of note, patient has been admitted several times (5x) since February for COPD exacerbations, uses multiple respiratory medicines at home (breo, incruse, duonebs, and albuterol in addition to intermittent rounds of PO corticosteroids and PO Abx), and requires 2L NC for supplemental O2. She was recently seen by Dr. Bernal (her pulmonologist) on 6/1 who urged continued therapy as above as well as smoking cessation (40+ pack years).

## 2017-06-21 NOTE — ASSESSMENT & PLAN NOTE
- Pt with COPD exacerbation requiring intubation to protect airway in declining mental status and unsustainable work-of-breathing  - Pt with initial blood gas noting acidosis as well as hypercapnia  - Restart home breathing treatments and respiratory therapy for PT  - Begin PO corticosteroids and PO Abx for CODP exacerbation  - Continue to monitor patient on the ventilator

## 2017-06-21 NOTE — ED PROVIDER NOTES
Encounter Date: 6/21/2017    SCRIBE #1 NOTE: IRobert, jay scribing for, and in the presence of, Dr. Clay.       History     Chief Complaint   Patient presents with    Respiratory Distress     arrives on CPAP via EMS.      This is a 56 y.o. female with pertinent PMHx of COPD, PAD, DM2, pulmonary emphysema, and HLD, who presents to the ED on CPAP via EMS with complaint of severe shortness of breath and hypoxia with associated decreased mental status with unknown time of onset tonight. Pt has severe COPD and is on home O2 and called EMS tonight due to respiratory distress. When EMS arrived pt was found to be poorly responsive and hypoxic. They gave her solumedrol, magnesium, nebulizer's, and started noninvasive ventilation. These improved her mental status and upon arrival to the ED the pt is more awake than her initial presentation per EMS. There are no other associated symptoms or mitigating/aggravating factors reported at this time.           Review of patient's allergies indicates:   Allergen Reactions    Avelox [moxifloxacin] Itching and Rash     IV     Past Medical History:   Diagnosis Date    Chronic respiratory failure with hypoxia and hypercapnia 5/4/2014    COPD (chronic obstructive pulmonary disease)     Depression     Hyperlipidemia 5/12/2017    Otosclerosis of right ear 5/12/2017    40% hearing back in 1988, had surgery with some improvement, but hearing loss persists.      PAD (peripheral artery disease)     Post-menopausal 2008    Pulmonary emphysema 4/28/2017    Tobacco dependence 5/2/2014    Type 2 diabetes mellitus without complication, without long-term current use of insulin 4/29/2017    Hb A1c 6.5% in 2/2017. Diet controlled.      Past Surgical History:   Procedure Laterality Date    GANGLION CYST EXCISION Right 1988    STAPEDES SURGERY Right 1988     Family History   Problem Relation Age of Onset    Diabetes Mother     Heart disease Father      Strong FH of CAD/CHF on  fathers side    Peripheral vascular disease Brother      With necrosis of leg    Diabetes Maternal Uncle     Cancer Neg Hx     Thyroid disease Neg Hx      Social History   Substance Use Topics    Smoking status: Current Every Day Smoker     Packs/day: 1.00     Years: 40.00     Types: Cigarettes    Smokeless tobacco: Never Used      Comment: 3-5 a day    Alcohol use 0.5 oz/week     1 Standard drinks or equivalent per week     Review of Systems   Constitutional: Negative for chills and fever.   HENT: Negative for congestion.    Eyes: Negative for pain.   Respiratory: Positive for shortness of breath.    Cardiovascular: Negative for chest pain.   Gastrointestinal: Negative for abdominal pain, nausea and vomiting.   Genitourinary: Negative for difficulty urinating and dysuria.   Musculoskeletal: Negative for back pain and neck pain.   Skin: Negative for rash.   Neurological: Negative for weakness and headaches.       Physical Exam     Initial Vitals   BP Pulse Resp Temp SpO2   -- -- -- -- --     Physical Exam    Nursing note and vitals reviewed.  Constitutional: She appears well-developed and well-nourished.   HENT:   Head: Normocephalic.   Mouth/Throat: Oropharynx is clear and moist.   Eyes: Conjunctivae are normal.   Neck: Neck supple.   Cardiovascular: Regular rhythm and intact distal pulses. Tachycardia present.    Pulmonary/Chest:   Poor air exchange bilaterally.    Abdominal: Soft. There is no tenderness.   Musculoskeletal: Normal range of motion. She exhibits no edema (no peripheral edema) or tenderness (no calf tenderness).   Neurological: She has normal strength.   Pt more awake than previously described and is following some commands.    Skin: Skin is warm and dry.   Psychiatric: Thought content normal.         ED Course   Procedures  Labs Reviewed   CBC W/ AUTO DIFFERENTIAL   COMPREHENSIVE METABOLIC PANEL   TROPONIN I             Medical Decision Making:   History:   Old Medical Records: I decided to  obtain old medical records.  Clinical Tests:   Lab Tests: Ordered and Reviewed  Radiological Study: Reviewed and Ordered  Medical Tests: Ordered and Reviewed            Scribe Attestation:   Scribe #1: I performed the above scribed service and the documentation accurately describes the services I performed. I attest to the accuracy of the note.    Attending Attestation:           Physician Attestation for Scribe:  Physician Attestation Statement for Scribe #1: I, Dr. Clay, reviewed documentation, as scribed by Robert Rubin in my presence, and it is both accurate and complete.                 ED Course     Clinical Impression:   There were no encounter diagnoses.                           Jay Clay MD  06/21/17 3338

## 2017-06-21 NOTE — PLAN OF CARE
Problem: Patient Care Overview  Goal: Plan of Care Review  Outcome: Ongoing (interventions implemented as appropriate)  Recommendations  1. As medically appropriate, recommend initiating TF of Glucerna 1.5 at 35 mL/hr - to provide 1260 kcal/day, 69 g protein/day, and 638 mL free fluid/day.   2. When able to extubate, ADAT to Diabetic 1800, Low Na + Boost Glucose Control OS with all meals, with texture per SLP.     RD to monitor. Full assessment completed. See RD Consult Note 6/21/17.

## 2017-06-21 NOTE — CONSULTS
Please see H&P dated 6/21/17 by Dr. Mayer.    Thank you,  SHANELL Pelayo,  [facility]-BC  Critical Care Medicine  Pager [phone]

## 2017-06-21 NOTE — HOSPITAL COURSE
6/21: Admitted to medical ICU after pt had decline in mental status, unsustainable work-of-breathing, and subsequent hypoxia requiring intubation in the ED. Extubated to BiPAP subsequently.  6/22: Titrated off of BiPAP to nasal cannula with goal saturations 88-92%.

## 2017-06-21 NOTE — PLAN OF CARE
Problem: Patient Care Overview  Goal: Plan of Care Review  Outcome: Ongoing (interventions implemented as appropriate)  Extubated today. See vital signs and assessments in flowsheets. See below for updates on today's progress.     Pulmonary: Extubated, now on BiPAP at 25% FiO2, oxygen saturations 86-94%.     Cardiovascular: NSR/ST. BP normotensive.     Neurological: RASS 0. Awake, alert, and oriented x 4.     Gastrointestinal: Passed beside swallow evaluation.     Genitourinary: Stewart catheter in place, draining yellow urine. 20-40 mL/hr.     Endocrine: Blood glucose monitoring in place.     Infusions: None    Patient progressing towards goals as tolerated, plan of care communicated and reviewed with Ann Elizabeth Canavan and family. All concerns addressed. Will continue to monitor.

## 2017-06-22 LAB
ANION GAP SERPL CALC-SCNC: 7 MMOL/L
BACTERIA UR CULT: NO GROWTH
BASOPHILS # BLD AUTO: 0 K/UL
BASOPHILS NFR BLD: 0 %
BUN SERPL-MCNC: 13 MG/DL
CALCIUM SERPL-MCNC: 9.1 MG/DL
CHLORIDE SERPL-SCNC: 101 MMOL/L
CO2 SERPL-SCNC: 29 MMOL/L
CREAT SERPL-MCNC: 0.8 MG/DL
DIFFERENTIAL METHOD: ABNORMAL
EOSINOPHIL # BLD AUTO: 0 K/UL
EOSINOPHIL NFR BLD: 0.1 %
ERYTHROCYTE [DISTWIDTH] IN BLOOD BY AUTOMATED COUNT: 12.6 %
EST. GFR  (AFRICAN AMERICAN): >60 ML/MIN/1.73 M^2
EST. GFR  (NON AFRICAN AMERICAN): >60 ML/MIN/1.73 M^2
GLUCOSE SERPL-MCNC: 140 MG/DL
HCT VFR BLD AUTO: 39.9 %
HGB BLD-MCNC: 13 G/DL
INR PPP: 0.9
LYMPHOCYTES # BLD AUTO: 1 K/UL
LYMPHOCYTES NFR BLD: 6.3 %
MAGNESIUM SERPL-MCNC: 2.1 MG/DL
MCH RBC QN AUTO: 31.3 PG
MCHC RBC AUTO-ENTMCNC: 32.6 %
MCV RBC AUTO: 96 FL
MONOCYTES # BLD AUTO: 0.6 K/UL
MONOCYTES NFR BLD: 3.6 %
NEUTROPHILS # BLD AUTO: 13.8 K/UL
NEUTROPHILS NFR BLD: 89.7 %
PHOSPHATE SERPL-MCNC: 4 MG/DL
PLATELET # BLD AUTO: 163 K/UL
PMV BLD AUTO: 11 FL
POCT GLUCOSE: 101 MG/DL (ref 70–110)
POCT GLUCOSE: 143 MG/DL (ref 70–110)
POCT GLUCOSE: 162 MG/DL (ref 70–110)
POCT GLUCOSE: 163 MG/DL (ref 70–110)
POTASSIUM SERPL-SCNC: 4.9 MMOL/L
PROTHROMBIN TIME: 10.2 SEC
RBC # BLD AUTO: 4.16 M/UL
SODIUM SERPL-SCNC: 137 MMOL/L
WBC # BLD AUTO: 15.39 K/UL

## 2017-06-22 PROCEDURE — 63600175 PHARM REV CODE 636 W HCPCS: Performed by: STUDENT IN AN ORGANIZED HEALTH CARE EDUCATION/TRAINING PROGRAM

## 2017-06-22 PROCEDURE — 83735 ASSAY OF MAGNESIUM: CPT

## 2017-06-22 PROCEDURE — 63600175 PHARM REV CODE 636 W HCPCS: Performed by: INTERNAL MEDICINE

## 2017-06-22 PROCEDURE — 94660 CPAP INITIATION&MGMT: CPT

## 2017-06-22 PROCEDURE — 25000003 PHARM REV CODE 250: Performed by: HOSPITALIST

## 2017-06-22 PROCEDURE — 97803 MED NUTRITION INDIV SUBSEQ: CPT

## 2017-06-22 PROCEDURE — 80048 BASIC METABOLIC PNL TOTAL CA: CPT

## 2017-06-22 PROCEDURE — 99900035 HC TECH TIME PER 15 MIN (STAT)

## 2017-06-22 PROCEDURE — 94761 N-INVAS EAR/PLS OXIMETRY MLT: CPT

## 2017-06-22 PROCEDURE — 27000221 HC OXYGEN, UP TO 24 HOURS

## 2017-06-22 PROCEDURE — 94640 AIRWAY INHALATION TREATMENT: CPT

## 2017-06-22 PROCEDURE — 25000242 PHARM REV CODE 250 ALT 637 W/ HCPCS: Performed by: INTERNAL MEDICINE

## 2017-06-22 PROCEDURE — 25000003 PHARM REV CODE 250: Performed by: INTERNAL MEDICINE

## 2017-06-22 PROCEDURE — 97161 PT EVAL LOW COMPLEX 20 MIN: CPT

## 2017-06-22 PROCEDURE — 11000001 HC ACUTE MED/SURG PRIVATE ROOM

## 2017-06-22 PROCEDURE — 85610 PROTHROMBIN TIME: CPT

## 2017-06-22 PROCEDURE — 25000003 PHARM REV CODE 250: Performed by: STUDENT IN AN ORGANIZED HEALTH CARE EDUCATION/TRAINING PROGRAM

## 2017-06-22 PROCEDURE — 85025 COMPLETE CBC W/AUTO DIFF WBC: CPT

## 2017-06-22 PROCEDURE — 99233 SBSQ HOSP IP/OBS HIGH 50: CPT | Mod: ,,, | Performed by: INTERNAL MEDICINE

## 2017-06-22 PROCEDURE — 84100 ASSAY OF PHOSPHORUS: CPT

## 2017-06-22 RX ORDER — BUPROPION HYDROCHLORIDE 75 MG/1
75 TABLET ORAL 2 TIMES DAILY
Status: DISCONTINUED | OUTPATIENT
Start: 2017-06-22 | End: 2017-06-22

## 2017-06-22 RX ORDER — PREDNISONE 20 MG/1
40 TABLET ORAL DAILY
Status: DISCONTINUED | OUTPATIENT
Start: 2017-06-22 | End: 2017-06-26 | Stop reason: HOSPADM

## 2017-06-22 RX ORDER — PREDNISONE 20 MG/1
20 TABLET ORAL DAILY
Status: DISCONTINUED | OUTPATIENT
Start: 2017-06-28 | End: 2017-06-26 | Stop reason: HOSPADM

## 2017-06-22 RX ORDER — SERTRALINE HYDROCHLORIDE 25 MG/1
25 TABLET, FILM COATED ORAL DAILY
Status: DISCONTINUED | OUTPATIENT
Start: 2017-06-23 | End: 2017-06-26 | Stop reason: HOSPADM

## 2017-06-22 RX ORDER — BUPROPION HYDROCHLORIDE 150 MG/1
150 TABLET ORAL DAILY
Status: DISCONTINUED | OUTPATIENT
Start: 2017-06-23 | End: 2017-06-24

## 2017-06-22 RX ORDER — FLUTICASONE FUROATE AND VILANTEROL 100; 25 UG/1; UG/1
1 POWDER RESPIRATORY (INHALATION) DAILY
Status: DISCONTINUED | OUTPATIENT
Start: 2017-06-22 | End: 2017-06-22

## 2017-06-22 RX ORDER — HEPARIN SODIUM 5000 [USP'U]/ML
5000 INJECTION, SOLUTION INTRAVENOUS; SUBCUTANEOUS DAILY
Status: DISCONTINUED | OUTPATIENT
Start: 2017-06-23 | End: 2017-06-22

## 2017-06-22 RX ORDER — NAPROXEN SODIUM 220 MG/1
81 TABLET, FILM COATED ORAL DAILY
Status: DISCONTINUED | OUTPATIENT
Start: 2017-06-23 | End: 2017-06-26 | Stop reason: HOSPADM

## 2017-06-22 RX ORDER — ENOXAPARIN SODIUM 100 MG/ML
40 INJECTION SUBCUTANEOUS EVERY 24 HOURS
Status: DISCONTINUED | OUTPATIENT
Start: 2017-06-22 | End: 2017-06-26 | Stop reason: HOSPADM

## 2017-06-22 RX ORDER — AZITHROMYCIN 250 MG/1
500 TABLET, FILM COATED ORAL DAILY
Status: COMPLETED | OUTPATIENT
Start: 2017-06-23 | End: 2017-06-25

## 2017-06-22 RX ORDER — FLUTICASONE FUROATE AND VILANTEROL 200; 25 UG/1; UG/1
1 POWDER RESPIRATORY (INHALATION) DAILY
Status: DISCONTINUED | OUTPATIENT
Start: 2017-06-22 | End: 2017-06-26 | Stop reason: HOSPADM

## 2017-06-22 RX ADMIN — Medication 3 ML: at 06:06

## 2017-06-22 RX ADMIN — Medication 3 ML: at 01:06

## 2017-06-22 RX ADMIN — SERTRALINE HYDROCHLORIDE 25 MG: 25 TABLET ORAL at 08:06

## 2017-06-22 RX ADMIN — PREDNISONE 40 MG: 20 TABLET ORAL at 08:06

## 2017-06-22 RX ADMIN — FAMOTIDINE 20 MG: 10 INJECTION, SOLUTION INTRAVENOUS at 08:06

## 2017-06-22 RX ADMIN — IPRATROPIUM BROMIDE AND ALBUTEROL SULFATE 3 ML: .5; 3 SOLUTION RESPIRATORY (INHALATION) at 12:06

## 2017-06-22 RX ADMIN — IPRATROPIUM BROMIDE AND ALBUTEROL SULFATE 3 ML: .5; 3 SOLUTION RESPIRATORY (INHALATION) at 08:06

## 2017-06-22 RX ADMIN — INSULIN ASPART 4 UNITS: 100 INJECTION, SOLUTION INTRAVENOUS; SUBCUTANEOUS at 05:06

## 2017-06-22 RX ADMIN — HEPARIN SODIUM 5000 UNITS: 5000 INJECTION, SOLUTION INTRAVENOUS; SUBCUTANEOUS at 05:06

## 2017-06-22 RX ADMIN — IPRATROPIUM BROMIDE AND ALBUTEROL SULFATE 3 ML: .5; 3 SOLUTION RESPIRATORY (INHALATION) at 03:06

## 2017-06-22 RX ADMIN — IPRATROPIUM BROMIDE AND ALBUTEROL SULFATE 3 ML: .5; 3 SOLUTION RESPIRATORY (INHALATION) at 07:06

## 2017-06-22 RX ADMIN — BUPROPION HYDROCHLORIDE 75 MG: 75 TABLET, FILM COATED ORAL at 08:06

## 2017-06-22 RX ADMIN — ENOXAPARIN SODIUM 40 MG: 100 INJECTION SUBCUTANEOUS at 05:06

## 2017-06-22 RX ADMIN — ATORVASTATIN CALCIUM 40 MG: 20 TABLET, FILM COATED ORAL at 08:06

## 2017-06-22 RX ADMIN — ASPIRIN 81 MG CHEWABLE TABLET 81 MG: 81 TABLET CHEWABLE at 08:06

## 2017-06-22 RX ADMIN — IPRATROPIUM BROMIDE AND ALBUTEROL SULFATE 3 ML: .5; 3 SOLUTION RESPIRATORY (INHALATION) at 01:06

## 2017-06-22 RX ADMIN — Medication 500 MG: at 05:06

## 2017-06-22 RX ADMIN — IPRATROPIUM BROMIDE AND ALBUTEROL SULFATE 3 ML: .5; 3 SOLUTION RESPIRATORY (INHALATION) at 04:06

## 2017-06-22 NOTE — CONSULTS
"  [facility]  Adult Nutrition  Consult Note    SUMMARY     Recommendations    1. Continue current 1800 kcal ADA diet.   2. If PO intake <50%, add Boost Glucose Control ONS.   3. RD to monitor & follow-up.    Goals: PO intake >50%  Nutrition Goal Status: new  Communication of RD Recs: reviewed with RN    Reason for Assessment    Reason for Assessment: RD follow-up, nurse/nurse practitioner consult  Diagnosis: pulmonary disease (acute on chronic respiratory failure)  Relevent Medical History: COPD, HLD, PAD, type 2 DM   Interdisciplinary Rounds: did not attend     General Information Comments: Pt extubated , diet advanced, pt tolerating & consuming 75% of meals.  Nutrition Discharge Planning: Adequate PO intake.    Nutrition Prescription Ordered    Current Diet Order: 1800 kcal ADA     Nutrition Risk Screen     Nutrition Risk Screen: no indicators present    Nutrition/Diet History    Patient Reported Diet/Restrictions/Preferences: general, diabetic diet  Typical Food/Fluid Intake: VALERY  Food Preferences: VALERY     Factors Affecting Nutritional Intake: other (see comments) (None)    Labs/Tests/Procedures/Meds    Pertinent Labs Reviewed: reviewed  Pertinent Labs Comments: A1C 6.5  Pertinent Medications Reviewed: reviewed  Pertinent Medications Comments: Statin, Prednisone    Physical Findings    Overall Physical Appearance: underweight  Skin: intact    Anthropometrics    Temp: 98.5 °F (36.9 °C)     Height: 5' 6" (167.6 cm)  Weight Method: Bed Scale  Weight: 51.1 kg (112 lb 10.5 oz)     Ideal Body Weight (IBW), Female: 130 lb     % Ideal Body Weight, Female (lb): 86.66 lb  BMI (Calculated): 18.2  BMI Grade: 17 - 18.4 protein-energy malnutrition grade I  Weight Loss: unintentional  Usual Body Weight (UBW), k.8 kg (per chart review 2017)  Weight Change Amount: 32 lb 6.5 oz  % Usual Body Weight: 77.82  % Weight Change From Usual Weight: 28.77 %    Estimated/Assessed Needs    Weight Used For " Calorie Calculations: 51.1 kg (112 lb 10.5 oz)      Energy Need Method: [place], other (see comments) (4600-4899 kcal/d)     Weight Used For Protein Calculations: 51.1 kg (112 lb 10.5 oz)  Protein Requirements: 51-61 g/d     Fluid Need Method: RDA Method (1 mL/kcal or per MD)    Assessment and Plan    Problem: Inadequate energy intake  Etiology: decreased ability to consume sufficient energy  Signs/Symptoms: NPO with no alternate means of nutrition  Nutrition Diagnosis Status: Resolved    Monitor and Evaluation    Food and Nutrient Intake: energy intake, food and beverage intake  Food and Nutrient Adminstration: diet order     Physical Activity and Function: nutrition-related ADLs and IADLs  Anthropometric Measurements: weight, body mass index, weight change  Biochemical Data, Medical Tests and Procedures: gastrointestinal profile, electrolyte and renal panel, glucose/endocrine profile, inflammatory profile, lipid profile  Nutrition-Focused Physical Findings: overall appearance    Nutrition Risk    Level of Risk: other (see comments) (1x/week)    Nutrition Follow-Up    RD Follow-up?: Yes

## 2017-06-22 NOTE — ASSESSMENT & PLAN NOTE
- Pt with 40+ pack-year history; still smoking some today  - Cause of patient's COPD and likely direct contributor to exacerbation.  - Pt has received information on smoking cessation; repeat education on floor.  - Provide nicotine patch as appropriate.

## 2017-06-22 NOTE — ASSESSMENT & PLAN NOTE
- Pt with 20lb weight loss over recent time period per pulmonology clinic note dated 6/1/17; likely 2/2 pulmonary disease and increased metabolic demand of respiration.  - Prior lung nodule seen on CT.  - Nutrition consult.

## 2017-06-22 NOTE — PT/OT/SLP PROGRESS
Physical Therapy      Zoila Blanka Canavan  MRN: [id number]    Patient not seen today secondary to  (pt extubated this AM, hold PT this date per MD). Will follow-up as appropriate.    Olga Lidia Pike, PT   6/21/2017

## 2017-06-22 NOTE — PLAN OF CARE
Problem: Physical Therapy Goal  Goal: Physical Therapy Goal  Outcome: Outcome(s) achieved Date Met: 06/22/17  No further goals assessed at this time

## 2017-06-22 NOTE — PROGRESS NOTES
[facility]  Critical Care Medicine  Progress Note    Patient Name: Ann Elizabeth Canavan  MRN: [id number]  Admission Date: 6/21/2017  Hospital Length of Stay: 1 days  Code Status: Full Code  Attending Provider: Yovany Valderrama MD  Primary Care Provider: Trinh Bowles MD   Principal Problem: Acute on chronic respiratory failure with hypoxia and hypercapnia    Subjective:     HPI:  Pt is a 56 y.o. F with a PMH significant for COPD, PAD, and DM who is being admitted to the medical ICU for COPD exacerbation requiring intubation. Pt was transported to ED on CPAP via EMS after calling for help and being found almost unresponsive and hypoxic. In route, patient was administered solumedrol, magnesium, nebulizer's, and started on noninvasive ventilation (EMS considered intubation, however deemed likely to be unsuccessful in the field and therefore held off). She arrived complaining of severe dyspnea, confusion, and fatigue, however patient showed some minor improvement in mental status. Pt was placed on BiPAP, ABG was abnormal showing acidosis/CO2 retention, and patient eventually showed repeat mental status decline with hypoxia requiring intubation.    Of note, patient has been admitted several times (5x) since February for COPD exacerbations, uses multiple respiratory medicines at home (breo, incruse, duonebs, and albuterol in addition to intermittent rounds of PO corticosteroids and PO Abx), and requires 2L NC for supplemental O2. She was recently seen by Dr. Bernal (her pulmonologist) on 6/1 who urged continued therapy as above as well as smoking cessation (40+ pack years).    Hospital/ICU Course:  6/21: Admitted to medical ICU after pt had decline in mental status, unsustainable work-of-breathing, and subsequent hypoxia requiring intubation in the ED. Extubated to BiPAP subsequently.  6/22: Titrated off of BiPAP to nasal cannula with goal saturations 88-92%.    Interval History/Significant Events: No acute  events overnight. Feeling improved this morning; denies SOB. Intermittent cough.    Review of Systems   Constitutional: Negative for chills and fever.   Respiratory: Positive for cough. Negative for shortness of breath.    Cardiovascular: Negative for chest pain and palpitations.   Gastrointestinal: Negative for abdominal pain, nausea and vomiting.     Objective:     Vital Signs (Most Recent):  Temp: 98.5 °F (36.9 °C) (06/22/17 0702)  Pulse: 108 (06/22/17 0825)  Resp: (!) 23 (06/22/17 0825)  BP: 138/84 (06/22/17 0800)  SpO2: (!) 94 % (06/22/17 0825) Vital Signs (24h Range):  Temp:  [97.9 °F (36.6 °C)-98.7 °F (37.1 °C)] 98.5 °F (36.9 °C)  Pulse:  [] 108  Resp:  [13-25] 23  SpO2:  [85 %-98 %] 94 %  BP: (100-151)/(64-86) 138/84   Weight: 51.1 kg (112 lb 10.5 oz)  Body mass index is 18.18 kg/m².      Intake/Output Summary (Last 24 hours) at 06/22/17 0834  Last data filed at 06/22/17 0818   Gross per 24 hour   Intake            507.5 ml   Output             1630 ml   Net          -1122.5 ml       Physical Exam   Constitutional: She is oriented to person, place, and time. She appears well-developed and well-nourished. No distress.   HENT:   Head: Normocephalic and atraumatic.   Eyes: Conjunctivae are normal. Pupils are equal, round, and reactive to light.   Cardiovascular: Normal rate, regular rhythm, normal heart sounds and intact distal pulses.    Pulmonary/Chest: Effort normal.   Mild wheezes   Abdominal: Soft. Bowel sounds are normal. She exhibits no distension. There is no tenderness.   Musculoskeletal: Normal range of motion. She exhibits no edema.   Neurological: She is alert and oriented to person, place, and time.   Skin: Skin is warm and dry. No rash noted.   Vitals reviewed.      Vents:  Vent Mode: A/C (06/21/17 0926)  Ventilator Initiated: Yes (06/21/17 0540)  Set Rate: 16 bmp (06/21/17 0926)  Vt Set: 350 mL (06/21/17 0926)  Pressure Support: 0 cmH20 (06/21/17 0613)  PEEP/CPAP: 5 cmH20 (06/21/17  0926)  Oxygen Concentration (%): 25 (06/22/17 0109)  Peak Airway Pressure: 24 cmH2O (06/21/17 0926)  Plateau Pressure: 10 cmH20 (06/21/17 0926)  Total Ve: 6.5 mL (06/21/17 0926)  F/VT Ratio<105 (RSBI): (!) 40.71 (06/21/17 0926)  Lines/Drains/Airways     Drain                 Urethral Catheter 06/21/17 0528 Non-latex 16 Fr. 1 day          Peripheral Intravenous Line                 Peripheral IV - Single Lumen 06/20/17 1800 Right Antecubital 1 day         Peripheral IV - Single Lumen 06/21/17 1800 less than 1 day              Significant Labs:    CBC/Anemia Profile:    Recent Labs  Lab 06/21/17  0422 06/22/17  0254   WBC 20.43* 15.39*   HGB 14.7 13.0   HCT 45.6 39.9    163   MCV 99* 96   RDW 12.7 12.6        Chemistries:    Recent Labs  Lab 06/21/17 0422 06/21/17  0651 06/22/17  0254    136 137   K 4.7 4.8 4.9    102 101   CO2 22* 25 29   BUN 16 16 13   CREATININE 1.1 1.1 0.8   CALCIUM 9.0 8.0* 9.1   ALBUMIN 3.5 3.3*  --    PROT 6.6 6.2  --    BILITOT 0.6 0.6  --    ALKPHOS 125 106  --    ALT 27 45*  --    AST 36 70*  --    MG  --  3.1* 2.1   PHOS  --  5.7* 4.0     Significant Imaging:  No new imaging this morning.    Assessment/Plan:     Neuro   Severe episode of recurrent major depressive disorder, without psychotic features    - Continuing bupropion 75mg PO BID.        Tobacco dependence    - Pt with 40+ pack-year history; still smoking some today  - Cause of patient's COPD and likely direct contributor to exacerbation.  - Pt has received information on smoking cessation; repeat education on floor.  - Provide nicotine patch as appropriate.        Pulmonary   * Acute on chronic respiratory failure with hypoxia and hypercapnia    - Pt with COPD exacerbation requiring intubation to protect airway in declining mental status and unsustainable work-of-breathing; extubated 06/21  - Continuing albuterol-ipratropium nebulizations, fluticasone-vilanterol inhalations.  - Continuing azithromycin 500mg PO  daily, prednisone 40mg PO daily for 7 days followed by 20mg PO daily for 7 days.        Endocrine   Type 2 diabetes mellitus without complication, without long-term current use of insulin    - Typically diet controlled with A1C of 6.5% in February  - Pt with elevated glucose on admit likely secondary to continued PO corticosteroid  - Continuing moderate dose sliding scale for time being.        Fluids/Electrolytes/Nutrition/GI   Malnutrition    - Pt with 20lb weight loss over recent time period per pulmonology clinic note dated 6/1/17; likely 2/2 pulmonary disease and increased metabolic demand of respiration.  - Prior lung nodule seen on CT.  - Nutrition consult.        Mixed hyperlipidemia    - Continuing atorvastatin 40mg PO daily.           Critical Care Medicine Daily Checklist:    A: Awake: RASS Goal/Actual Goal:    Actual: Mitchell Agitation Sedation Scale (RASS): Alert and calm   B: Spontaneous Breathing Trial Performed?     C: SAT & SBT Coordinated?  N/A                      D: Delirium: CAM-ICU Overall CAM-ICU: Negative   E: Early Mobility Performed? Yes   F: Feeding Goal: Goals: PO intake >50%  Status: Nutrition Goal Status: new   Current Diet Order   Procedures    Diet Diabetic 1800 Calories      AS: Analgesia/Sedation N/A   T: Thromboembolic Prophylaxis Enoxaparin   H: HOB > 300 Yes   U: Stress Ulcer Prophylaxis (if needed) N/A   G: Glucose Control MDSSI   B: Bowel Function     I: Indwelling Catheter (Lines & Reeves) Necessity D/C reeves   D: De-escalation of Antimicrobials/Pharmacotherapies Azithromycin    Plan for the day/ETD Step down to floor    Code Status:  Family/Goals of Care: Full Code     DHoward Goldman MD   PGY-2   [phone]

## 2017-06-22 NOTE — ASSESSMENT & PLAN NOTE
- Pt with COPD exacerbation requiring intubation to protect airway in declining mental status and unsustainable work-of-breathing; extubated 06/21  - Continuing albuterol-ipratropium nebulizations, fluticasone-vilanterol inhalations.  - Continuing azithromycin 500mg PO daily, prednisone 40mg PO daily for 7 days followed by 20mg PO daily for 7 days.

## 2017-06-22 NOTE — ASSESSMENT & PLAN NOTE
- Typically diet controlled with A1C of 6.5% in February  - Pt with elevated glucose on admit likely secondary to continued PO corticosteroid  - Continuing moderate dose sliding scale for time being.

## 2017-06-22 NOTE — PLAN OF CARE
Problem: Patient Care Overview  Goal: Plan of Care Review  Outcome: Ongoing (interventions implemented as appropriate)  No acute events overnight. VSS during shift. Pt. On BiPAP for majority of night, switched to nasal cannula per pt. request. Sats > 90% throughout night. Plan of care is for possible stepdown during day shift. Plan of care reviewed and discussed with pt. All questions and concerns were addressed. Pt. Verbalized understanding.

## 2017-06-22 NOTE — SUBJECTIVE & OBJECTIVE
Interval History/Significant Events: No acute events overnight. Feeling improved this morning; denies SOB. Intermittent cough.    Review of Systems   Constitutional: Negative for chills and fever.   Respiratory: Positive for cough. Negative for shortness of breath.    Cardiovascular: Negative for chest pain and palpitations.   Gastrointestinal: Negative for abdominal pain, nausea and vomiting.     Objective:     Vital Signs (Most Recent):  Temp: 98.5 °F (36.9 °C) (06/22/17 0702)  Pulse: 108 (06/22/17 0825)  Resp: (!) 23 (06/22/17 0825)  BP: 138/84 (06/22/17 0800)  SpO2: (!) 94 % (06/22/17 0825) Vital Signs (24h Range):  Temp:  [97.9 °F (36.6 °C)-98.7 °F (37.1 °C)] 98.5 °F (36.9 °C)  Pulse:  [] 108  Resp:  [13-25] 23  SpO2:  [85 %-98 %] 94 %  BP: (100-151)/(64-86) 138/84   Weight: 51.1 kg (112 lb 10.5 oz)  Body mass index is 18.18 kg/m².      Intake/Output Summary (Last 24 hours) at 06/22/17 0834  Last data filed at 06/22/17 0818   Gross per 24 hour   Intake            507.5 ml   Output             1630 ml   Net          -1122.5 ml       Physical Exam   Constitutional: She is oriented to person, place, and time. She appears well-developed and well-nourished. No distress.   HENT:   Head: Normocephalic and atraumatic.   Eyes: Conjunctivae are normal. Pupils are equal, round, and reactive to light.   Cardiovascular: Normal rate, regular rhythm, normal heart sounds and intact distal pulses.    Pulmonary/Chest: Effort normal.   Mild wheezes   Abdominal: Soft. Bowel sounds are normal. She exhibits no distension. There is no tenderness.   Musculoskeletal: Normal range of motion. She exhibits no edema.   Neurological: She is alert and oriented to person, place, and time.   Skin: Skin is warm and dry. No rash noted.   Vitals reviewed.      Vents:  Vent Mode: A/C (06/21/17 0926)  Ventilator Initiated: Yes (06/21/17 0540)  Set Rate: 16 bmp (06/21/17 0926)  Vt Set: 350 mL (06/21/17 0926)  Pressure Support: 0 cmH20  (06/21/17 0613)  PEEP/CPAP: 5 cmH20 (06/21/17 0926)  Oxygen Concentration (%): 25 (06/22/17 0109)  Peak Airway Pressure: 24 cmH2O (06/21/17 0926)  Plateau Pressure: 10 cmH20 (06/21/17 0926)  Total Ve: 6.5 mL (06/21/17 0926)  F/VT Ratio<105 (RSBI): (!) 40.71 (06/21/17 0926)  Lines/Drains/Airways     Drain                 Urethral Catheter 06/21/17 0528 Non-latex 16 Fr. 1 day          Peripheral Intravenous Line                 Peripheral IV - Single Lumen 06/20/17 1800 Right Antecubital 1 day         Peripheral IV - Single Lumen 06/21/17 1800 less than 1 day              Significant Labs:    CBC/Anemia Profile:    Recent Labs  Lab 06/21/17  0422 06/22/17  0254   WBC 20.43* 15.39*   HGB 14.7 13.0   HCT 45.6 39.9    163   MCV 99* 96   RDW 12.7 12.6        Chemistries:    Recent Labs  Lab 06/21/17  0422 06/21/17  0651 06/22/17  0254    136 137   K 4.7 4.8 4.9    102 101   CO2 22* 25 29   BUN 16 16 13   CREATININE 1.1 1.1 0.8   CALCIUM 9.0 8.0* 9.1   ALBUMIN 3.5 3.3*  --    PROT 6.6 6.2  --    BILITOT 0.6 0.6  --    ALKPHOS 125 106  --    ALT 27 45*  --    AST 36 70*  --    MG  --  3.1* 2.1   PHOS  --  5.7* 4.0     Significant Imaging:  No new imaging this morning.

## 2017-06-22 NOTE — RESIDENT HANDOFF
Handoff     Primary Team: [facility] CRITICAL CARE MEDICINE Room Number: 927/927 B     Patient Name: Ann Elizabeth Canavan MRN: [id number]     Date of Birth: [id number] Allergies: Avelox [moxifloxacin]     Age: 56 y.o. Admit Date: 6/21/2017     Sex: female  BMI: Body mass index is 18.18 kg/m².     Code Status: Full Code        Illness Level (current clinical status): Watcher - No    Reason for Admission: Acute on chronic respiratory failure with hypoxia and hypercapnia    Brief HPI: Pt is a 56 y.o. F with a PMH significant for COPD, PAD, and DM who is being admitted to the medical ICU for COPD exacerbation requiring intubation. Pt was transported to ED on CPAP via EMS after calling for help and being found almost unresponsive and hypoxic. In route, patient was administered solumedrol, magnesium, nebulizer's, and started on noninvasive ventilation (EMS considered intubation, however deemed likely to be unsuccessful in the field and therefore held off). She arrived complaining of severe dyspnea, confusion, and fatigue, however patient showed some minor improvement in mental status. Pt was placed on BiPAP, ABG was abnormal showing acidosis/CO2 retention, and patient eventually showed repeat mental status decline with hypoxia requiring intubation.     Of note, patient has been admitted several times (5x) since February for COPD exacerbations, uses multiple respiratory medicines at home (breo, incruse, duonebs, and albuterol in addition to intermittent rounds of PO corticosteroids and PO Abx), and requires 2L NC for supplemental O2. She was recently seen by Dr. Bernal (her pulmonologist) on 6/1 who urged continued therapy as above as well as smoking cessation (40+ pack years).     Hospital Course:   6/21: Admitted to medical ICU after pt had decline in mental status, unsustainable work-of-breathing, and subsequent hypoxia requiring intubation in the ED. Extubated to BiPAP subsequently.  6/22: Titrated off of BiPAP to nasal cannula with  goal saturations 88-92%.No acute events overnight. Feeling improved this morning; denies SOB. Intermittent cough.     Tasks:   Respiratory failure 2/2 COPD exacerbation- extubated 6/21, continue azithro, continue prednisone 40 mg for 7 days, followed by 20 mg for 7 days  Tobacco abuse- smoking cessation     Estimated Discharge Date: 6/24/2017    Discharge Disposition: Home or Self Care    Mentored By: Dr BRIDGET Valderrama

## 2017-06-22 NOTE — PT/OT/SLP EVAL
Physical Therapy  Evaluation / Discharge    Ann Elizabeth Canavan   MRN: [id number]   Admitting Diagnosis: Acute on chronic respiratory failure with hypoxia and hypercapnia    PT Received On: 06/22/17  PT Start Time: 0922     PT Stop Time: 0934    PT Total Time (min): 12 min       Billable Minutes:  Evaluation 12 mins    Diagnosis: Acute on chronic respiratory failure with hypoxia and hypercapnia  S/p extubation    Past Medical History:   Diagnosis Date    Chronic respiratory failure with hypoxia and hypercapnia 5/4/2014    COPD (chronic obstructive pulmonary disease)     Depression     Hyperlipidemia 5/12/2017    Otosclerosis of right ear 5/12/2017    40% hearing back in 1988, had surgery with some improvement, but hearing loss persists.      PAD (peripheral artery disease)     Post-menopausal 2008    Pulmonary emphysema 4/28/2017    Tobacco dependence 5/2/2014    Type 2 diabetes mellitus without complication, without long-term current use of insulin 4/29/2017    Hb A1c 6.5% in 2/2017. Diet controlled.       Past Surgical History:   Procedure Laterality Date    GANGLION CYST EXCISION Right 1988    STAPEDES SURGERY Right 1988       General Precautions: Standard, fall  Orthopedic Precautions: N/A        Do you have any cultural, spiritual, Tenriism conflicts, given your current situation?: none noted    Patient History:  Lives With: alone  Living Arrangements: house  Living Environment Comment: pt lives alone in a 1 story house with 4 CHILANGO with no handrails.   Equipment Currently Used at Home: oxygen    Previous Level of Function:  Ambulation Skills: independent  Transfer Skills: independent  ADL Skills: independent  Work/Leisure Activity: independent    Subjective:  Communicated with RN prior to session.  Pt agreeable to session  Chief Complaint: wanting to get up  Patient goals: to go home    Pain/Comfort  Pain Rating 1: 0/10  Pain Rating Post-Intervention 1: 0/10    Objective:   Patient found with: blood  pressure cuff, pulse ox (continuous), telemetry, oxygen     Cognitive Exam:  Oriented to: Person, Place, Time and Situation    Follows Commands/attention: Follows multistep  commands  Communication: clear/fluent  Safety awareness/insight to disability: intact    Physical Exam:  Postural examination/scapula alignment: No postural abnormalities identified    Skin integrity: Visible skin intact  Edema: None noted     Sensation:   Intact    Upper Extremity Range of Motion:  Right Upper Extremity: WFL  Left Upper Extremity: WFL    Upper Extremity Strength:  Right Upper Extremity: WFL  Left Upper Extremity: WFL    Lower Extremity Range of Motion:  Right Lower Extremity: WFL  Left Lower Extremity: WFL    Lower Extremity Strength:  Right Lower Extremity: WFL  Left Lower Extremity: WFL     Fine motor coordination:  Intact    Gross motor coordination: WFL    Functional Mobility:  Bed Mobility:  Scooting/Bridging: Modified Independent  Supine to Sit: Modified Independent  Sit to Supine: Modified Independent    Transfers:  Sit <> Stand Assistance: Modified Independent  Sit <> Stand Assistive Device: No Assistive Device    Gait:   Gait Distance: ~120 feet  Assistance 1: Supervision  Gait Assistive Device: No device  Gait Pattern: reciprocal  Gait Deviation(s): decreased aleksandr, increased time in double stance, decreased step length    Balance:   Static Sit: NORMAL: No deviations seen in posture held statically  Dynamic Sit: GOOD+: Maintains balance through MAXIMAL excursions of active trunk motion  Static Stand: GOOD: Takes MODERATE challenges from all directions  Dynamic stand: GOOD: Needs SUPERVISION only during gait and able to self right with moderate     Therapeutic Activities and Exercises:  Pt educated to continue to ambulate during hospitalization with staff as needed.     AM-PAC 6 CLICK MOBILITY  How much help from another person does this patient currently need?   1 = Unable, Total/Dependent Assistance  2 = A lot,  Maximum/Moderate Assistance  3 = A little, Minimum/Contact Guard/Supervision  4 = None, Modified [place]/Independent    Turning over in bed (including adjusting bedclothes, sheets and blankets)?: 4  Sitting down on and standing up from a chair with arms (e.g., wheelchair, bedside commode, etc.): 4  Moving from lying on back to sitting on the side of the bed?: 4  Moving to and from a bed to a chair (including a wheelchair)?: 4  Need to walk in hospital room?: 3  Climbing 3-5 steps with a railing?: 3  Total Score: 22     AM-PAC Raw Score CMS G-Code Modifier Level of Impairment Assistance   6 % Total / Unable   7 - 9 CM 80 - 100% Maximal Assist   10 - 14 CL 60 - 80% Moderate Assist   15 - 19 CK 40 - 60% Moderate Assist   20 - 22 CJ 20 - 40% Minimal Assist   23 CI 1-20% SBA / CGA   24 CH 0% Independent/ Mod I     Patient left supine with all lines intact, call button in reach and RN notified.    Assessment:   Ann Elizabeth Canavan is a 56 y.o. female with a medical diagnosis of Acute on chronic respiratory failure with hypoxia and hypercapnia and presents with deficits listed below. Pt will need skilled PT to address deficits and increase functional mobility as able.    Rehab identified problem list/impairments: Rehab identified problem list/impairments: weakness, impaired endurance, impaired cardiopulmonary response to activity    Rehab potential is good.    Activity tolerance: Good    Discharge recommendations: Discharge Facility/Level Of Care Needs: home (pulmonary rehab)     Barriers to discharge: Barriers to Discharge: None    GOALS:    Physical Therapy Goals     Not on file          Multidisciplinary Problems (Resolved)        Problem: Physical Therapy Goal    Goal Priority Disciplines Outcome Goal Variances Interventions   Physical Therapy Goal   (Resolved)     PT/OT, PT Outcome(s) achieved                     PLAN:    Patient to be d/c'ed from skilled PT at this time.  Plan of Care reviewed with:  patient          Olga Lidia Pike, PT  06/22/2017

## 2017-06-22 NOTE — NURSING TRANSFER
Nursing Transfer Note      6/22/2017     Transfer To: 927B    Transfer via wheelchair    Transfer with cardiac monitoring, 2L NC.     Transported by RN x 1, PCT x 1    Medicines sent: Yes    Chart send with patient: Yes    Upon arrival to floor: cardiac monitor applied, patient oriented to room, call bell in reach and bed in lowest position

## 2017-06-23 LAB
ANION GAP SERPL CALC-SCNC: 9 MMOL/L
BASOPHILS # BLD AUTO: 0.01 K/UL
BASOPHILS NFR BLD: 0.1 %
BUN SERPL-MCNC: 15 MG/DL
CALCIUM SERPL-MCNC: 9.1 MG/DL
CHLORIDE SERPL-SCNC: 101 MMOL/L
CO2 SERPL-SCNC: 29 MMOL/L
CREAT SERPL-MCNC: 0.7 MG/DL
DIFFERENTIAL METHOD: ABNORMAL
ENTEROVIRUS: NOT DETECTED
EOSINOPHIL # BLD AUTO: 0 K/UL
EOSINOPHIL NFR BLD: 0.3 %
ERYTHROCYTE [DISTWIDTH] IN BLOOD BY AUTOMATED COUNT: 12.5 %
EST. GFR  (AFRICAN AMERICAN): >60 ML/MIN/1.73 M^2
EST. GFR  (NON AFRICAN AMERICAN): >60 ML/MIN/1.73 M^2
GLUCOSE SERPL-MCNC: 90 MG/DL
HCT VFR BLD AUTO: 38.1 %
HGB BLD-MCNC: 12.6 G/DL
HUMAN BOCAVIRUS: NOT DETECTED
HUMAN CORONAVIRUS, COMMON COLD VIRUS: NOT DETECTED
INFLUENZA A - H1N1-09: NOT DETECTED
LYMPHOCYTES # BLD AUTO: 2.2 K/UL
LYMPHOCYTES NFR BLD: 21.9 %
MCH RBC QN AUTO: 31.6 PG
MCHC RBC AUTO-ENTMCNC: 33.1 %
MCV RBC AUTO: 96 FL
MONOCYTES # BLD AUTO: 0.5 K/UL
MONOCYTES NFR BLD: 5.3 %
NEUTROPHILS # BLD AUTO: 7.2 K/UL
NEUTROPHILS NFR BLD: 72.1 %
PARAINFLUENZA: NOT DETECTED
PHOSPHATE SERPL-MCNC: 3.6 MG/DL
PLATELET # BLD AUTO: 156 K/UL
PMV BLD AUTO: 11.6 FL
POCT GLUCOSE: 133 MG/DL (ref 70–110)
POCT GLUCOSE: 148 MG/DL (ref 70–110)
POCT GLUCOSE: 219 MG/DL (ref 70–110)
POCT GLUCOSE: 93 MG/DL (ref 70–110)
POTASSIUM SERPL-SCNC: 3.9 MMOL/L
RBC # BLD AUTO: 3.99 M/UL
RVP - ADENOVIRUS: NOT DETECTED
RVP - HUMAN METAPNEUMOVIRUS (HMPV): NOT DETECTED
RVP - INFLUENZA A: NOT DETECTED
RVP - INFLUENZA B: NOT DETECTED
RVP - RESPIRATORY SYNCTIAL VIRUS (RSV) A: NOT DETECTED
RVP - RESPIRATORY VIRAL PANEL, SOURCE: NORMAL
RVP - RHINOVIRUS: NOT DETECTED
SODIUM SERPL-SCNC: 139 MMOL/L
WBC # BLD AUTO: 10 K/UL

## 2017-06-23 PROCEDURE — 94761 N-INVAS EAR/PLS OXIMETRY MLT: CPT

## 2017-06-23 PROCEDURE — 36415 COLL VENOUS BLD VENIPUNCTURE: CPT

## 2017-06-23 PROCEDURE — 51798 US URINE CAPACITY MEASURE: CPT

## 2017-06-23 PROCEDURE — 80048 BASIC METABOLIC PNL TOTAL CA: CPT

## 2017-06-23 PROCEDURE — 25000003 PHARM REV CODE 250: Performed by: STUDENT IN AN ORGANIZED HEALTH CARE EDUCATION/TRAINING PROGRAM

## 2017-06-23 PROCEDURE — 99232 SBSQ HOSP IP/OBS MODERATE 35: CPT | Mod: ,,, | Performed by: INTERNAL MEDICINE

## 2017-06-23 PROCEDURE — 25000242 PHARM REV CODE 250 ALT 637 W/ HCPCS: Performed by: INTERNAL MEDICINE

## 2017-06-23 PROCEDURE — 84100 ASSAY OF PHOSPHORUS: CPT

## 2017-06-23 PROCEDURE — 25000242 PHARM REV CODE 250 ALT 637 W/ HCPCS: Performed by: STUDENT IN AN ORGANIZED HEALTH CARE EDUCATION/TRAINING PROGRAM

## 2017-06-23 PROCEDURE — 63600175 PHARM REV CODE 636 W HCPCS: Performed by: INTERNAL MEDICINE

## 2017-06-23 PROCEDURE — 94640 AIRWAY INHALATION TREATMENT: CPT

## 2017-06-23 PROCEDURE — 25000003 PHARM REV CODE 250: Performed by: INTERNAL MEDICINE

## 2017-06-23 PROCEDURE — 97530 THERAPEUTIC ACTIVITIES: CPT

## 2017-06-23 PROCEDURE — 11000001 HC ACUTE MED/SURG PRIVATE ROOM

## 2017-06-23 PROCEDURE — 63600175 PHARM REV CODE 636 W HCPCS: Performed by: STUDENT IN AN ORGANIZED HEALTH CARE EDUCATION/TRAINING PROGRAM

## 2017-06-23 PROCEDURE — 85025 COMPLETE CBC W/AUTO DIFF WBC: CPT

## 2017-06-23 PROCEDURE — 97165 OT EVAL LOW COMPLEX 30 MIN: CPT

## 2017-06-23 PROCEDURE — 27000221 HC OXYGEN, UP TO 24 HOURS

## 2017-06-23 RX ORDER — TIOTROPIUM BROMIDE 18 UG/1
1 CAPSULE ORAL; RESPIRATORY (INHALATION) DAILY
Status: DISCONTINUED | OUTPATIENT
Start: 2017-06-24 | End: 2017-06-26 | Stop reason: HOSPADM

## 2017-06-23 RX ADMIN — IPRATROPIUM BROMIDE AND ALBUTEROL SULFATE 3 ML: .5; 3 SOLUTION RESPIRATORY (INHALATION) at 03:06

## 2017-06-23 RX ADMIN — IPRATROPIUM BROMIDE AND ALBUTEROL SULFATE 3 ML: .5; 3 SOLUTION RESPIRATORY (INHALATION) at 12:06

## 2017-06-23 RX ADMIN — IPRATROPIUM BROMIDE AND ALBUTEROL SULFATE 3 ML: .5; 3 SOLUTION RESPIRATORY (INHALATION) at 11:06

## 2017-06-23 RX ADMIN — Medication 3 ML: at 06:06

## 2017-06-23 RX ADMIN — ASPIRIN 81 MG CHEWABLE TABLET 81 MG: 81 TABLET CHEWABLE at 08:06

## 2017-06-23 RX ADMIN — ATORVASTATIN CALCIUM 40 MG: 20 TABLET, FILM COATED ORAL at 08:06

## 2017-06-23 RX ADMIN — BUPROPION HYDROCHLORIDE 150 MG: 150 TABLET, FILM COATED, EXTENDED RELEASE ORAL at 08:06

## 2017-06-23 RX ADMIN — AZITHROMYCIN 500 MG: 250 TABLET, FILM COATED ORAL at 08:06

## 2017-06-23 RX ADMIN — FLUTICASONE FUROATE AND VILANTEROL TRIFENATATE 1 PUFF: 200; 25 POWDER RESPIRATORY (INHALATION) at 03:06

## 2017-06-23 RX ADMIN — SERTRALINE HYDROCHLORIDE 25 MG: 25 TABLET ORAL at 08:06

## 2017-06-23 RX ADMIN — Medication 3 ML: at 02:06

## 2017-06-23 RX ADMIN — PREDNISONE 40 MG: 20 TABLET ORAL at 08:06

## 2017-06-23 RX ADMIN — Medication 3 ML: at 09:06

## 2017-06-23 RX ADMIN — ENOXAPARIN SODIUM 40 MG: 100 INJECTION SUBCUTANEOUS at 05:06

## 2017-06-23 RX ADMIN — IPRATROPIUM BROMIDE AND ALBUTEROL SULFATE 3 ML: .5; 3 SOLUTION RESPIRATORY (INHALATION) at 08:06

## 2017-06-23 RX ADMIN — INSULIN ASPART 4 UNITS: 100 INJECTION, SOLUTION INTRAVENOUS; SUBCUTANEOUS at 01:06

## 2017-06-23 NOTE — PLAN OF CARE
"Sw asked by CM to provide Pt will places to apply for disability and other resources for SSI. Sw to provide. Resources provided to Pt for SSI, Pt very pleasant and appreciated Sw information but states "she does not qualify at this time." Sw informed to keep her records and information for future SSI if needed. Sw to follow for d.c needs.  "

## 2017-06-23 NOTE — CONSULTS
Consult Note  Pulmonology    Consult Requested By: Marisol Fernando MD  Reason for Consult: COPD    History of Present Illness:  The patient is a 56 yof with history of COPD & significant smoking history. Has been cutting back on her smoking recently (down to 2 cigarettes per day).    She has MMRC 3 symptoms of dyspnea at baseline. She has had recent exacerbations but had been improving in recent weeks. In fact, she had just tapered off her prednisone. She denies any cough. She was feeling okay & taking a nebulizer treatment at home when suddenly she felt like she couldn't breathe. She felt like the nebulizer triggered the episode. She called 911. She was waiting for EMS to arrive & lost consciousness. The next thing she remembers was in the ICU the next morning on a ventilator. It was quite traumatizing for her.    She wears 2LPM supplemental oxygen at baseline. She uses Breo, Incruse, & prn's.    Review of patient's allergies indicates:   Allergen Reactions    Avelox [moxifloxacin] Itching and Rash     IV       Past Medical History:   Diagnosis Date    Chronic respiratory failure with hypoxia and hypercapnia 5/4/2014    COPD (chronic obstructive pulmonary disease)     Depression     Hyperlipidemia 5/12/2017    Otosclerosis of right ear 5/12/2017    40% hearing back in 1988, had surgery with some improvement, but hearing loss persists.      PAD (peripheral artery disease)     Post-menopausal 2008    Pulmonary emphysema 4/28/2017    Tobacco dependence 5/2/2014    Type 2 diabetes mellitus without complication, without long-term current use of insulin 4/29/2017    Hb A1c 6.5% in 2/2017. Diet controlled.      Past Surgical History:   Procedure Laterality Date    GANGLION CYST EXCISION Right 1988    STAPEDES SURGERY Right 1988     Family History   Problem Relation Age of Onset    Diabetes Mother     Heart disease Father      Strong FH of CAD/CHF on fathers side    Peripheral vascular disease Brother       With necrosis of leg    Diabetes Maternal Uncle     Cancer Neg Hx     Thyroid disease Neg Hx      Social History     Social History    Marital status:      Spouse name: N/A    Number of children: N/A    Years of education: N/A     Occupational History    Not on file.     Social History Main Topics    Smoking status: Current Every Day Smoker     Packs/day: 1.00     Years: 40.00     Types: Cigarettes    Smokeless tobacco: Never Used      Comment: 3-5 a day    Alcohol use 0.5 oz/week     1 Standard drinks or equivalent per week    Drug use: No    Sexual activity: Not on file     Other Topics Concern    Not on file     Social History Narrative    Reports 6-8 years of sedentary lifestyle, where she lived where she worked, only walking 20 paces per day. 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 Hasn't worked this year due to illness and recent move.         Has had a lot of recent stressors. 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 Reports environment there is terrible, with a lot of dust . For the past 2-3 years, patient had been living alone in [place], and boss lived in [place] in the patient's house that she'd bought. Patient came to [place] in January when she was sick, and at that time she decided not to move back to [place].         Patient is [place].        Review of Systems:  CV: no chest pain  Resp: per hpi  Eyes: no visual changes  Gastrointestinal: no nausea or vomiting  Integument/Breast: no rash  Musculoskeletal: no arthralgias  Neurological: no headaches  Behavioral/Psych: no confusion or depression    OBJECTIVE:     Vital Signs (Most Recent)  Temp: 99.7 °F (37.6 °C) (17 1148)  Pulse: 105 (17 1148)  Resp: 19 (17 1148)  BP: 128/78 (17 1148)  SpO2: (!) 93 % (17 1148)    Vital Signs Range (Last 24H):  Temp:  [97.9 °F (36.6 °C)-99.7 °F (37.6 °C)]    Pulse:  []   Resp:  [18-20]   BP: (108-131)/(62-78)   SpO2:  [7 %-99 %]     Physical Exam:  General: no distress  Eyes:  conjunctivae/corneas clear  Nose: no discharge  Neck: no jugular venous distention  Lungs:  normal respiratory effort, no wheezes or rales, + severely diminished breath sounds diffusely  Heart: regular rate and rhythm and no murmur  Abdomen: non-distended  Extremities: no cyanosis or edema, or clubbing  Skin: No rashes or lesions. good skin turgor  Neurologic: alert, oriented, thought content appropriate    Laboratory:  CBC:   Recent Labs  Lab 06/23/17  0329   WBC 10.00   RBC 3.99*   HGB 12.6   HCT 38.1      MCV 96   MCH 31.6*   MCHC 33.1     CMP:   Recent Labs  Lab 06/21/17  0651  06/23/17  0329   *  < > 90   CALCIUM 8.0*  < > 9.1   ALBUMIN 3.3*  --   --    PROT 6.2  --   --      < > 139   K 4.8  < > 3.9   CO2 25  < > 29     < > 101   BUN 16  < > 15   CREATININE 1.1  < > 0.7   ALKPHOS 106  --   --    ALT 45*  --   --    AST 70*  --   --    BILITOT 0.6  --   --    < > = values in this interval not displayed.    Chest X-Ray: reviewed    Diagnostic Results:  CT reviewed    ASSESSMENT/PLAN:     1) COPD with FEV1 27% of predicted. Unable to calculate SHILO because 6MWT is not available. Continue incruse (spiriva while inpatient), breo, duonebs, prednisone, & azithromycin. Dr Bernal has already referred for pulmonary rehab. She agrees to follow through with pulmonary rehab; this should help decrease her exacerbations & symptoms.  2) Tobacco use. Needs to quit & she knows it. She is actively participating in our smoking cessation clinic.  3) Chronic hypoxemic, hypercapnic respiratory failure on supplemental oxygen 2 LPM at baseline.      Carlos Campos MD  Pulmonary Fellow  Pager: [phone]

## 2017-06-23 NOTE — PLAN OF CARE
Problem: Patient Care Overview  Goal: Plan of Care Review  Patient is alert and oriented x4, and able to make her needs known. Transferred from [facility] w/ Dx. Respiratory failure. Patient intubated on 6/20 and extubated on 6/22. No c/o pain or discomfort during the night.  Ambulates independently, and continent of B&B.  Accuchecks ACHS; no insulin coverage required.  On telemetry running NSR.  On 2 L O2 via nasal canula. No falls or injuries during the night. Labs continued to be monitored.

## 2017-06-23 NOTE — PT/OT/SLP EVAL
Occupational Therapy  Evaluation & D/C    Ann Elizabeth Canavan   MRN: [id number]   Admitting Diagnosis: Acute on chronic respiratory failure with hypoxia and hypercapnia    OT Date of Treatment: 06/23/17   OT Start Time: 1314  OT Stop Time: 1338  OT Total Time (min): 24 min    Billable Minutes:  Evaluation 15  Therapeutic Activity 9    Diagnosis: Acute on chronic respiratory failure with hypoxia and hypercapnia   S/p intubation    Past Medical History:   Diagnosis Date    Chronic respiratory failure with hypoxia and hypercapnia 5/4/2014    COPD (chronic obstructive pulmonary disease)     Depression     Hyperlipidemia 5/12/2017    Otosclerosis of right ear 5/12/2017    40% hearing back in 1988, had surgery with some improvement, but hearing loss persists.      PAD (peripheral artery disease)     Post-menopausal 2008    Pulmonary emphysema 4/28/2017    Tobacco dependence 5/2/2014    Type 2 diabetes mellitus without complication, without long-term current use of insulin 4/29/2017    Hb A1c 6.5% in 2/2017. Diet controlled.       Past Surgical History:   Procedure Laterality Date    GANGLION CYST EXCISION Right 1988    STAPEDES SURGERY Right 1988       Referring physician: Reba  Date referred to OT: 6/22/17    General Precautions: Standard, fall  Orthopedic Precautions:    Braces:            Patient History:  Living Environment  Lives With: alone  Living Arrangements: house  Home Accessibility: stairs to enter home  Number of Stairs to Enter Home: 4  Stair Railings at Home: none  Transportation Available: family or friend will provide, car  Living Environment Comment: Pt was previously (I) with all ADLs/IADLs/walking.    Prior level of function:   Bed Mobility/Transfers: independent  Grooming: independent  Bathing: independent  Upper Body Dressing: independent  Lower Body Dressing: independent  Toileting: independent  Home Management Skills: independent  Occupation: Self employed     Dominant hand:  right    Subjective:  Communicated with RN prior to session.  Patient/Family stated goals: To get stronger.    Pain/Comfort  Pain Rating 1: 0/10    Objective:  Patient found with: oxygen    Cognitive Exam:  Oriented to: Person, Place, Time and Situation  Follows Commands/attention: Follows multistep  commands  Communication: clear/fluent  Memory:  No Deficits noted  Safety awareness/insight to disability: intact  Coping skills/emotional control: Appropriate to situation    Visual/perceptual:  Intact    Physical Exam:  Postural examination/scapula alignment: No postural abnormalities identified  Skin integrity: Visible skin intact  Edema: None noted     Sensation:   Intact    Upper Extremity Range of Motion:  Right Upper Extremity: WNL  Left Upper Extremity: WNL    Upper Extremity Strength:  Right Upper Extremity: WFL  Left Upper Extremity: WFL   Strength: wnl    Fine motor coordination:   Intact    Gross motor coordination: WFL    Functional Mobility:  Bed Mobility:  Rolling/Turning Right: Independent  Scooting/Bridging: Independent  Supine to Sit: Independent  Sit to Supine: Independent    Transfers:  Sit <> Stand Assistance: Independent  Sit <> Stand Assistive Device: No Assistive Device  Toilet Transfer Assistance: Independent    Functional Ambulation: independent - no deficits.    Activities of Daily Living:     UE Dressing Level of Assistance: Independent  LE Dressing Level of Assistance: Independent     Grooming Level of Assistance: Independent     Toileting Level of Assistance: Independent     Balance:   Static Sit: NORMAL: No deviations seen in posture held statically  Dynamic Sit: NORMAL: No deviations seen in posture held dynamically  Static Stand: NORMAL: No deviations seen in posture held statically  Dynamic stand: NORMAL: No deviations seen in posture held dynamically    Therapeutic Activities and Exercises:  UE ROM/MMT  Due to pt's c/o generalized weakness, provided handout/education of UE/LE  "exercises with pt acknowledging understanding.  Functional mobility assessment  Sit/standing balance assessment  OT POC    AM-PAC 6 CLICK ADL  How much help from another person does this patient currently need?  1 = Unable, Total/Dependent Assistance  2 = A lot, Maximum/Moderate Assistance  3 = A little, Minimum/Contact Guard/Supervision  4 = None, Modified [place]/Independent    Putting on and taking off regular lower body clothing? : 4  Bathing (including washing, rinsing, drying)?: 4  Toileting, which includes using toilet, bedpan, or urinal? : 4  Putting on and taking off regular upper body clothing?: 4  Taking care of personal grooming such as brushing teeth?: 4  Eating meals?: 4  Total Score: 24    AM-PAC Raw Score CMS "G-Code Modifier Level of Impairment Assistance   6 % Total / Unable   7 - 9 CM 80 - 100% Maximal Assist   10-14 CL 60 - 80% Moderate Assist   15 - 19 CK 40 - 60% Moderate Assist   20 - 22 CJ 20 - 40% Minimal Assist   23 CI 1-20% SBA / CGA   24 CH 0% Independent/ Mod I       Patient left supine with all lines intact and call button in reach    Assessment:  Ann Elizabeth Canavan is a 56 y.o. female with a medical diagnosis of Acute on chronic respiratory failure with hypoxia and hypercapnia and is currently performing ADLs, functional mobility & t/fs without assistance and displays age-appropriate strength, endurance & balance. OT services are not recommended at this time and patient is safe to D/C home.    Rehab identified problem list/impairments: Rehab identified problem list/impairments: impaired endurance, weakness, impaired cardiopulmonary response to activity    Activity tolerance: Excellent    Discharge recommendations: Discharge Facility/Level Of Care Needs: home     Barriers to discharge: Barriers to Discharge: Decreased caregiver support    Equipment recommendations: none     GOALS:    Occupational Therapy Goals     Not on file          Multidisciplinary Problems (Resolved) "        Problem: Occupational Therapy Goal    Goal Priority Disciplines Outcome Interventions   Occupational Therapy Goal   (Resolved)     OT, PT/OT Outcome(s) achieved                    PLAN:  Patient to be seen   to address the above listed problems via    Plan of Care expires:    Plan of Care reviewed with: patient         MARU Alexander  06/23/2017

## 2017-06-23 NOTE — PLAN OF CARE
Patient awake & alert in bed when CM rounded. No family at the bedside. Patient was admitted with respiratory failure & was stepdown from ICU 6/22/17. Patient has had 4 admission for COPD exacerbation since February 2017. Patient lives alone, has a nebulizer at home & uses 2L O2 via NC at all times. Patient requested information regarding applying for disability & questioned if she would qualify for a CPAP for home use. CM informed EDILIA Marc of above. Plan to discharge patient home alone or home with home health when medically stable. Patient denied the need for assistance with transportation at time of discharge. Hospital follow up appointment scheduled for the patient with Dianne PARKER (Dr. Trinh Bowles not available) on 7/12/17 at 1400. Will continue to follow.

## 2017-06-23 NOTE — PLAN OF CARE
Problem: Occupational Therapy Goal  Goal: Occupational Therapy Goal  Outcome: Outcome(s) achieved Date Met: 06/23/17  Eval completed - no OT needed.    MARU Alexander

## 2017-06-24 LAB
ANION GAP SERPL CALC-SCNC: 8 MMOL/L
BASOPHILS # BLD AUTO: 0.01 K/UL
BASOPHILS NFR BLD: 0.1 %
BUN SERPL-MCNC: 16 MG/DL
CALCIUM SERPL-MCNC: 9.5 MG/DL
CHLORIDE SERPL-SCNC: 98 MMOL/L
CO2 SERPL-SCNC: 33 MMOL/L
CREAT SERPL-MCNC: 0.8 MG/DL
DIFFERENTIAL METHOD: ABNORMAL
EOSINOPHIL # BLD AUTO: 0.1 K/UL
EOSINOPHIL NFR BLD: 0.7 %
ERYTHROCYTE [DISTWIDTH] IN BLOOD BY AUTOMATED COUNT: 12.5 %
EST. GFR  (AFRICAN AMERICAN): >60 ML/MIN/1.73 M^2
EST. GFR  (NON AFRICAN AMERICAN): >60 ML/MIN/1.73 M^2
GLUCOSE SERPL-MCNC: 85 MG/DL
HCT VFR BLD AUTO: 40.1 %
HGB BLD-MCNC: 13.2 G/DL
LYMPHOCYTES # BLD AUTO: 2.2 K/UL
LYMPHOCYTES NFR BLD: 25.4 %
MCH RBC QN AUTO: 31.2 PG
MCHC RBC AUTO-ENTMCNC: 32.9 %
MCV RBC AUTO: 95 FL
MONOCYTES # BLD AUTO: 0.6 K/UL
MONOCYTES NFR BLD: 6.6 %
NEUTROPHILS # BLD AUTO: 5.9 K/UL
NEUTROPHILS NFR BLD: 67 %
PHOSPHATE SERPL-MCNC: 3.6 MG/DL
PLATELET # BLD AUTO: 158 K/UL
PMV BLD AUTO: 11.1 FL
POCT GLUCOSE: 120 MG/DL (ref 70–110)
POCT GLUCOSE: 128 MG/DL (ref 70–110)
POCT GLUCOSE: 168 MG/DL (ref 70–110)
POCT GLUCOSE: 176 MG/DL (ref 70–110)
POTASSIUM SERPL-SCNC: 4.2 MMOL/L
RBC # BLD AUTO: 4.23 M/UL
SODIUM SERPL-SCNC: 139 MMOL/L
WBC # BLD AUTO: 8.77 K/UL

## 2017-06-24 PROCEDURE — 99900035 HC TECH TIME PER 15 MIN (STAT)

## 2017-06-24 PROCEDURE — 25000003 PHARM REV CODE 250: Performed by: INTERNAL MEDICINE

## 2017-06-24 PROCEDURE — 11000001 HC ACUTE MED/SURG PRIVATE ROOM

## 2017-06-24 PROCEDURE — 94640 AIRWAY INHALATION TREATMENT: CPT

## 2017-06-24 PROCEDURE — 25000242 PHARM REV CODE 250 ALT 637 W/ HCPCS: Performed by: INTERNAL MEDICINE

## 2017-06-24 PROCEDURE — 99232 SBSQ HOSP IP/OBS MODERATE 35: CPT | Mod: ,,, | Performed by: INTERNAL MEDICINE

## 2017-06-24 PROCEDURE — 94660 CPAP INITIATION&MGMT: CPT

## 2017-06-24 PROCEDURE — 84100 ASSAY OF PHOSPHORUS: CPT

## 2017-06-24 PROCEDURE — 36415 COLL VENOUS BLD VENIPUNCTURE: CPT

## 2017-06-24 PROCEDURE — 85025 COMPLETE CBC W/AUTO DIFF WBC: CPT

## 2017-06-24 PROCEDURE — 25000242 PHARM REV CODE 250 ALT 637 W/ HCPCS: Performed by: STUDENT IN AN ORGANIZED HEALTH CARE EDUCATION/TRAINING PROGRAM

## 2017-06-24 PROCEDURE — 94760 N-INVAS EAR/PLS OXIMETRY 1: CPT

## 2017-06-24 PROCEDURE — 94761 N-INVAS EAR/PLS OXIMETRY MLT: CPT

## 2017-06-24 PROCEDURE — 80048 BASIC METABOLIC PNL TOTAL CA: CPT

## 2017-06-24 PROCEDURE — 27000221 HC OXYGEN, UP TO 24 HOURS

## 2017-06-24 PROCEDURE — 25000003 PHARM REV CODE 250: Performed by: STUDENT IN AN ORGANIZED HEALTH CARE EDUCATION/TRAINING PROGRAM

## 2017-06-24 PROCEDURE — 63600175 PHARM REV CODE 636 W HCPCS: Performed by: INTERNAL MEDICINE

## 2017-06-24 RX ORDER — BUPROPION HYDROCHLORIDE 300 MG/1
300 TABLET ORAL DAILY
Status: DISCONTINUED | OUTPATIENT
Start: 2017-06-25 | End: 2017-06-26 | Stop reason: HOSPADM

## 2017-06-24 RX ADMIN — IPRATROPIUM BROMIDE AND ALBUTEROL SULFATE 3 ML: .5; 3 SOLUTION RESPIRATORY (INHALATION) at 07:06

## 2017-06-24 RX ADMIN — TIOTROPIUM BROMIDE 18 MCG: 18 CAPSULE ORAL; RESPIRATORY (INHALATION) at 08:06

## 2017-06-24 RX ADMIN — IPRATROPIUM BROMIDE AND ALBUTEROL SULFATE 3 ML: .5; 3 SOLUTION RESPIRATORY (INHALATION) at 03:06

## 2017-06-24 RX ADMIN — PREDNISONE 40 MG: 20 TABLET ORAL at 08:06

## 2017-06-24 RX ADMIN — Medication 3 ML: at 10:06

## 2017-06-24 RX ADMIN — ATORVASTATIN CALCIUM 40 MG: 20 TABLET, FILM COATED ORAL at 08:06

## 2017-06-24 RX ADMIN — BUPROPION HYDROCHLORIDE 150 MG: 150 TABLET, FILM COATED, EXTENDED RELEASE ORAL at 08:06

## 2017-06-24 RX ADMIN — ASPIRIN 81 MG CHEWABLE TABLET 81 MG: 81 TABLET CHEWABLE at 08:06

## 2017-06-24 RX ADMIN — AZITHROMYCIN 500 MG: 250 TABLET, FILM COATED ORAL at 08:06

## 2017-06-24 RX ADMIN — IPRATROPIUM BROMIDE AND ALBUTEROL SULFATE 3 ML: .5; 3 SOLUTION RESPIRATORY (INHALATION) at 11:06

## 2017-06-24 RX ADMIN — FLUTICASONE FUROATE AND VILANTEROL TRIFENATATE 1 PUFF: 200; 25 POWDER RESPIRATORY (INHALATION) at 08:06

## 2017-06-24 RX ADMIN — INSULIN ASPART 1 UNITS: 100 INJECTION, SOLUTION INTRAVENOUS; SUBCUTANEOUS at 09:06

## 2017-06-24 RX ADMIN — SERTRALINE HYDROCHLORIDE 25 MG: 25 TABLET ORAL at 08:06

## 2017-06-24 RX ADMIN — INSULIN ASPART 2 UNITS: 100 INJECTION, SOLUTION INTRAVENOUS; SUBCUTANEOUS at 03:06

## 2017-06-24 NOTE — PROGRESS NOTES
POC reviewed with pt. Pt verbalized understanding. Tele DC'd today. Pt will wear Bipap machine tonight to see if that helps improve her condition. No falls/injuries/trauma on this shift. VSS. Will continue to monitor.

## 2017-06-24 NOTE — PROGRESS NOTES
Progress Note  Hospital Medicine      Admit Date: 6/21/2017    SUBJECTIVE:     Follow-up For:  Acute on chronic respiratory failure with hypoxia and hypercapnia    HPI/Interval history: Feeling improved    Review of Systems: Gen: no fever, no chills, Heart: no chest pain, palpitations; Resp: no SOB, no cough    OBJECTIVE:     Vital Signs Range (Last 24H):  Temp:  [98 °F (36.7 °C)-99 °F (37.2 °C)]   Pulse:  []   Resp:  [16-18]   BP: (118-131)/(65-81)   SpO2:  [90 %-98 %]     Physical Exam:  General appearance: NAD, conversant  Neck: FROM, supple   Lungs: Clear to auscultation, no accessory muscle use  CV: RRR, no heave  Abdomen: Soft, non-tender; no masses or HSM  Extremities: No peripheral edema or digital cyanosis  Skin: no rash, lesions or ulcers  Psych: Alert and oriented to person, place and time      Recent Labs  Lab 06/21/17 0651 06/22/17 0254 06/23/17 0329 06/24/17 0349    137 139 139   K 4.8 4.9 3.9 4.2    101 101 98   CO2 25 29 29 33*   BUN 16 13 15 16   CREATININE 1.1 0.8 0.7 0.8   * 140* 90 85   CALCIUM 8.0* 9.1 9.1 9.5   MG 3.1* 2.1  --   --    PHOS 5.7* 4.0 3.6 3.6       Recent Labs  Lab 06/21/17  0422 06/21/17 0613 06/21/17 0651 06/22/17 0254   ALKPHOS 125  --  106  --    ALT 27  --  45*  --    AST 36  --  70*  --    ALBUMIN 3.5  --  3.3*  --    PROT 6.6  --  6.2  --    BILITOT 0.6  --  0.6  --    INR  --  0.9  --  0.9         Recent Labs  Lab 06/22/17 0254 06/23/17 0329 06/24/17 0349   WBC 15.39* 10.00 8.77   HGB 13.0 12.6 13.2   HCT 39.9 38.1 40.1    156 158   GRAN 89.7*  13.8* 72.1  7.2 67.0  5.9   LYMPH 6.3*  1.0 21.9  2.2 25.4  2.2   MONO 3.6*  0.6 5.3  0.5 6.6  0.6         Recent Labs  Lab 06/23/17  0822 06/23/17  1246 06/23/17  1733 06/23/17  2133 06/24/17  0843 06/24/17  1522   POCTGLUCOSE 93 219* 128* 133* 120* 176*       ASSESSMENT/PLAN:   * Acute on chronic respiratory failure with hypoxia and hypercapnia.    Severe COPD   Pt admitted to ICU  intubated due to declining declining mental status and unsustainable work-of-breathing and extubated about 5 hours later to BiPAP. She had been placed on albuterol-ipratropium nebulizations, fluticasone-vilanterol inhalations,  azithromycin 500mg PO daily, and prednisone 40mg PO daily for 7 days followed by 20mg PO daily for 7 days. She was able to be transitioned to nasal cannula and then soon transitioned to floor. Consulted pulmonary about any further intervention. Recommended to continue above plan and to start BiPAP 12/5. Patient to work on smoking cessation and pulmonary rehab.    Acute metabolic encephalopathy - resolved with resolution of hypercapnia    Severe episode of recurrent major depressive disorder, without psychotic features - Continuing bupropion 75mg PO BID.     Tobacco dependence  - Pt with 40+ pack-year history; still smoking some today  - Cause of patient's COPD and likely direct contributor to exacerbation.  - Pt has received information on smoking cessation; repeat education on floor.  - Provide nicotine patch as appropriate.     Type 2 diabetes mellitus without complication, without long-term current use of insulin  DM II with hyperlipidemia  Steroid induced hyperglycemia  Typically diet controlled with A1C of 6.5% in February. Elevated blood sugars upon starting oral corticosteroid. Continue moderate dose sliding scale for time being.    Pulmonary cachexia  Pt with 20lb weight loss over recent time period per pulmonology clinic note dated 6/1/17; likely 2/2 pulmonary disease and increased metabolic demand of respiration.     Mixed hyperlipidemia    - Continuing atorvastatin 40mg PO daily.

## 2017-06-24 NOTE — PROGRESS NOTES
Progress Note  Hospital Medicine      Admit Date: 6/21/2017    SUBJECTIVE:     Follow-up For:  Acute on chronic respiratory failure with hypoxia and hypercapnia    HPI/Interval history: Feeling improved    Review of Systems: Gen: no fever, no chills, Heart: no chest pain, palpitations; Resp: no SOB, no cough    OBJECTIVE:     Vital Signs Range (Last 24H):  Temp:  [97.9 °F (36.6 °C)-99.7 °F (37.6 °C)]   Pulse:  []   Resp:  [16-20]   BP: (108-131)/(62-98)   SpO2:  [90 %-98 %]     Physical Exam:  General appearance: NAD, conversant  Neck: FROM, supple   Lungs: Clear to auscultation, no accessory muscle use  CV: RRR, no heave  Abdomen: Soft, non-tender; no masses or HSM  Extremities: No peripheral edema or digital cyanosis  Skin: no rash, lesions or ulcers  Psych: Alert and oriented to person, place and time      Recent Labs  Lab 06/21/17 0651 06/22/17 0254 06/23/17 0329    137 139   K 4.8 4.9 3.9    101 101   CO2 25 29 29   BUN 16 13 15   CREATININE 1.1 0.8 0.7   * 140* 90   CALCIUM 8.0* 9.1 9.1   MG 3.1* 2.1  --    PHOS 5.7* 4.0 3.6       Recent Labs  Lab 06/21/17 0422 06/21/17 0613 06/21/17 0651 06/22/17  0254   ALKPHOS 125  --  106  --    ALT 27  --  45*  --    AST 36  --  70*  --    ALBUMIN 3.5  --  3.3*  --    PROT 6.6  --  6.2  --    BILITOT 0.6  --  0.6  --    INR  --  0.9  --  0.9         Recent Labs  Lab 06/21/17 0422 06/22/17 0254 06/23/17  0329   WBC 20.43* 15.39* 10.00   HGB 14.7 13.0 12.6   HCT 45.6 39.9 38.1    163 156   GRAN 66.0 89.7*  13.8* 72.1  7.2   LYMPH 28.0  CANCELED 6.3*  1.0 21.9  2.2   MONO 4.0  CANCELED 3.6*  0.6 5.3  0.5         Recent Labs  Lab 06/22/17  1659 06/22/17  2213 06/23/17  0822 06/23/17  1246 06/23/17  1733 06/23/17  2133   POCTGLUCOSE 162* 101 93 219* 128* 133*       ASSESSMENT/PLAN:   * Acute on chronic respiratory failure with hypoxia and hypercapnia.    Severe COPD   Pt admitted to ICU intubated due to declining declining mental  status and unsustainable work-of-breathing and extubated about 5 hours later to BiPAP. She had been placed on albuterol-ipratropium nebulizations, fluticasone-vilanterol inhalations,  azithromycin 500mg PO daily, and prednisone 40mg PO daily for 7 days followed by 20mg PO daily for 7 days. She was able to be transitioned to nasal cannula and then soon transitioned to floor. Consulted pulmonary about any further intervention. Recommended to continue above plan. Patient to work on smoking cessation and pulmonary rehab.    Acute metabolic encephalopathy - resolved with resolution of hypercapnia    Severe episode of recurrent major depressive disorder, without psychotic features - Continuing bupropion 75mg PO BID.     Tobacco dependence  - Pt with 40+ pack-year history; still smoking some today  - Cause of patient's COPD and likely direct contributor to exacerbation.  - Pt has received information on smoking cessation; repeat education on floor.  - Provide nicotine patch as appropriate.     Type 2 diabetes mellitus without complication, without long-term current use of insulin  DM II with hyperlipidemia  Steroid induced hyperglycemia  Typically diet controlled with A1C of 6.5% in February. Elevated blood sugars upon starting oral corticosteroid. Continue moderate dose sliding scale for time being.    Pulmonary cachexia  Pt with 20lb weight loss over recent time period per pulmonology clinic note dated 6/1/17; likely 2/2 pulmonary disease and increased metabolic demand of respiration.     Mixed hyperlipidemia    - Continuing atorvastatin 40mg PO daily.

## 2017-06-24 NOTE — PLAN OF CARE
Problem: Patient Care Overview  Goal: Plan of Care Review  Outcome: Ongoing (interventions implemented as appropriate)  Safety:  call light in reach, patient oriented to room & instructed how to notify nurse if assistance is needed, questions & concerns addressed, bed in lowest position with wheels locked & side rails up X 2, fall precautions followed, patient free from fall & injury thus far this shift;  VTE/bleeding precautions maintained.  Activity:  patient up ad suresh, weight shifted at least every other hour.  Neurological:  patient A&O X 4, follows commands, equal  strength & dorsi/plantarflexion, neuro checks performed as ordered & WDL.  Respiratory:  patient tolerates room air without distress, SOB on exertion.  Cardiac:  Denies chest pain, BP stable.  HR stable.  NSR on telemetry.  Afebrile this shift.  GI:  Patient tolerates PO intake well, denies nausea,  LBM 6/23/2017.  :  patient voids clear yellow urine without foul odor spontaneously & without difficulty, adequate output for shift.  Skin:  CDI.  Devices:  PIV CDI, negative for s/sx of infection & infiltration.  Pain:  patient denies pain.  Will continue to monitor patient.

## 2017-06-25 LAB
ANION GAP SERPL CALC-SCNC: 9 MMOL/L
BASOPHILS # BLD AUTO: 0.01 K/UL
BASOPHILS NFR BLD: 0.1 %
BUN SERPL-MCNC: 17 MG/DL
CALCIUM SERPL-MCNC: 9.1 MG/DL
CHLORIDE SERPL-SCNC: 102 MMOL/L
CO2 SERPL-SCNC: 29 MMOL/L
CREAT SERPL-MCNC: 0.8 MG/DL
DIFFERENTIAL METHOD: NORMAL
EOSINOPHIL # BLD AUTO: 0.1 K/UL
EOSINOPHIL NFR BLD: 1 %
ERYTHROCYTE [DISTWIDTH] IN BLOOD BY AUTOMATED COUNT: 12.4 %
EST. GFR  (AFRICAN AMERICAN): >60 ML/MIN/1.73 M^2
EST. GFR  (NON AFRICAN AMERICAN): >60 ML/MIN/1.73 M^2
GLUCOSE SERPL-MCNC: 94 MG/DL
HCT VFR BLD AUTO: 39.3 %
HGB BLD-MCNC: 12.8 G/DL
LYMPHOCYTES # BLD AUTO: 2.2 K/UL
LYMPHOCYTES NFR BLD: 23.6 %
MCH RBC QN AUTO: 30.8 PG
MCHC RBC AUTO-ENTMCNC: 32.6 %
MCV RBC AUTO: 95 FL
MONOCYTES # BLD AUTO: 0.5 K/UL
MONOCYTES NFR BLD: 5.7 %
NEUTROPHILS # BLD AUTO: 6.4 K/UL
NEUTROPHILS NFR BLD: 69.3 %
PHOSPHATE SERPL-MCNC: 4 MG/DL
PLATELET # BLD AUTO: 158 K/UL
PMV BLD AUTO: 11.4 FL
POCT GLUCOSE: 100 MG/DL (ref 70–110)
POCT GLUCOSE: 176 MG/DL (ref 70–110)
POCT GLUCOSE: 90 MG/DL (ref 70–110)
POCT GLUCOSE: 97 MG/DL (ref 70–110)
POTASSIUM SERPL-SCNC: 3.7 MMOL/L
RBC # BLD AUTO: 4.15 M/UL
SODIUM SERPL-SCNC: 140 MMOL/L
WBC # BLD AUTO: 9.28 K/UL

## 2017-06-25 PROCEDURE — 94640 AIRWAY INHALATION TREATMENT: CPT

## 2017-06-25 PROCEDURE — 25000003 PHARM REV CODE 250: Performed by: INTERNAL MEDICINE

## 2017-06-25 PROCEDURE — 11000001 HC ACUTE MED/SURG PRIVATE ROOM

## 2017-06-25 PROCEDURE — 84100 ASSAY OF PHOSPHORUS: CPT

## 2017-06-25 PROCEDURE — 99900035 HC TECH TIME PER 15 MIN (STAT)

## 2017-06-25 PROCEDURE — 94660 CPAP INITIATION&MGMT: CPT

## 2017-06-25 PROCEDURE — 25000242 PHARM REV CODE 250 ALT 637 W/ HCPCS: Performed by: INTERNAL MEDICINE

## 2017-06-25 PROCEDURE — 94761 N-INVAS EAR/PLS OXIMETRY MLT: CPT

## 2017-06-25 PROCEDURE — 63600175 PHARM REV CODE 636 W HCPCS: Performed by: INTERNAL MEDICINE

## 2017-06-25 PROCEDURE — 63600175 PHARM REV CODE 636 W HCPCS: Performed by: STUDENT IN AN ORGANIZED HEALTH CARE EDUCATION/TRAINING PROGRAM

## 2017-06-25 PROCEDURE — 85025 COMPLETE CBC W/AUTO DIFF WBC: CPT

## 2017-06-25 PROCEDURE — 25000003 PHARM REV CODE 250: Performed by: STUDENT IN AN ORGANIZED HEALTH CARE EDUCATION/TRAINING PROGRAM

## 2017-06-25 PROCEDURE — 27000221 HC OXYGEN, UP TO 24 HOURS

## 2017-06-25 PROCEDURE — 80048 BASIC METABOLIC PNL TOTAL CA: CPT

## 2017-06-25 PROCEDURE — 99232 SBSQ HOSP IP/OBS MODERATE 35: CPT | Mod: ,,, | Performed by: INTERNAL MEDICINE

## 2017-06-25 PROCEDURE — 99231 SBSQ HOSP IP/OBS SF/LOW 25: CPT | Mod: ,,, | Performed by: INTERNAL MEDICINE

## 2017-06-25 RX ADMIN — IPRATROPIUM BROMIDE AND ALBUTEROL SULFATE 3 ML: .5; 3 SOLUTION RESPIRATORY (INHALATION) at 03:06

## 2017-06-25 RX ADMIN — ENOXAPARIN SODIUM 40 MG: 100 INJECTION SUBCUTANEOUS at 04:06

## 2017-06-25 RX ADMIN — IPRATROPIUM BROMIDE AND ALBUTEROL SULFATE 3 ML: .5; 3 SOLUTION RESPIRATORY (INHALATION) at 08:06

## 2017-06-25 RX ADMIN — PREDNISONE 40 MG: 20 TABLET ORAL at 08:06

## 2017-06-25 RX ADMIN — SERTRALINE HYDROCHLORIDE 25 MG: 25 TABLET ORAL at 08:06

## 2017-06-25 RX ADMIN — IPRATROPIUM BROMIDE AND ALBUTEROL SULFATE 3 ML: .5; 3 SOLUTION RESPIRATORY (INHALATION) at 07:06

## 2017-06-25 RX ADMIN — FLUTICASONE FUROATE AND VILANTEROL TRIFENATATE 1 PUFF: 200; 25 POWDER RESPIRATORY (INHALATION) at 09:06

## 2017-06-25 RX ADMIN — IPRATROPIUM BROMIDE AND ALBUTEROL SULFATE 3 ML: .5; 3 SOLUTION RESPIRATORY (INHALATION) at 11:06

## 2017-06-25 RX ADMIN — Medication 3 ML: at 02:06

## 2017-06-25 RX ADMIN — ATORVASTATIN CALCIUM 40 MG: 20 TABLET, FILM COATED ORAL at 08:06

## 2017-06-25 RX ADMIN — TIOTROPIUM BROMIDE 18 MCG: 18 CAPSULE ORAL; RESPIRATORY (INHALATION) at 09:06

## 2017-06-25 RX ADMIN — BUPROPION HYDROCHLORIDE 300 MG: 300 TABLET, FILM COATED, EXTENDED RELEASE ORAL at 08:06

## 2017-06-25 RX ADMIN — ASPIRIN 81 MG CHEWABLE TABLET 81 MG: 81 TABLET CHEWABLE at 08:06

## 2017-06-25 RX ADMIN — AZITHROMYCIN 500 MG: 250 TABLET, FILM COATED ORAL at 08:06

## 2017-06-25 RX ADMIN — Medication 3 ML: at 05:06

## 2017-06-25 NOTE — PLAN OF CARE
Problem: Patient Care Overview  Goal: Plan of Care Review  Outcome: Ongoing (interventions implemented as appropriate)  No fall related injury, oral steroid and oral antibiotic admin as ordered. resp tx q4h. Bi pap at night. Afebrile, room air pulse 91-97%. Will continue to monitor.POC testing discontinued,.

## 2017-06-25 NOTE — PLAN OF CARE
Problem: Patient Care Overview  Goal: Plan of Care Review  Outcome: Ongoing (interventions implemented as appropriate)  Plan of care reviewed with patient. Pt verbalized understanding. Pt AAOX4. Pt free from falls, injuries and trauma.  Pt free from skin breakdown.  Pt VSS and in NAD.  Pt afebrile.  Pt had no complaints of SOB, N/V or CP.  Pt had no complaints of pain this shift. Pt ambulated independently to  with nonskid socks on. Adequate UOP this shift. No BM this shift. POC BG done per order sliding scale insulin given as needed. Pt had uneventful evening. Pt in low locked bed with personal items and call light within reach. Fall precautions maintained.

## 2017-06-25 NOTE — CARE UPDATE
Pulmonary Consult Service    Patient seen on rounds.  She reports no acute respiratory problems overnight.  She tried BiPAP as planned, but mask leaks prevented use through the entire night.  On exam today, she has decreased air exchange bilaterally with minimal wheezes.  I have asked RT to adjust/change mask to minimize leaks.  Encourage time out of bed during day.    MD Enrique  [phone]

## 2017-06-26 VITALS
SYSTOLIC BLOOD PRESSURE: 129 MMHG | TEMPERATURE: 98 F | HEIGHT: 66 IN | RESPIRATION RATE: 18 BRPM | OXYGEN SATURATION: 91 % | WEIGHT: 112.63 LBS | BODY MASS INDEX: 18.1 KG/M2 | DIASTOLIC BLOOD PRESSURE: 72 MMHG | HEART RATE: 110 BPM

## 2017-06-26 LAB
BACTERIA BLD CULT: NORMAL
BACTERIA BLD CULT: NORMAL
POCT GLUCOSE: 85 MG/DL (ref 70–110)

## 2017-06-26 PROCEDURE — 25000003 PHARM REV CODE 250: Performed by: INTERNAL MEDICINE

## 2017-06-26 PROCEDURE — 63600175 PHARM REV CODE 636 W HCPCS: Performed by: INTERNAL MEDICINE

## 2017-06-26 PROCEDURE — 99233 SBSQ HOSP IP/OBS HIGH 50: CPT | Mod: ,,, | Performed by: INTERNAL MEDICINE

## 2017-06-26 PROCEDURE — 99900035 HC TECH TIME PER 15 MIN (STAT)

## 2017-06-26 PROCEDURE — 99239 HOSP IP/OBS DSCHRG MGMT >30: CPT | Mod: ,,, | Performed by: INTERNAL MEDICINE

## 2017-06-26 PROCEDURE — 94640 AIRWAY INHALATION TREATMENT: CPT

## 2017-06-26 PROCEDURE — 25000242 PHARM REV CODE 250 ALT 637 W/ HCPCS: Performed by: INTERNAL MEDICINE

## 2017-06-26 RX ORDER — PREDNISONE 10 MG/1
TABLET ORAL
Qty: 100 TABLET | Refills: 1 | Status: SHIPPED | OUTPATIENT
Start: 2017-06-26 | End: 2017-10-03 | Stop reason: SDUPTHER

## 2017-06-26 RX ORDER — BUPROPION HYDROCHLORIDE 150 MG/1
150 TABLET, EXTENDED RELEASE ORAL 2 TIMES DAILY
Qty: 30 TABLET | Refills: 1 | Status: SHIPPED | OUTPATIENT
Start: 2017-06-26 | End: 2017-11-16

## 2017-06-26 RX ADMIN — TIOTROPIUM BROMIDE 18 MCG: 18 CAPSULE ORAL; RESPIRATORY (INHALATION) at 08:06

## 2017-06-26 RX ADMIN — IPRATROPIUM BROMIDE AND ALBUTEROL SULFATE 3 ML: .5; 3 SOLUTION RESPIRATORY (INHALATION) at 12:06

## 2017-06-26 RX ADMIN — FLUTICASONE FUROATE AND VILANTEROL TRIFENATATE 1 PUFF: 200; 25 POWDER RESPIRATORY (INHALATION) at 08:06

## 2017-06-26 RX ADMIN — ASPIRIN 81 MG CHEWABLE TABLET 81 MG: 81 TABLET CHEWABLE at 08:06

## 2017-06-26 RX ADMIN — ATORVASTATIN CALCIUM 40 MG: 20 TABLET, FILM COATED ORAL at 08:06

## 2017-06-26 RX ADMIN — BUPROPION HYDROCHLORIDE 300 MG: 300 TABLET, FILM COATED, EXTENDED RELEASE ORAL at 08:06

## 2017-06-26 RX ADMIN — SERTRALINE HYDROCHLORIDE 25 MG: 25 TABLET ORAL at 08:06

## 2017-06-26 RX ADMIN — IPRATROPIUM BROMIDE AND ALBUTEROL SULFATE 3 ML: .5; 3 SOLUTION RESPIRATORY (INHALATION) at 03:06

## 2017-06-26 RX ADMIN — PREDNISONE 40 MG: 20 TABLET ORAL at 08:06

## 2017-06-26 RX ADMIN — IPRATROPIUM BROMIDE AND ALBUTEROL SULFATE 3 ML: .5; 3 SOLUTION RESPIRATORY (INHALATION) at 08:06

## 2017-06-26 NOTE — PROGRESS NOTES
Pulmonology Fellow  Progress Note     Patient seen and examined alongside MS4.  Subjectively improved.  Resting comfortably on room air this morning.   Once home NIPPV has been arranged, okay to discharge on ICS/LABA, PRAVIN, and low dose corticosteroid with close outpatient follow up in pulmonary clinic with Dr. Bernal.    Laron Heaton MD  Pulmonary / Critical Care Fellow  06/26/2017.  1:55 PM          Consult Requested by:  Marisol Fernando MD  Reason for Consult: COPD   SUBJECTIVE:      History of Present Illness:  Patient is a 56 y.o. female smoker with PMHx of COPD, presents with acute on chronic respiratory failure with hypoxia and hypercapnia.      Hospital Course: Improved. Tried BiPAP overnight with some success.            Review of patient's allergies indicates:   Allergen Reactions    Avelox [moxifloxacin] Itching and Rash       IV              Past Medical History:   Diagnosis Date    Chronic respiratory failure with hypoxia and hypercapnia 5/4/2014    COPD (chronic obstructive pulmonary disease)      Depression      Hyperlipidemia 5/12/2017    Otosclerosis of right ear 5/12/2017     40% hearing back in 1988, had surgery with some improvement, but hearing loss persists.      PAD (peripheral artery disease)      Post-menopausal 2008    Pulmonary emphysema 4/28/2017    Tobacco dependence 5/2/2014    Type 2 diabetes mellitus without complication, without long-term current use of insulin 4/29/2017     Hb A1c 6.5% in 2/2017. Diet controlled.             Past Surgical History:   Procedure Laterality Date    GANGLION CYST EXCISION Right 1988    STAPEDES SURGERY Right 1988             Family History   Problem Relation Age of Onset    Heart disease Father         Strong FH of CAD/CHF on fathers side    Peripheral vascular disease Brother         With necrosis of leg    Diabetes Maternal Uncle      Cancer Neg Hx                Social History   Substance Use Topics    Smoking status: Current Every Day  Smoker       Packs/day: 1.00       Years: 40.00       Types: Cigarettes    Smokeless tobacco: Never Used         Comment: 3-5 a day    Alcohol use 0.5 oz/week        1 Standard drinks or equivalent per week          Review of Systems:  Constitutional: no fever or chills  Respiratory: no cough or shortness of breath  Cardiovascular: no chest pain or palpitations  Neurological: no seizures or tremors     OBJECTIVE:      Vital Signs:  Temp:  [97.6 °F (36.4 °C)-98.1 °F (36.7 °C)]   Pulse:  []   Resp:  [16-19]   BP: (129-131)/(70-72)   SpO2:  [90 %-95 %]      Physical Exam:  General appearance: NAD, conversant  Lungs: Clear to auscultation, no accessory muscle use, generalized decreased air entry      Laboratory:   CBC:      Recent Labs  Lab 06/25/17  0420   WBC 9.28   RBC 4.15   HGB 12.8   HCT 39.3      MCV 95   MCH 30.8   MCHC 32.6      CMP:   Recent Labs  Lab 06/21/17  0651   06/25/17  0420   *  < > 94   CALCIUM 8.0*  < > 9.1   ALBUMIN 3.3*  --   --    PROT 6.2  --   --      < > 140   K 4.8  < > 3.7   CO2 25  < > 29     < > 102   BUN 16  < > 17   CREATININE 1.1  < > 0.8   ALKPHOS 106  --   --    ALT 45*  --   --    AST 70*  --   --    BILITOT 0.6  --   --    < > = values in this interval not displayed.         Diagnostic Results:  X-ray: Reviewed         ASSESSMENT/PLAN:      57 y/o F smoker with hx of COPD and acute on chronic respiratory failure with hypoxia and hypercapnia.      Plan:  1. Continue 10 mg prednisone   2. D/C home with BiPAP   3. Follow-up with Dr. Bernal in clinic     VICKY Boggs Y4  Pulmonology  [facility] [place]

## 2017-06-26 NOTE — PLAN OF CARE
Patient awake & alert in bed when CM rounded. No family at the bedside. CM informed patient that a sleep study needs to be scheduled in order to obtain a BIPAP for home use. Patient verbalized understanding & agreement with above. Patient requested that the BIPAP be delivered to her house following discharge. CM informed patient that the sleep study & pulmonary rehab are both done at [facility]. CM reminded patient of hospital follow up appointment scheduled with NP, Ximena Mike at the [facility] on 7/5/17 at 1000. Patient in agreement with plan to discharge home with no needs today. Patient denied the need for assistance with transportation at time of discharge. CM informed the patient's nurse, Leisa ([id number]), of discharge status. Will continue to follow.

## 2017-06-26 NOTE — PROGRESS NOTES
Progress Note  Hospital Medicine      Admit Date: 6/21/2017    SUBJECTIVE:     Follow-up For:  Acute on chronic respiratory failure with hypoxia and hypercapnia    HPI/Interval history: Feeling improved    Review of Systems: Gen: no fever, no chills, Heart: no chest pain, palpitations; Resp: no SOB, no cough    OBJECTIVE:     Vital Signs Range (Last 24H):  Temp:  [98.2 °F (36.8 °C)-98.7 °F (37.1 °C)]   Pulse:  []   Resp:  [16-19]   BP: (111-131)/(59-74)   SpO2:  [88 %-98 %]     Physical Exam:  General appearance: NAD, conversant  Neck: FROM, supple   Lungs: Clear to auscultation, no accessory muscle use  CV: RRR, no heave  Abdomen: Soft, non-tender; no masses or HSM  Extremities: No peripheral edema or digital cyanosis  Skin: no rash, lesions or ulcers  Psych: Alert and oriented to person, place and time      Recent Labs  Lab 06/21/17 0651 06/22/17 0254 06/23/17 0329 06/24/17 0349 06/25/17  0420    137 139 139 140   K 4.8 4.9 3.9 4.2 3.7    101 101 98 102   CO2 25 29 29 33* 29   BUN 16 13 15 16 17   CREATININE 1.1 0.8 0.7 0.8 0.8   * 140* 90 85 94   CALCIUM 8.0* 9.1 9.1 9.5 9.1   MG 3.1* 2.1  --   --   --    PHOS 5.7* 4.0 3.6 3.6 4.0       Recent Labs  Lab 06/21/17 0422 06/21/17 0613 06/21/17 0651 06/22/17  0254   ALKPHOS 125  --  106  --    ALT 27  --  45*  --    AST 36  --  70*  --    ALBUMIN 3.5  --  3.3*  --    PROT 6.6  --  6.2  --    BILITOT 0.6  --  0.6  --    INR  --  0.9  --  0.9         Recent Labs  Lab 06/23/17 0329 06/24/17 0349 06/25/17  0420   WBC 10.00 8.77 9.28   HGB 12.6 13.2 12.8   HCT 38.1 40.1 39.3    158 158   GRAN 72.1  7.2 67.0  5.9 69.3  6.4   LYMPH 21.9  2.2 25.4  2.2 23.6  2.2   MONO 5.3  0.5 6.6  0.6 5.7  0.5         Recent Labs  Lab 06/24/17  1522 06/24/17  2147 06/25/17  0014 06/25/17  0459 06/25/17  0806 06/25/17  1157   POCTGLUCOSE 176* 168* 100 90 97 176*       ASSESSMENT/PLAN:   * Acute on chronic respiratory failure with hypoxia and  hypercapnia.    Severe COPD   Pt admitted to ICU intubated due to declining declining mental status and unsustainable work-of-breathing and extubated about 5 hours later to BiPAP. She had been placed on albuterol-ipratropium nebulizations, fluticasone-vilanterol inhalations,  azithromycin 500mg PO daily, and prednisone 40mg PO daily for 7 days followed by 20mg PO daily for 7 days and to continue 10 mg daily indefinitely. She was able to be transitioned to nasal cannula and then soon transitioned to floor. Consulted pulmonary about any further intervention. Recommended to continue above plan and to start BiPAP 12/5. Patient to work on smoking cessation and pulmonary rehab.    Acute metabolic encephalopathy - resolved with resolution of hypercapnia    Severe episode of recurrent major depressive disorder, without psychotic features -Change bupropion 300 mg daily     Tobacco dependence  - Pt with 40+ pack-year history; still smoking some today  - Cause of patient's COPD and likely direct contributor to exacerbation.  - Pt has received information on smoking cessation; repeat education on floor.  - Provide nicotine patch as appropriate.     Type 2 diabetes mellitus without complication, without long-term current use of insulin  DM II with hyperlipidemia  Steroid induced hyperglycemia  Typically diet controlled with A1C of 6.5% in February. Elevated blood sugars upon starting oral corticosteroid. Continue moderate dose sliding scale for time being.    Pulmonary cachexia  Pt with 20lb weight loss over recent time period per pulmonology clinic note dated 6/1/17; likely 2/2 pulmonary disease and increased metabolic demand of respiration.     Mixed hyperlipidemia    - Continuing atorvastatin 40mg PO daily.

## 2017-06-26 NOTE — PLAN OF CARE
Allan left message with Garrett at [facility] to see about Pt's Bipap at d/c and if any orders need adjusting to acquire. Allan to follow. Allan informed by Garrett that Pt's insurance will be submitted for Bipap and that Pt has a 30 day loaner if not approved by insurance and if no sleep study criteria, will have to pay out of pocket or return Bipap. Allan informed ALFONZO Zaragoza of the above need for sleep study in next 30 days.

## 2017-06-26 NOTE — PROGRESS NOTES
D/c instructions reviewed and given. Pt verbalized understanding. IV removed with catheter intact. Pt waiting at bedside for meds to be delivered from hosp pharmacy.

## 2017-06-26 NOTE — MEDICAL/APP STUDENT
Consult Note    Consult Requested by:  Marisol Fernando MD  Reason for Consult: COPD   SUBJECTIVE:     History of Present Illness:  Patient is a 56 y.o. female smoker with PMHx of COPD, presents with acute on chronic respiratory failure with hypoxia and hypercapnia.     Hospital Course: Improved. Tried BiPAP overnight with some success.     Review of patient's allergies indicates:   Allergen Reactions    Avelox [moxifloxacin] Itching and Rash     IV       Past Medical History:   Diagnosis Date    Chronic respiratory failure with hypoxia and hypercapnia 5/4/2014    COPD (chronic obstructive pulmonary disease)     Depression     Hyperlipidemia 5/12/2017    Otosclerosis of right ear 5/12/2017    40% hearing back in 1988, had surgery with some improvement, but hearing loss persists.      PAD (peripheral artery disease)     Post-menopausal 2008    Pulmonary emphysema 4/28/2017    Tobacco dependence 5/2/2014    Type 2 diabetes mellitus without complication, without long-term current use of insulin 4/29/2017    Hb A1c 6.5% in 2/2017. Diet controlled.      Past Surgical History:   Procedure Laterality Date    GANGLION CYST EXCISION Right 1988    STAPEDES SURGERY Right 1988     Family History   Problem Relation Age of Onset    Heart disease Father      Strong FH of CAD/CHF on fathers side    Peripheral vascular disease Brother      With necrosis of leg    Diabetes Maternal Uncle     Cancer Neg Hx      Social History   Substance Use Topics    Smoking status: Current Every Day Smoker     Packs/day: 1.00     Years: 40.00     Types: Cigarettes    Smokeless tobacco: Never Used      Comment: 3-5 a day    Alcohol use 0.5 oz/week     1 Standard drinks or equivalent per week       Review of Systems:  Constitutional: no fever or chills  Respiratory: no cough or shortness of breath  Cardiovascular: no chest pain or palpitations  Neurological: no seizures or tremors    OBJECTIVE:     Vital Signs:  Temp:  [97.6 °F (36.4  °C)-98.1 °F (36.7 °C)]   Pulse:  []   Resp:  [16-19]   BP: (129-131)/(70-72)   SpO2:  [90 %-95 %]     Physical Exam:  General appearance: NAD, conversant  Lungs: Clear to auscultation, no accessory muscle use, generalized decreased air entry     Laboratory:   CBC:     Recent Labs  Lab 06/25/17  0420   WBC 9.28   RBC 4.15   HGB 12.8   HCT 39.3      MCV 95   MCH 30.8   MCHC 32.6     CMP:   Recent Labs  Lab 06/21/17  0651  06/25/17  0420   *  < > 94   CALCIUM 8.0*  < > 9.1   ALBUMIN 3.3*  --   --    PROT 6.2  --   --      < > 140   K 4.8  < > 3.7   CO2 25  < > 29     < > 102   BUN 16  < > 17   CREATININE 1.1  < > 0.8   ALKPHOS 106  --   --    ALT 45*  --   --    AST 70*  --   --    BILITOT 0.6  --   --    < > = values in this interval not displayed.       Diagnostic Results:  X-ray: Reviewed       ASSESSMENT/PLAN:     57 y/o F smoker with hx of COPD and acute on chronic respiratory failure with hypoxia and hypercapnia.     Plan:  1. Continue 10 mg prednisone   2. D/C home with BiPAP   3. Follow-up with Dr. Bernal in clinic    VICKY Boggs Y4  Pulmonology  [facility] [place]

## 2017-06-26 NOTE — PLAN OF CARE
"ALFONZO was informed by EDILIA Tran that a sleep study needs to be scheduled prior to patient's receiving a BIPAP machine. ALFONZO informed Dr. Fernando of above. MD to enter order for "ambualtory consult for sleep disorder". Will continue to follow.  "

## 2017-06-26 NOTE — PLAN OF CARE
06/26/17 1542   Final Note   Assessment Type Final Discharge Note   Discharge Disposition Home   Discharge planning education complete? Yes   What phone number can be called within the next 1-3 days to see how you are doing after discharge? [phone]   Hospital Follow Up  Appt(s) scheduled? Yes   Discharge plans and expectations educations in teach back method with documentation complete? No   Offered [facility] -- Bedside Delivery? Yes   Discharge/Hospital Encounter Summary to ([facility]) PCP n/a   Referral to Outpatient Case Management complete? No   Referral to / orders for Home Health Complete? No   30 day supply of medicines given at discharge, if documented non-compliance / non-adherence? No   Any social issues identified prior to discharge? No   Did you assess the readiness or willingness of the family or caregiver to support self management of care? Yes   Right Care Referral Info   Post Acute Recommendation No Care     Patient discharged home with no needs.

## 2017-06-28 ENCOUNTER — PATIENT OUTREACH (OUTPATIENT)
Dept: ADMINISTRATIVE | Facility: CLINIC | Age: 57
End: 2017-06-28

## 2017-06-28 NOTE — PATIENT INSTRUCTIONS

## 2017-07-05 ENCOUNTER — TELEPHONE (OUTPATIENT)
Dept: SLEEP MEDICINE | Facility: OTHER | Age: 57
End: 2017-07-05

## 2017-07-07 ENCOUNTER — TELEPHONE (OUTPATIENT)
Dept: INTERNAL MEDICINE | Facility: CLINIC | Age: 57
End: 2017-07-07

## 2017-07-07 NOTE — DISCHARGE SUMMARY
[facility]  Discharge Summary  Hospital Medicine      Admit Date: 6/21/2017    Discharge Date and Time: 6/26/2017  2:46 PM    Discharge Provider: Marisol Fernando    Reason for Admission: Acute on chronic respiratory failure with hypoxia and hypercapnia    Hospital Course (synopsis of major diagnoses, care, treatment, and services provided during the course of the hospital stay):   * Acute on chronic respiratory failure with hypoxia and hypercapnia.    Severe COPD   Pt admitted to ICU intubated due to declining declining mental status and unsustainable work-of-breathing and extubated about 5 hours later to BiPAP. She had been placed on albuterol-ipratropium nebulizations, fluticasone-vilanterol inhalations,  azithromycin 500mg PO daily, and prednisone 40mg PO daily for 7 days followed by 20mg PO daily for 7 days and to continue 10 mg daily indefinitely. She was able to be transitioned to nasal cannula and then soon transitioned to floor. Consulted pulmonary about any further intervention. Recommended to continue above plan and to start BiPAP 12/5. Patient to work on smoking cessation and pulmonary rehab. We discharge patient with BiPAP with sleep study titration.     Acute metabolic encephalopathy - resolved with resolution of hypercapnia     Severe episode of recurrent major depressive disorder, without psychotic features -Changed to bupropion 300 mg daily     Tobacco dependence  - Pt with 40+ pack-year history; still smoking some daily  - Cause of patient's COPD and likely direct contributor to exacerbation.  - Pt has received information on smoking cessation; repeat education on floor.  - Provide nicotine patch as appropriate.  -increased bupropion to 300 mg daily     Type 2 diabetes mellitus without complication, without long-term current use of insulin  DM II with hyperlipidemia  Steroid induced hyperglycemia  Typically diet controlled with A1C of 6.5% in February. Elevated blood sugars upon starting  oral corticosteroid. Continue moderate dose sliding scale for time being. Sugars <200 during stay.     Pulmonary cachexia  Pt with 20lb weight loss over recent time period per pulmonology clinic note dated 6/1/17; likely 2/2 pulmonary disease and increased metabolic demand of respiration.     Mixed hyperlipidemia                        - Continuing atorvastatin 40mg PO daily.    Consults: Pulmonary/Intensive care    Final Diagnoses:    Principal Problem: Acute on chronic respiratory failure with hypoxia and hypercapnia   Secondary Diagnoses:   Active Hospital Problems    Diagnosis  POA    *Acute on chronic respiratory failure with hypoxia and hypercapnia [J96.21, J96.22]  Yes    Malnutrition [E46]  Yes    Mixed hyperlipidemia [E78.2]  Yes     Chronic    Severe episode of recurrent major depressive disorder, without psychotic features [F33.2]  Yes     Chronic    Pulmonary nodule seen on imaging study [R91.1]  Yes     CT 5/6/2017 - 0.4 cm pulmonary nodule within the left upper lobe      Type 2 diabetes mellitus without complication, without long-term current use of insulin [E11.9]  Yes     Hb A1c 6.5% in 2/2017. Diet controlled.       Centrilobular emphysema [J43.2]  Yes    Tobacco dependence [F17.200]  Yes     Chronic      Resolved Hospital Problems    Diagnosis Date Resolved POA   No resolved problems to display.       Discharged Condition: stable    Disposition: Home or Self Care    Follow Up/Patient Instructions:     Medications:  Reconciled Home Medications:   Discharge Medication List as of 6/26/2017  1:45 PM      CONTINUE these medications which have CHANGED    Details   buPROPion (WELLBUTRIN SR) 150 MG TBSR 12 hr tablet Take 1 tablet (150 mg total) by mouth 2 (two) times daily., Starting Mon 6/26/2017, Until Tue 6/26/2018, Normal      predniSONE (DELTASONE) 10 MG tablet Orally take 40 mg until 6/28, then on 6/28 take 20 mg for one week, then take 10 mg daily starting on 7/5, Normal         CONTINUE  these medications which have NOT CHANGED    Details   albuterol 90 mcg/actuation inhaler Inhale 2 puffs into the lungs every 6 (six) hours as needed for Wheezing. Rescue, Starting Fri 5/26/2017, Until Sat 5/26/2018, Normal      albuterol-ipratropium 2.5mg-0.5mg/3mL (DUO-NEB) 0.5 mg-3 mg(2.5 mg base)/3 mL nebulizer solution Take 3 mLs by nebulization every 6 (six) hours as needed for Wheezing or Shortness of Breath. Rescue, Starting Fri 5/26/2017, Until Sat 5/26/2018, Normal      aspirin (ECOTRIN) 81 MG EC tablet Take 81 mg by mouth once daily., Until Discontinued, Historical Med      atorvastatin (LIPITOR) 40 MG tablet Take 1 tablet (40 mg total) by mouth once daily., Starting 5/12/2017, Until Sat 5/12/18, Print      fluticasone (FLONASE) 50 mcg/actuation nasal spray 1 spray by Each Nare route once daily., Starting 5/2/2014, Until Discontinued, Print      fluticasone-vilanterol (BREO) 100-25 mcg/dose diskus inhaler Inhale 1 puff into the lungs once daily. Controller, Starting Thu 6/1/2017, Normal      guaifenesin (MUCINEX) 600 mg 12 hr tablet Take 600 mg by mouth 2 (two) times daily. , Until Discontinued, Historical Med      loratadine (CLARITIN) 10 mg tablet Take 10 mg by mouth daily as needed for Allergies., Until Discontinued, Historical Med      nicotine (NICODERM CQ) 21 mg/24 hr Place 1 patch onto the skin once daily., Starting Fri 5/26/2017, Print      nicotine polacrilex 2 MG Lozg Take 1 lozenge (2 mg total) by mouth as needed (6-16 lozenges daily as needed)., Starting Fri 6/9/2017, Print      sertraline (ZOLOFT) 25 MG tablet Take 1 tablet (25 mg total) by mouth once daily., Starting Thu 6/8/2017, Until Fri 6/8/2018, Normal      umeclidinium 62.5 mcg/actuation DsDv Inhale 62.5 mcg into the lungs once daily. Controller, Starting Thu 6/1/2017, Normal             Discharge Procedure Orders  Polysomnogram (CPAP will be added if patient meets diagnostic criteria.)   Standing Status: Future  Standing Exp. Date:  06/26/18   Order Comments: Chronic hypercapneic respiratory failure     BIPAP FOR HOME USE   Order Comments: For chronic hypercapneic respiratory failure   Order Specific Question Answer Comments   Type: BiPap    BiPap setting (cmH20) Inspiratory: 12    BiPap setting (cmH20) Expiratory: 5    Length of need (1-99 months): 99    Humidification: Heated    Type of mask: FFM    Accessories needed: Headgear    Accessories needed: Tubing    Accessories needed: Humidifier chamber    Accessories needed: Chin strap    Accessories needed: Filters    BiPap supply refills: 12    Vendor: [facility]   Expected Date of Delivery: 6/26/2017      Diet general     Activity as tolerated     Call MD for:  temperature >100.4     Call MD for:  persistent nausea and vomiting or diarrhea     Call MD for:  severe uncontrolled pain     Call MD for:  redness, tenderness, or signs of infection (pain, swelling, redness, odor or green/yellow discharge around incision site)     Call MD for:  difficulty breathing or increased cough     Call MD for:  severe persistent headache     Call MD for:  worsening rash     Call MD for:  persistent dizziness, light-headedness, or visual disturbances     Call MD for:  increased confusion or weakness       Follow-up Information     Dianne Lorenz PA-C On 7/12/2017.    Specialty:  Internal Medicine  Why:  at 2:00pm  Contact information:  [address]  [facility] [id number]  [phone]             [facility].    Specialty:  DME Provider  Contact information:  [address] [facility] [address]  [phone]             NEVA Bustamante On 7/5/2017.    Specialty:  Internal Medicine  Why:  at 10:00 AM  Contact information:  [address]  [facility] [address]  [phone]                   I spent more than 30 minutes total on this discharge including seeing and examining patient, note writing, reconcilling medications, and discussion with other health providers  on team.

## 2017-07-10 ENCOUNTER — CLINICAL SUPPORT (OUTPATIENT)
Dept: SMOKING CESSATION | Facility: CLINIC | Age: 57
End: 2017-07-10
Payer: COMMERCIAL

## 2017-07-10 DIAGNOSIS — F17.200 NICOTINE DEPENDENCE: Primary | ICD-10-CM

## 2017-07-10 PROCEDURE — 90832 PSYTX W PT 30 MINUTES: CPT | Mod: S$GLB,,,

## 2017-07-10 NOTE — PROGRESS NOTES
"Individual Follow-Up Form    7/10/2017    Quit Date: 6/20/17    Clinical Status of Patient: Outpatient    Length of Service: 30mins    Continuing Medication: Yes     Other Medications: N/A     Target Symptoms: Withdrawal and medication side effects. The following were  rated moderate (3) to severe (4) on TCRS:  · Moderate (3): Poor Sleep, Restless  · Severe (4): Skin Itching, Increased Appetite    Comments: Pt has not smoked a cigarette since June 20th, down from twelve cigarettes a day, Pt denies any urges or cravings, Pt states her recent hospitalization was "the closes (she) has come to death", Pt states she feels "good" and "proud" of the progress she has made thus far, admits she still has an ashtray in her car and and two packs of cigarettes in her house, LPC praised Pt for progress made thus far, reminded Pt the goal is to be smoke free for three to four months in order to break the smoking habit, advised Pt to get rid of ashtray and cigarettes.    Diagnosis: F17.200    Next Visit: 7/24/17  "

## 2017-07-11 ENCOUNTER — TELEPHONE (OUTPATIENT)
Dept: SLEEP MEDICINE | Facility: OTHER | Age: 57
End: 2017-07-11

## 2017-07-12 ENCOUNTER — OFFICE VISIT (OUTPATIENT)
Dept: INTERNAL MEDICINE | Facility: CLINIC | Age: 57
End: 2017-07-12
Payer: MEDICAID

## 2017-07-12 ENCOUNTER — CLINICAL SUPPORT (OUTPATIENT)
Dept: INTERNAL MEDICINE | Facility: CLINIC | Age: 57
End: 2017-07-12
Payer: MEDICAID

## 2017-07-12 VITALS
TEMPERATURE: 98 F | HEART RATE: 76 BPM | BODY MASS INDEX: 19.77 KG/M2 | HEIGHT: 66 IN | OXYGEN SATURATION: 98 % | SYSTOLIC BLOOD PRESSURE: 122 MMHG | DIASTOLIC BLOOD PRESSURE: 78 MMHG | WEIGHT: 123 LBS

## 2017-07-12 DIAGNOSIS — E11.9 TYPE 2 DIABETES MELLITUS WITHOUT COMPLICATION, WITHOUT LONG-TERM CURRENT USE OF INSULIN: Primary | ICD-10-CM

## 2017-07-12 DIAGNOSIS — R21 RASH AND NONSPECIFIC SKIN ERUPTION: ICD-10-CM

## 2017-07-12 DIAGNOSIS — J44.9 CHRONIC OBSTRUCTIVE PULMONARY DISEASE, UNSPECIFIED COPD TYPE: ICD-10-CM

## 2017-07-12 DIAGNOSIS — Z72.0 TOBACCO ABUSE: ICD-10-CM

## 2017-07-12 PROCEDURE — 99213 OFFICE O/P EST LOW 20 MIN: CPT | Mod: S$PBB,,, | Performed by: PHYSICIAN ASSISTANT

## 2017-07-12 PROCEDURE — 3044F HG A1C LEVEL LT 7.0%: CPT | Mod: ,,, | Performed by: PHYSICIAN ASSISTANT

## 2017-07-12 PROCEDURE — 99999 PR PBB SHADOW E&M-EST. PATIENT-LVL V: CPT | Mod: PBBFAC,,, | Performed by: PHYSICIAN ASSISTANT

## 2017-07-12 RX ORDER — TRIAMCINOLONE ACETONIDE 1 MG/G
CREAM TOPICAL 2 TIMES DAILY
Qty: 80 G | Refills: 0 | Status: SHIPPED | OUTPATIENT
Start: 2017-07-12 | End: 2017-11-16

## 2017-07-12 RX ORDER — LANCETS
1 EACH MISCELLANEOUS DAILY
Qty: 100 EACH | Refills: 3 | Status: SHIPPED | OUTPATIENT
Start: 2017-07-12 | End: 2021-05-27

## 2017-07-12 NOTE — PROGRESS NOTES
Transitional Care Note  Subjective:       Patient ID: Ann Elizabeth Canavan is a 56 y.o. female.  Chief Complaint: Hospital Follow Up    Family and/or Caretaker present at visit?  No.  Diagnostic tests reviewed/disposition: No diagnosic tests pending after this hospitalization.  Disease/illness education: COPD  Home health/community services discussion/referrals: Patient does not have home health established from hospital visit.  They do not need home health.  If needed, we will set up home health for the patient.   Establishment or re-establishment of referral orders for community resources: No other necessary community resources.   Discussion with other health care providers: No discussion with other health care providers necessary.   Ann Elizabeth Canavan is an established patient of Trinh Bowles MD here today for hospital f/u visit.    Feeling great.  Her breathing is doing really well.  Not needing any albuterol.  She is taking her Breo daily and is currently down to prednisone 10 mg.  No cough, chest pain, shortness of breath, wheezing.    She has stopped smoking x 22 days!  On higher dose Wellbutrin for tobacco cessation and depression, doing well on higher dose.  Also seeing tobacco clinic and using nicotine patches.            Review of Systems   Constitutional: Negative for chills, diaphoresis, fatigue and fever.   HENT: Negative for congestion and sore throat.    Eyes: Negative for visual disturbance.   Respiratory: Negative for cough, chest tightness and shortness of breath.    Cardiovascular: Negative for chest pain, palpitations and leg swelling.   Gastrointestinal: Negative for abdominal pain, blood in stool, constipation, diarrhea, nausea and vomiting.   Genitourinary: Negative for dysuria, frequency, hematuria and urgency.   Musculoskeletal: Negative for arthralgias and back pain.   Skin: Negative for rash.   Neurological: Negative for dizziness, syncope, weakness and headaches.    Psychiatric/Behavioral: Negative for dysphoric mood and sleep disturbance. The patient is not nervous/anxious.        Objective:      Physical Exam   Constitutional: She appears well-developed and well-nourished. No distress.   HENT:   Head: Normocephalic and atraumatic.   Right Ear: Tympanic membrane and external ear normal.   Left Ear: Tympanic membrane and external ear normal.   Nose: Nose normal.   Mouth/Throat: Oropharynx is clear and moist.   Eyes: Conjunctivae are normal. Pupils are equal, round, and reactive to light.   Cardiovascular: Normal rate, regular rhythm and normal heart sounds.  Exam reveals no gallop.    No murmur heard.  Pulmonary/Chest: Effort normal and breath sounds normal. No respiratory distress.   Abdominal: Soft. Normal appearance. There is no tenderness. There is no rebound, no guarding and no CVA tenderness.   Musculoskeletal: She exhibits no edema.   Neurological: She is alert.   Skin: Skin is warm and dry. She is not diaphoretic.        Psychiatric: She has a normal mood and affect.   Nursing note and vitals reviewed.      Assessment:       1. Type 2 diabetes mellitus without complication, without long-term current use of insulin    2. Tobacco abuse    3. Chronic obstructive pulmonary disease, unspecified COPD type    4. Rash and nonspecific skin eruption        Plan:       Zoila was seen today for hospital follow up.    Diagnoses and all orders for this visit:    Type 2 diabetes mellitus without complication, without long-term current use of insulin-given One Touch Verio IQ glucometer to be able to monitor blood sugar at home given that she is on prednisone, with diet controlled diabetes  -     blood sugar diagnostic Strp; 1 strip by Misc.(Non-Drug; Combo Route) route once daily.  -     lancets Misc; 1 Device by Misc.(Non-Drug; Combo Route) route once daily.    Tobacco abuse-she has stopped smoking!  Encouraged to remain abstinent and continue appointments with tobacco cessation  "clinic.    Chronic obstructive pulmonary disease, unspecified COPD type-needs to schedule sleep study and f/u appointment with Dr. Bernal, she will call to schedule both, phone numbers provided    Rash and nonspecific skin eruption  -     triamcinolone acetonide 0.1% (KENALOG) 0.1 % cream; Apply topically 2 (two) times daily.    Pt has been given instructions populated from [employer] database and has verbalized understanding of the after visit summary and information contained wherein.    Follow up with a primary care provider. May go to ER for acute shortness of breath, lightheadedness, fever, or any other emergent complaints or changes in condition.    "This note will be shared with the patient"       "

## 2017-07-12 NOTE — PROGRESS NOTES
Glucometer education and material provided:  1. Provided pt with Glucometer:   [x] One Touch Verio   [] Ultra 2 Mini with Delica Needles  [] Contour or Contour NEXT EZ Meter  [] Contour Breeze   [] Contour USB  [] Accu-Check Felicita   [] Accu-Chek Nayana  [] Accu-Check Compak  [] Free-Style  2. Visual and hands-on demonstration of blood sugar check.   3. Pt instructed to use a new lancet with each use; wash hands or use alcohol swab before puncturing finger with lancet; use an adequate supply of blood.   4. Times to perform glucose check as per MD orders, as indicated by medication use and as part of treatment for hypoglycemia.   5. Document readings and bring these readings to the next MD visit.   6. What is hypoglycemia and treatment.   7. Pt was able to accurately return demonstration of blood sugar check.   8. Reading obtained was     199 .   9. This reading is:   [] FBS; [x] 1.5 hr post meal; [] Other:       .  10. Pt voiced understanding of glucometer use and purpose.   Reviewed and discuss healthy plate and counting carbs, gave handouts on Meal planning for Diabetes and Healthy plate and glucose sheet to keep log of glucose.   She will call if she has any questions or problems.   ..Ania Garcia RN HC

## 2017-07-26 ENCOUNTER — TELEPHONE (OUTPATIENT)
Dept: SLEEP MEDICINE | Facility: OTHER | Age: 57
End: 2017-07-26

## 2017-07-28 DIAGNOSIS — Z12.11 COLON CANCER SCREENING: ICD-10-CM

## 2017-08-07 ENCOUNTER — HOSPITAL ENCOUNTER (OUTPATIENT)
Dept: SLEEP MEDICINE | Facility: HOSPITAL | Age: 57
Discharge: HOME | End: 2017-08-07
Attending: INTERNAL MEDICINE
Payer: MEDICAID

## 2017-08-07 DIAGNOSIS — J96.22 ACUTE ON CHRONIC RESPIRATORY FAILURE WITH HYPOXIA AND HYPERCAPNIA: ICD-10-CM

## 2017-08-07 DIAGNOSIS — G47.33 OSA (OBSTRUCTIVE SLEEP APNEA): ICD-10-CM

## 2017-08-07 DIAGNOSIS — J96.21 ACUTE ON CHRONIC RESPIRATORY FAILURE WITH HYPOXIA AND HYPERCAPNIA: ICD-10-CM

## 2017-08-07 PROCEDURE — 95810 POLYSOM 6/> YRS 4/> PARAM: CPT | Mod: 26,,, | Performed by: PSYCHIATRY & NEUROLOGY

## 2017-08-07 PROCEDURE — 95810 POLYSOM 6/> YRS 4/> PARAM: CPT

## 2017-08-07 PROCEDURE — 95810 PR POLYSOMNOGRAPHY, 4 OR MORE: ICD-10-PCS | Mod: 26,,, | Performed by: PSYCHIATRY & NEUROLOGY

## 2017-08-08 NOTE — PROGRESS NOTES
Pt. did not meet criteria for CPAP. Few respiratory events were observed. Supine REM sleep was obtained.    Low sat of 80% was observed in study. EKG revealed NSR. Soft to moderate snoring was heard.

## 2017-08-16 ENCOUNTER — TELEPHONE (OUTPATIENT)
Dept: SMOKING CESSATION | Facility: CLINIC | Age: 57
End: 2017-08-16

## 2017-09-20 DIAGNOSIS — F32.1 MODERATE SINGLE CURRENT EPISODE OF MAJOR DEPRESSIVE DISORDER: ICD-10-CM

## 2017-09-20 RX ORDER — SERTRALINE HYDROCHLORIDE 25 MG/1
25 TABLET, FILM COATED ORAL DAILY
Qty: 30 TABLET | Refills: 2 | Status: SHIPPED | OUTPATIENT
Start: 2017-09-20 | End: 2017-11-16

## 2017-09-20 NOTE — TELEPHONE ENCOUNTER
----- Message from Martin Acuña sent at 9/20/2017  3:17 PM CDT -----  Contact: self/ [phone] cell  Type: Rx    Name of medication(s): sertraline (ZOLOFT) 25 MG tablet    Is this a refill? New rx? refill    Who prescribed medication? Dr. Bowles    Pharmacy Name, Phone, & Location[facility]    Comments: Pt would like to request a refill on the medication above.  Please call and advise.    Thank you

## 2017-09-28 DIAGNOSIS — J43.2 CENTRILOBULAR EMPHYSEMA: ICD-10-CM

## 2017-09-28 RX ORDER — PREDNISONE 10 MG/1
TABLET ORAL
Qty: 100 TABLET | Refills: 1 | Status: CANCELLED | OUTPATIENT
Start: 2017-09-28

## 2017-09-28 NOTE — TELEPHONE ENCOUNTER
Refill is not appropriate.  Patient has to be seen. Please call pt to see what's going on that she feels she needs prednisone taper again. Also order was from pulmonologist.

## 2017-10-03 DIAGNOSIS — J43.2 CENTRILOBULAR EMPHYSEMA: ICD-10-CM

## 2017-10-03 RX ORDER — PREDNISONE 10 MG/1
TABLET ORAL
Qty: 31 TABLET | Refills: 2 | Status: SHIPPED | OUTPATIENT
Start: 2017-10-03 | End: 2017-10-04

## 2017-10-03 NOTE — TELEPHONE ENCOUNTER
----- Message from Ynes Patricio sent at 10/2/2017  1:56 PM CDT -----  Contact: Self [phone]  ..Refill request.  predniSONE (DELTASONE) 10 MG tablet     ..  [facility] - [place], [place] - [address] [place] [address]  Phone: [phone] Fax: [phone]

## 2017-10-04 ENCOUNTER — TELEPHONE (OUTPATIENT)
Dept: INTERNAL MEDICINE | Facility: CLINIC | Age: 57
End: 2017-10-04

## 2017-10-04 DIAGNOSIS — J43.2 CENTRILOBULAR EMPHYSEMA: ICD-10-CM

## 2017-10-04 RX ORDER — PREDNISONE 10 MG/1
TABLET ORAL
Qty: 31 TABLET | Refills: 2 | Status: CANCELLED | OUTPATIENT
Start: 2017-10-04

## 2017-10-04 RX ORDER — PREDNISONE 5 MG/1
5 TABLET ORAL DAILY
Qty: 15 TABLET | Refills: 0 | Status: SHIPPED | OUTPATIENT
Start: 2017-10-04 | End: 2017-10-19

## 2017-10-04 NOTE — TELEPHONE ENCOUNTER
Patient needs appointment for this.  Also-she was supposed to call to schedule a f/u with her pulmonologist.

## 2017-10-04 NOTE — TELEPHONE ENCOUNTER
Pt was last seen by me on 6/8/17 to establish care. Since then patient was hospitalized in July for COPD exacerbation.  Patient was sent home with prednisone taper.  At hospital follow-up with Dianne Lorenz. Apparently patient has been on prednisone since July as she kept on calling for refills from multiple physicians.  Discussed that prednisone was not meant to be long-term.  Patient is hesitant to come off of the prednisone but risks discussed with patient.  Follow-up appointment with pulmonologist also scheduled on 10/19/17.  Will taper from her 10 mg daily to 5 mg daily and will only give enough prednisone up until pulmonary appointment. Further taper will be deferred to pulm.    Her next appt w/ me is in Nov.

## 2017-10-04 NOTE — TELEPHONE ENCOUNTER
----- Message from Milena Contreras PharmD sent at 10/4/2017  8:44 AM CDT -----  Regarding: refill for prednisone   Contact: [phone]  Patient is requesting a refill for prednisone 10 mg. If approved, please send us a new script. [facility] at [address] [place] [address]. Thanks.

## 2017-10-04 NOTE — TELEPHONE ENCOUNTER
----- Message from Tammy Paredes sent at 10/4/2017  2:15 PM CDT -----  Contact: Pt  Pt is calling to speak with nurse in regards to medication refill and appt.  Pt stated that she only has one pill left, first available appt is in November.    Pt can be reached at [phone].  Thank you

## 2017-10-19 ENCOUNTER — OFFICE VISIT (OUTPATIENT)
Dept: PULMONOLOGY | Facility: CLINIC | Age: 57
End: 2017-10-19
Payer: MEDICAID

## 2017-10-19 VITALS
OXYGEN SATURATION: 89 % | WEIGHT: 152.31 LBS | HEIGHT: 66 IN | HEART RATE: 104 BPM | BODY MASS INDEX: 24.48 KG/M2 | DIASTOLIC BLOOD PRESSURE: 85 MMHG | SYSTOLIC BLOOD PRESSURE: 148 MMHG

## 2017-10-19 DIAGNOSIS — J44.9 CHRONIC OBSTRUCTIVE PULMONARY DISEASE, UNSPECIFIED COPD TYPE: Primary | ICD-10-CM

## 2017-10-19 PROCEDURE — 99999 PR PBB SHADOW E&M-EST. PATIENT-LVL III: CPT | Mod: PBBFAC,,, | Performed by: INTERNAL MEDICINE

## 2017-10-19 PROCEDURE — 99214 OFFICE O/P EST MOD 30 MIN: CPT | Mod: S$PBB,,, | Performed by: INTERNAL MEDICINE

## 2017-10-19 PROCEDURE — 99213 OFFICE O/P EST LOW 20 MIN: CPT | Mod: PBBFAC | Performed by: INTERNAL MEDICINE

## 2017-10-19 RX ORDER — PREDNISONE 20 MG/1
40 TABLET ORAL DAILY
Qty: 10 TABLET | Refills: 0 | Status: SHIPPED | OUTPATIENT
Start: 2017-10-19 | End: 2017-10-24

## 2017-10-19 RX ORDER — ROFLUMILAST 500 UG/1
500 TABLET ORAL DAILY
Qty: 30 TABLET | Refills: 3 | Status: SHIPPED | OUTPATIENT
Start: 2017-10-19 | End: 2018-05-15 | Stop reason: SDUPTHER

## 2017-10-19 RX ORDER — PREDNISONE 2.5 MG/1
2.5 TABLET ORAL DAILY
Qty: 15 TABLET | Refills: 0 | Status: SHIPPED | OUTPATIENT
Start: 2017-10-19 | End: 2017-10-29

## 2017-10-19 NOTE — PROGRESS NOTES
Subjective:       Patient ID: Ann Elizabeth Canavan is a 57 y.o. female.    Chief Complaint: No chief complaint on file.    HPI:   Ann Elizabeth Canavan is a 57 y.o. female who presents for follow up.     She has a history of tobacco abuse disorder and COPD.  She stopped smoking in June 2017.    She was previously seen by Dr. Brenal (last visit 6/2017).  She reports that she has been in the hospital 6 times this last year.    She takes anoro and incruse daily as prescribed.  She takes her rescue inhaler 2-3x/day.  She uses her nebulizer several times a week.  She has been taking steroids for most of this year, receiving steroid refills from multiple physicians.  She has been taking prednisone 5mg daily for the last 15 days.  She is participating in pulmonary rehab.  She was able to walk from the parking lot to the office stopping only once to recover.    She began treatment with bronchodilators in 2014.  Symptoms are reportedly worse over the last year.  She has been prescribed 2L supplemental O2 via [place].  She has a 40packyear history of smoking.      Review of Systems   Constitutional: Negative.    HENT: Negative.    Respiratory: Positive for cough, chest tightness, shortness of breath, wheezing, dyspnea on extertion and use of rescue inhaler.    Cardiovascular: Negative.    Genitourinary: Negative.    Endocrine: endocrine negative   Musculoskeletal: Negative.    Skin: Negative.    Gastrointestinal: Negative.    Neurological: Negative.    Psychiatric/Behavioral: The patient is nervous/anxious.          Social History   Substance Use Topics    Smoking status: Former Smoker     Packs/day: 1.00     Years: 40.00     Types: Cigarettes     Quit date: 6/19/2017    Smokeless tobacco: Never Used      Comment: 3-5 a day    Alcohol use 0.5 oz/week     1 Standard drinks or equivalent per week       Review of patient's allergies indicates:   Allergen Reactions    Avelox [moxifloxacin] Itching and Rash     IV     Past Medical  History:   Diagnosis Date    Chronic respiratory failure with hypoxia and hypercapnia 5/4/2014    COPD (chronic obstructive pulmonary disease)     Depression     Hyperlipidemia 5/12/2017    Otosclerosis of right ear 5/12/2017    40% hearing back in 1988, had surgery with some improvement, but hearing loss persists.      PAD (peripheral artery disease)     Post-menopausal 2008    Pulmonary emphysema 4/28/2017    Tobacco dependence 5/2/2014    Type 2 diabetes mellitus without complication, without long-term current use of insulin 4/29/2017    Hb A1c 6.5% in 2/2017. Diet controlled.      Past Surgical History:   Procedure Laterality Date    GANGLION CYST EXCISION Right 1988    STAPEDES SURGERY Right 1988     Current Outpatient Prescriptions on File Prior to Visit   Medication Sig    albuterol 90 mcg/actuation inhaler Inhale 2 puffs into the lungs every 6 (six) hours as needed for Wheezing. Rescue    albuterol-ipratropium 2.5mg-0.5mg/3mL (DUO-NEB) 0.5 mg-3 mg(2.5 mg base)/3 mL nebulizer solution Take 3 mLs by nebulization every 6 (six) hours as needed for Wheezing or Shortness of Breath. Rescue    aspirin (ECOTRIN) 81 MG EC tablet Take 81 mg by mouth once daily.    atorvastatin (LIPITOR) 40 MG tablet Take 1 tablet (40 mg total) by mouth once daily.    blood sugar diagnostic Strp 1 strip by Misc.(Non-Drug; Combo Route) route once daily.    buPROPion (WELLBUTRIN SR) 150 MG TBSR 12 hr tablet Take 1 tablet (150 mg total) by mouth 2 (two) times daily.    fluticasone (FLONASE) 50 mcg/actuation nasal spray 1 spray by Each Nare route once daily.    fluticasone-vilanterol (BREO) 100-25 mcg/dose diskus inhaler Inhale 1 puff into the lungs once daily. Controller    guaifenesin (MUCINEX) 600 mg 12 hr tablet Take 1,200 mg by mouth once daily.     lancets Misc 1 Device by Misc.(Non-Drug; Combo Route) route once daily.    loratadine (CLARITIN) 10 mg tablet Take 10 mg by mouth daily as needed for Allergies.     nicotine (NICODERM CQ) 21 mg/24 hr Place 1 patch onto the skin once daily.    nicotine polacrilex 2 MG Lozg Take 1 lozenge (2 mg total) by mouth as needed (6-16 lozenges daily as needed).    predniSONE (DELTASONE) 5 MG tablet Take 1 tablet (5 mg total) by mouth once daily.    sertraline (ZOLOFT) 25 MG tablet Take 1 tablet (25 mg total) by mouth once daily.    umeclidinium 62.5 mcg/actuation DsDv Inhale 62.5 mcg into the lungs once daily. Controller    triamcinolone acetonide 0.1% (KENALOG) 0.1 % cream Apply topically 2 (two) times daily.     No current facility-administered medications on file prior to visit.        Objective:      Vitals:    10/19/17 1413   BP: (!) 148/85   Pulse: 104     Physical Exam   Constitutional: She is oriented to person, place, and time. She appears well-developed and well-nourished.   HENT:   Head: Normocephalic.   Mouth/Throat: Mallampati Score: III.   Neck: Normal range of motion. Neck supple. No tracheal deviation present.   Cardiovascular: Normal rate and regular rhythm.    No murmur heard.  Pulmonary/Chest: Normal expansion and effort normal. She has no wheezes. She has no rales.   Distant breath sounds   Abdominal: Soft. Bowel sounds are normal. She exhibits no distension. There is no tenderness.   Musculoskeletal: Normal range of motion. She exhibits no edema.   Neurological: She is alert and oriented to person, place, and time.   Skin: Skin is warm and dry.   Vitals reviewed.    Personal Diagnostic Review      CT chest 5/2017: extensive emphysematous changes.  0.4cm pulmonary nodule in the HECTOR.  Assessment:       1. Chronic obstructive pulmonary disease, unspecified COPD type      Severe COPD;  I suspect she needs to be on continuous oxygen and will require a portable o2 device.  Ideally, will attempt steroid sparing agents to maintain her respiratory status.    Plan:       -Repeat 6MWT to arrange for portable oxygen.  -Continue LABA, LAMA, ICS  -Start roflumilast  -will  arrange for follow up with sleep specialist  -CT Chest in May to follow up nodule    F/U 6 weeks.

## 2017-10-19 NOTE — LETTER
October 19, 2017      Trinh Bowles MD  [address]  [place] [address]           [facility]  [address]  [place] [address]  Phone: [phone]          Patient: Ann Elizabeth Canavan   MR Number: [id number]   YOB: 1960   Date of Visit: 10/19/2017       Dear Dr. Trinh Bowles:    Thank you for referring Ann Canavan to me for evaluation. Attached you will find relevant portions of my assessment and plan of care.    If you have questions, please do not hesitate to call me. I look forward to following Ann Canavan along with you.    Sincerely,    Heide Noriega MD    Enclosure  CC:  No Recipients    If you would like to receive this communication electronically, please contact [email or web address] or [phone] to request more information on [employer] Link access.    For providers and/or their staff who would like to refer a patient to [facility], please contact us through our one-stop-shop provider referral line, [facility], at [phone].    If you feel you have received this communication in error or would no longer like to receive these types of communications, please e-mail [email or web address]

## 2017-10-20 ENCOUNTER — TELEPHONE (OUTPATIENT)
Dept: PHARMACY | Facility: CLINIC | Age: 57
End: 2017-10-20

## 2017-11-08 ENCOUNTER — OFFICE VISIT (OUTPATIENT)
Dept: OPTOMETRY | Facility: CLINIC | Age: 57
End: 2017-11-08
Payer: MEDICAID

## 2017-11-08 DIAGNOSIS — H52.203 HYPEROPIA WITH ASTIGMATISM AND PRESBYOPIA, BILATERAL: ICD-10-CM

## 2017-11-08 DIAGNOSIS — H52.03 HYPEROPIA WITH ASTIGMATISM AND PRESBYOPIA, BILATERAL: ICD-10-CM

## 2017-11-08 DIAGNOSIS — H02.889 MEIBOMIAN GLAND DYSFUNCTION: Primary | ICD-10-CM

## 2017-11-08 DIAGNOSIS — H10.13 ALLERGIC CONJUNCTIVITIS OF BOTH EYES: ICD-10-CM

## 2017-11-08 DIAGNOSIS — R73.02 GLUCOSE INTOLERANCE (IMPAIRED GLUCOSE TOLERANCE): ICD-10-CM

## 2017-11-08 DIAGNOSIS — H52.4 HYPEROPIA WITH ASTIGMATISM AND PRESBYOPIA, BILATERAL: ICD-10-CM

## 2017-11-08 PROCEDURE — 99212 OFFICE O/P EST SF 10 MIN: CPT | Mod: PBBFAC | Performed by: OPTOMETRIST

## 2017-11-08 PROCEDURE — 92004 COMPRE OPH EXAM NEW PT 1/>: CPT | Mod: S$PBB,,, | Performed by: OPTOMETRIST

## 2017-11-08 PROCEDURE — 99999 PR PBB SHADOW E&M-EST. PATIENT-LVL II: CPT | Mod: PBBFAC,,, | Performed by: OPTOMETRIST

## 2017-11-08 PROCEDURE — 92015 DETERMINE REFRACTIVE STATE: CPT | Mod: ,,, | Performed by: OPTOMETRIST

## 2017-11-08 NOTE — LETTER
November 8, 2017      Trinh Bowles MD  [address]  [facility] [address]           [facility]  [address]  [facility] [address]  Phone: [phone]          Patient: Ann Elizabeth Canavan   MR Number: [id number]   YOB: 1960   Date of Visit: 11/8/2017       Dear Dr. Trinh Bowles:    Thank you for referring Ann Canavan to me for evaluation. Attached you will find relevant portions of my assessment and plan of care.    If you have questions, please do not hesitate to call me. I look forward to following Ann Canavan along with you.    Sincerely,    Mikki Sun, OD    Enclosure  CC:  No Recipients    If you would like to receive this communication electronically, please contact [email or web address] or [phone] to request more information on [employer] Link access.    For providers and/or their staff who would like to refer a patient to [facility], please contact us through our one-stop-shop provider referral line, [facility], at [phone].    If you feel you have received this communication in error or would no longer like to receive these types of communications, please e-mail [email or web address]

## 2017-11-08 NOTE — PROGRESS NOTES
HPI     Ms. Ann Elizabeth Canavan was referred by VICKY Cloud for   glucose intolerance.    Patient states no complaints about eyes, vision, or glasses today. Pt is   happy with vision at all ranges with her current glasses. She requests   refraction today.  Pt declined DFE today (she has to return to work); she is scheduled to   return for DFE on 11/16/17.    (-)drops  (-)flashes  (+)floaters longstanding and stable OU  (-)diplopia    Diabetic? Type 2 DM is on her Problem List  Hemoglobin A1C       Date                     Value               Ref Range             Status                06/21/2017               6.5 (H)             4.0 - 5.6 %           Final                 05/06/2017               5.8                 4.5 - 6.2 %           Final                  02/08/2017               6.5 (H)             4.5 - 6.2 %           Final                OCULAR HISTORY  Last Eye Exam 1 year ago   (-)eye surgery   (-)diagnosed or treated for any eye conditions or diseases none     FAMILY HISTORY  (-)Glaucoma none      Last edited by Mikki Sun, OD on 11/8/2017 11:44 AM. (History)            Assessment /Plan     For exam results, see Encounter Report.    Meibomian gland dysfunction  Allergic conjunctivitis of both eyes   Cause of dry and itchy eyes. Pt says she dislikes drops. Recommended alternating warm and cool compresses prn.    Glucose intolerance (impaired glucose tolerance)   RTC for DFE (scheduled for 11/16/17).    Hyperopia with astigmatism and presbyopia, bilateral   Relatively stable OU. New glasses prescription released, adaptation expected. New glasses optional.   Eyeglass Final Rx     Eyeglass Final Rx       Sphere Cylinder Axis Add    Right +4.00 +2.00 106 +2.25    Left +4.25 +2.25 092 +2.25    Type:  PAL    Expiration Date:  11/9/2018                 RTC 11/16/17 for DFE (continuation of 11/8/2017 exam)

## 2017-11-08 NOTE — PATIENT INSTRUCTIONS
What you can expect when your eyes have been dilated:  * Increased light sensitivity  * Likely your vision will be blurry, especially at near  * Dilation usually lasts 4-8 hrs  * Sunglasses are recommended    If your eyes remain dilated beyond 24hrs, please contact our office.    ==============================================    MEIBOMITIS    Your eyes look dry today. Your eyes are dry not because you're missing the watery portion of your tear film but because you're missing an oily component. The oil component keeps the tears from evaporating and provides stability to the tears.    This portion of the tears is produced by the Meibomian glands which are located just behind the base of the eyelashes. Your Meibomian glands are clogged because of a condition called Meibomitis. Meibomitis is caused by chronic inflammation inside the glands thought to be a reaction to the normal bacteria that live on your skin.     As this is a chronic condition the goal of treatment is to reduce your symptoms and the inflammation under control. The initial treatment is as follows:     - Warm Compresses: Heat a wash cloth by running it under hot water. Then hold this wash cloth over your closed eyelids and allow your eyelids to absorb the heat. After 20-30 seconds once the wash cloth cools down you'll need to heat it up again.  (An alternative heat source is a gel pack or microwavable eye mask, warmed in the microwave or in a dish of water,  wrapped with a warm wet washcloth). You want to get 10 minutes of warmth to your eyelids, so if the compress feels cool before the end of 10 minutes you should reheat it. You should consistently do this 2 times a day.     - Artificial tears: Some good Artificial tear drops to try are Blink, Refresh Optive, Systane Balance, Thera Tears or Genteal. You should use these consistently 2-3 times a day more if you need them. It's important to use these when in breezy environments or when doing prolonged  near work such as reading or computer. The goal is to prevent your eyes from drying rather than instilling them once your eye is already dry.     - Omega 3 Supplement: 1000 mg of good quality fish oil (labeled DHA and/or EPA).   Fish oil, flax seed oil or omega 3 supplements have found to be beneficial in Meibomitis and dry eye syndrome. There are a lot of choices out there and not one single product has been shown to be more beneficial than others.    It usually takes 1-2 months of treatment before you'll notice a difference. However some will continue to have problems even with this therapy. If you continue to have problems please let me know.

## 2017-11-15 ENCOUNTER — TELEPHONE (OUTPATIENT)
Dept: OPTOMETRY | Facility: CLINIC | Age: 57
End: 2017-11-15

## 2017-11-16 ENCOUNTER — HOSPITAL ENCOUNTER (OUTPATIENT)
Dept: RADIOLOGY | Facility: HOSPITAL | Age: 57
Discharge: HOME | End: 2017-11-16
Attending: INTERNAL MEDICINE
Payer: MEDICAID

## 2017-11-16 ENCOUNTER — OFFICE VISIT (OUTPATIENT)
Dept: OPTOMETRY | Facility: CLINIC | Age: 57
End: 2017-11-16
Payer: MEDICAID

## 2017-11-16 ENCOUNTER — OFFICE VISIT (OUTPATIENT)
Dept: INTERNAL MEDICINE | Facility: CLINIC | Age: 57
End: 2017-11-16
Payer: MEDICAID

## 2017-11-16 VITALS
SYSTOLIC BLOOD PRESSURE: 122 MMHG | RESPIRATION RATE: 15 BRPM | BODY MASS INDEX: 23.89 KG/M2 | TEMPERATURE: 99 F | DIASTOLIC BLOOD PRESSURE: 82 MMHG | WEIGHT: 148.63 LBS | HEIGHT: 66 IN | HEART RATE: 103 BPM

## 2017-11-16 DIAGNOSIS — E11.9 TYPE 2 DIABETES MELLITUS WITHOUT RETINOPATHY: Primary | ICD-10-CM

## 2017-11-16 DIAGNOSIS — Z12.11 ENCOUNTER FOR FIT (FECAL IMMUNOCHEMICAL TEST) SCREENING: ICD-10-CM

## 2017-11-16 DIAGNOSIS — G47.33 OSA (OBSTRUCTIVE SLEEP APNEA): ICD-10-CM

## 2017-11-16 DIAGNOSIS — Z12.31 ENCOUNTER FOR SCREENING MAMMOGRAM FOR BREAST CANCER: ICD-10-CM

## 2017-11-16 DIAGNOSIS — F33.2 SEVERE EPISODE OF RECURRENT MAJOR DEPRESSIVE DISORDER, WITHOUT PSYCHOTIC FEATURES: Primary | Chronic | ICD-10-CM

## 2017-11-16 DIAGNOSIS — Z11.59 NEED FOR HEPATITIS C SCREENING TEST: ICD-10-CM

## 2017-11-16 DIAGNOSIS — E11.9 TYPE 2 DIABETES MELLITUS WITHOUT COMPLICATION, WITHOUT LONG-TERM CURRENT USE OF INSULIN: ICD-10-CM

## 2017-11-16 DIAGNOSIS — J43.2 CENTRILOBULAR EMPHYSEMA: ICD-10-CM

## 2017-11-16 DIAGNOSIS — Z12.4 CERVICAL CANCER SCREENING: ICD-10-CM

## 2017-11-16 DIAGNOSIS — L72.9 SKIN CYST: ICD-10-CM

## 2017-11-16 DIAGNOSIS — Z12.83 SKIN CANCER SCREENING: ICD-10-CM

## 2017-11-16 DIAGNOSIS — N28.1 RENAL CYST: ICD-10-CM

## 2017-11-16 DIAGNOSIS — D72.829 LEUKOCYTOSIS, UNSPECIFIED TYPE: ICD-10-CM

## 2017-11-16 PROBLEM — J96.22 ACUTE ON CHRONIC RESPIRATORY FAILURE WITH HYPOXIA AND HYPERCAPNIA: Status: RESOLVED | Noted: 2017-06-21 | Resolved: 2017-11-16

## 2017-11-16 PROBLEM — J96.21 ACUTE ON CHRONIC RESPIRATORY FAILURE WITH HYPOXIA AND HYPERCAPNIA: Status: RESOLVED | Noted: 2017-06-21 | Resolved: 2017-11-16

## 2017-11-16 PROCEDURE — 99999 PR PBB SHADOW E&M-EST. PATIENT-LVL I: CPT | Mod: PBBFAC,,, | Performed by: OPTOMETRIST

## 2017-11-16 PROCEDURE — 99499 UNLISTED E&M SERVICE: CPT | Mod: S$PBB,,, | Performed by: OPTOMETRIST

## 2017-11-16 PROCEDURE — 77067 SCR MAMMO BI INCL CAD: CPT | Mod: TC

## 2017-11-16 PROCEDURE — 77067 SCR MAMMO BI INCL CAD: CPT | Mod: 26,,, | Performed by: RADIOLOGY

## 2017-11-16 PROCEDURE — 99214 OFFICE O/P EST MOD 30 MIN: CPT | Mod: S$PBB,,, | Performed by: INTERNAL MEDICINE

## 2017-11-16 PROCEDURE — 99211 OFF/OP EST MAY X REQ PHY/QHP: CPT | Mod: PBBFAC | Performed by: OPTOMETRIST

## 2017-11-16 PROCEDURE — 99215 OFFICE O/P EST HI 40 MIN: CPT | Mod: PBBFAC,27 | Performed by: INTERNAL MEDICINE

## 2017-11-16 PROCEDURE — 99999 PR PBB SHADOW E&M-EST. PATIENT-LVL V: CPT | Mod: PBBFAC,,, | Performed by: INTERNAL MEDICINE

## 2017-11-16 RX ORDER — SERTRALINE HYDROCHLORIDE 50 MG/1
50 TABLET, FILM COATED ORAL DAILY
Qty: 30 TABLET | Refills: 5 | Status: SHIPPED | OUTPATIENT
Start: 2017-11-16 | End: 2018-02-01

## 2017-11-16 NOTE — PROGRESS NOTES
HPI     Ms. Ann Elizabeth Canavan is here for DFE (continuation of 11/08/17   exam).    No visual/ocular concerns. No changes since last visit. Pt has not yet   started warm compresses.     (-)drops  (-)flashes  (+)floaters: longstanding and stable OU  (-)diplopia    Diabetic? Yes (Type 2 DM is on her Problem List)  Hemoglobin A1C       Date                     Value               Ref Range             Status                06/21/2017               6.5 (H)             4.0 - 5.6 %           Final        05/06/2017               5.8                 4.5 - 6.2 %           Final        02/08/2017               6.5 (H)             4.5 - 6.2 %           Final  ----------            OCULAR HISTORY  Last Eye Exam 11/08/17 with Dr. Sun   (-)eye surgery   MGD OU  Allergic conjunctivitis OU    FAMILY HISTORY  (-)Glaucoma none      Last edited by Mikki Sun, OD on 11/16/2017  1:45 PM. (History)            Assessment /Plan     For exam results, see Encounter Report.    Type 2 diabetes mellitus without retinopathy   No retinopathy noted OU. Continue management of DM as directed by PCP. Monitor with DFE in 1 year, or RTC immediately with any vision changes.     Note: DVA cc was 20/20-2 OU before leaving today.      RTC 1 year

## 2017-11-16 NOTE — PROGRESS NOTES
"Subjective:       Patient ID: Ann Elizabeth Canavan is a 57 y.o. female.    Chief Complaint: Diabetes    HPI   Severe COPD. Seen in pulm 10/19/17.  6MWT ordered and plan for portable O2 if she meets requirement.   Started on roflumilast.   Currently on Breo 1 puff daily, umeclidinium 62.5mcg, albuterol prn.   Quit smoking at the beginning of June.   Reports no wheezing. Started w/ some coughing yesterday. No fevers/chills.    DM2 -   Eye exam - today.  Foot 6/8/17  MAC 4/29/17  a1c 6/21/17 - 6.5.    Reports that she feels like she's depressed. Decreased appetite. Reports just thinking about food makes her feel manic. Takes a long time to get to sleep. Once she sleeps, feels like she's doing ok. Reports feels like she's tired of being sick. On zoloft 25mg daily. She stopped wellbutrin since she quit smoking. Weight loss of 3lbs since stopping steroids. Referred to therapist in the past but reports [facility] is not taking new pts.    LAMBERTO - sleep study w/ LAMBERTO 8/21/17. Needs CPAP titration.     Review of Systems  Comprehensive review of systems otherwise negative. See history/subjective section for more details.    Objective:      Physical Exam    /82   Pulse 103   Temp 98.7 °F (37.1 °C) (Oral)   Resp 15   Ht 5' 6" (1.676 m)   Wt 67.4 kg (148 lb 9.6 oz)   LMP  (LMP Unknown)   BMI 23.98 kg/m²     Gen - A+OX4, NAD  Psych - sad affect and sad mood.   HEENT - PERRL, OP clear. MMM. R earlobe w/ soft, doughy, nontender, nonerythematous cyst.   Neck - no LAD  CV - rrr, no m/r  Chest - CTAB, no wheezing/rhonchi/crackles. Normal work of breathing.   Abd - S/NT/ND/+BS  Ext -2 + BDP and radial pulses. No LE edema.   Skin - as above. Freckles and moles.     Labs reviewed.    Assessment/Plan     Zoila was seen today for diabetes.    Diagnoses and all orders for this visit:    Severe episode of recurrent major depressive disorder, without psychotic features - inc zoloft from 25 to 50mg daily. Reports will talk to friend who " is seeing a therapist for free. I really think she'll benefit from that.   -     sertraline (ZOLOFT) 50 MG tablet; Take 1 tablet (50 mg total) by mouth once daily.    Centrilobular emphysema - cont current meds. Make appt w/ Dr. Noriega for the end of the mo.     LAMBERTO (obstructive sleep apnea)  -     CPAP titration (Must have diagnosis of LAMBERTO from previous sleep study.); Future    Type 2 diabetes mellitus without complication, without long-term current use of insulin - diet controlled. Now that she's off of prednisone, suspect it would be much better.  -     Hemoglobin A1c; Future  -     Lipid panel; Future  -     Comprehensive metabolic panel; Future    Leukocytosis, unspecified type - likely 2/2 prednisone.  -     CBC auto differential; Future    Need for hepatitis C screening test  -     HEPATITIS C ANTIBODY; Future    Encounter for screening mammogram for breast cancer  -     Mammo Digital Screening Bilat with CAD; Future    Encounter for FIT (fecal immunochemical test) screening  -     Fecal Immunochemical Test (iFOBT); Future    Cervical cancer screening  -     Ambulatory Referral to Obstetrics / Gynecology    Skin cancer screening  -     Ambulatory Referral to Dermatology    Skin cyst  -     Ambulatory Referral to Dermatology    Renal cyst - ?cyst on CTA. Will get renal US.  -     US Retroperitoneal Complete (Kidney and; Future    Declines flu vaccine today.    Return in about 2 months (around 1/16/2018).      Trinh Bowles MD  Department of Internal Medicine - [facility]  1:54 PM

## 2017-11-20 ENCOUNTER — DOCUMENTATION ONLY (OUTPATIENT)
Dept: INTERNAL MEDICINE | Facility: CLINIC | Age: 57
End: 2017-11-20

## 2017-11-20 ENCOUNTER — TELEPHONE (OUTPATIENT)
Dept: INTERNAL MEDICINE | Facility: CLINIC | Age: 57
End: 2017-11-20

## 2017-11-20 NOTE — TELEPHONE ENCOUNTER
Please call and let pt know:    She does not have hepatitis C. Blood count, liver and kidney functions are normal. Her diabetes is the same with a1c of 6.5. Cholesterol is doing much better. Continue atorvastatin 40mg daily.

## 2017-11-21 NOTE — TELEPHONE ENCOUNTER
Spoke to patient, results given via phone as instructed, patient expressed understanding verbally, no additional questions.,

## 2017-11-24 ENCOUNTER — HOSPITAL ENCOUNTER (OUTPATIENT)
Dept: RADIOLOGY | Facility: HOSPITAL | Age: 57
Discharge: HOME | End: 2017-11-24
Attending: INTERNAL MEDICINE
Payer: MEDICAID

## 2017-11-24 DIAGNOSIS — N28.1 RENAL CYST: ICD-10-CM

## 2017-11-24 PROCEDURE — 76770 US EXAM ABDO BACK WALL COMP: CPT | Mod: TC

## 2017-11-24 PROCEDURE — 76770 US EXAM ABDO BACK WALL COMP: CPT | Mod: 26,,, | Performed by: RADIOLOGY

## 2017-11-27 ENCOUNTER — TELEPHONE (OUTPATIENT)
Dept: INTERNAL MEDICINE | Facility: CLINIC | Age: 57
End: 2017-11-27

## 2017-11-27 NOTE — TELEPHONE ENCOUNTER
Please call and let pt know that there are incidental findings of two very small kidney stones but they're not blocking drainage. These will pass. There is a simple left kidney cyst, which is benign and does not need to be followed up.

## 2017-12-15 ENCOUNTER — TELEPHONE (OUTPATIENT)
Dept: SLEEP MEDICINE | Facility: OTHER | Age: 57
End: 2017-12-15

## 2017-12-15 ENCOUNTER — TELEPHONE (OUTPATIENT)
Dept: PULMONOLOGY | Facility: HOSPITAL | Age: 57
End: 2017-12-15

## 2017-12-15 NOTE — TELEPHONE ENCOUNTER
Ms. Canavan called to report that her brother has had a terrible stroke, is brain dead, and an organ donor.  She relayed that she was 'offered' his lungs and wanted to know if they could be transplanted to her.  I explained that  there is an extensive work up that goes into preparing a potential transplant candidate and as she has not been referred to transplant at this juncture that the scenario was not realistic.    She quit smoking in June of 2017 and is currently being followed for a subcentimeter lung nodule. Both of these will factor into her transplant candidacy.    She reports that she is now off prednisone and doing okay on roflumilast.    Advised her to continue pulmonary rehab.    I cannot access her PFTs from 6/2017.  If they cannot be obtained a new set of tests is in order and based on those results potential referral to Dr. Shore.

## 2017-12-27 ENCOUNTER — TELEPHONE (OUTPATIENT)
Dept: SLEEP MEDICINE | Facility: OTHER | Age: 57
End: 2017-12-27

## 2018-02-01 ENCOUNTER — OFFICE VISIT (OUTPATIENT)
Dept: INTERNAL MEDICINE | Facility: CLINIC | Age: 58
End: 2018-02-01
Payer: MEDICAID

## 2018-02-01 ENCOUNTER — IMMUNIZATION (OUTPATIENT)
Dept: INTERNAL MEDICINE | Facility: CLINIC | Age: 58
End: 2018-02-01
Payer: MEDICAID

## 2018-02-01 VITALS
SYSTOLIC BLOOD PRESSURE: 122 MMHG | WEIGHT: 145.94 LBS | BODY MASS INDEX: 23.46 KG/M2 | HEIGHT: 66 IN | HEART RATE: 104 BPM | RESPIRATION RATE: 18 BRPM | OXYGEN SATURATION: 88 % | TEMPERATURE: 98 F | DIASTOLIC BLOOD PRESSURE: 72 MMHG

## 2018-02-01 DIAGNOSIS — L29.9 PRURITUS: ICD-10-CM

## 2018-02-01 DIAGNOSIS — F33.41 RECURRENT MAJOR DEPRESSIVE DISORDER, IN PARTIAL REMISSION: ICD-10-CM

## 2018-02-01 DIAGNOSIS — J44.9 CHRONIC OBSTRUCTIVE PULMONARY DISEASE, UNSPECIFIED COPD TYPE: Primary | ICD-10-CM

## 2018-02-01 DIAGNOSIS — E11.9 TYPE 2 DIABETES MELLITUS WITHOUT COMPLICATION, WITHOUT LONG-TERM CURRENT USE OF INSULIN: ICD-10-CM

## 2018-02-01 DIAGNOSIS — R91.1 PULMONARY NODULE: ICD-10-CM

## 2018-02-01 DIAGNOSIS — G47.33 OSA (OBSTRUCTIVE SLEEP APNEA): ICD-10-CM

## 2018-02-01 DIAGNOSIS — R20.2 RIGHT LEG PARESTHESIAS: ICD-10-CM

## 2018-02-01 DIAGNOSIS — I73.9 PAD (PERIPHERAL ARTERY DISEASE): ICD-10-CM

## 2018-02-01 DIAGNOSIS — Z87.891 FORMER SMOKER: ICD-10-CM

## 2018-02-01 PROCEDURE — 90471 IMMUNIZATION ADMIN: CPT | Mod: PBBFAC

## 2018-02-01 PROCEDURE — 3008F BODY MASS INDEX DOCD: CPT | Mod: ,,, | Performed by: INTERNAL MEDICINE

## 2018-02-01 PROCEDURE — 99214 OFFICE O/P EST MOD 30 MIN: CPT | Mod: S$PBB,,, | Performed by: INTERNAL MEDICINE

## 2018-02-01 PROCEDURE — 99214 OFFICE O/P EST MOD 30 MIN: CPT | Mod: PBBFAC | Performed by: INTERNAL MEDICINE

## 2018-02-01 PROCEDURE — 99999 PR PBB SHADOW E&M-EST. PATIENT-LVL IV: CPT | Mod: PBBFAC,,, | Performed by: INTERNAL MEDICINE

## 2018-02-01 RX ORDER — SERTRALINE HYDROCHLORIDE 100 MG/1
100 TABLET, FILM COATED ORAL DAILY
Qty: 90 TABLET | Refills: 3 | Status: SHIPPED | OUTPATIENT
Start: 2018-02-01 | End: 2019-02-08 | Stop reason: SDUPTHER

## 2018-02-16 ENCOUNTER — TELEPHONE (OUTPATIENT)
Dept: SLEEP MEDICINE | Facility: OTHER | Age: 58
End: 2018-02-16

## 2018-03-09 ENCOUNTER — TELEPHONE (OUTPATIENT)
Dept: SLEEP MEDICINE | Facility: OTHER | Age: 58
End: 2018-03-09

## 2018-04-09 ENCOUNTER — NURSE TRIAGE (OUTPATIENT)
Dept: ADMINISTRATIVE | Facility: CLINIC | Age: 58
End: 2018-04-09

## 2018-04-09 NOTE — TELEPHONE ENCOUNTER
Recommend scheduling UC visit. None in computer system seen. Please make sure she follows through w/ a UC appt.

## 2018-04-09 NOTE — TELEPHONE ENCOUNTER
Reason for Disposition   Known COPD or other severe lung disease (i.e., bronchiectasis, cystic fibrosis, lung surgery) and worsening symptoms (i.e., increased sputum purulence or amount, increased breathing difficulty)    Protocols used: ST COUGH-A-OH    Ms. Canavan states she has congestion that has been present since last week.

## 2018-04-12 ENCOUNTER — CLINICAL SUPPORT (OUTPATIENT)
Dept: SMOKING CESSATION | Facility: CLINIC | Age: 58
End: 2018-04-12
Payer: COMMERCIAL

## 2018-04-12 DIAGNOSIS — F17.200 NICOTINE DEPENDENCE: Primary | ICD-10-CM

## 2018-04-12 PROCEDURE — 99407 BEHAV CHNG SMOKING > 10 MIN: CPT | Mod: S$GLB,,,

## 2018-05-04 ENCOUNTER — PATIENT MESSAGE (OUTPATIENT)
Dept: INTERNAL MEDICINE | Facility: CLINIC | Age: 58
End: 2018-05-04

## 2018-05-04 DIAGNOSIS — J96.12 CHRONIC RESPIRATORY FAILURE WITH HYPOXIA AND HYPERCAPNIA: ICD-10-CM

## 2018-05-04 DIAGNOSIS — J43.1 PANLOBULAR EMPHYSEMA: ICD-10-CM

## 2018-05-04 DIAGNOSIS — J96.11 CHRONIC RESPIRATORY FAILURE WITH HYPOXIA AND HYPERCAPNIA: ICD-10-CM

## 2018-05-04 RX ORDER — IPRATROPIUM BROMIDE AND ALBUTEROL SULFATE 2.5; .5 MG/3ML; MG/3ML
3 SOLUTION RESPIRATORY (INHALATION) EVERY 6 HOURS PRN
Qty: 360 ML | Refills: 0 | Status: SHIPPED | OUTPATIENT
Start: 2018-05-04 | End: 2018-05-10 | Stop reason: SDUPTHER

## 2018-05-04 RX ORDER — FLUTICASONE FUROATE AND VILANTEROL 100; 25 UG/1; UG/1
1 POWDER RESPIRATORY (INHALATION) DAILY
Qty: 60 EACH | Refills: 5 | Status: SHIPPED | OUTPATIENT
Start: 2018-05-04 | End: 2018-11-08 | Stop reason: SDUPTHER

## 2018-05-04 RX ORDER — IPRATROPIUM BROMIDE AND ALBUTEROL SULFATE 2.5; .5 MG/3ML; MG/3ML
3 SOLUTION RESPIRATORY (INHALATION) EVERY 6 HOURS PRN
Qty: 99 VIAL | Refills: 0 | Status: CANCELLED | OUTPATIENT
Start: 2018-05-04 | End: 2019-05-04

## 2018-05-09 ENCOUNTER — PATIENT MESSAGE (OUTPATIENT)
Dept: PULMONOLOGY | Facility: CLINIC | Age: 58
End: 2018-05-09

## 2018-05-10 RX ORDER — IPRATROPIUM BROMIDE AND ALBUTEROL SULFATE 2.5; .5 MG/3ML; MG/3ML
3 SOLUTION RESPIRATORY (INHALATION) EVERY 6 HOURS PRN
Qty: 360 ML | Refills: 0 | Status: SHIPPED | OUTPATIENT
Start: 2018-05-10 | End: 2018-06-15

## 2018-05-15 ENCOUNTER — HOSPITAL ENCOUNTER (OUTPATIENT)
Dept: PULMONOLOGY | Facility: CLINIC | Age: 58
Discharge: HOME | End: 2018-05-15
Payer: MEDICAID

## 2018-05-15 ENCOUNTER — HOSPITAL ENCOUNTER (OUTPATIENT)
Dept: RADIOLOGY | Facility: HOSPITAL | Age: 58
Discharge: HOME | End: 2018-05-15
Attending: INTERNAL MEDICINE
Payer: MEDICAID

## 2018-05-15 ENCOUNTER — OFFICE VISIT (OUTPATIENT)
Dept: PULMONOLOGY | Facility: CLINIC | Age: 58
End: 2018-05-15
Payer: MEDICAID

## 2018-05-15 VITALS
HEART RATE: 108 BPM | OXYGEN SATURATION: 95 % | BODY MASS INDEX: 24.63 KG/M2 | SYSTOLIC BLOOD PRESSURE: 130 MMHG | DIASTOLIC BLOOD PRESSURE: 84 MMHG | WEIGHT: 153.25 LBS | HEIGHT: 66 IN

## 2018-05-15 VITALS — HEIGHT: 66 IN | WEIGHT: 153.25 LBS | BODY MASS INDEX: 24.63 KG/M2

## 2018-05-15 DIAGNOSIS — J44.9 CHRONIC OBSTRUCTIVE PULMONARY DISEASE, UNSPECIFIED COPD TYPE: ICD-10-CM

## 2018-05-15 DIAGNOSIS — J44.9 COPD, VERY SEVERE: ICD-10-CM

## 2018-05-15 DIAGNOSIS — J44.9 COPD, VERY SEVERE: Primary | ICD-10-CM

## 2018-05-15 PROCEDURE — 99214 OFFICE O/P EST MOD 30 MIN: CPT | Mod: S$PBB,,, | Performed by: INTERNAL MEDICINE

## 2018-05-15 PROCEDURE — 71250 CT THORAX DX C-: CPT | Mod: TC

## 2018-05-15 PROCEDURE — 99214 OFFICE O/P EST MOD 30 MIN: CPT | Mod: PBBFAC,25 | Performed by: INTERNAL MEDICINE

## 2018-05-15 PROCEDURE — 94618 PULMONARY STRESS TESTING: CPT | Mod: 26,S$PBB,, | Performed by: INTERNAL MEDICINE

## 2018-05-15 PROCEDURE — 94618 PULMONARY STRESS TESTING: CPT | Mod: PBBFAC | Performed by: INTERNAL MEDICINE

## 2018-05-15 PROCEDURE — 99999 PR PBB SHADOW E&M-EST. PATIENT-LVL IV: CPT | Mod: PBBFAC,,, | Performed by: INTERNAL MEDICINE

## 2018-05-15 PROCEDURE — 71250 CT THORAX DX C-: CPT | Mod: 26,,, | Performed by: RADIOLOGY

## 2018-05-15 RX ORDER — ROFLUMILAST 500 UG/1
500 TABLET ORAL DAILY
Qty: 30 TABLET | Refills: 11 | Status: SHIPPED | OUTPATIENT
Start: 2018-05-15 | End: 2019-05-20

## 2018-05-15 RX ORDER — PREDNISONE 10 MG/1
TABLET ORAL
Qty: 20 TABLET | Refills: 3 | Status: SHIPPED | OUTPATIENT
Start: 2018-05-15 | End: 2019-11-13

## 2018-05-15 NOTE — PROCEDURES
Ann Elizabeth Canavan is a 57 y.o.  female patient, who presents for a 6 minute walk test ordered by MD. Leann.  The diagnosis is COPD.  The patient's BMI is 24.8 kg/m2.  Predicted distance (lower limit of normal) is 396.77 meters.      Test Results:    The test was not completed.  The patient stopped 2 times for a total of 135 seconds.  The total time walked was 225 seconds.  During walking, the patient reported:  Dyspnea, Leg pain. The patient used no assistive devices and supplemental oxygen during testing.     05/15/2018---------Distance: 182.88 meters (600 feet)     O2 Sat % Supplemental Oxygen Heart Rate Blood Pressure Kevin Scale   Pre-exercise  (Resting) 97 % 2 L/M 101 bpm 129/76 mmHg 0   During Exercise 83 % 2 L/M 109 bpm 149/74 mmHg 3   Post-exercise  (Recovery) 96 % 2 L/M  106 bpm   mmHg       Recovery Time: 164 seconds    Performing nurse/tech: MELISSA Clay.      PREVIOUS STUDY:   The patient has not had a previous study.      CLINICAL INTERPRETATION:  Six minute walk distance is 182.88 meters (600 feet) with moderate dyspnea.  During exercise, there was significant desaturation while breathing supplemental oxygen.  Both blood pressure and heart rate remained stable with walking.  Tachycardia was present prior to exercise.  The patient reported non-pulmonary symptoms during exercise.  Significant exercise impairment is likely due to desaturation.  The patient did not complete the study, walking 225 seconds of the 360 second test.  The patient may benefit from using supplemental oxygen.  No previous study performed.  Based upon age and body mass index, exercise capacity is less than predicted.

## 2018-05-15 NOTE — PROGRESS NOTES
Subjective:       Patient ID: Ann Elizabeth Canavan is a 57 y.o. female.    Chief Complaint: COPD    HPI:   Ann Elizabeth Canavan is a 57 y.o. female who presents for follow up.   She recently contacted me via [facility] and was experiencing an exacerbation.  Increased sputum production and shortness of breath that was not responding to her rescue inhaler.  She took her emergency steroids and seen dramatic improvement over the past couple of days.    Currently on day 6 of what will be 10 days of steroids. First course of steroids in nearly 6 months.  2L via NC continuously at home, but does not have portable oxygen at this time.  At last visit (10/2017) she was started on Roflumilast and responded well- however she has been off for the last few months (issue with refills?)  Continues Breo and Ellipta.   No ER visits since last visit.   Off cigarettes since June 2017!!!    Initial History:   She has a history of tobacco abuse disorder and COPD.  She stopped smoking in June 2017.    She was previously seen by Dr. Bernal (6/2017).  She reports that she has been in the hospital 6 times 2017.  Was essentially on chronic steroids for much of 2017. I saw her in October 2017 and tapered her off steroids; started roflumilast.    She began treatment with bronchodilators in 2014.   She has a 40packyear history of smoking.      Review of Systems   Constitutional: Negative.    HENT: Negative.    Respiratory: Positive for cough, chest tightness, shortness of breath, wheezing and dyspnea on extertion.    Cardiovascular: Negative.    Genitourinary: Negative.    Endocrine: endocrine negative   Musculoskeletal: Negative.    Skin: Negative.    Gastrointestinal: Negative.    Neurological: Negative.          Social History   Substance Use Topics    Smoking status: Former Smoker     Packs/day: 1.00     Years: 40.00     Types: Cigarettes     Quit date: 6/19/2017    Smokeless tobacco: Never Used      Comment: 3-5 a day    Alcohol use 0.5  oz/week     1 Standard drinks or equivalent per week       Review of patient's allergies indicates:   Allergen Reactions    Avelox [moxifloxacin] Itching and Rash     IV     Past Medical History:   Diagnosis Date    Chronic respiratory failure with hypoxia and hypercapnia 5/4/2014    COPD (chronic obstructive pulmonary disease)     Depression     Hyperlipidemia 5/12/2017    Otosclerosis of right ear 5/12/2017    40% hearing back in 1988, had surgery with some improvement, but hearing loss persists.      PAD (peripheral artery disease)     Post-menopausal 2008    Pulmonary emphysema 4/28/2017    Tobacco dependence 5/2/2014    Type 2 diabetes mellitus without complication, without long-term current use of insulin 4/29/2017    Hb A1c 6.5% in 2/2017. Diet controlled.      Past Surgical History:   Procedure Laterality Date    BREAST BIOPSY      BREAST CYST ASPIRATION      GANGLION CYST EXCISION Right 1988    STAPEDES SURGERY Right 1988     Current Outpatient Prescriptions on File Prior to Visit   Medication Sig    albuterol 90 mcg/actuation inhaler Inhale 2 puffs into the lungs every 6 (six) hours as needed for Wheezing. Rescue    albuterol-ipratropium 2.5mg-0.5mg/3mL (DUO-NEB) 0.5 mg-3 mg(2.5 mg base)/3 mL nebulizer solution Take 3 mLs by nebulization every 6 (six) hours as needed for Wheezing or Shortness of Breath. Rescue    aspirin (ECOTRIN) 81 MG EC tablet Take 81 mg by mouth once daily.    atorvastatin (LIPITOR) 40 MG tablet Take 1 tablet (40 mg total) by mouth once daily.    blood sugar diagnostic Strp 1 strip by Misc.(Non-Drug; Combo Route) route once daily.    fluticasone-vilanterol (BREO) 100-25 mcg/dose diskus inhaler Inhale 1 puff into the lungs once daily. Controller    lancets Misc 1 Device by Misc.(Non-Drug; Combo Route) route once daily.    loratadine (CLARITIN) 10 mg tablet Take 10 mg by mouth daily as needed for Allergies.    predniSONE (DELTASONE) 10 MG tablet TAKE ONE TABLET BY  MOUTH ONCE DAILY    roflumilast 500 mcg Tab Take 1 tablet (500 mcg total) by mouth once daily.    sertraline (ZOLOFT) 100 MG tablet Take 1 tablet (100 mg total) by mouth once daily.    umeclidinium 62.5 mcg/actuation DsDv Inhale one puff by mouth into the lungs once daily. Controller     No current facility-administered medications on file prior to visit.        Objective:      Vitals:    05/15/18 1407   BP: 130/84   Pulse: 108     Physical Exam   Constitutional: She is oriented to person, place, and time. She appears well-developed and well-nourished.   HENT:   Head: Normocephalic.   Mouth/Throat: Mallampati Score: III.   Neck: Normal range of motion. Neck supple. No tracheal deviation present.   Cardiovascular: Normal rate and regular rhythm.    No murmur heard.  Pulmonary/Chest: Normal expansion and effort normal. She has no wheezes. She has no rales.   Distant breath sounds   Abdominal: Soft. Bowel sounds are normal. She exhibits no distension. There is no tenderness.   Musculoskeletal: Normal range of motion. She exhibits no edema.   Neurological: She is alert and oriented to person, place, and time.   Skin: Skin is warm and dry.   Psychiatric: She has a normal mood and affect. Her behavior is normal.   Vitals reviewed.    Personal Diagnostic Review            CT Chest 5/2017: extensive emphysematous changes.  0.4cm pulmonary nodule in the HECTOR.  CT Chest 5/2018: study performed, radiology read pending.     ECHO: 5/8/17: Normal LV systolic function, Normal diastolic function, mildly depressed RV sys fxn.  Assessment:       1. COPD, very severe        Despite this recent exacerbation with the weather change, Ms. Canavan has been relatively well controlled over the course of the last 6 months.   She is no longer on chronic steroids.  She stopped smoking nearly 1 year ago.  At this juncture, I believe lung transplant evaluation is warranted.   Plan:       -Referral to pulmonary rehab.  -Repeat 6MWT to arrange  for portable oxygen.  -Continue LABA, LAMA, ICS  -Albuterol and nebs prn.  -Restart roflumilast  -Refilled stand-by prednisone.  -Referral for lung transplant evaluation  -6MWT to set up portable oxygen concentrator for home use.    F/U in 12 weeks.

## 2018-05-16 ENCOUNTER — TELEPHONE (OUTPATIENT)
Dept: TRANSPLANT | Facility: CLINIC | Age: 58
End: 2018-05-16

## 2018-05-16 DIAGNOSIS — Z76.82 LUNG TRANSPLANT CANDIDATE: ICD-10-CM

## 2018-05-16 DIAGNOSIS — J44.9 CHRONIC OBSTRUCTIVE PULMONARY DISEASE, UNSPECIFIED COPD TYPE: Primary | ICD-10-CM

## 2018-05-16 NOTE — TELEPHONE ENCOUNTER
Notified patient that clearance for consult has been obtained.  She states that June 14th would be fine for her appointments.  Scheduling card placed on 's desk.    ----- Message from Koffi Shore MD sent at 5/16/2018  8:25 AM CDT -----  Referral  ----- Message -----  From: Heide Noriega MD  Sent: 5/15/2018   3:29 PM  To: Koffi Shore MD    Sending you this nice lady for evaluation. Severe COPD.

## 2018-05-30 ENCOUNTER — PATIENT MESSAGE (OUTPATIENT)
Dept: INTERNAL MEDICINE | Facility: CLINIC | Age: 58
End: 2018-05-30

## 2018-05-31 DIAGNOSIS — E11.9 TYPE 2 DIABETES MELLITUS WITHOUT COMPLICATION: ICD-10-CM

## 2018-06-08 ENCOUNTER — TELEPHONE (OUTPATIENT)
Dept: SLEEP MEDICINE | Facility: OTHER | Age: 58
End: 2018-06-08

## 2018-06-14 ENCOUNTER — HOSPITAL ENCOUNTER (OUTPATIENT)
Dept: PULMONOLOGY | Facility: CLINIC | Age: 58
Discharge: HOME | End: 2018-06-14
Payer: MEDICAID

## 2018-06-14 ENCOUNTER — INITIAL CONSULT (OUTPATIENT)
Dept: TRANSPLANT | Facility: CLINIC | Age: 58
End: 2018-06-14
Payer: MEDICAID

## 2018-06-14 ENCOUNTER — HOSPITAL ENCOUNTER (OUTPATIENT)
Dept: CARDIOLOGY | Facility: CLINIC | Age: 58
Discharge: HOME | End: 2018-06-14
Attending: INTERNAL MEDICINE
Payer: MEDICAID

## 2018-06-14 VITALS — WEIGHT: 148 LBS | BODY MASS INDEX: 24.66 KG/M2 | HEIGHT: 65 IN

## 2018-06-14 VITALS
BODY MASS INDEX: 24.66 KG/M2 | HEIGHT: 65 IN | OXYGEN SATURATION: 98 % | RESPIRATION RATE: 20 BRPM | DIASTOLIC BLOOD PRESSURE: 78 MMHG | TEMPERATURE: 97 F | SYSTOLIC BLOOD PRESSURE: 160 MMHG | WEIGHT: 148 LBS | HEART RATE: 87 BPM

## 2018-06-14 DIAGNOSIS — Z76.82 LUNG TRANSPLANT CANDIDATE: ICD-10-CM

## 2018-06-14 DIAGNOSIS — J44.9 CHRONIC OBSTRUCTIVE PULMONARY DISEASE, UNSPECIFIED COPD TYPE: ICD-10-CM

## 2018-06-14 DIAGNOSIS — M79.604 PAIN OF RIGHT LOWER EXTREMITY: ICD-10-CM

## 2018-06-14 DIAGNOSIS — Z76.82 LUNG TRANSPLANT CANDIDATE: Primary | ICD-10-CM

## 2018-06-14 DIAGNOSIS — J96.11 CHRONIC RESPIRATORY FAILURE WITH HYPOXIA AND HYPERCAPNIA: ICD-10-CM

## 2018-06-14 DIAGNOSIS — J96.12 CHRONIC RESPIRATORY FAILURE WITH HYPOXIA AND HYPERCAPNIA: ICD-10-CM

## 2018-06-14 DIAGNOSIS — I05.9 RHEUMATIC MITRAL VALVE DISEASE: ICD-10-CM

## 2018-06-14 LAB
DIASTOLIC DYSFUNCTION: NO
PRE FEV1 FVC: 44
PRE FEV1: 0.82
PRE FVC: 1.88
PREDICTED FEV1 FVC: 80
PREDICTED FEV1: 2.58
PREDICTED FVC: 3.2
RETIRED EF AND QEF - SEE NOTES: 65 (ref 55–65)

## 2018-06-14 PROCEDURE — 94729 DIFFUSING CAPACITY: CPT | Mod: 26,S$PBB,TXP, | Performed by: INTERNAL MEDICINE

## 2018-06-14 PROCEDURE — 94618 PULMONARY STRESS TESTING: CPT | Mod: 26,S$PBB,TXP, | Performed by: INTERNAL MEDICINE

## 2018-06-14 PROCEDURE — 93306 TTE W/DOPPLER COMPLETE: CPT | Mod: PBBFAC,TXP | Performed by: INTERNAL MEDICINE

## 2018-06-14 PROCEDURE — 94729 DIFFUSING CAPACITY: CPT | Mod: PBBFAC,TXP | Performed by: INTERNAL MEDICINE

## 2018-06-14 PROCEDURE — 99213 OFFICE O/P EST LOW 20 MIN: CPT | Mod: PBBFAC,TXP,25 | Performed by: INTERNAL MEDICINE

## 2018-06-14 PROCEDURE — 94618 PULMONARY STRESS TESTING: CPT | Mod: PBBFAC,TXP | Performed by: INTERNAL MEDICINE

## 2018-06-14 PROCEDURE — 99204 OFFICE O/P NEW MOD 45 MIN: CPT | Mod: 25,S$PBB,TXP, | Performed by: INTERNAL MEDICINE

## 2018-06-14 PROCEDURE — 94010 BREATHING CAPACITY TEST: CPT | Mod: PBBFAC,TXP | Performed by: INTERNAL MEDICINE

## 2018-06-14 PROCEDURE — 94727 GAS DIL/WSHOT DETER LNG VOL: CPT | Mod: 26,S$PBB,TXP, | Performed by: INTERNAL MEDICINE

## 2018-06-14 PROCEDURE — 94010 BREATHING CAPACITY TEST: CPT | Mod: 26,S$PBB,TXP, | Performed by: INTERNAL MEDICINE

## 2018-06-14 PROCEDURE — 94727 GAS DIL/WSHOT DETER LNG VOL: CPT | Mod: PBBFAC,TXP | Performed by: INTERNAL MEDICINE

## 2018-06-14 PROCEDURE — 99999 PR PBB SHADOW E&M-EST. PATIENT-LVL III: CPT | Mod: PBBFAC,TXP,, | Performed by: INTERNAL MEDICINE

## 2018-06-14 NOTE — LETTER
June 14, 2018        Heide Noriega  [address]  [facility] [address]  Phone: [phone]  Fax: [phone]             [facility] Trans[facility]  [address]  [facility] [address]  Phone: [phone]   Patient: Ann Elizabeth Canavan   MR Number: [id number]   YOB: 1960   Date of Visit: 6/14/2018       Dear Dr. Heide Noriega    Thank you for referring Ann Canavan to me for evaluation. Attached you will find relevant portions of my assessment and plan of care.    If you have questions, please do not hesitate to call me. I look forward to following Ann Canavan along with you.    Sincerely,    Koffi Shore MD    Enclosure    If you would like to receive this communication electronically, please contact [email or web address] or [phone] to request [employer] access.    [employer] is a tool which provides read-only access to select patient information with whom you have a relationship. Its easy to use and provides real time access to review your patients record including encounter summaries, notes, results, and demographic information.    If you feel you have received this communication in error or would no longer like to receive these types of communications, please e-mail [email or web address]

## 2018-06-14 NOTE — PROGRESS NOTES
LUNG TRANSPLANT INITIAL EVALUATION                                                                                                                                           Reason for Visit:  Evaluation for lung transplant    Referring Physician: Heide Noriega MD    History of Present Illness: Ann Elizabeth Canavan is a 57 y.o. female who is on 2L of oxygen.  She is on no assisted ventilation but recently had a sleep study and is scheduled for her titration.  Her New York Heart Association Class is III and a Karnofsky score of 60% - Requires occasional assistance but is able to care for needs. She is not diabetic.   She presents for her initial lung transplant evaluation for a diagnosis of COPD.    She states that she was diagnosed with COPD approximately 3 years ago.  She had mild dyspnea with exertion until June 2017 when she went into the hospital for acute respiratory failure requiring intubation.  She was only intubated for a short period of time (hours) but states that she never recovered back to her respiratory baseline.  Immediately following that hospitalization, she had multiple other hospitalizations for COPD, none of which required intubation.  Since then she has remained dyspneic and on supplemental oxygen therapy.    Since 2017, she has had 2 episodes of COPD exacerbation that were treated with Prednisone.  She has steroids at home that she uses when she feels like she is having an exacerbation.  In 2017, was taking steroids daily but this was weaned off by Dr. Noriega.  She is currently taking Breo, Incruse, prn Duo-Nebs, and Roflumilast.  She has an occasional cough.  Daily household activities cause significant dyspnea.  She was briefly enrolled in pulmonary rehab and is scheduled to start back again.      She has no known cardiac history.  Has an elevated Hgb A1C and monitors her blood glucose levels but these are diet controlled.  She has significant pain in her right leg that is a big  "limitation to her activity.  She has thigh numbness and calf pain.  States she is being treated with Lipitor for claudication.  Is scheduled to have "nerve" investigation that her primary care is following.      She works as an  for court proceedings.  Her job is sedentary and she is able to work from home.  She is not  and does not have family around.  Has no children.  Quit smoking in June 2017.  Drinks alcohol occasionally and does not use illicits.  She previously lived in [place] and was an avid .         Past Medical History:   Diagnosis Date    Chronic respiratory failure with hypoxia and hypercapnia 5/4/2014    COPD (chronic obstructive pulmonary disease)     Depression     Hyperlipidemia 5/12/2017    Otosclerosis of right ear 5/12/2017    40% hearing back in 1988, had surgery with some improvement, but hearing loss persists.      PAD (peripheral artery disease)     Post-menopausal 2008    Pulmonary emphysema 4/28/2017    Tobacco dependence 5/2/2014    Type 2 diabetes mellitus without complication, without long-term current use of insulin 4/29/2017    Hb A1c 6.5% in 2/2017. Diet controlled.      Colonoscopy Results: No procedure found.    Past Surgical History:   Procedure Laterality Date    BREAST BIOPSY      BREAST CYST ASPIRATION      GANGLION CYST EXCISION Right 1988    STAPEDES SURGERY Right 1988     [place]: none    Blood Product Transfusions: no    Allergies: Avelox [moxifloxacin]    Current Outpatient Prescriptions   Medication Sig    albuterol 90 mcg/actuation inhaler Inhale 2 puffs into the lungs every 6 (six) hours as needed for Wheezing. Rescue    albuterol-ipratropium 2.5mg-0.5mg/3mL (DUO-NEB) 0.5 mg-3 mg(2.5 mg base)/3 mL nebulizer solution Take 3 mLs by nebulization every 6 (six) hours as needed for Wheezing or Shortness of Breath. Rescue    aspirin (ECOTRIN) 81 MG EC tablet Take 81 mg by mouth once daily.    atorvastatin (LIPITOR) 40 MG tablet Take " 1 tablet (40 mg total) by mouth once daily.    blood sugar diagnostic Strp 1 strip by Misc.(Non-Drug; Combo Route) route once daily.    fluticasone-vilanterol (BREO) 100-25 mcg/dose diskus inhaler Inhale 1 puff into the lungs once daily. Controller    lancets Misc 1 Device by Misc.(Non-Drug; Combo Route) route once daily.    loratadine (CLARITIN) 10 mg tablet Take 10 mg by mouth daily as needed for Allergies.    roflumilast 500 mcg Tab Take 1 tablet (500 mcg total) by mouth once daily.    sertraline (ZOLOFT) 100 MG tablet Take 1 tablet (100 mg total) by mouth once daily.    umeclidinium 62.5 mcg/actuation DsDv Inhale one puff by mouth into the lungs once daily. Controller    predniSONE (DELTASONE) 10 MG tablet Take 4 tabs daily for 3 days, then 3 tabs daily for 3 days, then 2 tabs daily for 2 days, then 1 tab daily for 2 days.     No current facility-administered medications for this visit.        Immunization History   Administered Date(s) Administered    Influenza - Quadrivalent - PF 02/01/2018    Pneumococcal Conjugate 05/02/2014    Pneumococcal Conjugate - 13 Valent 05/02/2014    Pneumococcal Polysaccharide - 23 Valent 05/02/2014    Tdap 06/08/2017     Family History:    Family History   Problem Relation Age of Onset    Diabetes Mother     Heart disease Father         Strong FH of CAD/CHF on fathers side    Peripheral vascular disease Brother         With necrosis of leg    Diabetes Maternal Uncle     Cancer Neg Hx     Thyroid disease Neg Hx      History   Alcohol Use    0.5 oz/week    1 Standard drinks or equivalent per week     Comment: occasional      History   Drug Use No      Social History     Social History    Marital status:      Spouse name: N/A    Number of children: N/A    Years of education: N/A     Occupational History    Not on file.     Social History Main Topics    Smoking status: Former Smoker     Packs/day: 1.00     Years: 40.00     Types: Cigarettes     Quit  date: 2017    Smokeless tobacco: Never Used      Comment: 3-5 a day    Alcohol use 0.5 oz/week     1 Standard drinks or equivalent per week      Comment: occasional    Drug use: No    Sexual activity: Not on file     Other Topics Concern    Not on file     Social History Narrative    Reports 6-8 years of sedentary lifestyle, where she lived where she worked, only walking 20 paces per day. 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 Hasn't worked this year due to illness and recent move.         Has had a lot of recent stressors. 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 Reports environment there is terrible, with a lot of dust . For the past 2-3 years, patient had been living alone in [place], and boss lived in [place] in the patient's house that she'd bought. Patient came to [place] in January when she was sick, and at that time she decided not to move back to [place].         Patient is [place].      Review of Systems   Constitutional: Positive for malaise/fatigue. Negative for chills, diaphoresis, fever and weight loss.   HENT: Negative for congestion, ear discharge, ear pain, hearing loss, nosebleeds, sinus pain, sore throat and tinnitus.    Eyes: Negative for blurred vision, double vision, photophobia, pain, discharge and redness.   Respiratory: Positive for shortness of breath. Negative for cough, hemoptysis, sputum production, wheezing and stridor.    Cardiovascular: Negative for chest pain, palpitations, orthopnea, claudication, leg swelling and PND.   Gastrointestinal: Negative for abdominal pain, blood in stool, constipation, diarrhea, heartburn, melena, nausea and vomiting.   Genitourinary: Negative for dysuria, flank pain, frequency, hematuria and urgency.   Musculoskeletal: Positive for joint pain (right leg pain; calf). Negative for back pain, falls, myalgias and neck pain.   Skin:  "Negative for itching and rash.   Neurological: Positive for tingling (numbness to right thigh) and weakness. Negative for dizziness, tremors, sensory change, speech change, focal weakness, seizures, loss of consciousness and headaches.   Endo/Heme/Allergies: Negative for environmental allergies and polydipsia. Does not bruise/bleed easily.   Psychiatric/Behavioral: Negative for depression, hallucinations, memory loss, substance abuse and suicidal ideas. The patient is not nervous/anxious and does not have insomnia.      Vitals  BP (!) 160/78   Pulse 87   Temp 97.3 °F (36.3 °C) (Oral)   Resp 20   Ht 5' 5" (1.651 m)   Wt 67.1 kg (148 lb)   LMP  (LMP Unknown)   SpO2 98% Comment: 2 liters  BMI 24.63 kg/m²   Physical Exam   Constitutional: She is oriented to person, place, and time and well-developed, well-nourished, and in no distress. No distress.   HENT:   Head: Normocephalic and atraumatic.   Nose: Nose normal.   Mouth/Throat: Oropharynx is clear and moist. No oropharyngeal exudate.   Eyes: Conjunctivae and EOM are normal. Pupils are equal, round, and reactive to light. Right eye exhibits no discharge. Left eye exhibits no discharge. No scleral icterus.   Neck: Normal range of motion. Neck supple. No JVD present. No tracheal deviation present. No thyromegaly present.   Cardiovascular: Normal rate, regular rhythm, normal heart sounds and intact distal pulses.  Exam reveals no gallop and no friction rub.    No murmur heard.  Pulmonary/Chest: Effort normal. No stridor. No respiratory distress. She has decreased breath sounds in the right upper field, the right middle field, the right lower field, the left upper field, the left middle field and the left lower field. She has no wheezes. She has no rales. She exhibits no tenderness.   Abdominal: Bowel sounds are normal. She exhibits no distension. There is no tenderness. There is no guarding.   Musculoskeletal: Normal range of motion. She exhibits no edema, " tenderness or deformity.   Lymphadenopathy:     She has no cervical adenopathy.   Neurological: She is oriented to person, place, and time. No cranial nerve deficit. Gait normal. Coordination normal. GCS score is 15.   Skin: Skin is dry. No rash noted. She is not diaphoretic. No erythema. No pallor.   Psychiatric: Mood and affect normal.       Labs:  Lab Visit on 06/14/2018   Component Date Value    Alcohol, Urine 06/14/2018 <10     Benzodiazepines 06/14/2018 Negative     Methadone metabolites 06/14/2018 Negative     Cocaine (Metab.) 06/14/2018 Negative     Opiate Scrn, Ur 06/14/2018 Negative     Barbiturate Screen, Ur 06/14/2018 Negative     Amphetamine Screen, Ur 06/14/2018 Negative     THC 06/14/2018 Negative     Phencyclidine 06/14/2018 Negative     Creatinine, Random Ur 06/14/2018 110.0     Toxicology Information 06/14/2018 [facility]    Hospital Outpatient Visit on 06/14/2018   Component Date Value    EF 06/14/2018 65     Diastolic Dysfunction 06/14/2018 No        Pulmonary Function Tests 6/14/2018   FVC 1.88   FEV1 0.82   TLC (liters) 3.8   DLCO (ml/mmHg sec) 6.4   FVC% 59   FEV1% 32   FEF 25-75 0.3   FEF 25-75% 11   TLC% 73   RV 1.89   RV% 98   DLCO% 32     6MW 6/14/2018 5/15/2018   6MWT Status completed with stops completed with stops   Patient Reported Leg pain Dyspnea;Leg pain   Was O2 used? Yes Yes   Delivery Method Cannula;Pull Tank;Continuous Flow Cannula;Pull Tank;Continuous Flow   6MW Distance walked (feet) 700 600   Distance walked (meters) 213.36 182.88   Did patient stop? Yes Yes   Oxygen Saturation 98 97   Supplemental Oxygen 2 L/M 2 L/M   Heart Rate 94 101   Blood Pressure 139/65 129/76   Kevin Dyspnea Rating  very, very light (just noticeable) nothing at all   Oxygen Saturation 81 83   Supplemental Oxygen 2 L/M 2 L/M   Heart Rate 142 109   Blood Pressure 148/89 149/74   Kevin Dyspnea Rating  heavy moderate   Recovery Time (seconds) 125 164   Oxygen Saturation 96 96   Supplemental  Oxygen 2 L/M 2 L/M   Heart Rate 115 106       Imaging:  Results for orders placed during the hospital encounter of 05/06/17   X-Ray Chest PA And Lateral    Narrative Technique:  Chest PA and lateral radiographs    Comparison: Chest radiograph 4/27/17, 2/7/17    Findings:     Cardiac monitor wires overlie the chest. Mediastinal structures are midline. Cardiomediastinal silhouette is within normal limits. Lungs are symmetrically hyperexpanded with flattening of the hemidiaphragms. There is subtle increased perihilar and bibasilar interstitial prominence suggestive of mild pulmonary edema with interstitial pneumonia not excluded. No large focal consolidation or pleural effusion is evident. No pneumothorax.    Osseous structures appear unchanged.    Impression Subtle increased perihilar and bibasilar interstitial prominence suggestive of pulmonary edema, with interstitial pneumonia not excluded. No focal consolidation.     Findings suggestive of COPD.  ______________________________________     Electronically signed by resident: ANDER ARAUJO MD  Date:     05/06/17  Time:    18:13            As the supervising and teaching physician, I personally reviewed the images and resident's interpretation and I agree with the findings.          Electronically signed by: LILIANE FARRELL MD, MD  Date:     05/06/17  Time:    18:23        Assessment:  1. Lung transplant candidate    2. Chronic obstructive pulmonary disease, unspecified COPD type    3. Chronic respiratory failure with hypoxia and hypercapnia    4. Pain of right lower extremity      Plan:   1. Followed by Dr. Noriega for a diagnosis of COPD GOLD 4D.  She is currently on optimal therapy with Breo, Incruse, prn Duo-Nebs, and Roflumilast.  Is scheduled for pulmonary rehab.      2. Continue with supplemental oxygen as prescribed.  Scheduled for NIPPV titration study after a positive sleep study.    3. Continue to follow up with her primary care for her continued right leg  pain.  Consider vascular studies if her work-up is negative for a neuropathic cause.    4. With regards to her lung transplant candidacy for a diagnosis of COPD, she has a current SHILO of 7.  This predicts an estimated 4 year survival of 18% from COPD.  She also has increased exacerbations which increases her 2 year mortality from her disease.  She would clearly benefit from lung transplantation and this could provide a survival and quality of life benefit to her.  She verbalized understanding and all questions were answered.  She is deconditioned at this time and her 6MWT distance is only 700 feet.  We explained that prior to listing this must improve and that pulmonary rehab will help with this.  Will plan to start the work-up process.  Her social situation and possible caregivers may be a barrier and she is encouraged to talk to good friends about who may be willing to commit to helping her post transplant.      5. Follow-up in 3 months or earlier if needed.    Kanwal Jacobsen DO  PCCM Fellow    Attending Note:    I have seen and evaluated the patient with the fellow. Their note reflects the content of our discussion and my plan of care.  Thank you for allowing me to participate in the care of your patient. Please, do not hesitate to contact me if you have any questions.      Koffi Shore MD  Medical Director of Lung Transplantation  [facility]

## 2018-06-15 NOTE — PROCEDURES
Ann Elizabeth Canavan is a 57 y.o.  female patient, who presents for a 6 minute walk test ordered by Silviano Humphrey.  The diagnosis is Pre Lung Transplant Evaluation.  The patient's BMI is 24.7 kg/m2.  Predicted distance (lower limit of normal) is 397.39 meters.      Test Results:    The test was completed with stops.  The patient stopped 2 times for a total of 125 seconds.  The total time walked was 235 seconds.  During walking, the patient reported:  Leg pain. The patient used no assistive devices and supplemental oxygen during testing.     6/14/2048---------Distance: 213.36 meters (700 feet)     O2 Sat % Supplemental Oxygen Heart Rate Blood Pressure Kevin Scale   Pre-exercise  (Resting) 98 % 2 L/M 94 bpm 139/65 mmHg 0.5   During Exercise 81 % 2 L/M 142 bpm 148/89 mmHg 5-6   Post-exercise  (Recovery) 96 % 2 L/M  115 bpm   mmHg       Recovery Time: 125 seconds    Performing nurse/tech: SCARLET Arce RRT      PREVIOUS STUDY:   The patient had a previous study.  05/15/2018---------Distance: 182.88 meters (600 feet)       O2 Sat % Supplemental Oxygen Heart Rate Blood Pressure Kevin Scale   Pre-exercise  (Resting) 97 % 2 L/M 101 bpm 129/76 mmHg 0   During Exercise 83 % 2 L/M 109 bpm 149/74 mmHg 3   Post-exercise  (Recovery) 96 % 2 L/M  106 bpm   mmHg          CLINICAL INTERPRETATION:  Six minute walk distance is 213.36 meters (700 feet) with heavy dyspnea.  During exercise, there was significant desaturation while breathing supplemental oxygen.  Blood pressure remained stable and Heart rate increased significantly with walking.  This may represent a tachycardic response to exercise.  The patient reported non-pulmonary symptoms during exercise.  Significant exercise impairment is likely due to desaturation, cardiovascular causes and subjective symptoms.  The patient did complete the study, walking 235 seconds of the 360 second test.  Since the previous study in May 2018, exercise capacity may be somewhat improved.  Based  upon age and body mass index, exercise capacity is less than predicted.

## 2018-06-15 NOTE — PROGRESS NOTES
Dr. Shore would like for patient to undergo evaluation testing. Pre transplant binder given to patient. She was asked to review the information it contains.  Discussed evaluation/selection/listing process. Explained the PCP and Dental forms and that they need to be completed and faxed to us. Mammogram, Pap, and colonoscopy to be scheduled at [facility] during eval.  Notified patient that she would need a primary caregiver who could commit to being with her 24/7 for at least 3 months post discharge. She would also need two backup caregivers in the event that the primary can not be with them for any reason. Patient verbalized understanding of all points discussed.  She would like a few days to try to determine a caregiver plan as she has no family here.  She has 2 people that come to mind and would like to discuss transplant and their availability with them.  Will contact her next week.

## 2018-06-19 RX ORDER — IPRATROPIUM BROMIDE AND ALBUTEROL SULFATE 2.5; .5 MG/3ML; MG/3ML
3 SOLUTION RESPIRATORY (INHALATION) EVERY 6 HOURS PRN
Qty: 360 ML | Refills: 0 | Status: SHIPPED | OUTPATIENT
Start: 2018-06-19 | End: 2021-01-01 | Stop reason: SDUPTHER

## 2018-06-20 ENCOUNTER — TELEPHONE (OUTPATIENT)
Dept: TRANSPLANT | Facility: CLINIC | Age: 58
End: 2018-06-20

## 2018-06-20 NOTE — TELEPHONE ENCOUNTER
Contacted patient to find out if she has had a discussion with friends regarding being caregivers for lung transplant.  Pt states she has not and would like a call back next week.  At this time patient does not have any caregivers to move forward with evaluation testing.

## 2018-06-21 ENCOUNTER — PATIENT MESSAGE (OUTPATIENT)
Dept: INTERNAL MEDICINE | Facility: CLINIC | Age: 58
End: 2018-06-21

## 2018-06-22 ENCOUNTER — TELEPHONE (OUTPATIENT)
Dept: INTERNAL MEDICINE | Facility: CLINIC | Age: 58
End: 2018-06-22

## 2018-06-22 DIAGNOSIS — I73.9 CLAUDICATION IN PERIPHERAL VASCULAR DISEASE: ICD-10-CM

## 2018-06-22 DIAGNOSIS — E78.2 MIXED HYPERLIPIDEMIA: ICD-10-CM

## 2018-06-22 RX ORDER — ATORVASTATIN CALCIUM 40 MG/1
40 TABLET, FILM COATED ORAL DAILY
Qty: 90 TABLET | Refills: 3 | Status: SHIPPED | OUTPATIENT
Start: 2018-06-22 | End: 2019-08-01 | Stop reason: SDUPTHER

## 2018-06-22 NOTE — TELEPHONE ENCOUNTER
Atorvastatin was prescribed to her after a hospital visit and she believes the  That prescribed does not work at [facility] anymore. Wanted to know if you could give her a new script on it. It's  so it won't let me pend.

## 2018-07-01 NOTE — PROGRESS NOTES
"Subjective:       Patient ID: Ann Elizabeth Canavan is a 57 y.o. female.    Chief Complaint: f/u for depression    HPI   Severe COPD. Seen in pulm, Dr. Noriega, 10/19/17.  Currently on Breo 1 puff daily, umeclidinium 62.5mcg, albuterol prn.   Quit smoking at the beginning of 2017  6MWT ordered and plan for portable O2 if she meets requirement.   Currently undergoing pulm rehab but hasn't gone for a few mo as brother was sick.   C/o chest congestion over the weekend. Nonproductive cough. Rhinorrhea. No fevers/chills. Feels like she had a swollen gland but was taking airborne, which helped.     DM2 - diet controlled.  Eye exam - 17.  Foot 17  MAC 17  a1c 17 - 6.5.  Asa 81mg daily, atorvastatin 40mg daily (LDL 17)    LAMBERTO - sleep study w/ LAMBERTO 17. Needs CPAP titration - ordered by not done yet but does have appt 18.     MDD - zoloft 50mg daily.  Brother  at 50 y/o from stroke. Mom was living w/ brother in [place]. Reports mom is doing poorly and so pt has to go back to help her mom.    pulm nodule on CTA 17 - repeat in 1 yr.    PAD - DELORIS 14 - B decreased ABIs w/ mod stenosis.  Reports doesn't have claudication anymore but reports has numbness in the R leg. Feels like shooting, electric pain at night. Nothing makes it better or worse - doesn't get better or worse when laying on it. No weakness. No back pain. Not worse w/ walking.     C/o itching on the scalp. No rash. Using Tgel OTC and peroxide spray w/o help. Not itching anywhere else at the moment.    Review of Systems  Comprehensive review of systems otherwise negative. See history/subjective section for more details.    Objective:      Physical Exam    /72   Pulse 104   Temp 98.2 °F (36.8 °C)   Resp 18   Ht 5' 6" (1.676 m)   Wt 66.2 kg (145 lb 15.1 oz)   LMP  (LMP Unknown)   SpO2 (!) 88%   BMI 23.56 kg/m²     Gen - A+OX4, NAD  KISHAN - PERCARL,OP clear. MMM. TM normal. NCAT. No rash on the scalp. Very " minor dandruff.   Neck - no LAD  CV - RRR  Chest - CTAB but does have decreased BS of bases.   abd - S/NT/ND/+BS  Ext - 2+ B radial and palp B DP and PT pulses. No LE edema.   Skin - dry  Feet - no calluses. No open lesions. Normal sensation to monofilament. Normal nails.  Neuro - 3+ B patellar reflexes. 5/5 BLE m strength. Normal gait. Sensation to light monofilament decreased at the R lower half of anterior thigh to about the knee.    Previous labs reviewed.    Assessment/Plan     Zoila was seen today for leg pain.    Diagnoses and all orders for this visit:    Chronic obstructive pulmonary disease, unspecified COPD type - cont current meds. F/u w/ Dr. Noriega.    Former smoker - continued cessation.     Type 2 diabetes mellitus without complication, without long-term current use of insulin - diet controlled. Foot exam today.    LAMBERTO (obstructive sleep apnea) - CPAP titration on 2/8.    Recurrent major depressive disorder, in partial remission - inc zoloft to 100mg daily.   -     sertraline (ZOLOFT) 100 MG tablet; Take 1 tablet (100 mg total) by mouth once daily.    Pulmonary nodule    PAD (peripheral artery disease) - cont asa 81 and atorvstatin 40mg daily.   -     US Lower Extremity Arteries Bilateral; Future    Right leg paresthesias - not consistent w/ PAD.  -     EMG W/ ULTRASOUND AND NERVE CONDUCTION TEST 2 Extremities; Future    Pruritus  Zyrtec daily. F/u w/ derm as scheduled.      Follow-up in about 3 months (around 5/1/2018).      Trinh Bowles MD  Department of Internal Medicine - [facility]  1:55 PM   Principal Discharge DX:	Arthritis of knee, right

## 2018-07-05 ENCOUNTER — TELEPHONE (OUTPATIENT)
Dept: TRANSPLANT | Facility: CLINIC | Age: 58
End: 2018-07-05

## 2018-07-05 NOTE — TELEPHONE ENCOUNTER
"Contacted patient to find out if she has caregivers and is ready to move forward with evaluation testing.  Pt states she has one and is "racking my brain to think of others". Explained that she could go through eval with a primary caregiver, but would ultimately need the backup caregivers to be listed, if she is a candidate.  Pt verbalized understanding and states that while she is working on the other caregivers, she will schedule her age appropriate cancer screenings to get that taken care of.  She will call me when she is ready to schedule evaluation testing.  "

## 2018-07-13 DIAGNOSIS — E11.9 TYPE 2 DIABETES MELLITUS WITHOUT COMPLICATION: ICD-10-CM

## 2018-09-27 ENCOUNTER — PATIENT MESSAGE (OUTPATIENT)
Dept: INTERNAL MEDICINE | Facility: CLINIC | Age: 58
End: 2018-09-27

## 2018-09-27 DIAGNOSIS — Z91.09 ENVIRONMENTAL ALLERGIES: Primary | ICD-10-CM

## 2018-09-27 RX ORDER — LEVOCETIRIZINE DIHYDROCHLORIDE 5 MG/1
5 TABLET, FILM COATED ORAL NIGHTLY
Qty: 30 TABLET | Refills: 5 | Status: SHIPPED | OUTPATIENT
Start: 2018-09-27 | End: 2019-08-01 | Stop reason: SDUPTHER

## 2018-10-18 NOTE — ED NOTES
Patient placed on continuous cardiac monitor, automatic blood pressure cuff and continuous pulse oximeter.   No

## 2018-11-08 DIAGNOSIS — J96.11 CHRONIC RESPIRATORY FAILURE WITH HYPOXIA AND HYPERCAPNIA: ICD-10-CM

## 2018-11-08 DIAGNOSIS — J43.1 PANLOBULAR EMPHYSEMA: ICD-10-CM

## 2018-11-08 DIAGNOSIS — J96.12 CHRONIC RESPIRATORY FAILURE WITH HYPOXIA AND HYPERCAPNIA: ICD-10-CM

## 2018-11-08 RX ORDER — FLUTICASONE FUROATE AND VILANTEROL 100; 25 UG/1; UG/1
1 POWDER RESPIRATORY (INHALATION) DAILY
Qty: 60 EACH | Refills: 5 | Status: SHIPPED | OUTPATIENT
Start: 2018-11-08 | End: 2019-05-13

## 2018-11-16 DIAGNOSIS — E11.9 TYPE 2 DIABETES MELLITUS WITHOUT COMPLICATION: ICD-10-CM

## 2018-12-01 NOTE — PLAN OF CARE
Patient never rec'd her home O2 yesterday. CM called and spoke with Tacos on call for [facility]; stated he never rec'd orders yesterday. Fax confirmation was rec'd by this CM. Face sheet, H&P, O2 qualifying note and orders faxed again to [phone]. Per Tacos, it will be a while as he has 5 orders in [place]. Will follow.   Reason For Visit  Neuro hospital follow-up for FX Cervical spine 08/03/2018([facility]).      Referred By     Primary Care Physician: Dr. Constance Hernandez   Phone #: [phone].   Fax #: [phone].      Quality    Communication CI communication of results to PCP: , communication of plan to PCP: , communication of results to Patient: , communication of plan to Patient: , tobacco use, provided intervention and counseling in regards to tobacco use and seeing a family doctor or pcp.      History of Present Illness    sEVERE NECK PAIN AND ARM PAIN, RECENT FALL AND CT CONFIRMATION OF CERVICAL FRACTUR AT OUTSIDE HOSPITAL    BACK PAIN AND LEFT LEG PAIN DIFFICULTY STANDING AND WALKING    POOR NUTRITION AND SEVERE CHRONIC PAIN    .      Active Problems   1. Chest pain (R07.9)   2. CSF leak (G96.0)   3. Debility (R53.81)   4. Disturbance of sleep (G47.9)   5. History of left foot drop (Z87.39)   6. History of smoking (Z87.891)   7. HTN (hypertension) (I10)   8. Left foot drop (M21.372)   9. Leukocytosis (D72.829)   10. Lumbar stenosis with neurogenic claudication (M48.062)   11. S/P lumbar fusion (Z98.1)   12. Sciatica of left side due to displacement of lumbar intervertebral disc (M54.32)   13. Spondylolisthesis, acquired (M43.10)   14. Spondylolisthesis, lumbar region (M43.16)   15. Swelling of both lower extremities (M79.89)   16. Tachycardia (R00.0)   17. Urethral trauma (S37.30XA)   18. Urinary retention (R33.9)    Past Medical History   1. History of Chronic low back pain (M54.5,G89.29)   2. History of Groin cyst  Past medical history was reviewed. History of hospitalization. No history of intubation. No history of ICU stay.      Surgical History   1. History of cervical surgery   2. 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 History of Foot Surgery Right  Surgical history was reviewed, History of surgery      Family History   1. No pertinent family history : Mother  family medical history was  reviewed. No family history of aneurysm. No family history of TIA/Stroke. No family history of bleeding problems. No family history of anesthesia issues.      Social History   · Alcohol use (Z78.9)   · Current every day smoker (F17.200)   · History of smoking (Z87.891)  Social history was reviewed.   Diet: He does not have a healthy diet.   Weight Issues: He does not have any weight concerns.   Exercise: He does not exercise regularly.   Smoking: He uses tobacco. The patient is a current cigarette smoker.   Alcohol: He consumes alcohol. He reports occasional alcohol use. Alcohol concern: The patient has no concerns about alcohol abuse.   Drug Use: He denies drug use. He has never used illicit drugs.      Allergies   1. No Known Drug Allergies    Current Meds   1. Temazepam 15 MG Oral Capsule; TAKE 1 CAPSULE AT BEDTIME AS NEEDED FOR   SLEEP;   Therapy: 13Mar2018 to (Evaluate:92Rab8224)  Requested for: 03May2018; Last   Rx:13Mar2018; Status: ACTIVE - Renewal Denied Ordered   2. Oxycodone-Acetaminophen  MG Oral Tablet; TAKE 1 TABLET EVERY 8 HOURS AS   NEEDED FOR PAIN;   Therapy: 29Nov2016 to (Evaluate:81Vly9701); Last Rx:[id number] Ordered   3. Cyclobenzaprine HCl - 10 MG Oral Tablet; TAKE 1 TABLET BY MOUTH THREE TIMES   DAILY AS NEEDED;   Therapy: 17Jan2017 to (Evaluate:08Tpo0094)  Requested for: 39Vlf5337; Last   Rx:[id number] Ordered   4. DiazePAM 5 MG Oral Tablet; TAKE 1 TABLET 3 TIMES DAILY AS NEEDED;   Therapy: 29Nov2016 to (Evaluate:63Xew3752)  Requested for: 30Rje0255; Last   Rx:[id number] Ordered   5. DiphenhydrAMINE HCl - 50 MG Oral Capsule; TAKE 1 CAPSULE AT BEDTIME AS   NEEDED;   Therapy: 16Mar2018 to (Evaluate:90Jbc3802)  Requested for: 16Mar2018; Last   Rx:[id number] Ordered   6. Folic Acid 1 MG Oral Tablet; TAKE 1 TABLET DAILY;   Therapy: 11Asa5323 to Recorded   7. Gabapentin 600 MG Oral Tablet; TAKE 1 TABLET 3 TIMES DAILY;   Therapy: (Recorded:45Jel3901) to Recorded   8. MethylPREDNISolone 4 MG Oral  Tablet Therapy Pack; Follow instructions on dose pack;   Therapy: 17Jan2017 to (Last Rx:17Jan2017) Ordered   9. Morphine Sulfate 15 MG Oral Tablet; one tab po q 12 hrs;   Therapy: 02Edr8402 to Recorded   10. Senokot S 8.6-50 MG Oral Tablet; TAKE 2 TABLETS DAILY AS NEEDED;    Therapy: 30Vzr4793 to Recorded   11. Vitamin B12 1000 MCG Oral Tablet Extended Release; TAKE 1 TABLET DAILY AS    DIRECTED;    Therapy: 74Yha1837 to Recorded    Vitals  Signs   Height: 6 ft   Weight: 187 lb   BMI Calculated: 25.36  BSA Calculated: 2.07  Temperature: 98.2 F  Heart Rate: 88  Respiration: 18  Pain Scale: 10  Blood Pressure: 101 / 76    Physical Exam    PE: Anxious  Cannot move neck without severe pain    weakness in left arm pain limitwed    left ankle 3/5 slightly improved from a few months ago    walks wit limp and uses walker.      Results/Data  CT from outside hospital unavailable but reported C6 laminar fracture.      Assessment   1. C6 cervical fracture (S12.500A)   2. Left foot drop (M21.372)   3. History of left foot drop (Z87.39)   4. Sciatica of left side due to displacement of lumbar intervertebral disc (M54.32)   5. S/P lumbar fusion (Z98.1)    Discussion/Summary    Impression/Plan:   Fall, back pain, left foot drop, and left arm pain with cervical fracture    Needs urgent admission to make sure no cord compression exists and to make sure fracture is stable and to work with PT and pain management.      Signatures   Electronically signed by : RICHIE Andrade; Aug 14 2018  8:29AM CST    Electronically signed by : ELSA MENESES M.D.; Aug 16 2018  8:04AM CST

## 2019-02-01 DIAGNOSIS — Z12.11 COLON CANCER SCREENING: ICD-10-CM

## 2019-02-08 ENCOUNTER — PATIENT MESSAGE (OUTPATIENT)
Dept: PULMONOLOGY | Facility: CLINIC | Age: 59
End: 2019-02-08

## 2019-02-08 DIAGNOSIS — F33.41 RECURRENT MAJOR DEPRESSIVE DISORDER, IN PARTIAL REMISSION: ICD-10-CM

## 2019-02-08 RX ORDER — SERTRALINE HYDROCHLORIDE 100 MG/1
100 TABLET, FILM COATED ORAL DAILY
Qty: 90 TABLET | Refills: 0 | Status: SHIPPED | OUTPATIENT
Start: 2019-02-08 | End: 2019-06-20 | Stop reason: SDUPTHER

## 2019-04-03 ENCOUNTER — TELEPHONE (OUTPATIENT)
Dept: PHARMACY | Facility: CLINIC | Age: 59
End: 2019-04-03

## 2019-04-03 NOTE — TELEPHONE ENCOUNTER
Left message for patient to contact Jennifer with Pharmacy Patient Assistance.  Also, I mailed out a letter offering assistance.

## 2019-05-09 ENCOUNTER — PATIENT MESSAGE (OUTPATIENT)
Dept: PULMONOLOGY | Facility: CLINIC | Age: 59
End: 2019-05-09

## 2019-05-10 ENCOUNTER — TELEPHONE (OUTPATIENT)
Dept: PHARMACY | Facility: CLINIC | Age: 59
End: 2019-05-10

## 2019-05-10 ENCOUNTER — TELEPHONE (OUTPATIENT)
Dept: INTERNAL MEDICINE | Facility: CLINIC | Age: 59
End: 2019-05-10

## 2019-05-10 DIAGNOSIS — J96.11 CHRONIC RESPIRATORY FAILURE WITH HYPOXIA AND HYPERCAPNIA: ICD-10-CM

## 2019-05-10 DIAGNOSIS — J43.1 PANLOBULAR EMPHYSEMA: ICD-10-CM

## 2019-05-10 DIAGNOSIS — J96.12 CHRONIC RESPIRATORY FAILURE WITH HYPOXIA AND HYPERCAPNIA: ICD-10-CM

## 2019-05-10 DIAGNOSIS — J44.9 CHRONIC OBSTRUCTIVE PULMONARY DISEASE, UNSPECIFIED COPD TYPE: Primary | ICD-10-CM

## 2019-05-10 NOTE — TELEPHONE ENCOUNTER
----- Message from Milena Contreras PharmD sent at 5/10/2019  2:09 PM CDT -----  Regarding: PA denied for Breo  Contact: [phone]  We have a script for Breo but since Medicaid has changed, they denied the prior authorization and they are only covering Adviar, Symbicort and Dulera. Would you like to switch to one of these? ThanksLayo

## 2019-05-10 NOTE — TELEPHONE ENCOUNTER
Good Afternoon.    The prior authorization for Ms. Canavan's Breo inhaler has been denied. [facility] the patient must try and fail all preferred alternatives: (Symbicort, Dulera, and Advair).    The patient has been notified and is aware of the medication status.     If there are any additional questions or concerns about the medication please contact the pharmacy at, [phone].    Thanks.    Sujey Castelan CPhT.  Patient Care Advocate  [facility]  [email or web address]

## 2019-05-13 ENCOUNTER — PATIENT MESSAGE (OUTPATIENT)
Dept: CRITICAL CARE MEDICINE | Facility: HOSPITAL | Age: 59
End: 2019-05-13

## 2019-05-13 RX ORDER — TIOTROPIUM BROMIDE 18 UG/1
18 CAPSULE ORAL; RESPIRATORY (INHALATION) DAILY
Qty: 90 CAPSULE | Refills: 3 | Status: SHIPPED | OUTPATIENT
Start: 2019-05-13 | End: 2021-06-21 | Stop reason: SDUPTHER

## 2019-05-13 RX ORDER — FLUTICASONE PROPIONATE AND SALMETEROL 250; 50 UG/1; UG/1
1 POWDER RESPIRATORY (INHALATION) 2 TIMES DAILY
Qty: 60 EACH | Refills: 11 | Status: SHIPPED | OUTPATIENT
Start: 2019-05-13 | End: 2020-05-01

## 2019-05-14 RX ORDER — FLUTICASONE FUROATE AND VILANTEROL 100; 25 UG/1; UG/1
1 POWDER RESPIRATORY (INHALATION) DAILY
Qty: 60 EACH | Refills: 5 | OUTPATIENT
Start: 2019-05-14

## 2019-05-20 RX ORDER — ROFLUMILAST 500 UG/1
500 TABLET ORAL DAILY
Qty: 30 TABLET | Refills: 11 | Status: SHIPPED | OUTPATIENT
Start: 2019-05-20 | End: 2021-06-21 | Stop reason: SDUPTHER

## 2019-06-20 DIAGNOSIS — F33.41 RECURRENT MAJOR DEPRESSIVE DISORDER, IN PARTIAL REMISSION: ICD-10-CM

## 2019-06-21 RX ORDER — SERTRALINE HYDROCHLORIDE 100 MG/1
100 TABLET, FILM COATED ORAL DAILY
Qty: 90 TABLET | Refills: 1 | Status: SHIPPED | OUTPATIENT
Start: 2019-06-21 | End: 2020-05-26

## 2019-08-01 DIAGNOSIS — I73.9 CLAUDICATION IN PERIPHERAL VASCULAR DISEASE: ICD-10-CM

## 2019-08-01 DIAGNOSIS — E78.2 MIXED HYPERLIPIDEMIA: ICD-10-CM

## 2019-08-01 DIAGNOSIS — Z91.09 ENVIRONMENTAL ALLERGIES: ICD-10-CM

## 2019-08-01 RX ORDER — ATORVASTATIN CALCIUM 40 MG/1
40 TABLET, FILM COATED ORAL DAILY
Qty: 90 TABLET | Refills: 3 | Status: SHIPPED | OUTPATIENT
Start: 2019-08-01 | End: 2020-10-06 | Stop reason: SDUPTHER

## 2019-08-02 RX ORDER — LEVOCETIRIZINE DIHYDROCHLORIDE 5 MG/1
5 TABLET, FILM COATED ORAL NIGHTLY
Qty: 30 TABLET | Refills: 5 | Status: SHIPPED | OUTPATIENT
Start: 2019-08-02 | End: 2020-10-06 | Stop reason: SDUPTHER

## 2019-09-18 ENCOUNTER — TELEPHONE (OUTPATIENT)
Dept: PULMONOLOGY | Facility: CLINIC | Age: 59
End: 2019-09-18

## 2019-09-18 DIAGNOSIS — J44.9 CHRONIC OBSTRUCTIVE PULMONARY DISEASE, UNSPECIFIED COPD TYPE: Primary | ICD-10-CM

## 2019-09-20 ENCOUNTER — TELEPHONE (OUTPATIENT)
Dept: PHARMACY | Facility: CLINIC | Age: 59
End: 2019-09-20

## 2019-09-25 ENCOUNTER — TELEPHONE (OUTPATIENT)
Dept: PHARMACY | Facility: CLINIC | Age: 59
End: 2019-09-25

## 2019-09-25 ENCOUNTER — PATIENT OUTREACH (OUTPATIENT)
Dept: ADMINISTRATIVE | Facility: OTHER | Age: 59
End: 2019-09-25

## 2019-09-25 DIAGNOSIS — E11.9 TYPE 2 DIABETES MELLITUS WITHOUT COMPLICATION, WITHOUT LONG-TERM CURRENT USE OF INSULIN: ICD-10-CM

## 2019-09-25 DIAGNOSIS — E11.9 TYPE 2 DIABETES MELLITUS WITHOUT COMPLICATION, UNSPECIFIED WHETHER LONG TERM INSULIN USE: Primary | ICD-10-CM

## 2019-09-27 ENCOUNTER — HOSPITAL ENCOUNTER (OUTPATIENT)
Dept: PULMONOLOGY | Facility: CLINIC | Age: 59
Discharge: HOME | End: 2019-09-27
Payer: MEDICAID

## 2019-09-27 ENCOUNTER — OFFICE VISIT (OUTPATIENT)
Dept: PULMONOLOGY | Facility: CLINIC | Age: 59
End: 2019-09-27
Payer: MEDICAID

## 2019-09-27 VITALS
HEIGHT: 65 IN | WEIGHT: 154 LBS | HEIGHT: 65 IN | HEART RATE: 90 BPM | WEIGHT: 154 LBS | BODY MASS INDEX: 25.66 KG/M2 | DIASTOLIC BLOOD PRESSURE: 78 MMHG | BODY MASS INDEX: 25.66 KG/M2 | OXYGEN SATURATION: 89 % | SYSTOLIC BLOOD PRESSURE: 124 MMHG

## 2019-09-27 DIAGNOSIS — J44.9 CHRONIC OBSTRUCTIVE PULMONARY DISEASE, UNSPECIFIED COPD TYPE: ICD-10-CM

## 2019-09-27 DIAGNOSIS — J43.1 PANLOBULAR EMPHYSEMA: ICD-10-CM

## 2019-09-27 DIAGNOSIS — J96.11 CHRONIC RESPIRATORY FAILURE WITH HYPOXIA: ICD-10-CM

## 2019-09-27 DIAGNOSIS — J30.2 SEASONAL ALLERGIES: Primary | ICD-10-CM

## 2019-09-27 PROCEDURE — 94618 PULMONARY STRESS TESTING: CPT | Mod: PBBFAC | Performed by: INTERNAL MEDICINE

## 2019-09-27 PROCEDURE — 99213 OFFICE O/P EST LOW 20 MIN: CPT | Mod: PBBFAC,25 | Performed by: NURSE PRACTITIONER

## 2019-09-27 PROCEDURE — 99214 OFFICE O/P EST MOD 30 MIN: CPT | Mod: 25,S$PBB,, | Performed by: NURSE PRACTITIONER

## 2019-09-27 PROCEDURE — 99999 PR PBB SHADOW E&M-EST. PATIENT-LVL III: CPT | Mod: PBBFAC,,, | Performed by: NURSE PRACTITIONER

## 2019-09-27 PROCEDURE — 94618 PULMONARY STRESS TESTING: ICD-10-PCS | Mod: 26,S$PBB,, | Performed by: INTERNAL MEDICINE

## 2019-09-27 PROCEDURE — 99214 PR OFFICE/OUTPT VISIT, EST, LEVL IV, 30-39 MIN: ICD-10-PCS | Mod: 25,S$PBB,, | Performed by: NURSE PRACTITIONER

## 2019-09-27 PROCEDURE — 99999 PR PBB SHADOW E&M-EST. PATIENT-LVL III: ICD-10-PCS | Mod: PBBFAC,,, | Performed by: NURSE PRACTITIONER

## 2019-09-27 PROCEDURE — 94618 PULMONARY STRESS TESTING: CPT | Mod: 26,S$PBB,, | Performed by: INTERNAL MEDICINE

## 2019-09-27 RX ORDER — DESLORATADINE 5 MG/1
5 TABLET ORAL DAILY
Qty: 30 TABLET | Refills: 11 | Status: SHIPPED | OUTPATIENT
Start: 2019-09-27 | End: 2020-11-19

## 2019-09-27 NOTE — PROGRESS NOTES
Subjective:       Patient ID: Ann Elizabeth Canavan is a 59 y.o. female.    Chief Complaint: COPD    HPI:   Ann Elizabeth Canavan is a 59 y.o. female who presents with evaluation after a 6MWT today.  She is a patient of Dr. Marquez but is new to me.    Uses albuterol prior to activity, at least once daily most days, hasn't used nebs in a while  87% RA sat at rest.  84% prior to walk without oxygen.  She is using all her meds as prescribed and is doing ok.  She plans to contact the transplant team again as she has thought more about it.   Has not needed PRN steroids for some time.     Review of Systems   Constitutional: Positive for fatigue. Negative for chills, activity change and night sweats.   HENT: Positive for postnasal drip. Negative for rhinorrhea, trouble swallowing and congestion.    Eyes: Negative for itching.   Respiratory: Positive for cough, shortness of breath, dyspnea on extertion and use of rescue inhaler. Negative for hemoptysis, sputum production, choking, chest tightness and wheezing.    Cardiovascular: Negative for chest pain and palpitations.   Genitourinary: Negative for difficulty urinating.   Endocrine: Negative for cold intolerance and heat intolerance.    Musculoskeletal: Negative for arthralgias.   Skin: Negative for rash.   Gastrointestinal: Negative for nausea, vomiting and acid reflux.   Neurological: Negative for dizziness and light-headedness.   Hematological: Negative for adenopathy.   Psychiatric/Behavioral: Positive for sleep disturbance.         Social History     Tobacco Use    Smoking status: Former Smoker     Packs/day: 1.00     Years: 40.00     Pack years: 40.00     Types: Cigarettes     Last attempt to quit: 2017     Years since quittin.2    Smokeless tobacco: Never Used    Tobacco comment: 3-5 a day   Substance Use Topics    Alcohol use: Yes     Alcohol/week: 0.8 standard drinks     Types: 1 Standard drinks or equivalent per week     Comment: occasional        Review of patient's allergies indicates:   Allergen Reactions    Avelox [moxifloxacin] Itching and Rash     IV     Past Medical History:   Diagnosis Date    Chronic respiratory failure with hypoxia and hypercapnia 5/4/2014    COPD (chronic obstructive pulmonary disease)     Depression     Hyperlipidemia 5/12/2017    Otosclerosis of right ear 5/12/2017    40% hearing back in 1988, had surgery with some improvement, but hearing loss persists.      PAD (peripheral artery disease)     Post-menopausal 2008    Pulmonary emphysema 4/28/2017    Tobacco dependence 5/2/2014    Type 2 diabetes mellitus without complication, without long-term current use of insulin 4/29/2017    Hb A1c 6.5% in 2/2017. Diet controlled.      Past Surgical History:   Procedure Laterality Date    BREAST BIOPSY      BREAST CYST ASPIRATION      GANGLION CYST EXCISION Right 1988    STAPEDES SURGERY Right 1988     Current Outpatient Medications on File Prior to Visit   Medication Sig    aspirin (ECOTRIN) 81 MG EC tablet Take 81 mg by mouth once daily.    atorvastatin (LIPITOR) 40 MG tablet Take 1 tablet (40 mg total) by mouth once daily.    blood sugar diagnostic Strp 1 strip by Misc.(Non-Drug; Combo Route) route once daily.    fluticasone-salmeterol diskus inhaler 250-50 mcg Inhale 1 puff into the lungs 2 (two) times daily. Controller    lancets Misc 1 Device by Misc.(Non-Drug; Combo Route) route once daily.    levocetirizine (XYZAL) 5 MG tablet Take 1 tablet (5 mg total) by mouth every evening.    loratadine (CLARITIN) 10 mg tablet Take 10 mg by mouth daily as needed for Allergies.    OPW TEST CLAIM - DO NOT FILL Inhale into the lungs. OPW test claim. Do not fill.    predniSONE (DELTASONE) 10 MG tablet Take 4 tabs daily for 3 days, then 3 tabs daily for 3 days, then 2 tabs daily for 2 days, then 1 tab daily for 2 days.    roflumilast (DALIRESP) 500 mcg Tab Take 1 tablet (500 mcg total) by mouth once daily.    sertraline  (ZOLOFT) 100 MG tablet Take 1 tablet (100 mg total) by mouth once daily.    tiotropium (SPIRIVA) 18 mcg inhalation capsule Inhale 1 capsule (18 mcg total) into the lungs once daily.    albuterol 90 mcg/actuation inhaler Inhale 2 puffs into the lungs every 6 (six) hours as needed for Wheezing. Rescue    albuterol-ipratropium (DUO-NEB) 2.5 mg-0.5 mg/3 mL nebulizer solution Take 3 mLs by nebulization every 6 (six) hours as needed for Wheezing or Shortness of Breath. Rescue     No current facility-administered medications on file prior to visit.        Objective:      Vitals:    09/27/19 1434   BP: 124/78   Pulse: 90     Physical Exam   Constitutional: She is oriented to person, place, and time. She appears well-developed and well-nourished. No distress.   HENT:   Head: Normocephalic.   Neck: Normal range of motion. Neck supple.   Cardiovascular: Normal rate and regular rhythm.   No murmur heard.  Pulmonary/Chest: Normal expansion, symmetric chest wall expansion and effort normal. No respiratory distress. She has decreased breath sounds. She has no wheezes. She has no rhonchi. She has no rales.   Slightly coarse   Abdominal: Soft. She exhibits no distension. There is no hepatosplenomegaly. There is no tenderness.   Musculoskeletal: Normal range of motion. She exhibits no edema.   Lymphadenopathy:     She has no cervical adenopathy.   Neurological: She is alert and oriented to person, place, and time. Gait normal.   Skin: Skin is warm and dry. No cyanosis. Nails show no clubbing.   Psychiatric: She has a normal mood and affect.   Vitals reviewed.    Personal Diagnostic Review    Pulmonary Function Tests 9/27/2019   Ordering Provider GRETTA Parker   Performing nurse/tech/RT Sheng RRT   Diagnosis Qualify for Oxygen   Height 65   Weight 2464   BMI (Calculated) 25.7   Patient Race    6MWT Status completed with stops   Patient Reported Leg pain;Dyspnea;Lightheadedness   Was O2 used? Yes   Delivery Method Pull  Tank;Cannula;Continuous Flow   6MW Distance walked (feet) 700   Distance walked (meters) 213.36   Did patient stop? Yes   How many times? 1   Stop Time 1 195   Restart Time 1 217   Did patient restart? Yes   Type of assistive device(s) used? a wheelchair   Oxygen Saturation 84   Supplemental Oxygen Room Air   Heart Rate 98   Blood Pressure 128/70   Kevin Dyspnea Rating  light   Is procedure ready for interpretation? Yes   Oxygen Qualification? Yes   Oxygen Saturation 94   Supplemental Oxygen 2 L/M   Heart Rate 98   Blood Pressure 139/67   Kevin Dyspnea Rating  light   Oxygen Saturation 0   Supplemental Oxygen 2 L/M   Heart Rate 121   Blood Pressure 143/69   Kevin Dyspnea Rating  very heavy   Recovery Time (seconds) 121   Oxygen Saturation 93   Supplemental Oxygen 2 L/M   Heart Rate 107   Some recent data might be hidden         Assessment:     Problem List Items Addressed This Visit        Pulmonary    Chronic respiratory failure with hypoxia (Chronic)    Overview     RA sat at rest prior to 6MWT 84%.    Has concentrator for use at home         Current Assessment & Plan     Order supplemental oxygen with goal of keeping sats between 88-92%.  2LPM per NC         Chronic obstructive pulmonary disease    Overview     Stable on Advair 250/50 BID and Spiriva.  Scant needs for albuterol.           Current Assessment & Plan     Continue as now.           Other Visit Diagnoses     Seasonal allergies    -  Primary        Follow up with Dr. Gallardo

## 2019-09-28 NOTE — PROCEDURES
Ann Elizabeth Canavan is a 59 y.o.  female patient, who presents for a 6 minute walk test ordered by Ronit Parker DNP.  The diagnosis is Qualify for Oxygen; COPD/Emphysema.  The patient's BMI is 25.7 kg/m2. Predicted distance (lower limit of normal) is 379.49 meters.    Test Results:    The test was completed with stops.  The patient stopped 1 time for a total of 22 seconds.  The total time walked was 338 seconds.  During walking, the patient reported:  Leg pain, Dyspnea, Lightheadedness.  The patient used a wheelchair for assistance and supplemental oxygen during testing.     09/27/2019---------Distance: 213.36 meters (700 feet)     O2 Sat % Supplemental Oxygen Heart Rate Blood Pressure Kevin Scale   Pre-exercise  (Resting) 94 % 2 L/M  98 bpm  139/67 mmHg 2    During Exercise   80 %  2 L/M  121 bpm  143/69 mmHg 7-8    Post-exercise   93 %  2 L/M  107 bpm            Recovery Time: 121 seconds      Oxygen Qualification:     O2 Sat % Supplemental Oxygen Heart Rate Blood Pressure Kevin Scale   Pre-exercise  (Resting) 84 % Room Air 98 bpm 128/70 mmHg 2     Performing nurse/tech: Sheng TORRES      PREVIOUS STUDY:   6/14/2048---------Distance: 213.36 meters (700 feet)       O2 Sat % Supplemental Oxygen Heart Rate Blood Pressure Kevin Scale   Pre-exercise  (Resting) 98 % 2 L/M 94 bpm 139/65 mmHg 0.5   During Exercise   81 % 2 L/M 142 bpm 148/89 mmHg 5-6   Post-exercise  (Recovery) 96 % 2 L/M  115 bpm   mmHg        CLINICAL INTERPRETATION:  Six minute walk distance is 213.36 meters (700 feet) with very heavy dyspnea.  At rest, there was significant desaturation while breathing room air.  During exercise, there was significant desaturation while breathing supplemental oxygen.  Blood pressure remained stable and Heart rate increased significantly with walking.  The patient reported non-pulmonary symptoms during exercise.  Significant exercise impairment is likely due to desaturation and subjective symptoms.  The patient  did complete the study, walking 338 seconds of the 360 second test.  The patient may benefit from using supplemental oxygen.  Since the previous study in June 2018, exercise capacity is unchanged.  Based upon age and body mass index, exercise capacity is less than predicted.   Oxygen saturation did improve while breathing supplemental oxygen.

## 2019-11-13 ENCOUNTER — PATIENT MESSAGE (OUTPATIENT)
Dept: PULMONOLOGY | Facility: CLINIC | Age: 59
End: 2019-11-13

## 2019-11-13 RX ORDER — PREDNISONE 20 MG/1
40 TABLET ORAL DAILY
Qty: 10 TABLET | Refills: 0 | Status: SHIPPED | OUTPATIENT
Start: 2019-11-13 | End: 2019-12-02

## 2019-11-13 NOTE — TELEPHONE ENCOUNTER
Patient requesting a course of prednisone with concern for impending COPD Exacerbation.    · Prednisone 40mg daily x 5 days ordered  · Request patient be evaluated at Urgent care if situation worsens.

## 2019-11-22 DIAGNOSIS — Z12.39 BREAST CANCER SCREENING: ICD-10-CM

## 2020-01-07 ENCOUNTER — HOSPITAL ENCOUNTER (EMERGENCY)
Facility: HOSPITAL | Age: 60
Discharge: HOME | End: 2020-01-07
Attending: EMERGENCY MEDICINE

## 2020-01-07 VITALS
OXYGEN SATURATION: 93 % | HEIGHT: 66 IN | RESPIRATION RATE: 17 BRPM | TEMPERATURE: 98 F | DIASTOLIC BLOOD PRESSURE: 78 MMHG | BODY MASS INDEX: 24.11 KG/M2 | WEIGHT: 150 LBS | HEART RATE: 108 BPM | SYSTOLIC BLOOD PRESSURE: 122 MMHG

## 2020-01-07 DIAGNOSIS — R06.02 SHORTNESS OF BREATH: ICD-10-CM

## 2020-01-07 DIAGNOSIS — J44.1 COPD EXACERBATION: Primary | ICD-10-CM

## 2020-01-07 LAB
ALBUMIN SERPL BCP-MCNC: 4.1 G/DL (ref 3.5–5.2)
ALP SERPL-CCNC: 98 U/L (ref 55–135)
ALT SERPL W/O P-5'-P-CCNC: 10 U/L (ref 10–44)
ANION GAP SERPL CALC-SCNC: 11 MMOL/L (ref 8–16)
AST SERPL-CCNC: 15 U/L (ref 10–40)
BASOPHILS # BLD AUTO: 0.02 K/UL (ref 0–0.2)
BASOPHILS NFR BLD: 0.3 % (ref 0–1.9)
BILIRUB SERPL-MCNC: 0.5 MG/DL (ref 0.1–1)
BUN SERPL-MCNC: 9 MG/DL (ref 6–20)
CALCIUM SERPL-MCNC: 9.5 MG/DL (ref 8.7–10.5)
CHLORIDE SERPL-SCNC: 98 MMOL/L (ref 95–110)
CO2 SERPL-SCNC: 31 MMOL/L (ref 23–29)
CREAT SERPL-MCNC: 0.7 MG/DL (ref 0.5–1.4)
DIFFERENTIAL METHOD: ABNORMAL
EOSINOPHIL # BLD AUTO: 0.2 K/UL (ref 0–0.5)
EOSINOPHIL NFR BLD: 2.9 % (ref 0–8)
ERYTHROCYTE [DISTWIDTH] IN BLOOD BY AUTOMATED COUNT: 13.1 % (ref 11.5–14.5)
EST. GFR  (AFRICAN AMERICAN): >60 ML/MIN/1.73 M^2
EST. GFR  (NON AFRICAN AMERICAN): >60 ML/MIN/1.73 M^2
GLUCOSE SERPL-MCNC: 125 MG/DL (ref 70–110)
HCT VFR BLD AUTO: 43.2 % (ref 37–48.5)
HGB BLD-MCNC: 13.4 G/DL (ref 12–16)
IMM GRANULOCYTES # BLD AUTO: 0.01 K/UL (ref 0–0.04)
IMM GRANULOCYTES NFR BLD AUTO: 0.1 % (ref 0–0.5)
LYMPHOCYTES # BLD AUTO: 1 K/UL (ref 1–4.8)
LYMPHOCYTES NFR BLD: 13.4 % (ref 18–48)
MCH RBC QN AUTO: 28.9 PG (ref 27–31)
MCHC RBC AUTO-ENTMCNC: 31 G/DL (ref 32–36)
MCV RBC AUTO: 93 FL (ref 82–98)
MONOCYTES # BLD AUTO: 0.4 K/UL (ref 0.3–1)
MONOCYTES NFR BLD: 4.8 % (ref 4–15)
NEUTROPHILS # BLD AUTO: 6 K/UL (ref 1.8–7.7)
NEUTROPHILS NFR BLD: 78.5 % (ref 38–73)
NRBC BLD-RTO: 0 /100 WBC
PLATELET # BLD AUTO: 165 K/UL (ref 150–350)
PMV BLD AUTO: 11 FL (ref 9.2–12.9)
POTASSIUM SERPL-SCNC: 3.9 MMOL/L (ref 3.5–5.1)
PROT SERPL-MCNC: 7.3 G/DL (ref 6–8.4)
RBC # BLD AUTO: 4.64 M/UL (ref 4–5.4)
SODIUM SERPL-SCNC: 140 MMOL/L (ref 136–145)
WBC # BLD AUTO: 7.68 K/UL (ref 3.9–12.7)

## 2020-01-07 PROCEDURE — 63600175 PHARM REV CODE 636 W HCPCS: Performed by: EMERGENCY MEDICINE

## 2020-01-07 PROCEDURE — 25000242 PHARM REV CODE 250 ALT 637 W/ HCPCS

## 2020-01-07 PROCEDURE — 25000242 PHARM REV CODE 250 ALT 637 W/ HCPCS: Performed by: EMERGENCY MEDICINE

## 2020-01-07 PROCEDURE — 99285 EMERGENCY DEPT VISIT HI MDM: CPT | Mod: ,,, | Performed by: EMERGENCY MEDICINE

## 2020-01-07 PROCEDURE — 96374 THER/PROPH/DIAG INJ IV PUSH: CPT

## 2020-01-07 PROCEDURE — 99285 EMERGENCY DEPT VISIT HI MDM: CPT | Mod: 25

## 2020-01-07 PROCEDURE — 99285 PR EMERGENCY DEPT VISIT,LEVEL V: ICD-10-PCS | Mod: ,,, | Performed by: EMERGENCY MEDICINE

## 2020-01-07 PROCEDURE — 80053 COMPREHEN METABOLIC PANEL: CPT

## 2020-01-07 PROCEDURE — 85025 COMPLETE CBC W/AUTO DIFF WBC: CPT

## 2020-01-07 RX ORDER — METHYLPREDNISOLONE SOD SUCC 125 MG
125 VIAL (EA) INJECTION
Status: COMPLETED | OUTPATIENT
Start: 2020-01-07 | End: 2020-01-07

## 2020-01-07 RX ORDER — IPRATROPIUM BROMIDE AND ALBUTEROL SULFATE 2.5; .5 MG/3ML; MG/3ML
SOLUTION RESPIRATORY (INHALATION)
Status: COMPLETED
Start: 2020-01-07 | End: 2020-01-07

## 2020-01-07 RX ORDER — IPRATROPIUM BROMIDE AND ALBUTEROL SULFATE 2.5; .5 MG/3ML; MG/3ML
3 SOLUTION RESPIRATORY (INHALATION)
Status: COMPLETED | OUTPATIENT
Start: 2020-01-07 | End: 2020-01-07

## 2020-01-07 RX ORDER — PREDNISONE 20 MG/1
40 TABLET ORAL DAILY
Qty: 10 TABLET | Refills: 0 | Status: SHIPPED | OUTPATIENT
Start: 2020-01-07 | End: 2020-01-14

## 2020-01-07 RX ADMIN — METHYLPREDNISOLONE SODIUM SUCCINATE 125 MG: 125 INJECTION, POWDER, FOR SOLUTION INTRAMUSCULAR; INTRAVENOUS at 04:01

## 2020-01-07 RX ADMIN — IPRATROPIUM BROMIDE AND ALBUTEROL SULFATE 3 ML: .5; 3 SOLUTION RESPIRATORY (INHALATION) at 04:01

## 2020-01-07 NOTE — PROVIDER PROGRESS NOTES - EMERGENCY DEPT.
"Encounter Date: 1/7/2020    ED Physician Progress Notes        Physician Note:   Medical screening exam completed.  I have conducted a focused provider triage encounter, findings are as follows:    Brief history of present illness:  51-year-old female with past medical history of COPD on 2 L or oxygen with worsening shortness of breath for several weeks    --------------------------------------               01/07/20 01/07/20                  1403          1407     --------------------------------------   BP:        (!) 158/82                 Pulse:       (!) 123                  Resp:          20                     Temp:   97.6 °F (36.4 °C)             SpO2:        (!) 86%      (!) 94%     Weight:  68 kg (150 lb)               Height:  5' 6" (1.676 m)             --------------------------------------    Pertinent physical exam:  Frail-appearing, tachycardic, decreased breath sounds bilaterally with faint expiratory wheeze.    Brief workup plan:  CBC, CMP, EKG, chest x-ray, nebulizer treatment and steroid.    Preliminary workup initiated; this workup will be continued and followed by the physician or advanced practice provider that is assigned to the patient when roomed.       "

## 2020-01-07 NOTE — ED PROVIDER NOTES
Encounter Date: 2020       History     Chief Complaint   Patient presents with    Shortness of Breath     Pt c/o SOB related to COPD.  Pt uses oxygen at home as needed.     59-year-old female past medical history of COPD, on 2 L home O2 nasal cannula, presenting with several weeks of worsening shortness of breath both at rest and worsened with exertion.  Denies fever chills, chest pain, cough, lower extremity edema. Patient reports that she typically has improvement with roflumirast but she has run out of this medication.        Review of patient's allergies indicates:   Allergen Reactions    Avelox [moxifloxacin] Itching and Rash     IV     Past Medical History:   Diagnosis Date    Chronic respiratory failure with hypoxia and hypercapnia 2014    COPD (chronic obstructive pulmonary disease)     Depression     Hyperlipidemia 2017    Otosclerosis of right ear 2017    40% hearing back in , had surgery with some improvement, but hearing loss persists.      PAD (peripheral artery disease)     Post-menopausal     Pulmonary emphysema 2017    Tobacco dependence 2014    Type 2 diabetes mellitus without complication, without long-term current use of insulin 2017    Hb A1c 6.5% in 2017. Diet controlled.      Past Surgical History:   Procedure Laterality Date    BREAST BIOPSY      BREAST CYST ASPIRATION      GANGLION CYST EXCISION Right 1988    STAPEDES SURGERY Right      Family History   Problem Relation Age of Onset    Diabetes Mother     Heart disease Father         Strong FH of CAD/CHF on fathers side    Peripheral vascular disease Brother         With necrosis of leg    Diabetes Maternal Uncle     Cancer Neg Hx     Thyroid disease Neg Hx      Social History     Tobacco Use    Smoking status: Former Smoker     Packs/day: 1.00     Years: 40.00     Pack years: 40.00     Types: Cigarettes     Last attempt to quit: 2017     Years since quittin.5     Smokeless tobacco: Never Used    Tobacco comment: 3-5 a day   Substance Use Topics    Alcohol use: Yes     Alcohol/week: 0.8 standard drinks     Types: 1 Standard drinks or equivalent per week     Comment: occasional    Drug use: No     Review of Systems   Constitutional: Negative for chills and fever.   Eyes: Negative for visual disturbance.        Neg vision changes   Respiratory: Positive for shortness of breath. Negative for cough.    Cardiovascular: Negative for chest pain and leg swelling.   Gastrointestinal: Negative for abdominal pain, nausea and vomiting.        Neg changes in stool   Genitourinary:        Neg changes in urination   Musculoskeletal: Negative for arthralgias and joint swelling.   Skin: Negative for rash.   Allergic/Immunologic: Negative for immunocompromised state.   Neurological: Negative for headaches.   Hematological: Does not bruise/bleed easily.       Physical Exam     Initial Vitals [01/07/20 1403]   BP Pulse Resp Temp SpO2   (!) 158/82 (!) 123 20 97.6 °F (36.4 °C) (!) 86 %      MAP       --         Physical Exam    Nursing note and vitals reviewed.  Constitutional: She appears well-developed. She is not diaphoretic. No distress.   Frail-appearing   HENT:   Head: Normocephalic and atraumatic.   Nose: Nose normal.   Eyes: EOM are normal. Pupils are equal, round, and reactive to light. No scleral icterus.   Neck: Normal range of motion. Neck supple.   Cardiovascular: Normal rate, regular rhythm and intact distal pulses.   Pulmonary/Chest: No stridor.   Decreased breath sounds bilaterally with faint expiratory wheeze   Abdominal: Soft. Bowel sounds are normal. She exhibits no distension. There is no tenderness.   Musculoskeletal: Normal range of motion. She exhibits no edema or tenderness.   Neurological: She is alert and oriented to person, place, and time. She has normal strength. No cranial nerve deficit or sensory deficit.   Skin: Skin is warm and dry. Capillary refill takes less  than 2 seconds. No rash noted.   Psychiatric: She has a normal mood and affect. Her behavior is normal. Judgment and thought content normal.         ED Course   Procedures  Labs Reviewed   CBC W/ AUTO DIFFERENTIAL - Abnormal; Notable for the following components:       Result Value    Mean Corpuscular Hemoglobin Conc 31.0 (*)     Gran% 78.5 (*)     Lymph% 13.4 (*)     All other components within normal limits   COMPREHENSIVE METABOLIC PANEL - Abnormal; Notable for the following components:    CO2 31 (*)     Glucose 125 (*)     All other components within normal limits          Imaging Results          X-Ray Chest PA And Lateral (Final result)  Result time 01/07/20 15:16:17    Final result by Mansoor Eagle MD (01/07/20 15:16:17)                 Impression:      See above      Electronically signed by: Mansoor Eagle MD  Date:    01/07/2020  Time:    15:16             Narrative:    EXAMINATION:  XR CHEST PA AND LATERAL    CLINICAL HISTORY:  Shortness of breath    TECHNIQUE:  PA and lateral views of the chest were performed.    COMPARISON:  Non 06/21/2017 e    FINDINGS:  Heart size normal.  The lungs are clear.  No pleural effusion.                                 Medical Decision Making:   History:   Old Medical Records: I decided to obtain old medical records.  Initial Assessment:   Patient is frail but in no significant respiratory distress, sats low 90s on nasal cannula, decreased breath sounds bilaterally with faint expiratory wheeze, no lower extremity edema  Differential Diagnosis:   COPD exacerbation, pneumonia, less likely influenza given no cough or systemic symptoms, no chest pain or EKG changes concerning for ACS, no volume overload consistent with CHF  Independently Interpreted Test(s):   I have ordered and independently interpreted X-rays - see summary below.       <> Summary of X-Ray Reading(s): No focal consolidation no pleural effusion no pulmonary edema  Clinical Tests:   Lab Tests: Ordered and  Reviewed  Radiological Study: Ordered and Reviewed  Medical Tests: Ordered and Reviewed  ED Management:  Will obtain CBC, CMP, chest x-ray, give 3 duo nebs and Solu-Medrol.    4:32 PM  CXR neg, however pt has still not received either nebs or steroids. Will re-evaluate when pt recieves medicartions.     Patient signed out to Dr. Dailey with medications and re-evaluation pending.                                 Clinical Impression:       ICD-10-CM ICD-9-CM   1. COPD exacerbation J44.1 491.21   2. Shortness of breath R06.02 786.05                             Sun Villanueva MD  01/09/20 0472

## 2020-01-07 NOTE — ED TRIAGE NOTES
Ann Elizabeth Canavan, a 59 y.o. female presents to the ED w/ complaint of shortness of breath which has been ongoing for about 1 month has become worse this morning. The shortness of breath is not at rest but with movement/ activity    Patient denies chest pain, abdominal pain and back pain     Patient denies fevers and chills       Triage note:  Chief Complaint   Patient presents with    Shortness of Breath     Pt c/o SOB related to COPD.  Pt uses oxygen at home as needed.     Review of patient's allergies indicates:   Allergen Reactions    Avelox [moxifloxacin] Itching and Rash     IV     Past Medical History:   Diagnosis Date    Chronic respiratory failure with hypoxia and hypercapnia 5/4/2014    COPD (chronic obstructive pulmonary disease)     Depression     Hyperlipidemia 5/12/2017    Otosclerosis of right ear 5/12/2017    40% hearing back in 1988, had surgery with some improvement, but hearing loss persists.      PAD (peripheral artery disease)     Post-menopausal 2008    Pulmonary emphysema 4/28/2017    Tobacco dependence 5/2/2014    Type 2 diabetes mellitus without complication, without long-term current use of insulin 4/29/2017    Hb A1c 6.5% in 2/2017. Diet controlled.

## 2020-01-07 NOTE — ED NOTES
Patient identifiers verified and correct for Ann Elizabeth Canavan.    LOC: The patient is awake, alert and aware of environment with an appropriate affect, the patient is oriented x 4 and speaking appropriately.    APPEARANCE: Patient resting comfortably and in no acute distress, patient is clean and well groomed, patient's clothing is properly fastened.  SKIN: The skin is warm and dry, color consistent with ethnicity, patient has normal skin turgor and moist mucus membranes, skin intact, no breakdown or bruising noted.  MUSCULOSKELETAL: Patient moving all extremities spontaneously, no obvious swelling or deformities noted.  RESPIRATORY: Airway is open and patent, respirations are spontaneous, patient has a normal effort and rate, no accessory muscle use noted, bilateral breath sounds clear  CARDIAC: Tachycardia,  no periphreal edema noted, capillary refill < 3 seconds.  ABDOMEN: Soft and non tender to palpation, no distention noted, normoactive bowel sounds present in all four quadrants.  NEUROLOGIC: PERRLA, 3 mm bilaterally, eyes open spontaneously, behavior appropriate to situation, follows commands, facial expression symmetrical, bilateral hand grasp equal and even, purposeful motor response noted, normal sensation in all extremities when touched with a finger.

## 2020-01-23 ENCOUNTER — TELEPHONE (OUTPATIENT)
Dept: INTERNAL MEDICINE | Facility: CLINIC | Age: 60
End: 2020-01-23

## 2020-01-23 NOTE — TELEPHONE ENCOUNTER
Please call and remind the patient it's time for the annual and schedule if possible.  Please schedule labs a week prior and let me know once done. I'll order the labs and link. Thanks!

## 2020-01-28 ENCOUNTER — PATIENT MESSAGE (OUTPATIENT)
Dept: INTERNAL MEDICINE | Facility: CLINIC | Age: 60
End: 2020-01-28

## 2020-01-28 NOTE — TELEPHONE ENCOUNTER
I got in touch with Lisa Mckay who is with the pharmacy assistance program. Unfortunately the program doesn't cover medicaid patients.

## 2020-01-30 ENCOUNTER — TELEPHONE (OUTPATIENT)
Dept: PHARMACY | Facility: CLINIC | Age: 60
End: 2020-01-30

## 2020-01-30 NOTE — TELEPHONE ENCOUNTER
An application was submitted to Dr. Bowles but she haven't seen the patient since 2018 so she didn't sign the application.  After speaking to the patient she wanted me to send the application to Dr. Noriega.  I'm still waiting for Dr. Noriega to sign the application.

## 2020-02-12 ENCOUNTER — TELEPHONE (OUTPATIENT)
Dept: PHARMACY | Facility: CLINIC | Age: 60
End: 2020-02-12

## 2020-02-12 NOTE — TELEPHONE ENCOUNTER
I received sign application from Dr. Noriega, I faxed application to [employer] and [employer] pending approval.

## 2020-02-19 ENCOUNTER — TELEPHONE (OUTPATIENT)
Dept: PHARMACY | Facility: CLINIC | Age: 60
End: 2020-02-19

## 2020-02-19 NOTE — TELEPHONE ENCOUNTER
Patient approved with [employer])& [employer]) until 2/16/2021.  Medication will be shipped to patients home within 7-10 business days.

## 2020-03-10 ENCOUNTER — TELEPHONE (OUTPATIENT)
Dept: PHARMACY | Facility: CLINIC | Age: 60
End: 2020-03-10

## 2020-03-10 NOTE — TELEPHONE ENCOUNTER
Patient approved with [employer])until 3/08/2021.  Medication will be shipped to patients home within 7-10 business days

## 2020-05-01 DIAGNOSIS — J44.9 CHRONIC OBSTRUCTIVE PULMONARY DISEASE, UNSPECIFIED COPD TYPE: ICD-10-CM

## 2020-05-01 RX ORDER — FLUTICASONE PROPIONATE AND SALMETEROL 250; 50 UG/1; UG/1
1 POWDER RESPIRATORY (INHALATION) 2 TIMES DAILY
Qty: 60 EACH | Refills: 11 | Status: CANCELLED | OUTPATIENT
Start: 2020-05-01 | End: 2021-05-01

## 2020-05-04 RX ORDER — FLUTICASONE PROPIONATE AND SALMETEROL 250; 50 UG/1; UG/1
1 POWDER RESPIRATORY (INHALATION) 2 TIMES DAILY
Qty: 60 EACH | Refills: 11 | Status: SHIPPED | OUTPATIENT
Start: 2020-05-04 | End: 2020-11-19

## 2020-05-26 DIAGNOSIS — F33.41 RECURRENT MAJOR DEPRESSIVE DISORDER, IN PARTIAL REMISSION: ICD-10-CM

## 2020-05-26 RX ORDER — SERTRALINE HYDROCHLORIDE 100 MG/1
100 TABLET, FILM COATED ORAL DAILY
Qty: 90 TABLET | Refills: 1 | Status: SHIPPED | OUTPATIENT
Start: 2020-05-26 | End: 2020-12-10 | Stop reason: SDUPTHER

## 2020-05-26 NOTE — TELEPHONE ENCOUNTER
Please call pt to let her know that I haven't seen her since 2018. Recommend making a following up appointment for September/October. Will only refill meds till then but will need an office visit for further refills. Will give last 6 mo refill of sertraline.

## 2020-07-07 ENCOUNTER — PATIENT MESSAGE (OUTPATIENT)
Dept: INTERNAL MEDICINE | Facility: CLINIC | Age: 60
End: 2020-07-07

## 2020-07-08 RX ORDER — PREDNISONE 20 MG/1
40 TABLET ORAL DAILY
Qty: 5 TABLET | Refills: 0 | Status: SHIPPED | OUTPATIENT
Start: 2020-07-08 | End: 2021-01-01 | Stop reason: SDUPTHER

## 2020-07-08 NOTE — TELEPHONE ENCOUNTER
Let her know this is the very last time I'm going to send in meds since I haven't seen her since 2018 and she has no upcoming appointments. She needs to make a f/u appt asap.

## 2020-07-09 ENCOUNTER — PATIENT MESSAGE (OUTPATIENT)
Dept: INTERNAL MEDICINE | Facility: CLINIC | Age: 60
End: 2020-07-09

## 2020-08-14 ENCOUNTER — TELEPHONE (OUTPATIENT)
Dept: PHARMACY | Facility: CLINIC | Age: 60
End: 2020-08-14

## 2020-08-14 NOTE — TELEPHONE ENCOUNTER
Ordered refills with [employer], [employer] ( [employer])& [employer])..  Medication will be shipped to patients home within 7-10 business days.

## 2020-10-05 ENCOUNTER — PATIENT MESSAGE (OUTPATIENT)
Dept: TRANSPLANT | Facility: CLINIC | Age: 60
End: 2020-10-05

## 2020-10-05 ENCOUNTER — PATIENT MESSAGE (OUTPATIENT)
Dept: ADMINISTRATIVE | Facility: HOSPITAL | Age: 60
End: 2020-10-05

## 2020-10-06 ENCOUNTER — TELEPHONE (OUTPATIENT)
Dept: TRANSPLANT | Facility: CLINIC | Age: 60
End: 2020-10-06

## 2020-10-06 ENCOUNTER — TELEPHONE (OUTPATIENT)
Dept: ADMINISTRATIVE | Facility: HOSPITAL | Age: 60
End: 2020-10-06

## 2020-10-06 DIAGNOSIS — Z91.09 ENVIRONMENTAL ALLERGIES: ICD-10-CM

## 2020-10-06 DIAGNOSIS — Z01.812 PRE-PROCEDURE LAB EXAM: Primary | ICD-10-CM

## 2020-10-06 DIAGNOSIS — I73.9 CLAUDICATION IN PERIPHERAL VASCULAR DISEASE: ICD-10-CM

## 2020-10-06 DIAGNOSIS — E78.2 MIXED HYPERLIPIDEMIA: ICD-10-CM

## 2020-10-06 DIAGNOSIS — Z76.82 LUNG TRANSPLANT CANDIDATE: ICD-10-CM

## 2020-10-06 DIAGNOSIS — J44.9 COPD, VERY SEVERE: ICD-10-CM

## 2020-10-06 RX ORDER — ATORVASTATIN CALCIUM 40 MG/1
40 TABLET, FILM COATED ORAL DAILY
Qty: 90 TABLET | Refills: 3 | Status: SHIPPED | OUTPATIENT
Start: 2020-10-06 | End: 2020-12-09

## 2020-10-06 RX ORDER — LEVOCETIRIZINE DIHYDROCHLORIDE 5 MG/1
5 TABLET, FILM COATED ORAL NIGHTLY
Qty: 90 TABLET | Refills: 0 | Status: SHIPPED | OUTPATIENT
Start: 2020-10-06 | End: 2020-12-10 | Stop reason: SDUPTHER

## 2020-10-06 NOTE — TELEPHONE ENCOUNTER
----- Message from Antony Rose MD sent at 10/6/2020  9:22 AM CDT -----  Regarding: RE: Re- Referral/Re-establish patient  yes  ----- Message -----  From: Devora Posadas  Sent: 10/6/2020   9:01 AM CDT  To: Antony Rose MD  Subject: Re- Referral/Re-establish patient                Referral from self-referred (previously referred by Dr. Noriega, last pulmonary visit with Ronit Parker NP 09/2019)    Dx: COPD  Patient is 60 year old female.  BMI 2019 - 25.63    PFT's 6/14/18  FVC 1.88  FEV1 0.82  TLC 3.80  DLCO 6.40    Patient is requesting to re-establish with lung transplant, as she is ready for evaluation. Notified her that we would need to re-establish as it has been over two years.     Consult?

## 2020-10-06 NOTE — LETTER
October 8, 2020     Heide Zarate  [address] [facility] [address]  Phone: [phone]  Fax: [phone]       Dear Heide Zarate:    Patient: Ann Elizabeth Canavan   MR Number: [id number]   YOB: 1960     Thank you for the referral of Ann Elizabeth Canavan to our lung transplant program. An initial appointment with the transplant team has been scheduled for October 22, 2020. You will receive an after-visit summary following the completion of your patients appointment in our clinic.    Thank you again for your trust in our program. If there is anything we can do for you or your staff, please feel free to contact us at [phone].    Sincerely,         David Weill, MD   Director, Lung Transplantation   Pulmonary & Critical Care Medicine    [facility]  [address] [place] [address] [phone]

## 2020-10-06 NOTE — TELEPHONE ENCOUNTER
I went to make an appointment on here, but the first available was January 21st I think.  Is there anything sooner?  I need some medications refilled also.  Thank you.  Gary

## 2020-10-06 NOTE — TELEPHONE ENCOUNTER
Received patient message requesting follow up with transplant team. Records reviewed, note patient prior pending lung transplant eval June 2018, but did not follow up with caregiver plan, readiness for lung transplant eval. Patient last pulmonary f/u 9/2019, with 6MWT 700 ft. Patient with exacerbation in January 2020. Also note patient insurance lapse during 2020, patient without medication for three months. Will review with provider for authorization for consultation visit, as it has been over two years since she was last seen.

## 2020-10-07 ENCOUNTER — PATIENT MESSAGE (OUTPATIENT)
Dept: ADMINISTRATIVE | Facility: HOSPITAL | Age: 60
End: 2020-10-07

## 2020-10-08 NOTE — TELEPHONE ENCOUNTER
Contacted patient about lung transplant referral, consult scheduling. Patient is interested and requests next available. Offered 10/20/20. Patient requested next available date. Patient is aware we will confirm appointment date and time, she will need to obtain PFTs, 6MWT, echo, and CT with consult. Patient is aware that she will need to aim for a goal walk distance of 800 ft prior to being a candidate for lung transplant evaluation, in addition to other evaluation testing. Patient verbalized understanding, she is aware will need to wear a mask at all times, bring oxygen for travel, but did not state any person will accompany her. All questions answered at this time. Request to .

## 2020-10-09 ENCOUNTER — TELEPHONE (OUTPATIENT)
Dept: TRANSPLANT | Facility: CLINIC | Age: 60
End: 2020-10-09

## 2020-10-09 NOTE — TELEPHONE ENCOUNTER
Spoke with patient about upcoming appointments on 10/22/20. CT and COVID test Caden Bacon on 10/19/20

## 2020-10-16 ENCOUNTER — TELEPHONE (OUTPATIENT)
Dept: TRANSPLANT | Facility: CLINIC | Age: 60
End: 2020-10-16

## 2020-10-16 NOTE — TELEPHONE ENCOUNTER
Spoke with patient about upcoming appointments on 10/22/20.  And COVID test and CT scan on Caden Bacon on 10/19/20.

## 2020-10-19 ENCOUNTER — HOSPITAL ENCOUNTER (OUTPATIENT)
Dept: RADIOLOGY | Facility: HOSPITAL | Age: 60
Discharge: HOME | End: 2020-10-19
Attending: INTERNAL MEDICINE
Payer: MEDICARE

## 2020-10-19 ENCOUNTER — LAB VISIT (OUTPATIENT)
Dept: SURGERY | Facility: CLINIC | Age: 60
End: 2020-10-19
Payer: MEDICARE

## 2020-10-19 DIAGNOSIS — Z01.812 PRE-PROCEDURE LAB EXAM: ICD-10-CM

## 2020-10-19 DIAGNOSIS — J44.9 COPD, VERY SEVERE: ICD-10-CM

## 2020-10-19 DIAGNOSIS — Z76.82 LUNG TRANSPLANT CANDIDATE: ICD-10-CM

## 2020-10-19 LAB — SARS-COV-2 RNA RESP QL NAA+PROBE: NOT DETECTED

## 2020-10-19 PROCEDURE — 71250 CT CHEST WITHOUT CONTRAST: ICD-10-PCS | Mod: 26,TXP,, | Performed by: RADIOLOGY

## 2020-10-19 PROCEDURE — 71250 CT THORAX DX C-: CPT | Mod: 26,TXP,, | Performed by: RADIOLOGY

## 2020-10-19 PROCEDURE — 71250 CT THORAX DX C-: CPT | Mod: TC,TXP

## 2020-10-19 PROCEDURE — U0003 INFECTIOUS AGENT DETECTION BY NUCLEIC ACID (DNA OR RNA); SEVERE ACUTE RESPIRATORY SYNDROME CORONAVIRUS 2 (SARS-COV-2) (CORONAVIRUS DISEASE [COVID-19]), AMPLIFIED PROBE TECHNIQUE, MAKING USE OF HIGH THROUGHPUT TECHNOLOGIES AS DESCRIBED BY CMS-2020-01-R: HCPCS | Mod: TXP

## 2020-10-21 ENCOUNTER — TELEPHONE (OUTPATIENT)
Dept: TRANSPLANT | Facility: CLINIC | Age: 60
End: 2020-10-21

## 2020-10-22 ENCOUNTER — INITIAL CONSULT (OUTPATIENT)
Dept: TRANSPLANT | Facility: CLINIC | Age: 60
End: 2020-10-22
Payer: MEDICARE

## 2020-10-22 ENCOUNTER — HOSPITAL ENCOUNTER (OUTPATIENT)
Dept: PULMONOLOGY | Facility: CLINIC | Age: 60
Discharge: HOME | End: 2020-10-22
Payer: MEDICARE

## 2020-10-22 ENCOUNTER — HOSPITAL ENCOUNTER (OUTPATIENT)
Dept: CARDIOLOGY | Facility: HOSPITAL | Age: 60
Discharge: HOME | End: 2020-10-22
Attending: INTERNAL MEDICINE
Payer: MEDICARE

## 2020-10-22 VITALS
SYSTOLIC BLOOD PRESSURE: 150 MMHG | HEIGHT: 65 IN | RESPIRATION RATE: 20 BRPM | BODY MASS INDEX: 22.16 KG/M2 | HEART RATE: 110 BPM | BODY MASS INDEX: 22.33 KG/M2 | WEIGHT: 133 LBS | SYSTOLIC BLOOD PRESSURE: 162 MMHG | OXYGEN SATURATION: 96 % | DIASTOLIC BLOOD PRESSURE: 90 MMHG | HEIGHT: 65 IN | HEART RATE: 108 BPM | WEIGHT: 134 LBS | DIASTOLIC BLOOD PRESSURE: 77 MMHG

## 2020-10-22 VITALS — WEIGHT: 133.81 LBS | BODY MASS INDEX: 22.3 KG/M2 | HEIGHT: 65 IN

## 2020-10-22 DIAGNOSIS — J44.9 COPD, VERY SEVERE: ICD-10-CM

## 2020-10-22 DIAGNOSIS — Z76.82 LUNG TRANSPLANT CANDIDATE: ICD-10-CM

## 2020-10-22 LAB
ASCENDING AORTA: 3.23 CM
AV INDEX (PROSTH): 0.94
AV MEAN GRADIENT: 3 MMHG
AV PEAK GRADIENT: 6 MMHG
AV VALVE AREA: 3.24 CM2
AV VELOCITY RATIO: 1.06
BSA FOR ECHO PROCEDURE: 1.66 M2
CV ECHO LV RWT: 0.33 CM
DOP CALC AO PEAK VEL: 1.25 M/S
DOP CALC AO VTI: 22.45 CM
DOP CALC LVOT AREA: 3.4 CM2
DOP CALC LVOT DIAMETER: 2.09 CM
DOP CALC LVOT PEAK VEL: 1.33 M/S
DOP CALC LVOT STROKE VOLUME: 72.66 CM3
DOP CALCLVOT PEAK VEL VTI: 21.19 CM
E WAVE DECELERATION TIME: 155.44 MSEC
E/A RATIO: 0.67
E/E' RATIO: 9.29 M/S
ECHO LV POSTERIOR WALL: 0.65 CM (ref 0.6–1.1)
FRACTIONAL SHORTENING: 29 % (ref 28–44)
INTERVENTRICULAR SEPTUM: 0.91 CM (ref 0.6–1.1)
LA MAJOR: 4.23 CM
LA MINOR: 4.29 CM
LA WIDTH: 3.14 CM
LEFT ATRIUM SIZE: 3.25 CM
LEFT ATRIUM VOLUME INDEX: 22.2 ML/M2
LEFT ATRIUM VOLUME: 36.95 CM3
LEFT INTERNAL DIMENSION IN SYSTOLE: 2.75 CM (ref 2.1–4)
LEFT VENTRICLE DIASTOLIC VOLUME INDEX: 39.55 ML/M2
LEFT VENTRICLE DIASTOLIC VOLUME: 65.78 ML
LEFT VENTRICLE MASS INDEX: 52 G/M2
LEFT VENTRICLE SYSTOLIC VOLUME INDEX: 17 ML/M2
LEFT VENTRICLE SYSTOLIC VOLUME: 28.34 ML
LEFT VENTRICULAR INTERNAL DIMENSION IN DIASTOLE: 3.9 CM (ref 3.5–6)
LEFT VENTRICULAR MASS: 86.67 G
LV LATERAL E/E' RATIO: 9.88 M/S
LV SEPTAL E/E' RATIO: 8.78 M/S
MV PEAK A VEL: 1.18 M/S
MV PEAK E VEL: 0.79 M/S
MV STENOSIS PRESSURE HALF TIME: 45.08 MS
MV VALVE AREA P 1/2 METHOD: 4.88 CM2
PISA MRMAX VEL: 0.05 M/S
RA MAJOR: 3.65 CM
RA PRESSURE: 3 MMHG
RA WIDTH: 3.27 CM
RIGHT VENTRICULAR END-DIASTOLIC DIMENSION: 3 CM
SINUS: 3.65 CM
STJ: 3.26 CM
TDI LATERAL: 0.08 M/S
TDI SEPTAL: 0.09 M/S
TDI: 0.09 M/S
TRICUSPID ANNULAR PLANE SYSTOLIC EXCURSION: 1.67 CM

## 2020-10-22 PROCEDURE — 94618 PULMONARY STRESS TESTING: CPT | Mod: S$GLB,TXP,, | Performed by: INTERNAL MEDICINE

## 2020-10-22 PROCEDURE — 99499 RISK ADDL DX/OHS AUDIT: ICD-10-PCS | Mod: S$GLB,TXP,, | Performed by: INTERNAL MEDICINE

## 2020-10-22 PROCEDURE — 94729 DIFFUSING CAPACITY: CPT | Mod: S$GLB,TXP,, | Performed by: INTERNAL MEDICINE

## 2020-10-22 PROCEDURE — 94727 PR PULM FUNCTION TEST BY GAS: ICD-10-PCS | Mod: 53,S$GLB,TXP, | Performed by: INTERNAL MEDICINE

## 2020-10-22 PROCEDURE — 94010 BREATHING CAPACITY TEST: CPT | Mod: S$GLB,TXP,, | Performed by: INTERNAL MEDICINE

## 2020-10-22 PROCEDURE — 94618 PULMONARY STRESS TESTING: ICD-10-PCS | Mod: S$GLB,TXP,, | Performed by: INTERNAL MEDICINE

## 2020-10-22 PROCEDURE — 99999 PR PBB SHADOW E&M-EST. PATIENT-LVL IV: ICD-10-PCS | Mod: PBBFAC,TXP,, | Performed by: INTERNAL MEDICINE

## 2020-10-22 PROCEDURE — 94727 GAS DIL/WSHOT DETER LNG VOL: CPT | Mod: 53,S$GLB,TXP, | Performed by: INTERNAL MEDICINE

## 2020-10-22 PROCEDURE — 94729 PR C02/MEMBANE DIFFUSE CAPACITY: ICD-10-PCS | Mod: S$GLB,TXP,, | Performed by: INTERNAL MEDICINE

## 2020-10-22 PROCEDURE — 93306 ECHO (CUPID ONLY): ICD-10-PCS | Mod: 26,NTX,, | Performed by: INTERNAL MEDICINE

## 2020-10-22 PROCEDURE — 3008F PR BODY MASS INDEX (BMI) DOCUMENTED: ICD-10-PCS | Mod: CPTII,S$GLB,TXP, | Performed by: INTERNAL MEDICINE

## 2020-10-22 PROCEDURE — 3008F BODY MASS INDEX DOCD: CPT | Mod: CPTII,S$GLB,TXP, | Performed by: INTERNAL MEDICINE

## 2020-10-22 PROCEDURE — 99204 OFFICE O/P NEW MOD 45 MIN: CPT | Mod: 25,S$GLB,TXP, | Performed by: INTERNAL MEDICINE

## 2020-10-22 PROCEDURE — 99204 PR OFFICE/OUTPT VISIT, NEW, LEVL IV, 45-59 MIN: ICD-10-PCS | Mod: 25,S$GLB,TXP, | Performed by: INTERNAL MEDICINE

## 2020-10-22 PROCEDURE — 93306 TTE W/DOPPLER COMPLETE: CPT | Mod: 26,NTX,, | Performed by: INTERNAL MEDICINE

## 2020-10-22 PROCEDURE — 93306 TTE W/DOPPLER COMPLETE: CPT | Mod: TXP

## 2020-10-22 PROCEDURE — 99499 UNLISTED E&M SERVICE: CPT | Mod: S$GLB,TXP,, | Performed by: INTERNAL MEDICINE

## 2020-10-22 PROCEDURE — 94010 BREATHING CAPACITY TEST: ICD-10-PCS | Mod: S$GLB,TXP,, | Performed by: INTERNAL MEDICINE

## 2020-10-22 PROCEDURE — 99999 PR PBB SHADOW E&M-EST. PATIENT-LVL IV: CPT | Mod: PBBFAC,TXP,, | Performed by: INTERNAL MEDICINE

## 2020-10-22 RX ORDER — DIPHENHYDRAMINE HCL 25 MG
25 TABLET ORAL NIGHTLY PRN
COMMUNITY

## 2020-10-22 NOTE — PROGRESS NOTES
LUNG TRANSPLANT INITIAL EVALUATION                                                                                                                                           Reason for Visit:  Evaluation for lung transplant    Referring Physician: Antony Rose MD    History of Present Illness: Ann Elizabeth Canavan is a 60 y.o. female who is on 3L of oxygen.  She is on no assisted ventilation.  Her New [place] Heart Association Class is III and a Karnofsky score of 60% - Requires occasional assistance but is able to care for needs. She is not diabetic.     Patient with hx/o very severe COPD (FEV1 23%) coming in after 2 years for re-evaluation of her lung transplant candidacy.  She was diagnosed with COPD about 5 years.  She reports that her respiratory symptoms have progressed dramatically and limits her quality of life.  She repeinorts that she quit smoking in 2017.  She denies any hospitalization related to her underlying respiratory disease but reports multiple acute exacerbations that was treated with rescue antibiotics and steroids.  She was intubated in 2017 due to acute exacerbation of COPD.  Current, pulmonary medications: Advair, Spiriva, and Dailresp.         Patient lives by herself.  She is unmarried, without children.  On her previous initial visit, there were concerns about care-givers were brought up.  She admits to anxiety due to concerns with her respiratory disease.      Past Medical History:   Diagnosis Date    Chronic respiratory failure with hypoxia and hypercapnia 5/4/2014    COPD (chronic obstructive pulmonary disease)     Depression     Hyperlipidemia 5/12/2017    Otosclerosis of right ear 5/12/2017    40% hearing back in 1988, had surgery with some improvement, but hearing loss persists.      PAD (peripheral artery disease)     Post-menopausal 2008    Pulmonary emphysema 04/28/2017    Tobacco dependence 5/2/2014    Type 2 diabetes mellitus without complication, without long-term  current use of insulin 4/29/2017    Hb A1c 6.5% in 2/2017. Diet controlled.        Past Surgical History:   Procedure Laterality Date    BREAST BIOPSY      BREAST CYST ASPIRATION      GANGLION CYST EXCISION Right 1988    STAPEDES SURGERY Right 1988       Allergies: Avelox [moxifloxacin]    Current Outpatient Medications   Medication Sig    aspirin (ECOTRIN) 81 MG EC tablet Take 81 mg by mouth once daily.    atorvastatin (LIPITOR) 40 MG tablet Take 1 tablet (40 mg total) by mouth once daily.    diphenhydrAMINE (BENADRYL ALLERGY) 25 mg tablet Take 25 mg by mouth nightly as needed for Insomnia.    fluticasone-salmeterol diskus inhaler 250-50 mcg Inhale 1 puff into the lungs 2 (two) times daily. Controller (Patient taking differently: Inhale 1 puff into the lungs 2 (two) times daily. Controller    Patient takes Advair.)    levocetirizine (XYZAL) 5 MG tablet Take 1 tablet (5 mg total) by mouth every evening.    predniSONE (DELTASONE) 20 MG tablet Take 2 tablets (40 mg total) by mouth once daily. For exacerbation. (Patient taking differently: Take 40 mg by mouth once daily. For exacerbation. Only takes when needed.)    roflumilast (DALIRESP) 500 mcg Tab Take 1 tablet (500 mcg total) by mouth once daily.    sertraline (ZOLOFT) 100 MG tablet Take 1 tablet (100 mg total) by mouth once daily. Last refill. Need appointment.    tiotropium (SPIRIVA) 18 mcg inhalation capsule Inhale 1 capsule (18 mcg total) into the lungs once daily.    albuterol 90 mcg/actuation inhaler Inhale 2 puffs into the lungs every 6 (six) hours as needed for Wheezing. Rescue    albuterol-ipratropium (DUO-NEB) 2.5 mg-0.5 mg/3 mL nebulizer solution Take 3 mLs by nebulization every 6 (six) hours as needed for Wheezing or Shortness of Breath. Rescue    blood sugar diagnostic Strp 1 strip by Misc.(Non-Drug; Combo Route) route once daily. (Patient not taking: Reported on 10/22/2020)    desloratadine (CLARINEX) 5 mg tablet Take 1 tablet (5 mg  total) by mouth once daily.    lancets Misc 1 Device by Misc.(Non-Drug; Combo Route) route once daily. (Patient not taking: Reported on 10/22/2020)    loratadine (CLARITIN) 10 mg tablet Take 10 mg by mouth daily as needed for Allergies.    OPW TEST CLAIM - DO NOT FILL Inhale into the lungs. OPW test claim. Do not fill. (Patient not taking: Reported on 10/22/2020)     No current facility-administered medications for this visit.        Immunization History   Administered Date(s) Administered    Influenza - Quadrivalent - PF *Preferred* (6 months and older) 02/01/2018    Pneumococcal Conjugate - 13 Valent 05/02/2014, 05/02/2014    Pneumococcal Polysaccharide - 23 Valent 05/02/2014    Tdap 06/08/2017     Family History:    Family History   Problem Relation Age of Onset    Diabetes Mother     Heart disease Father         Strong FH of CAD/CHF on fathers side    Peripheral vascular disease Brother         With necrosis of leg    Stroke Brother 49    Diabetes Maternal Uncle     Cancer Neg Hx     Thyroid disease Neg Hx      Social History     Substance and Sexual Activity   Alcohol Use Yes    Alcohol/week: 0.8 standard drinks    Types: 1 Standard drinks or equivalent per week    Comment: occasional      Social History     Substance and Sexual Activity   Drug Use Never      Social History     Socioeconomic History    Marital status:      Spouse name: Not on file    Number of children: Not on file    Years of education: Not on file    Highest education level: Not on file   Occupational History    Not on file   Social Needs    Financial resource strain: Not on file    Food insecurity     Worry: Not on file     Inability: Not on file    Transportation needs     Medical: Not on file     Non-medical: Not on file   Tobacco Use    Smoking status: Former Smoker     Packs/day: 1.00     Years: 40.00     Pack years: 40.00     Types: Cigarettes     Start date: 8/13/1977     Quit date: 6/19/2017     Years  since quitting: 3.3    Smokeless tobacco: Never Used   Substance and Sexual Activity    Alcohol use: Yes     Alcohol/week: 0.8 standard drinks     Types: 1 Standard drinks or equivalent per week     Comment: occasional    Drug use: Never    Sexual activity: Not on file   Lifestyle    Physical activity     Days per week: Not on file     Minutes per session: Not on file    Stress: Not on file   Relationships    Social connections     Talks on phone: Not on file     Gets together: Not on file     Attends Christianity service: Not on file     Active member of club or organization: Not on file     Attends meetings of clubs or organizations: Not on file     Relationship status: Not on file   Other Topics Concern    Not on file   Social History Narrative    Reports 6-8 years of sedentary lifestyle, where she lived where she worked, only walking 20 paces per day. 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 Hasn't worked this year due to illness and recent move.         Has had a lot of recent stressors. 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 Reports environment there is terrible, with a lot of dust . For the past 2-3 years, patient had been living alone in [place], and boss lived in [place] in the patient's house that she'd bought. Patient came to [place] in January when she was sick, and at that time she decided not to move back to [place].         Patient is [place].      Review of Systems   Constitutional: Negative for chills, fever, malaise/fatigue and weight loss.   HENT: Negative for congestion, ear pain, hearing loss and sore throat.    Eyes: Negative for blurred vision and double vision.   Respiratory: Positive for shortness of breath. Negative for cough, hemoptysis, sputum production and wheezing.    Cardiovascular: Negative for chest pain, palpitations and leg swelling.   Gastrointestinal: Negative  "for abdominal pain, constipation, diarrhea, nausea and vomiting.   Genitourinary: Negative for dysuria, frequency, hematuria and urgency.   Musculoskeletal: Negative for back pain, joint pain and myalgias.   Skin: Negative for rash.   Neurological: Negative for dizziness, weakness and headaches.   Psychiatric/Behavioral: Negative for depression and memory loss. The patient is nervous/anxious. The patient does not have insomnia.      Vitals  BP (!) 162/77   Pulse 110   Resp 20   Ht 5' 5" (1.651 m)   Wt 60.8 kg (134 lb)   LMP  (LMP Unknown)   SpO2 96% Comment: 3 liters  BMI 22.30 kg/m²   Physical Exam  Constitutional:       Appearance: Normal appearance. She is well-developed.   HENT:      Head: Normocephalic and atraumatic.   Eyes:      Conjunctiva/sclera: Conjunctivae normal.      Pupils: Pupils are equal, round, and reactive to light.   Neck:      Musculoskeletal: Normal range of motion and neck supple.   Cardiovascular:      Rate and Rhythm: Normal rate and regular rhythm.      Heart sounds: Normal heart sounds.   Pulmonary:      Effort: Pulmonary effort is normal.      Breath sounds: Normal breath sounds.   Abdominal:      General: Bowel sounds are normal.      Palpations: Abdomen is soft.   Musculoskeletal: Normal range of motion.   Skin:     General: Skin is warm and dry.   Neurological:      Mental Status: She is alert and oriented to person, place, and time.         Labs:    Component Date Value    SARS-CoV2 (COVID-19) Dom* 10/19/2020 Not Detected        Pulmonary Function Tests 10/22/2020 6/14/2018 6/14/2018   FVC 1.48 1.88 1.88   FEV1 0.61 0.82 0.82   TLC (liters) - - 3.8   DLCO (ml/mmHg sec) 5.1 6.4 6.4   FVC% 45 59 59   FEV1% 23 32 32   FEF 25-75 - - 0.3   FEF 25-75% - - 11   TLC% - - 73   RV - - 1.89   RV% - - 98   DLCO% 22 19.66 32     6MW 10/22/2020 9/27/2019 6/14/2018 5/15/2018   6MWT Status completed with stops completed with stops completed with stops completed with stops   Patient Reported " Dyspnea;Leg pain Leg pain;Dyspnea;Lightheadedness Leg pain Dyspnea;Leg pain   Was O2 used? Yes Yes Yes Yes   Delivery Method Pull Tank;Cannula;Continuous Flow Pull Tank;Cannula;Continuous Flow Cannula;Pull Tank;Continuous Flow Cannula;Pull Tank;Continuous Flow   6MW Distance walked (feet) 450 700 700 600   Distance walked (meters) 137.16 213.36 213.36 182.88   Did patient stop? Yes Yes Yes Yes   Oxygen Saturation 96 84 98 97   Supplemental Oxygen 3 L/M Room Air 2 L/M 2 L/M   Heart Rate 112 98 94 101   Blood Pressure 131/76 128/70 139/65 129/76   Kevin Dyspnea Rating  somewhat heavy light very, very light (just noticeable) nothing at all   Oxygen Saturation 88 - 81 83   Supplemental Oxygen 3 L/M - 2 L/M 2 L/M   Heart Rate 133 - 142 109   Blood Pressure 201/95 - 148/89 149/74   Kevni Dyspnea Rating  heavy - heavy moderate   Recovery Time (seconds) 330 - 125 164   Oxygen Saturation 97 - 96 96   Supplemental Oxygen 3 L/M - 2 L/M 2 L/M   Heart Rate 117 - 115 106       Imaging:  Results for orders placed during the hospital encounter of 01/07/20   X-Ray Chest PA And Lateral    Narrative EXAMINATION:  XR CHEST PA AND LATERAL    CLINICAL HISTORY:  Shortness of breath    TECHNIQUE:  PA and lateral views of the chest were performed.    COMPARISON:  Non 06/21/2017 e    FINDINGS:  Heart size normal.  The lungs are clear.  No pleural effusion.      Impression See above      Electronically signed by: Mansoor Eagle MD  Date:    01/07/2020  Time:    15:16       Cardiodiagnostics:  10/22/20  10/22/2020 Routine      Result Text     · The left ventricle is normal in size with normal systolic function. The estimated ejection fraction is 60%.  · Normal left ventricular diastolic function.  · Normal right ventricular systolic function.  · Normal central venous pressure (3 mmHg).        Assessment:  1. Lung transplant candidate    2. COPD, very severe      Plan:   -Given the severity of patient's disease, limited lung reserve, drop in FEV1  values compared to 2 years ago, lung transplant could be an appropriate future consideration for this patient.  However, her low functional capacity as evident by her low walk distance is a barrier to her optimal candidacy at this point.  Moreover, patient lacks a strong social support and demonstrates on-going anxiety that is concern towards her optimal candidacy.    - Recommend patient increase her activity level.  Defer to referring physician to consider referral to pulmonary rehab or similarly exercise program.       - Recommended patient work on a primay and back-up care-giver     - Recommended patient to seek further support to help her with her anxiety       Patient is in agreement with my impression and is motivated to start the pre-LuT evaluation.  Therefore, will start work-up.        RTC upon completion of work-up      Thank you for allowing us to participate in this patient's care    Antony Rose MD  Pulmonary/Critical Care Medicine/Transplant Pulmonology  [facility]  11/3/2020

## 2020-10-22 NOTE — LETTER
November 3, 2020        Kizzy Franz             [facility] 1st [facility]  [address]  [facility] [address]  Phone: [phone]   Patient: Ann Elizabeth Canavan   MR Number: [id number]   YOB: 1960   Date of Visit: 10/22/2020       Dear Dr. Kizzy Franz    Thank you for referring Ann Canavan to me for evaluation. Attached you will find relevant portions of my assessment and plan of care.    If you have questions, please do not hesitate to call me. I look forward to following Ann Canavan along with you.    Sincerely,    Antony Rose MD    Enclosure    If you would like to receive this communication electronically, please contact [email or web address] or [phone] to request [facility] access.    [facility] is a tool which provides read-only access to select patient information with whom you have a relationship. Its easy to use and provides real time access to review your patients record including encounter summaries, notes, results, and demographic information.    If you feel you have received this communication in error or would no longer like to receive these types of communications, please e-mail [email or web address]

## 2020-10-22 NOTE — PROCEDURES
Ann Elizabeth Canavan is a 60 y.o.  female patient, who presents for a 6 minute walk test ordered by MD Milton.  The diagnosis is Pre Lung Transplant Evaluation; COPD/Emphysema.  The patient's BMI is 22.3 kg/m2.  Predicted distance (lower limit of normal) is 394.88 meters.      Test Results:    The test was completed with stops.  The patient stopped 2 times for a total of 136 seconds.  The total time walked was 224 seconds.  During walking, the patient reported:  Dyspnea, Leg pain.  The patient used a wheelchair for assistance and supplemental oxygen during testing.     10/22/2020---------Distance: 137.16 meters (450 feet)     O2 Sat % Supplemental Oxygen Heart Rate Blood Pressure Kevin Scale   Pre-exercise  (Resting) 96 % 3 L/M 112 bpm 131/76 mmHg 4   During Exercise 88 % 3 L/M 133 bpm 201/95 mmHg 5-6   Post-exercise  (Recovery) 97 % 3 L/M  117 bpm 146/76 mmHg      Recovery Time: 330 seconds    Performing nurse/tech: Sheng TORRES      PREVIOUS STUDY:   09/27/2019---------Distance: 213.36 meters (700 feet)       O2 Sat % Supplemental Oxygen Heart Rate Blood Pressure Kevin Scale   Pre-exercise  (Resting) 94 % 2 L/M  98 bpm  139/67 mmHg 2    During Exercise    80 %  2 L/M  121 bpm  143/69 mmHg 7-8    Post-exercise    93 %  2 L/M  107 bpm              CLINICAL INTERPRETATION:  Six minute walk distance is 137.16 meters (450 feet) with heavy dyspnea.  During exercise, there was significant desaturation while breathing supplemental oxygen.  Both blood pressure and heart rate increased significantly with walking.  Tachycardia was present prior to exercise.  This may represent a hypertensive and a tachycardic response to exercise.  The patient reported non-pulmonary symptoms during exercise.  Severe exercise impairment is likely due to desaturation, cardiovascular causes and subjective symptoms.  The patient did complete the study, walking 224 seconds of the 360 second test.  Since the previous study in September  2019, exercise capacity is significantly worse.  Based upon age and body mass index, exercise capacity is less than predicted.

## 2020-10-23 LAB
DLCO ADJ PRE: 5.12 ML/(MIN*MMHG) (ref 17.75–29.22)
DLCO SINGLE BREATH LLN: 17.75
DLCO SINGLE BREATH PRE REF: 21.8 %
DLCO SINGLE BREATH REF: 23.49
DLCOC SBVA LLN: 3.17
DLCOC SBVA PRE REF: 52.7 %
DLCOC SBVA REF: 4.61
DLCOC SINGLE BREATH LLN: 17.75
DLCOC SINGLE BREATH PRE REF: 21.8 %
DLCOC SINGLE BREATH REF: 23.49
DLCOCSBVAULN: 6.04
DLCOCSINGLEBREATHULN: 29.22
DLCOSINGLEBREATHULN: 29.22
DLCOVA LLN: 3.17
DLCOVA PRE REF: 52.7 %
DLCOVA PRE: 2.43 ML/(MIN*MMHG*L) (ref 3.17–6.04)
DLCOVA REF: 4.61
DLCOVAULN: 6.04
DLVAADJ PRE: 2.43 ML/(MIN*MMHG*L) (ref 3.17–6.04)
FEF 25 75 LLN: 1.22
FEF 25 75 PRE REF: 10.8 %
FEF 25 75 REF: 2.36
FEV05 LLN: 1.05
FEV05 REF: 1.9
FEV1 FVC LLN: 67
FEV1 FVC PRE REF: 51.6 %
FEV1 FVC REF: 79
FEV1 LLN: 1.97
FEV1 PRE REF: 23.3 %
FEV1 REF: 2.6
FVC LLN: 2.51
FVC PRE REF: 44.8 %
FVC REF: 3.3
IVC PRE: 1.27 L (ref 2.51–4.09)
IVC SINGLE BREATH LLN: 2.51
IVC SINGLE BREATH PRE REF: 38.5 %
IVC SINGLE BREATH REF: 3.3
IVCSINGLEBREATHULN: 4.09
PEF LLN: 4.73
PEF PRE REF: 38.1 %
PEF REF: 6.5
PHYSICIAN COMMENT: ABNORMAL
PRE DLCO: 5.12 ML/(MIN*MMHG) (ref 17.75–29.22)
PRE FEF 25 75: 0.25 L/S (ref 1.22–3.5)
PRE FET 100: 6.59 SEC
PRE FEV05 REF: 23 %
PRE FEV1 FVC: 40.92 % (ref 67.4–91.21)
PRE FEV1: 0.61 L (ref 1.97–3.23)
PRE FEV5: 0.44 L (ref 1.05–2.76)
PRE FVC: 1.48 L (ref 2.51–4.09)
PRE PEF: 2.47 L/S (ref 4.73–8.26)
VA PRE: 2.1 L (ref 4.95–4.95)
VA SINGLE BREATH LLN: 4.95
VA SINGLE BREATH PRE REF: 42.5 %
VA SINGLE BREATH REF: 4.95
VASINGLEBREATHULN: 4.95

## 2020-11-04 ENCOUNTER — TELEPHONE (OUTPATIENT)
Dept: TRANSPLANT | Facility: CLINIC | Age: 60
End: 2020-11-04

## 2020-11-04 DIAGNOSIS — J44.9 CHRONIC OBSTRUCTIVE PULMONARY DISEASE, UNSPECIFIED COPD TYPE: ICD-10-CM

## 2020-11-04 DIAGNOSIS — Z01.818 PRE-TRANSPLANT EVALUATION FOR LUNG TRANSPLANT: ICD-10-CM

## 2020-11-04 DIAGNOSIS — J96.11 CHRONIC RESPIRATORY FAILURE WITH HYPOXIA: Primary | Chronic | ICD-10-CM

## 2020-11-04 NOTE — TELEPHONE ENCOUNTER
Per Dr. Rose, patient to proceed with clearance for lung transplant evaluation. Patient is aware of need to improve walk distance and evidence of social support. Routed request to  for authorization to proceed with lung transplant evaluation.

## 2020-11-16 NOTE — TELEPHONE ENCOUNTER
Contacted Ann Canavan and informed her that financial clearance for the lung transplant evaluation testing has been received from the insurance company.  Informed her that we can now schedule the evaluation testing. Inquired as to his availability, offered available dates. Reminded her that the evaluation testing consists of three (3) days of testing (Monday, Tuesday, and Wednesday). Informed her that a right and left heart catheterization will be scheduled either on the Thursday of the same week, or another day depending on the physician's availability. She requested to schedule the testing the week of November 30. She requested testing on consecutive days if possible. Instructed her to bring enough oxygen for round trip travel and for the days of testing. Patient will need to bring her primary caregiver - stressed to patient the her one primary caregiver must be present for all testing and appointments during evaluation -  and a current medication list. Instructed her to wear comfortable shoes for the six minute walk test. She verbalized understanding of all information discussed.     Inquired if she has had a recent colonoscopy, mammogram, gyn/pap appointment. She stated that she has never had a colonoscopy and is due for a mammo /pap. She is requesting to be scheduled with eval. Informed patient that I will request records per the signed release of information during the consultation visit, so I can obtain the reports. She has been reminded that we need to ensure that her mouth is free of infection, so dental clearance is necessary. Informed her that if she has a source of infection in her mouth, then she can aspirate and develop an infection in her lungs after transplant. Informed her that any needed dental work will need to be completed prior to presentation to selection committee for listing.     Informed her that she will receive a 24 hour urine container (large orange jug) in the mail. Informed her that she  will need to collect urine for 24 hours and keep it on ice at all times. Informed her that the test is done to determine kidney function. Informed her that after transplant, some of the medications can affect kidney function. Informed her that she will receive instructions in the mail. Instructed her to start the collection on the Sunday morning prior to appointment.  Informed her that if she wakes up at 07:00 and urinates, then that will be the start of the 24 hour urine collection. Informed her that she will discard that first urine, but it will be the start time of the test. Instructed her to collect every urine thereafter until 07:00 on Monday morning (the following day).  Instructed her to collect the last urine at 07:00 and end the collection. Informed her that she can drop off the 24 hour urine container on Monday morning when checking in for labs.     Also informed her that she will receive a small specimen container in the mail as well. Informed her that cup is for a sputum collection. Informed her to collect sputum or mucus, not spit. Instructed her to drop off the specimen to the lab once able to collect it.     Ann Canavan spelled her name and stated her date of birth for verification. Informed her that all the specimen containers will be labeled with this information.  Instructed her to verify his name and date of birth on all of the containers once received. She verbalized understanding of all information discussed. All questions answered.

## 2020-11-18 ENCOUNTER — TELEPHONE (OUTPATIENT)
Dept: ENDOSCOPY | Facility: HOSPITAL | Age: 60
End: 2020-11-18

## 2020-11-18 ENCOUNTER — TELEPHONE (OUTPATIENT)
Dept: TRANSPLANT | Facility: CLINIC | Age: 60
End: 2020-11-18

## 2020-11-18 ENCOUNTER — PATIENT MESSAGE (OUTPATIENT)
Dept: TRANSPLANT | Facility: CLINIC | Age: 60
End: 2020-11-18

## 2020-11-18 NOTE — TELEPHONE ENCOUNTER
Left voicemail for patient about upcoming appointments on 11/20/20 @ [facility] for OBGYN. And mailed out work up package, and sent a message.

## 2020-11-19 ENCOUNTER — TELEPHONE (OUTPATIENT)
Dept: TRANSPLANT | Facility: CLINIC | Age: 60
End: 2020-11-19

## 2020-11-19 ENCOUNTER — IMMUNIZATION (OUTPATIENT)
Dept: PHARMACY | Facility: CLINIC | Age: 60
End: 2020-11-19

## 2020-11-19 ENCOUNTER — OFFICE VISIT (OUTPATIENT)
Dept: INTERNAL MEDICINE | Facility: CLINIC | Age: 60
End: 2020-11-19
Payer: MEDICARE

## 2020-11-19 ENCOUNTER — IMMUNIZATION (OUTPATIENT)
Dept: PHARMACY | Facility: CLINIC | Age: 60
End: 2020-11-19
Payer: MEDICARE

## 2020-11-19 ENCOUNTER — PATIENT MESSAGE (OUTPATIENT)
Dept: PHARMACY | Facility: CLINIC | Age: 60
End: 2020-11-19

## 2020-11-19 VITALS
HEIGHT: 65 IN | DIASTOLIC BLOOD PRESSURE: 74 MMHG | HEART RATE: 101 BPM | WEIGHT: 132.94 LBS | SYSTOLIC BLOOD PRESSURE: 126 MMHG | BODY MASS INDEX: 22.15 KG/M2 | OXYGEN SATURATION: 98 % | TEMPERATURE: 99 F

## 2020-11-19 DIAGNOSIS — F33.42 RECURRENT MAJOR DEPRESSIVE DISORDER, IN FULL REMISSION: ICD-10-CM

## 2020-11-19 DIAGNOSIS — M79.10 MYALGIA: ICD-10-CM

## 2020-11-19 DIAGNOSIS — J44.9 COPD, SEVERE: Primary | ICD-10-CM

## 2020-11-19 DIAGNOSIS — Z12.11 ENCOUNTER FOR FIT (FECAL IMMUNOCHEMICAL TEST) SCREENING: ICD-10-CM

## 2020-11-19 DIAGNOSIS — H90.41 SENSORINEURAL HEARING LOSS (SNHL) OF RIGHT EAR WITH UNRESTRICTED HEARING OF LEFT EAR: ICD-10-CM

## 2020-11-19 DIAGNOSIS — E55.9 VITAMIN D DEFICIENCY: ICD-10-CM

## 2020-11-19 DIAGNOSIS — J96.11 CHRONIC RESPIRATORY FAILURE WITH HYPOXIA, ON HOME O2 THERAPY: ICD-10-CM

## 2020-11-19 DIAGNOSIS — I73.9 PAD (PERIPHERAL ARTERY DISEASE): ICD-10-CM

## 2020-11-19 DIAGNOSIS — G47.33 OSA (OBSTRUCTIVE SLEEP APNEA): ICD-10-CM

## 2020-11-19 DIAGNOSIS — Z99.81 CHRONIC RESPIRATORY FAILURE WITH HYPOXIA, ON HOME O2 THERAPY: ICD-10-CM

## 2020-11-19 DIAGNOSIS — E11.9 CONTROLLED TYPE 2 DIABETES MELLITUS WITHOUT COMPLICATION, WITHOUT LONG-TERM CURRENT USE OF INSULIN: ICD-10-CM

## 2020-11-19 DIAGNOSIS — F41.1 GAD (GENERALIZED ANXIETY DISORDER): ICD-10-CM

## 2020-11-19 DIAGNOSIS — J31.0 CHRONIC RHINITIS: ICD-10-CM

## 2020-11-19 PROCEDURE — 99999 PR PBB SHADOW E&M-EST. PATIENT-LVL V: ICD-10-PCS | Mod: PBBFAC,,, | Performed by: INTERNAL MEDICINE

## 2020-11-19 PROCEDURE — 99215 OFFICE O/P EST HI 40 MIN: CPT | Mod: S$GLB,,, | Performed by: INTERNAL MEDICINE

## 2020-11-19 PROCEDURE — 3008F BODY MASS INDEX DOCD: CPT | Mod: CPTII,S$GLB,, | Performed by: INTERNAL MEDICINE

## 2020-11-19 PROCEDURE — 3008F PR BODY MASS INDEX (BMI) DOCUMENTED: ICD-10-PCS | Mod: CPTII,S$GLB,, | Performed by: INTERNAL MEDICINE

## 2020-11-19 PROCEDURE — 99999 PR PBB SHADOW E&M-EST. PATIENT-LVL V: CPT | Mod: PBBFAC,,, | Performed by: INTERNAL MEDICINE

## 2020-11-19 PROCEDURE — 1126F AMNT PAIN NOTED NONE PRSNT: CPT | Mod: S$GLB,,, | Performed by: INTERNAL MEDICINE

## 2020-11-19 PROCEDURE — 99215 PR OFFICE/OUTPT VISIT, EST, LEVL V, 40-54 MIN: ICD-10-PCS | Mod: S$GLB,,, | Performed by: INTERNAL MEDICINE

## 2020-11-19 PROCEDURE — 1126F PR PAIN SEVERITY QUANTIFIED, NO PAIN PRESENT: ICD-10-PCS | Mod: S$GLB,,, | Performed by: INTERNAL MEDICINE

## 2020-11-19 PROCEDURE — 99499 RISK ADDL DX/OHS AUDIT: ICD-10-PCS | Mod: S$GLB,,, | Performed by: INTERNAL MEDICINE

## 2020-11-19 PROCEDURE — 99499 UNLISTED E&M SERVICE: CPT | Mod: S$GLB,,, | Performed by: INTERNAL MEDICINE

## 2020-11-19 RX ORDER — FLUTICASONE PROPIONATE 50 MCG
1 SPRAY, SUSPENSION (ML) NASAL 2 TIMES DAILY
Qty: 16 G | Refills: 3 | Status: SHIPPED | OUTPATIENT
Start: 2020-11-19

## 2020-11-19 RX ORDER — FLUTICASONE PROPIONATE AND SALMETEROL 250; 50 UG/1; UG/1
POWDER RESPIRATORY (INHALATION)
COMMUNITY
Start: 2014-05-07 | End: 2021-06-21 | Stop reason: SDUPTHER

## 2020-11-19 NOTE — PATIENT INSTRUCTIONS
Shingrix and flu vaccine today.  Follow through with OB/GYN tomorrow and mammogram.   Bring back the stool test when you can when you come for one of the appointments.   Schedule an appointment to see the sleep specialist.   Start flonase 1 spray each nostril twice daily (point it towards your ears) to help with runny nose.   I'll link my labs to Dr. Jacobsen's labs on Nov 30.

## 2020-11-19 NOTE — PROGRESS NOTES
INTERNAL MEDICINE ANNUAL VISIT NOTE      CHIEF COMPLAINT     Chief Complaint   Patient presents with    Annual Exam     HPI     Ann Elizabeth Canavan is a 60 y.o. C female who presents for annual. Last seen pt in 2018.  Hasn't gone out since pandemic - anxious just getting dressed to go out.   appt w/ Dr. Peña tomorrow in OB/GYN.  Scheduled for MMG.     Severe COPD. Seen in pulm, Ronit Parker NP w/ Dr. Noriega, 19.  Advair 250-50mcg BID, prednisone 40mg daily x 5 days prn exacerbation, roflumilast 500mcg daily, spiriva daily.   Baseline feels SOB w/ any little activities - including turning over in bed.   Quit smoking at the beginning of 2017  6MWT-10/22/20 88% during exercise on 3LPM (previously in , 80% on 2LPM w/ exercise)  Evaluated w/ lung transplant - Dr. Rose 10/22/20.   No cough.     Does still have runny nose and dry throat (chronic) despite taking the allergy pills. Taking benadryl at night and the xyzal. Previously was on clarinex. No fevers/chills. No swollen glands/congestion/ear pain/odynophagia. Has a saline nasal spray that she has not been using.      DM2 - diet controlled.  Eye exam - 17.  Foot 17  MAC 17  a1c 17 - 6.5. no updated A1C.  Asa 81mg daily, atorvastatin 40mg daily (LDL 17)     LAMBERTO - sleep study w/ LAMBERTO 17. Needs CPAP titration - never had it down.      MDD - zoloft 100mg daily.  Brother  at 50 y/o from stroke.      CT chest 10/19/20 - severe emphysema. Scattered pulm nodule. Repeat in 1 yr. Mild aortic and coronary atherosclerosis.      PAD - DELORIS 14 - B decreased ABIs w/ mod stenosis.  Does have claudication but does not walk far enough to know how far. Does have feet cramps (every 2 weeks). No numbness/tingling.      Past Medical History:  Past Medical History:   Diagnosis Date    Chronic respiratory failure with hypoxia and hypercapnia 2014    COPD (chronic obstructive pulmonary disease)     Depression      "Hyperlipidemia 5/12/2017    Otosclerosis of right ear 5/12/2017    40% hearing back in 1988, had surgery with some improvement, but hearing loss persists.      PAD (peripheral artery disease)     Post-menopausal 2008    Pulmonary emphysema 04/28/2017    Tobacco dependence 5/2/2014    Type 2 diabetes mellitus without complication, without long-term current use of insulin 4/29/2017    Hb A1c 6.5% in 2/2017. Diet controlled.        Past Surgical History:  Past Surgical History:   Procedure Laterality Date    BREAST BIOPSY      BREAST CYST ASPIRATION      GANGLION CYST EXCISION Right 1988    STAPEDES SURGERY Right 1988       Allergies:  Review of patient's allergies indicates:   Allergen Reactions    Avelox [moxifloxacin] Itching and Rash     IV       Home Medications:  meds reviewed.    Family History:  Family History   Problem Relation Age of Onset    Diabetes Mother     Heart disease Father         Strong FH of CAD/CHF on fathers side    Peripheral vascular disease Brother         With necrosis of leg    Stroke Brother 49    Diabetes Maternal Uncle     Cancer Neg Hx     Thyroid disease Neg Hx        Social History:  Social History     Tobacco Use    Smoking status: Former Smoker     Packs/day: 1.00     Years: 40.00     Pack years: 40.00     Types: Cigarettes     Start date: 8/13/1977     Quit date: 6/19/2017     Years since quitting: 3.4    Smokeless tobacco: Never Used   Substance Use Topics    Alcohol use: Yes     Alcohol/week: 0.8 standard drinks     Types: 1 Standard drinks or equivalent per week     Comment: occasional    Drug use: Never       Review of Systems:  Review of Systems Comprehensive review of systems otherwise negative. See history/subjective section for more details.    Health Maintainence:    reviewed.   Due for shingrix and flu. Pap tomorrow w/ Dr. Peña. MMG scheduled.     PHYSICAL EXAM     /74   Pulse 101   Temp 98.5 °F (36.9 °C)   Ht 5' 5" (1.651 m)   Wt 60.3 kg " (132 lb 15 oz)   LMP  (LMP Unknown)   SpO2 98%   BMI 22.12 kg/m²     GEN - A+OX4, NAD   HEENT - PERRL, EOMI, OP clear. MMM. TM normal.   Neck - No thyromegaly or cervical LAD. No thyroid masses felt.  CV - RRR, no m/r   Chest - on 3LPM O2 by NC. Decreased BS throughout.   Abd - S/NT/ND/+BS.   Ext - palp BDP and 2+ radial pulses. No LE edema.   Neuro - PERRL, EOMI, no nystagmus, eyebrow raise, facial sensation, hearing (decreased on the R side), m of mastication, smile, palatal raise, shoulder shrug, tongue protrusion symmetric and intact. 5/5 BUE and BLE strength. Sensation to light touch intact throughout  MSK - No spinal tenderness to palpation. Sitting in wheelchair.  Skin - No rash. Dry skin. Few bruises of LUE.    LABS     Previous labs reviewed.    ASSESSMENT/PLAN     Ann Elizabeth Canavan is a 60 y.o. female with  Zoila was seen today for annual exam.    Diagnoses and all orders for this visit:    COPD, severe - undergoing eval for transplant. Cont 3LPM O2 by NC. Cont current meds.  -     Cancel: CBC Auto Differential; Future; Expected date: 11/19/2020    Chronic respiratory failure with hypoxia, on home O2 therapy  -     Cancel: CBC Auto Differential; Future; Expected date: 11/19/2020    LAMBERTO (obstructive sleep apnea) - needs CPAP titration and O2 during sleep  -     Ambulatory referral/consult to Sleep Disorders; Future; Expected date: 11/26/2020    Chronic rhinitis  -     fluticasone propionate (FLONASE) 50 mcg/actuation nasal spray; 1 spray (50 mcg total) by Each Nostril route 2 (two) times a day.    Recurrent major depressive disorder, in full remission - reports doing ok on zoloft 100mg. Cont current med.   -     Vitamin D; Future; Expected date: 11/19/2020    BRENDA (generalized anxiety disorder) - as above.   -     Cancel: Comprehensive Metabolic Panel; Future; Expected date: 11/19/2020    Controlled type 2 diabetes mellitus without complication, without long-term current use of insulin  -     Cancel:  Comprehensive Metabolic Panel; Future; Expected date: 11/19/2020  -     Cancel: Hemoglobin A1C; Future; Expected date: 11/19/2020  -     Cancel: Lipid Panel; Future; Expected date: 11/19/2020  -     Microalbumin/Creatinine Ratio, Urine; Future; Expected date: 11/19/2020  -     Ambulatory referral/consult to Optometry; Future; Expected date: 11/26/2020    PAD (peripheral artery disease) - cont atorva and asa 81mg daily.  -     Cancel: Comprehensive Metabolic Panel; Future; Expected date: 11/19/2020  -     Cancel: Lipid Panel; Future; Expected date: 11/19/2020  -     US Lower Extremity Arteries Bilateral; Future; Expected date: 11/19/2020    Encounter for FIT (fecal immunochemical test) screening  -     Fecal Immunochemical Test (iFOBT); Future; Expected date: 11/19/2020    Vitamin D deficiency  -     Vitamin D; Future; Expected date: 11/19/2020    Myalgia  -     CK; Future; Expected date: 11/19/2020  -     Magnesium; Future; Expected date: 11/19/2020    Sensorineural hearing loss (SNHL) of right ear with unrestricted hearing of left ear - due to otosclerosis. Stable.     Other orders  -     fluticasone-salmeterol diskus inhaler 250-50 mcg    Pt already had a lot of labs and urine ordered w/ transplant. Will link my labs and urine to scheduled labs on 11/30/20.   shingrix and flu vaccine today. Pt to bring back fitkit.     45 minutes was spent on patient with over half the time was spent in coordination of care and/or counseling.  RTC in 4 months, sooner if needed and depending on labs.    Trinh Bowles MD  Department of Internal Medicine - [facility]  8:10 AM

## 2020-11-23 ENCOUNTER — TELEPHONE (OUTPATIENT)
Dept: ENDOSCOPY | Facility: HOSPITAL | Age: 60
End: 2020-11-23

## 2020-11-25 DIAGNOSIS — Z01.818 PRE-OP TESTING: Primary | ICD-10-CM

## 2020-11-27 ENCOUNTER — TELEPHONE (OUTPATIENT)
Dept: TRANSPLANT | Facility: CLINIC | Age: 60
End: 2020-11-27

## 2020-11-28 ENCOUNTER — NURSE TRIAGE (OUTPATIENT)
Dept: ADMINISTRATIVE | Facility: CLINIC | Age: 60
End: 2020-11-28

## 2020-11-28 NOTE — TELEPHONE ENCOUNTER
Spoke with patient she states she has upcoming labs for pre lung transplant.  She states she received a small and large urine specimen containers.  She states she is not sure when to start her specimen collection.  Spoke with on call provider Dr. Henry whom states to have patient follow up with lung transplant coordinator Zakia on Monday.  Patient notified and verbalized understanding.     Reason for Disposition   Nursing judgment or information in reference    Protocols used: NO GUIDELINE [id number]

## 2020-11-30 ENCOUNTER — OFFICE VISIT (OUTPATIENT)
Dept: CARDIOTHORACIC SURGERY | Facility: CLINIC | Age: 60
End: 2020-11-30
Payer: MEDICARE

## 2020-11-30 ENCOUNTER — HOSPITAL ENCOUNTER (OUTPATIENT)
Dept: RADIOLOGY | Facility: HOSPITAL | Age: 60
Discharge: HOME | End: 2020-11-30
Attending: INTERNAL MEDICINE
Payer: MEDICARE

## 2020-11-30 ENCOUNTER — TELEPHONE (OUTPATIENT)
Dept: TRANSPLANT | Facility: CLINIC | Age: 60
End: 2020-11-30

## 2020-11-30 VITALS
HEIGHT: 63 IN | OXYGEN SATURATION: 88 % | HEART RATE: 119 BPM | BODY MASS INDEX: 23.25 KG/M2 | WEIGHT: 131.19 LBS | TEMPERATURE: 98 F | DIASTOLIC BLOOD PRESSURE: 75 MMHG | SYSTOLIC BLOOD PRESSURE: 137 MMHG

## 2020-11-30 DIAGNOSIS — J96.11 CHRONIC RESPIRATORY FAILURE WITH HYPOXIA: Chronic | ICD-10-CM

## 2020-11-30 DIAGNOSIS — J96.11 CHRONIC RESPIRATORY FAILURE WITH HYPOXIA: ICD-10-CM

## 2020-11-30 DIAGNOSIS — Z01.818 PRE-TRANSPLANT EVALUATION FOR LUNG TRANSPLANT: ICD-10-CM

## 2020-11-30 DIAGNOSIS — J44.9 CHRONIC OBSTRUCTIVE PULMONARY DISEASE, UNSPECIFIED COPD TYPE: ICD-10-CM

## 2020-11-30 DIAGNOSIS — J96.11 CHRONIC RESPIRATORY FAILURE WITH HYPOXIA: Primary | Chronic | ICD-10-CM

## 2020-11-30 DIAGNOSIS — J43.2 CENTRILOBULAR EMPHYSEMA: ICD-10-CM

## 2020-11-30 DIAGNOSIS — Z76.82 LUNG TRANSPLANT CANDIDATE: ICD-10-CM

## 2020-11-30 PROCEDURE — 71046 X-RAY EXAM CHEST 2 VIEWS: CPT | Mod: 26,TXP,, | Performed by: RADIOLOGY

## 2020-11-30 PROCEDURE — 99205 OFFICE O/P NEW HI 60 MIN: CPT | Mod: S$GLB,TXP,, | Performed by: THORACIC SURGERY (CARDIOTHORACIC VASCULAR SURGERY)

## 2020-11-30 PROCEDURE — 93880 EXTRACRANIAL BILAT STUDY: CPT | Mod: TC,TXP

## 2020-11-30 PROCEDURE — 76700 US EXAM ABDOM COMPLETE: CPT | Mod: TC,TXP

## 2020-11-30 PROCEDURE — 93880 US CAROTID BILATERAL: ICD-10-PCS | Mod: 26,TXP,, | Performed by: RADIOLOGY

## 2020-11-30 PROCEDURE — 71046 XR CHEST PA AND LATERAL: ICD-10-PCS | Mod: 26,TXP,, | Performed by: RADIOLOGY

## 2020-11-30 PROCEDURE — 93880 EXTRACRANIAL BILAT STUDY: CPT | Mod: 26,TXP,, | Performed by: RADIOLOGY

## 2020-11-30 PROCEDURE — 78597 LUNG PERFUSION DIFFERENTIAL: CPT | Mod: 26,TXP,, | Performed by: RADIOLOGY

## 2020-11-30 PROCEDURE — 76700 US EXAM ABDOM COMPLETE: CPT | Mod: 26,TXP,, | Performed by: RADIOLOGY

## 2020-11-30 PROCEDURE — 3008F BODY MASS INDEX DOCD: CPT | Mod: CPTII,S$GLB,TXP, | Performed by: THORACIC SURGERY (CARDIOTHORACIC VASCULAR SURGERY)

## 2020-11-30 PROCEDURE — 1126F AMNT PAIN NOTED NONE PRSNT: CPT | Mod: S$GLB,TXP,, | Performed by: THORACIC SURGERY (CARDIOTHORACIC VASCULAR SURGERY)

## 2020-11-30 PROCEDURE — 78597 LUNG PERFUSION DIFFERENTIAL: CPT | Mod: TC,TXP

## 2020-11-30 PROCEDURE — 3008F PR BODY MASS INDEX (BMI) DOCUMENTED: ICD-10-PCS | Mod: CPTII,S$GLB,TXP, | Performed by: THORACIC SURGERY (CARDIOTHORACIC VASCULAR SURGERY)

## 2020-11-30 PROCEDURE — 78597 NM LUNG QUANT DIFFERENTIAL PERFUSION W IMAGING: ICD-10-PCS | Mod: 26,TXP,, | Performed by: RADIOLOGY

## 2020-11-30 PROCEDURE — 99999 PR PBB SHADOW E&M-EST. PATIENT-LVL IV: CPT | Mod: PBBFAC,TXP,, | Performed by: THORACIC SURGERY (CARDIOTHORACIC VASCULAR SURGERY)

## 2020-11-30 PROCEDURE — 99999 PR PBB SHADOW E&M-EST. PATIENT-LVL IV: ICD-10-PCS | Mod: PBBFAC,TXP,, | Performed by: THORACIC SURGERY (CARDIOTHORACIC VASCULAR SURGERY)

## 2020-11-30 PROCEDURE — 99205 PR OFFICE/OUTPT VISIT, NEW, LEVL V, 60-74 MIN: ICD-10-PCS | Mod: S$GLB,TXP,, | Performed by: THORACIC SURGERY (CARDIOTHORACIC VASCULAR SURGERY)

## 2020-11-30 PROCEDURE — 1126F PR PAIN SEVERITY QUANTIFIED, NO PAIN PRESENT: ICD-10-PCS | Mod: S$GLB,TXP,, | Performed by: THORACIC SURGERY (CARDIOTHORACIC VASCULAR SURGERY)

## 2020-11-30 PROCEDURE — 76700 US ABDOMEN COMPLETE: ICD-10-PCS | Mod: 26,TXP,, | Performed by: RADIOLOGY

## 2020-11-30 PROCEDURE — 71046 X-RAY EXAM CHEST 2 VIEWS: CPT | Mod: TC,TXP

## 2020-11-30 NOTE — TELEPHONE ENCOUNTER
Returned patient's call, she is requesting to reschedule her education class today at 1 pm, citing a 'terrible migraine' due to fasting for testing today. Patient reports she cannot come back because she 'told her caregiver she could go' and that she 'only needed to be there for certain appointments'. Reminded patient that, based on this statement, she is reminded that her primary caregiver needs to be present with her during all of her appointments, not only a select few. Advised patient to discuss this need with her care persons this week.   Discussed with patient that she will also need to repeat blood work (serum creatinine, TORB per Dr. Jacobsen) due to not having completed 24 hour urine study. Reviewed that patient was provided instructions over the phone (and just now), as well as instructions were provided/attached to specimen container packaging, and that an appointment for urine specimen drop off had been scheduled this morning. Patient acknowledged this, but still stated she was not sure.   Patient is requesting to be rescheduled to week of December 7 as possible, and is aware that she will need to have a caregiver present for all appointments, bring urine specimen (24 urine) as well as incomplete (urine tox/nicotine) and sputum testing.  Patient verbalized understanding. Request to .

## 2020-11-30 NOTE — PROGRESS NOTES
Subjective:      Patient ID: Ann Elizabeth Canavan is a 60 y.o. female.    Chief Complaint: No chief complaint on file.      HPI:  Ann Elizabeth Canavan is a 60 y.o. female who presents on 3L of oxygen.  She is on no assisted ventilation.  Her New York Heart Association Class is III and a Karnofsky score of 60% - Requires occasional assistance but is able to care for needs. She is not diabetic.      Patient has severe COPD (FEV1 23%) coming in after 2 years for re-evaluation of her lung transplant candidacy.  She was diagnosed with COPD about 5 years.  She reports that her respiratory symptoms have progressed dramatically and limits her quality of life.  She repeinorts that she quit smoking in 2017.  She denies any hospitalization related to her underlying respiratory disease but reports multiple acute exacerbations that was treated with rescue antibiotics and steroids.  She was intubated in 2017 due to acute exacerbation of COPD.  Current, pulmonary medications: Advair, Spiriva, and Dailresp. On her previous initial visit, there were concerns about care-givers were brought up. She admits to anxiety due to concerns with her respiratory disease.        Family and social history reviewed    Review of patient's allergies indicates:   Allergen Reactions    Avelox [moxifloxacin] Itching and Rash     IV     Past Medical History:   Diagnosis Date    Chronic respiratory failure with hypoxia and hypercapnia 5/4/2014    COPD (chronic obstructive pulmonary disease)     Depression     Hyperlipidemia 5/12/2017    Otosclerosis of right ear 5/12/2017    40% hearing back in 1988, had surgery with some improvement, but hearing loss persists.      PAD (peripheral artery disease)     Post-menopausal 2008    Pulmonary emphysema 04/28/2017    Tobacco dependence 5/2/2014    Type 2 diabetes mellitus without complication, without long-term current use of insulin 4/29/2017    Hb A1c 6.5% in 2/2017. Diet controlled.      Past  Surgical History:   Procedure Laterality Date    BREAST BIOPSY      BREAST CYST ASPIRATION      GANGLION CYST EXCISION Right 1988    STAPEDES SURGERY Right 1988     Family History     Problem Relation (Age of Onset)    Diabetes Mother, Maternal Uncle    Heart disease Father    Peripheral vascular disease Brother    Stroke Brother (49)        Social History     Socioeconomic History    Marital status:      Spouse name: Not on file    Number of children: Not on file    Years of education: Not on file    Highest education level: Not on file   Occupational History    Not on file   Social Needs    Financial resource strain: Not on file    Food insecurity     Worry: Not on file     Inability: Not on file    Transportation needs     Medical: Not on file     Non-medical: Not on file   Tobacco Use    Smoking status: Former Smoker     Packs/day: 1.00     Years: 40.00     Pack years: 40.00     Types: Cigarettes     Start date: 8/13/1977     Quit date: 6/19/2017     Years since quitting: 3.4    Smokeless tobacco: Never Used   Substance and Sexual Activity    Alcohol use: Yes     Alcohol/week: 0.8 standard drinks     Types: 1 Standard drinks or equivalent per week     Comment: occasional    Drug use: Never    Sexual activity: Not on file   Lifestyle    Physical activity     Days per week: Not on file     Minutes per session: Not on file    Stress: Not on file   Relationships    Social connections     Talks on phone: Not on file     Gets together: Not on file     Attends [employer] service: Not on file     Active member of club or organization: Not on file     Attends meetings of clubs or organizations: Not on file     Relationship status: Not on file   Other Topics Concern    Not on file   Social History Narrative    Reports 6-8 years of sedentary lifestyle, where she lived where she worked, only walking 20 paces per day. 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 Hasn't worked this year due to illness and  recent move.         Has had a lot of recent stressors. 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 Reports environment there is terrible, with a lot of dust . For the past 2-3 years, patient had been living alone in [place], and boss lived in [place] in the patient's house that she'd bought. Patient came to [place] in January when she was sick, and at that time she decided not to move back to [place].         Patient is Opal.        Current medications Reviewed    Review of Systems   Constitutional: Positive for fatigue. Negative for activity change.   Respiratory: Positive for shortness of breath. Negative for cough.    Cardiovascular: Negative for chest pain, palpitations and leg swelling.   Gastrointestinal: Negative for abdominal pain, nausea and vomiting.   Endocrine: Negative for polydipsia, polyphagia and polyuria.   Genitourinary: Negative for dysuria.   Musculoskeletal: Negative for gait problem.   Skin: Negative for rash.   Allergic/Immunologic: Negative for immunocompromised state.   Neurological: Negative for dizziness, syncope and weakness.   Hematological: Does not bruise/bleed easily.   Psychiatric/Behavioral: Negative for behavioral problems.     Objective:   Physical Exam  Constitutional:       Appearance: She is well-developed.   HENT:      Head: Normocephalic.      Mouth/Throat:      Mouth: Mucous membranes are moist.   Eyes:      Extraocular Movements: Extraocular movements intact.   Neck:      Musculoskeletal: Normal range of motion.   Cardiovascular:      Rate and Rhythm: Normal rate and regular rhythm.      Heart sounds: Normal heart sounds.   Pulmonary:      Effort: Pulmonary effort is normal.      Breath sounds: Decreased breath sounds present.   Abdominal:      Palpations: Abdomen is soft.   Musculoskeletal: Normal range of motion.   Skin:     General: Skin is warm and dry.       Capillary Refill: Capillary refill takes less than 2 seconds.   Neurological:      Mental Status: She is alert and oriented to person, place, and time.   Psychiatric:         Mood and Affect: Mood normal.         Behavior: Behavior normal.         Diagnostic Results:   CT chest:   Severe centrilobular emphysema coalescing to panlobular emphysema as detailed above. Few scattered pulmonary nodules measuring up to 0.4 cm.  For multiple solid nodules all <6 mm, Fleischner Society 2017 guidelines recommend follow up with non-contrast chest CT at 12 months after discovery in a high risk patient.    Blood group:pending   Assessment:   1. COPD  Plan:   Pt is frail suggest rehab, BMI 22.1, concerns with alcohol use every other day vodka and bourbon. Also has has c/o left hand tremors and patient is concerned that she may have Parkinson's.  Continue with LUT work up.  Lung measurements: Right 30.2; Left 32 cm.

## 2020-11-30 NOTE — LETTER
December 1, 2020      Kanwal Jacobsen, DO  [address]  [facility] [address]           [facility] 2nd Fl  [address]  [facility] [address]  Phone: [phone]          Patient: Ann Elizabeth Canavan   MR Number: [id number]   YOB: 1960   Date of Visit: 11/30/2020       Dear Dr. Kanwal Jacobsen:    Thank you for referring Ann Canavan to me for evaluation. Attached you will find relevant portions of my assessment and plan of care.    If you have questions, please do not hesitate to call me. I look forward to following Ann Canavan along with you.    Sincerely,    Loc Gonzalez MD    Enclosure  CC:  No Recipients    If you would like to receive this communication electronically, please contact [email or web address] or [phone] to request more information on [employer] [facility] access.    For providers and/or their staff who would like to refer a patient to [facility], please contact us through our one-stop-shop provider referral line, [facility], at [phone].    If you feel you have received this communication in error or would no longer like to receive these types of communications, please e-mail [email or web address]

## 2020-11-30 NOTE — TELEPHONE ENCOUNTER
----- Message from Guillermina Wilkerson sent at 11/30/2020 12:21 PM CST -----  Regarding: Appt Access  Contact: Pt @ [phone]  Pt stating that she is not feeling well and needs to reschedule appt for today.     Call back: [phone]

## 2020-12-01 ENCOUNTER — TELEPHONE (OUTPATIENT)
Dept: TRANSPLANT | Facility: CLINIC | Age: 60
End: 2020-12-01

## 2020-12-01 DIAGNOSIS — J96.11 CHRONIC RESPIRATORY FAILURE WITH HYPOXIA: Primary | Chronic | ICD-10-CM

## 2020-12-01 DIAGNOSIS — Z01.818 PRE-TRANSPLANT EVALUATION FOR LUNG TRANSPLANT: ICD-10-CM

## 2020-12-01 DIAGNOSIS — N20.0 KIDNEY STONE ON LEFT SIDE: ICD-10-CM

## 2020-12-01 DIAGNOSIS — J44.9 CHRONIC OBSTRUCTIVE PULMONARY DISEASE, UNSPECIFIED COPD TYPE: ICD-10-CM

## 2020-12-01 DIAGNOSIS — R16.0 HEPATOMEGALY: Primary | ICD-10-CM

## 2020-12-01 DIAGNOSIS — N28.1 RENAL CYST, LEFT: ICD-10-CM

## 2020-12-01 DIAGNOSIS — N28.1 RENAL CYST: ICD-10-CM

## 2020-12-01 DIAGNOSIS — Z76.82 LUNG TRANSPLANT CANDIDATE: ICD-10-CM

## 2020-12-01 NOTE — PROGRESS NOTES
Per LYNNE Levy, providers have concern over patient's use of alcohol, and her reported ability to 'stop drinking after transplant'. Order for PETH placed at Dr. Jacobsen's request, linked to current orders.

## 2020-12-01 NOTE — TELEPHONE ENCOUNTER
----- Message from Kanwal Jacobsen DO sent at 11/30/2020  1:16 PM CST -----  Needs hepatology and urology referrals.  Thanks    Contacted patient and notified her of the abdominal ultrasound results.  Also notified her of Dr. Jacobsen's orders for a hepatology and urology consult.  She verbalized her understanding of all discussed and stated that she has had the cysts on her kidney.  Informed her that we still need to have her see urology as part of the transplant evaluation.  She again verbalized her understanding and requested not to have all of the testing and appointments on the same day.

## 2020-12-01 NOTE — TELEPHONE ENCOUNTER
Pt informed of all results and also a weekly vitamin D will be prescribed. Also informed pt Dr. Bowles is changing her cholesterol medicine to rosuvastatin 40mg to better control her cholesterol. Pt stated to send the medications to our [facility] pharmacy.

## 2020-12-01 NOTE — TELEPHONE ENCOUNTER
Please call and notify pt:    Vitamin D level is almost nil. Will put on weekly vitamin D.   Sugars are doing well. Muscle enzymes are normal. Liver and kidney functions are ok.   Cholesterol is ok. However her LDL, bad cholesterol, is not quite at goal of <70. I'd like to change her from atorvastatin 40 to rosuvastatin 40 to better control cholesterol but also see if her feet cramp improves.    Let me know which pharmacy does she want me to send meds to.

## 2020-12-02 RX ORDER — ROSUVASTATIN CALCIUM 40 MG/1
40 TABLET, COATED ORAL NIGHTLY
Qty: 90 TABLET | Refills: 3 | Status: SHIPPED | OUTPATIENT
Start: 2020-12-02 | End: 2021-01-01 | Stop reason: SDUPTHER

## 2020-12-02 RX ORDER — ERGOCALCIFEROL 1.25 MG/1
50000 CAPSULE ORAL
Qty: 12 CAPSULE | Refills: 3 | Status: SHIPPED | OUTPATIENT
Start: 2020-12-02 | End: 2022-01-01

## 2020-12-04 ENCOUNTER — TELEPHONE (OUTPATIENT)
Dept: TRANSPLANT | Facility: CLINIC | Age: 60
End: 2020-12-04

## 2020-12-04 NOTE — TELEPHONE ENCOUNTER
Spoke with patient about upcoming appointments on 12.07.20, and about change of 8:30am MRI on Monday was changed to Tuesday same time.

## 2020-12-07 ENCOUNTER — CLINICAL SUPPORT (OUTPATIENT)
Dept: TRANSPLANT | Facility: CLINIC | Age: 60
End: 2020-12-07
Payer: MEDICARE

## 2020-12-07 ENCOUNTER — TELEPHONE (OUTPATIENT)
Dept: TRANSPLANT | Facility: CLINIC | Age: 60
End: 2020-12-07

## 2020-12-07 ENCOUNTER — HOSPITAL ENCOUNTER (OUTPATIENT)
Dept: PULMONOLOGY | Facility: CLINIC | Age: 60
Discharge: HOME | End: 2020-12-07
Payer: MEDICARE

## 2020-12-07 VITALS — BODY MASS INDEX: 21.99 KG/M2 | WEIGHT: 132 LBS | HEIGHT: 65 IN

## 2020-12-07 DIAGNOSIS — J96.11 CHRONIC RESPIRATORY FAILURE WITH HYPOXIA: Chronic | ICD-10-CM

## 2020-12-07 DIAGNOSIS — Z01.818 PRE-TRANSPLANT EVALUATION FOR LUNG TRANSPLANT: ICD-10-CM

## 2020-12-07 DIAGNOSIS — J44.9 CHRONIC OBSTRUCTIVE PULMONARY DISEASE, UNSPECIFIED COPD TYPE: ICD-10-CM

## 2020-12-07 LAB
ALLENS TEST: ABNORMAL
DELSYS: ABNORMAL
FIO2: 0.3
FLOW: 3
HCO3 UR-SCNC: 33.6 MMOL/L (ref 24–28)
MODE: ABNORMAL
PCO2 BLDA: 62.4 MMHG (ref 35–45)
PH SMN: 7.34 [PH] (ref 7.35–7.45)
PO2 BLDA: 95 MMHG (ref 80–100)
POC BE: 8 MMOL/L
POC SATURATED O2: 97 % (ref 95–100)
POC TCO2: 35 MMOL/L (ref 23–27)
SAMPLE: ABNORMAL
SITE: ABNORMAL

## 2020-12-07 PROCEDURE — 36600 WITHDRAWAL OF ARTERIAL BLOOD: CPT | Mod: S$GLB,TXP,, | Performed by: INTERNAL MEDICINE

## 2020-12-07 PROCEDURE — 82803 PR  BLOOD GASES: PH, PO2 & PCO2: ICD-10-PCS | Mod: S$GLB,TXP,, | Performed by: INTERNAL MEDICINE

## 2020-12-07 PROCEDURE — 94618 PULMONARY STRESS TESTING: ICD-10-PCS | Mod: S$GLB,TXP,, | Performed by: INTERNAL MEDICINE

## 2020-12-07 PROCEDURE — 94618 PULMONARY STRESS TESTING: CPT | Mod: S$GLB,TXP,, | Performed by: INTERNAL MEDICINE

## 2020-12-07 PROCEDURE — 36600 PR WITHDRAWAL OF ARTERIAL BLOOD: ICD-10-PCS | Mod: S$GLB,TXP,, | Performed by: INTERNAL MEDICINE

## 2020-12-07 PROCEDURE — 82803 BLOOD GASES ANY COMBINATION: CPT | Mod: S$GLB,TXP,, | Performed by: INTERNAL MEDICINE

## 2020-12-07 NOTE — PROGRESS NOTES
ABG: PH 7.34, PCO2 62, PO2 95, BE 8, HCO3 33.6, O2Hb 97% FIO2 0.30  ABG drawn by Ion Trinidad RRT. Results given to Zakia Potts RN.

## 2020-12-07 NOTE — PROGRESS NOTES
PRE EDUCATION NOTE    Met with Ann Canavan and her friend for pre-transplant education and to consent for lung transplant evaluation.  Pre-transplant educational binder given to patient.  Instructed patient to read the binder.      Thorough pre-transplant education conducted.    Information presented included:  · Evaluation process and testing  · Members of the transplant team  · Selection committee members and role of the committee  · Contraindications to lung transplantation  · Policy for absolute smoking / nicotine cessation for at least 6 months prior to listing  · Relocation pre- and post-transplant  · Listing process for transplant: explained the listing designations, active versus inactive (status 7),  lung allocation score (LAS), and allocation of lungs within 250 nautical miles  · National graft survival statistics, our current survival statistics since reopening of the program to present  · Wait time on the list  · The need to reach patient within 15 minutes with donor offer  ·  [facility] donors with increased risk of disease transmission and Hepatitis C antibody positive and ALBARO positive donors  · Donor criteria and testing on donor, procurement of donor lungs and ischemic time, blood transfusions, process for matching donor with recipient  · Transplant surgery, single vs. double, estimated length of surgery, surgical incision, chest tubes and purpose, and ventilator  · Post-transplant immunosuppression for life with the need to be able to afford post transplant medications, immediate post transplant ICU stay - extubation, explained the importance of getting out of bed (once extubated) and the importance of coughing and taking deep breaths despite the pain to prevent pneumonia  · Need for a caregiver to be with her at all times beginning with discharge from ICU and transfer to TSU, through at least the first 3 months post-transplant possibly longer  · Post-transplant medications, their side  effects, and self medications  · Discharge, follow-up clinic visits, testing with each visit, and surveillance bronchoscopies  · Rejection and treatment, how to reach team members at any time  · Health maintenance post-transplant, avoiding large crowds and sick contacts, wearing a mask, good handwashing, pet policy, fishing, dermatology, opthamology, and dental visits post-transplant  · Multiple listing information provided    Ann Canavan and her friend asked pertinent questions which were answered to their satisfaction.     Informed Consent to Undergo Evaluation for Lung Transplantation reviewed and discussed with patient and her friend.  Consent signed by patient.  Copy of consent given to patient.  Original to be sent to Medical Records for scanning.

## 2020-12-07 NOTE — PROCEDURES
Ann Elizabeth Canavan is a 60 y.o.  female patient, who presents for a 6 minute walk test ordered by DO Ann-Marie.  The diagnosis is Pre Lung Transplant Evaluation.  The patient's BMI is 22 kg/m2.  Predicted distance (lower limit of normal) is 396.75 meters.      Test Results:    The test was not completed.  The patient stopped 2 times for a total of 200 seconds.  The total time walked was 160 seconds.  During walking, the patient reported:  Dyspnea, Leg pain, Lightheadedness, Dizziness. The patient used a wheelchair and supplemental oxygen during testing.     12/07/2020---------Distance: 121.92 meters (400 feet)     O2 Sat % Supplemental Oxygen Heart Rate Blood Pressure Kevin Scale   Pre-exercise  (Resting) 95 % 3 L/M 120 bpm 157/70 mmHg 4   During Exercise 78 % 3 L/M 141 bpm (!) 222/90 mmHg 7-8   Post-exercise  (Recovery) 94 % 3 L/M  130 bpm 159/79 mmHg       Recovery Time: 180 seconds    Performing nurse/tech: Estopyuriy TORRES      PREVIOUS STUDY:   The patient had a previous study.     10/22/2020---------Distance: 137.16 meters (450 feet)       O2 Sat % Supplemental Oxygen Heart Rate Blood Pressure Kevin Scale   Pre-exercise  (Resting) 96 % 3 L/M 112 bpm 131/76 mmHg 4   During Exercise 88 % 3 L/M 133 bpm 201/95 mmHg 5-6   Post-exercise  (Recovery) 97 % 3 L/M  117 bpm 146/76 mmHg            CLINICAL INTERPRETATION:  Six minute walk distance is 121.92 meters (400 feet) with very heavy dyspnea.  During exercise, there was significant desaturation while breathing supplemental oxygen.  Blood pressure increased significantly and Heart rate remained stable with walking.  Hypertension and Tachycardia was present prior to exercise.  This may represent a hypertensive and a tachycardic response to exercise.  The patient reported non-pulmonary symptoms during exercise.  Severe exercise impairment is likely due to desaturation and cardiovascular causes.  The patient did not complete the study, walking 160 seconds of the  360 second test.  The patient may benefit from lung transplant evaluation.  Since the previous study in October 2020, exercise capacity is unchanged.  Based upon age and body mass index, exercise capacity is less than predicted.

## 2020-12-08 ENCOUNTER — HOSPITAL ENCOUNTER (OUTPATIENT)
Dept: CARDIOLOGY | Facility: CLINIC | Age: 60
Discharge: HOME | End: 2020-12-08
Payer: MEDICARE

## 2020-12-08 ENCOUNTER — HOSPITAL ENCOUNTER (OUTPATIENT)
Dept: RADIOLOGY | Facility: HOSPITAL | Age: 60
Discharge: HOME | End: 2020-12-08
Attending: INTERNAL MEDICINE
Payer: MEDICARE

## 2020-12-08 ENCOUNTER — TELEPHONE (OUTPATIENT)
Dept: ADMINISTRATIVE | Facility: HOSPITAL | Age: 60
End: 2020-12-08

## 2020-12-08 ENCOUNTER — NUTRITION (OUTPATIENT)
Dept: TRANSPLANT | Facility: CLINIC | Age: 60
End: 2020-12-08
Payer: MEDICARE

## 2020-12-08 ENCOUNTER — SOCIAL WORK (OUTPATIENT)
Dept: TRANSPLANT | Facility: CLINIC | Age: 60
End: 2020-12-08
Payer: MEDICARE

## 2020-12-08 VITALS — WEIGHT: 130.31 LBS | BODY MASS INDEX: 20.94 KG/M2 | HEIGHT: 66 IN

## 2020-12-08 VITALS — BODY MASS INDEX: 22.03 KG/M2 | WEIGHT: 132.25 LBS | HEIGHT: 65 IN

## 2020-12-08 DIAGNOSIS — J44.9 CHRONIC OBSTRUCTIVE PULMONARY DISEASE, UNSPECIFIED COPD TYPE: ICD-10-CM

## 2020-12-08 DIAGNOSIS — E43 SEVERE PROTEIN-CALORIE MALNUTRITION: Primary | ICD-10-CM

## 2020-12-08 DIAGNOSIS — Z01.818 PRE-TRANSPLANT EVALUATION FOR LUNG TRANSPLANT: ICD-10-CM

## 2020-12-08 DIAGNOSIS — E46 MALNUTRITION, UNSPECIFIED TYPE: ICD-10-CM

## 2020-12-08 DIAGNOSIS — M62.50 MUSCULAR ATROPHY, UNSPECIFIED SITE: ICD-10-CM

## 2020-12-08 DIAGNOSIS — J96.11 CHRONIC RESPIRATORY FAILURE WITH HYPOXIA: ICD-10-CM

## 2020-12-08 DIAGNOSIS — J96.11 CHRONIC RESPIRATORY FAILURE WITH HYPOXIA: Chronic | ICD-10-CM

## 2020-12-08 PROCEDURE — 93005 ELECTROCARDIOGRAM TRACING: CPT | Mod: S$GLB,TXP,, | Performed by: INTERNAL MEDICINE

## 2020-12-08 PROCEDURE — 77067 SCR MAMMO BI INCL CAD: CPT | Mod: TC,TXP

## 2020-12-08 PROCEDURE — 97802 PR MED NUTR THER, 1ST, INDIV, EA 15 MIN: ICD-10-PCS | Mod: S$GLB,TXP,, | Performed by: DIETITIAN, REGISTERED

## 2020-12-08 PROCEDURE — 99999 PR PBB SHADOW E&M-EST. PATIENT-LVL II: ICD-10-PCS | Mod: PBBFAC,TXP,, | Performed by: DIETITIAN, REGISTERED

## 2020-12-08 PROCEDURE — 93010 EKG 12-LEAD: ICD-10-PCS | Mod: S$GLB,TXP,, | Performed by: INTERNAL MEDICINE

## 2020-12-08 PROCEDURE — 77067 SCR MAMMO BI INCL CAD: CPT | Mod: 26,TXP,, | Performed by: RADIOLOGY

## 2020-12-08 PROCEDURE — 99999 PR PBB SHADOW E&M-EST. PATIENT-LVL II: CPT | Mod: PBBFAC,TXP,, | Performed by: DIETITIAN, REGISTERED

## 2020-12-08 PROCEDURE — 93005 EKG 12-LEAD: ICD-10-PCS | Mod: S$GLB,TXP,, | Performed by: INTERNAL MEDICINE

## 2020-12-08 PROCEDURE — 93010 ELECTROCARDIOGRAM REPORT: CPT | Mod: S$GLB,TXP,, | Performed by: INTERNAL MEDICINE

## 2020-12-08 PROCEDURE — 97802 MEDICAL NUTRITION INDIV IN: CPT | Mod: S$GLB,TXP,, | Performed by: DIETITIAN, REGISTERED

## 2020-12-08 PROCEDURE — 77067 MAMMO DIGITAL SCREENING BILAT: ICD-10-PCS | Mod: 26,TXP,, | Performed by: RADIOLOGY

## 2020-12-08 NOTE — PROGRESS NOTES
TRANSPLANT NUTRITIONAL ASSESSMENT    Referring Provider: Kanwal Jacobsen DO    Reason for Visit: Pre-lung transplant work-up    Age: 60 y.o.  Sex: female    Patient Active Problem List   Diagnosis    Tobacco dependence    Chronic respiratory failure with hypoxia    Glucose intolerance (impaired glucose tolerance)    Centrilobular emphysema    Type 2 diabetes mellitus without complication, without long-term current use of insulin    Pulmonary nodule seen on imaging study    Mixed hyperlipidemia    Severe episode of recurrent major depressive disorder, without psychotic features    Complicated grieving    Right leg pain    Malnutrition    Claudication in peripheral vascular disease    Otosclerosis of right ear    Conductive hearing loss of right ear with unrestricted hearing of left ear    Multiple renal cysts    Atherosclerosis of aorta    LAMBERTO (obstructive sleep apnea)    COPD, very severe    Lung transplant candidate    Chronic obstructive pulmonary disease    Pre-transplant evaluation for lung transplant     Past Medical History:   Diagnosis Date    Chronic respiratory failure with hypoxia and hypercapnia 5/4/2014    COPD (chronic obstructive pulmonary disease)     Depression     Hyperlipidemia 5/12/2017    Otosclerosis of right ear 5/12/2017    40% hearing back in 1988, had surgery with some improvement, but hearing loss persists.      PAD (peripheral artery disease)     Post-menopausal 2008    Pulmonary emphysema 04/28/2017    Tobacco dependence 5/2/2014    Type 2 diabetes mellitus without complication, without long-term current use of insulin 4/29/2017    Hb A1c 6.5% in 2/2017. Diet controlled.      Lab Results   Component Value Date     (H) 11/30/2020    K 3.9 11/30/2020    PHOS 4.0 06/25/2017    MG 1.8 11/30/2020    CHOL 219 (H) 11/30/2020     (H) 11/30/2020    TRIG 69 11/30/2020    ALBUMIN 4.3 11/30/2020    PREALBUMIN 24 05/12/2017    HGBA1C 5.5 11/30/2020     CALCIUM 9.3 11/30/2020     Other Pertinent Labs: none    Current Outpatient Medications   Medication Sig    albuterol 90 mcg/actuation inhaler Inhale 2 puffs into the lungs every 6 (six) hours as needed for Wheezing. Rescue    albuterol-ipratropium (DUO-NEB) 2.5 mg-0.5 mg/3 mL nebulizer solution Take 3 mLs by nebulization every 6 (six) hours as needed for Wheezing or Shortness of Breath. Rescue    aspirin (ECOTRIN) 81 MG EC tablet Take 81 mg by mouth once daily.    blood sugar diagnostic Strp 1 strip by Misc.(Non-Drug; Combo Route) route once daily. (Patient not taking: Reported on 11/30/2020)    diphenhydrAMINE (BENADRYL ALLERGY) 25 mg tablet Take 25 mg by mouth nightly as needed for Insomnia.    ergocalciferol (ERGOCALCIFEROL) 50,000 unit Cap Take 1 capsule (50,000 Units total) by mouth every 7 days.    fluticasone propionate (FLONASE) 50 mcg/actuation nasal spray 1 spray (50 mcg total) by Each Nostril route 2 (two) times a day.    fluticasone-salmeterol diskus inhaler 250-50 mcg     lancets Misc 1 Device by Misc.(Non-Drug; Combo Route) route once daily. (Patient not taking: Reported on 11/30/2020)    levocetirizine (XYZAL) 5 MG tablet Take 1 tablet (5 mg total) by mouth every evening.    OPW TEST CLAIM - DO NOT FILL Inhale into the lungs. OPW test claim. Do not fill. (Patient not taking: Reported on 11/30/2020)    predniSONE (DELTASONE) 20 MG tablet Take 2 tablets (40 mg total) by mouth once daily. For exacerbation. (Patient not taking: Reported on 11/30/2020)    roflumilast (DALIRESP) 500 mcg Tab Take 1 tablet (500 mcg total) by mouth once daily.    rosuvastatin (CRESTOR) 40 MG Tab Take 1 tablet (40 mg total) by mouth every evening.    sertraline (ZOLOFT) 100 MG tablet Take 1 tablet (100 mg total) by mouth once daily. Last refill. Need appointment.    tiotropium (SPIRIVA) 18 mcg inhalation capsule Inhale 1 capsule (18 mcg total) into the lungs once daily.     No current facility-administered  "medications for this visit.      Allergies: Avelox [moxifloxacin]    Ht Readings from Last 1 Encounters:   12/08/20 5' 5.63" (1.667 m)     Wt Readings from Last 1 Encounters:   12/08/20 59.1 kg (130 lb 4.7 oz)      BMI: Body mass index is 21.27 kg/m².    Usual Weight: 125-130 lb  Weight Change/Time: states she does not recall weight much more than 130/135 lb, however chart records indicate 9/2019 pt weighed around 150 lb  Current Diet: regular  Appetite/Current Intake: fair   Exercise/Physical Activity: poor functional status, sedentary/debilitated  Nutritional/Herbal Supplements: none (will start Vit D)  Potential Food/Medication Interactions: reviewed  Chewing/Swallowing Problems: none  Symptoms: none  Assessment of Lab Values: no new labs today  Support System: friend present and voices support    Estimated Kcal Need: 7219-0711 kcal (30-35 kcal/kg)  Estimated Protein Need: 59-88 gm (1-1.5 gm/kg)    Nutritional History:   Pt present with friend as caregiver. Pt unaware /forgot she has had significant weight loss over the past year. She has visible muscle wasting and severe fat depletion. Pt debilitated, in wheelchair and not much activity at home. She states she eats small snacks/meals throughout the day. She does not like to drink milk or milk-texture protein/nutrition supplements. She drinks water, maybe some cranberry juice. Pt reported the following diet recall:  B: 1 piece of whole grain toast / 3 frozen sausage / maybe cereal w/ 2% or whole milk  Snack: whole or 1/2 sandwich with 1 slice of cheese, whole grain bread  L/D: various fresh or frozen items from [employer] - cheese burger / 3 meatballs / pork roast & mashed potatoes / Seng chicken breaded tenderloins (3 usually) / hummus w/ bread / tzatziki sauce + bread or crackers / goldfish crackers    Nutritional Diagnoses  Problem: inadequate energy intake  Etiology: r/t increased energy needs as lung disease progressed, increased energy expenditure  Symptoms: " aeb weight loss, unintentional approx 20 lb + visible muscle and fat depletion    Educational Need? yes  Barriers: emotional/psychosocial and readiness to learn  Discussed with: patient and caregiver  Interventions: Patient taught nutrition information regarding Pre-lung transplant work-up.    COPD nutrition recommendations packet reviewed and provided for pt to take home and reference.  Provided nutrition supplement suggestions, options and strongly advised pt 2-3 x/per day (pt resistant to adding anything new or trying any suggestions)  Small meals/snacks throughout the day, daily - high hermann/high protein.    Goals/Recommendations: diet adherence, small frequent meals and snacks, consumption of theo nutritional supplements and gradual weight gain  Actions Taken: instruct/provide written information  Strategies Used: problem solving, goal setting, motivational interviewing  Patient and/or family comprehend instructions: yes , adherence expected  Outcome: Did not verbalize willingness to try new nutrition habits or new foods, did not verbalize understanding that her nutritional status and functional status need improving  Monitoring:     Contact information provided, will f/u in clinic and communicate with the care team as needed.     Counseling Time: 30 minutes

## 2020-12-08 NOTE — PROGRESS NOTES
Pre Transplant Infectious Diseases Consult    Subjective:     Patient ID: Ann Canavan is a 60 y.o. female with COPD on home O2 (3L) dx 2014 who comes to clinic for pre-transplant evaluation. Today, pt states she feels well, a little anxious and some SOB on exertion.     Chief Complaint   Patient presents with    Lung Failure     Pre-Tx eval    Immunizations           Reason for Visit:  Pre Transplant Evaluation     Organ:  Lung     History of Prior Transplant:  no     Currently taking immunosuppressants/steroids:  no      History of Splenectomy:  no     Infectious History:  Current/recent infections or currently taking antibiotics?  no  History of recurrent infections (sinuses, throat, bladder/kidneys, intestines, skin, dental, lung, catheter (HD/PD) related, or peritonitis/SBP)?  no  Any major hospitalizations due to infection?  no  If diabetic, history of diabetic foot infection/osteomyelitis?  no  History of shingles?  no  History of STDs (syphilis, viral hepatitis, HIV)?  no  Exposure to TB or ever had a positive TB skin test?  no  History of residence in coccidioides endemic areas ([facility] [place]? no  Any foreign travel?  yes   Country: [place]/   Travel-related illness: no   Born and raised in [facility] ([facility] last visited  >5 years ago     Social/Environmental:  Occupational:  admin/sales  Animal exposures (dogs, cats, farm animals, bird cages, fish tanks):  yes Dog/cat in the past, none current   Hobbies (gardening, hike, fish/hunting, etc): no  Consumption of raw/undercooked meat or seafood?  yes (sushi)  Any injectable or smoked recreational drug use? no     Immunization History:  Childhood vaccines:  yes  Annual Flu vaccine: yes 11/2020  Tetanus/TDAP: 2017  Hepatitis A: immune  Hepatitis B: NA  Prevnar-13: 2014  Pneumovax-23: 2014  Zostavax/Shingrix: 11/2020    Review of Systems   Constitution: Negative for chills and fever.   Cardiovascular: Positive for dyspnea on exertion.    All other systems reviewed and are negative.       Past Medical History:   Diagnosis Date    Chronic respiratory failure with hypoxia and hypercapnia 5/4/2014    COPD (chronic obstructive pulmonary disease)     Depression     Hyperlipidemia 5/12/2017    Otosclerosis of right ear 5/12/2017    40% hearing back in 1988, had surgery with some improvement, but hearing loss persists.      PAD (peripheral artery disease)     Post-menopausal 2008    Pulmonary emphysema 04/28/2017    Tobacco dependence 5/2/2014    Type 2 diabetes mellitus without complication, without long-term current use of insulin 4/29/2017    Hb A1c 6.5% in 2/2017. Diet controlled.      Past Surgical History:   Procedure Laterality Date    BREAST BIOPSY      BREAST CYST ASPIRATION      GANGLION CYST EXCISION Right 1988    STAPEDES SURGERY Right 1988     Family History   Problem Relation Age of Onset    Diabetes Mother     Heart disease Father         Strong FH of CAD/CHF on fathers side    Peripheral vascular disease Brother         With necrosis of leg    Stroke Brother 49    Diabetes Maternal Uncle     Cancer Neg Hx     Thyroid disease Neg Hx      Social History     Tobacco Use    Smoking status: Former Smoker     Packs/day: 1.00     Years: 40.00     Pack years: 40.00     Types: Cigarettes     Start date: 8/13/1977     Quit date: 6/19/2017     Years since quitting: 3.4    Smokeless tobacco: Never Used   Substance Use Topics    Alcohol use: Yes     Alcohol/week: 0.8 standard drinks     Types: 1 Standard drinks or equivalent per week     Comment: occasional    Drug use: Never       Objective:   Vitals:    12/09/20 1045   BP: 137/80   Pulse: (!) 115   Temp: 98.1 °F (36.7 °C)       Physical Exam  Constitutional:       General: She is not in acute distress.     Appearance: She is not toxic-appearing.   HENT:      Head: Normocephalic and atraumatic.      Right Ear: External ear normal.      Left Ear: External ear normal.      Nose:       Comments: NC     Mouth/Throat:      Mouth: Mucous membranes are moist.      Pharynx: No oropharyngeal exudate or posterior oropharyngeal erythema.   Eyes:      General: No scleral icterus.        Right eye: No discharge.         Left eye: No discharge.   Neck:      Musculoskeletal: Normal range of motion.   Cardiovascular:      Rate and Rhythm: Regular rhythm. Tachycardia present.   Pulmonary:      Effort: Pulmonary effort is normal. No respiratory distress.   Abdominal:      General: There is no distension.      Palpations: Abdomen is soft.      Tenderness: There is no abdominal tenderness.   Musculoskeletal:      Right lower leg: No edema.      Left lower leg: No edema.   Skin:     General: Skin is warm and dry.      Findings: No rash.   Neurological:      Mental Status: She is alert and oriented to person, place, and time. Mental status is at baseline.      Comments: In wheelchair     Psychiatric:         Mood and Affect: Mood normal.         Data:    All data, including recent labs, radiology, and pathology, has been independently reviewed.    Labs:    Recent Labs   Lab Result Units 11/30/20  0913 12/07/20  1023 12/07/20  1051   WBC K/uL 9.26  --   --    Hemoglobin g/dL 12.8  --   --    Hematocrit % 41.5  --   --    Sodium mmol/L 140  --   --    Potassium mmol/L 3.9  --   --    Chloride mmol/L 97  --   --    BUN mg/dL 17  --   --    Creatinine mg/dL 0.7 0.7 0.7   AST U/L 19  --   --    ALT U/L 18  --   --    Alkaline Phosphatase U/L 107  --   --    Total Bilirubin mg/dL 0.3  --   --    INR  0.9  --   --       Positive: Hep A, EBV, HSV1, CMV, VZV  Negative: HbcAb, HBsAb, HBsAg, HCV Ab, QTB, HSV2, toxo, strongy    In process: HIV    Radiology:    Reviewed CT chest - scattered pulm nodules noted    Assessment:    1. Vaccine counseling  Hepatitis B (Recombinant) Adjuvanted, 2 dose   2. Lung transplant candidate     3. Pre-transplant evaluation for lung transplant     4. Chronic obstructive pulmonary disease,  unspecified COPD type     5. Pulmonary nodule seen on imaging study  Blastomyces Antigen, Urine    Histoplasma antigen, urine    Cryptococcal antigen, blood        Counseling:   I discussed with the patient the risk for increased susceptibility to infections following transplantation including increased risk for infection right after transplant and if rejection should occur.  The patient has been counseled on the importance of vaccinations including but not limited to a yearly flu vaccine. Patient was also instructed to encourage that family/caretakers receive their flu vaccine yearly. The patient was encouraged to contact us about any problems that may develop after immunizations and possible side effects were reviewed.      Specific guidance has been provided to the patient regarding the patient's occupation, hobbies and activities to avoid future infectious complications. These include but are not limited to: avoiding raw/undercooked meats and seafood, avoiding unpasteurized milk/cheeses, proper (hand) hygiene, contact with animals and appropriate vaccination of animals, use of mosquito/tick precautions, avoiding walking barefoot, avoiding sick contacts, and seeking medical advice prior to foreign travel (specifically developing countries).      Transplant Candidacy:   Based on available information, there are no identified significant barriers to transplantation from an infectious disease standpoint pending acceptable serologies and subject to recommendations below.     -pulm nodules noted on CT scans, will check fungal markers for completeness    Vaccinations:  -Hep B series today     Final determination of transplant candidacy will be made once evaluation is complete and reviewed by the Transplant Selection Committee.     Pt was given ample time for questions, all questions answered.     Patient's care has been discussed with the following providers:   - Dr. Kanwal Jacobsen   - Trinh Bowles MD      Follow up  PRN. Please reconsult if HIV returns positive.

## 2020-12-08 NOTE — PROGRESS NOTES
Transplant Recipient Adult Psychosocial Assessment    SW met with pt and friend Mayela Maddox in clinic this morning. Pt here for Lung Transplant Evaluation due to worsening COPD.    Ann Canavan  [address] [place] [id number]  Telephone Information:   Mobile [phone]   Home  [phone] (home)  Work  There is no work phone number on file.  E-mail  [email or web address]    Sex: female  YOB: 1960  Age: 60 y.o.    Encounter Date: 12/8/2020  [place] Citizen: yes  Primary Language: English   Needed: no    Emergency Contact:  Name: Mayela Maddox  Relationship: friend  Address: [address] [place], [place] [id number]  Phone Numbers:  [phone] (mobile)    Family/Social Support:   Number of dependents/: none  Marital history: Pt reports that she is , with two previous marriages.   Other family dynamics: Pt states that her mother, Vibha Clayton (91) is living in [place] with a roommate. Pt states that she had one brother who passed away of a stroke a few years ago. Pt is originally from [place] but grew up in [place] and then moved to the [place] 35 years ago.     Pt has limited family support as most of her family is back in [place]. Pt reports that most of her friends are here in [place]. She reports meeting many of them years ago through [place] meet ups.     Household Composition:  Pt lives alone.     Do you and your caregivers have access to reliable transportation? yes. Pt has her own car and all three caregivers have a vehicle and drive.    PRIMARY CAREGIVER: Mayela Maddox, pt's friend, will commit to assisting as one of three caregivers, phone number [phone].     Mayela would not sign caregiver agreement but states she will provide some care to pt. She reports that she cannot commit as the point person, and would not be able to stay with pt for all three months. She and pt states that they would need to work out a rotating caregiver  plan for the three months that pt requires 24/7 care. Pt states that she will bring her friend Rose Honeycutt in with her tomorrow morning to also meet with SW. Rose may be willing to sign caregiver agreement.      provided in-depth information to patient and caregiver regarding pre- and post-transplant caregiver role.   strongly encourages patient and caregiver to have concrete plan regarding post-transplant care giving, including back-up caregiver(s) to ensure care giving needs are met as needed.    Patient and Caregiver states understanding all aspects of caregiver role/commitment and is able/willing/committed to being caregiver to the fullest extent necessary.    Patient and Caregiver verbalizes understanding of the education provided today and caregiver responsibilities.         remains available. Patient and Caregiver agree to contact  in a timely manner if concerns arise.      Able to take time off work without financial concerns: yes. Mayela states that she is retired and works in retail on some weekends. In addition, pt's other two caregivers are retired and have flexibility.     Additional Significant Others who will Assist with Transplant:  Name: Rose Honeycutt (ph#[phone])  Age: 64  City: [place] State: [place]  Relationship: friend  Does person drive? yes     Pt reports that Rose is retired but works at an agency part-time. SW to meet with pt and Rose tomorrow morning in order to further confirm caregiver plan.     Name: Cecelia Adair (ph#[phone])  Age: early 60's  City: [place] State: [place]  Relationship: friend  Does person drive? yes     Pt reports that Cecelia is retired but walks dogs part time.     Living Will: no  Healthcare Power of : no  Advance Directives on file: <<no information> per medical record.  Verbally reviewed LW/HCPA information.   provided patient with copy of LW/HCPA documents and provided education on  completion of forms.    Living Donors: N/A    Highest Education Level: some college  Reading Ability: college  Reports difficulty with: vision-wears prescription glasses, hearing loss in right ear due to otosclerosis. Pt states that she is sometimes scattered but feels this is related to lack of oxygen.   Learns Best By:  Pt reports that she learns best from hands-on     Status: no  VA Benefits: no     Working for Income: No  If no, reason not working: Disability  Patient is disabled.  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 Pt states that she last worked full-time in . Pt reports that she did this for 20 years. 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 Pt states she makes very little money from this.     Spouse/Significant Other Employment: N/A    Disabled: yes: date disability began: , due to: COPD.    Monthly Income:   Disability: $1100.00   Pt reports that she has a 401K but does not yet know how much money is in her account. She states that she sometimes receives some financial assistance from her mother.     Able to afford all costs now and if transplanted, including medications: yes. Pt is enrolled in PAP programs which fully assist with the cost of three her COPD medications.     Patient and Caregiver verbalizes understanding of personal responsibilities related to transplant costs and the importance of having a financial plan to ensure that patients transplant costs are fully covered.      provided fundraising information/education.  Patient and Caregiver verbalizes understanding.   remains available.    Insurance:   Payor/Plan Subscr  Sex Relation Sub. Ins. ID Effective Group Num   1. [employer]* CANAVAN,ANN ELIZ* 1960 Female Self [id number]                                   [address]   2. CASA  - [facility]* CANAVAN,ANN ELIZ* 1960 Female  [id number] 4/12/18                                    [address]     Primary Insurance (for UNOS reporting): Public Insurance - Medicare FFS (Fee For Service) [facility]  Secondary Insurance (for UNOS reporting): None     Pt has a $6700 OOP. Pt states that she has not begun fundraising. SW provided education on importance of this as pt has a high OOP. SW explained that pt will need to show concrete evidence that she is working on fundraising in order to be considered a transplant candidate. Pt verbalized understanding.     Patient and Caregiver verbalizes clear understanding that patient may experience difficulty obtaining and/or be denied insurance coverage post-surgery. This includes and is not limited to disability insurance, life insurance, health insurance, burial insurance, long term care insurance, and other insurances.    Patient and Caregiver also reports understanding that future health concerns related to or unrelated to transplantation may not be covered by patient's insurance.  Resources and information provided and reviewed.      Patient and Caregiver provides verbal permission to release any necessary information to outside resources for patient care and discharge planning.  Resources and information provided are reviewed.      Dialysis: none  Infusion Service: patient utilizing? no  Home Health: patient utilizing? no  DME: yes. Pt has all O2 DME through [facility]. Pt has a home concentrator that goes up to 5LPM. Pt also has 3 portable tanks from [employer]. Pt has a portable concentrator through [employer] that she purchased on [employer]. Pt states that she uses between 2-3LPM.   Pulmonary/Cardiac Rehab: Pt reports that she attended Pulmonary [place]. She reports that she probably attended for a total of 6 months.   ADLS:  Pt is fully independent and driving but does fatigue very easily due to COPD.     Adherence:  Pt reports  that she is mostly compliant with doctor's appointments but does struggle at times getting out the door. Pt attributes this to her medical issues and anxiety related to her illness. Pt states that she is compliant with her medications. Pt is very thin but states that she has not been prescribed a specific diet at this point in time. Adherence education and counseling provided.     Per History Section:  Past Medical History:   Diagnosis Date    Chronic respiratory failure with hypoxia and hypercapnia 5/4/2014    COPD (chronic obstructive pulmonary disease)     Depression     Hyperlipidemia 5/12/2017    Otosclerosis of right ear 5/12/2017    40% hearing back in 1988, had surgery with some improvement, but hearing loss persists.      PAD (peripheral artery disease)     Post-menopausal 2008    Pulmonary emphysema 04/28/2017    Tobacco dependence 5/2/2014    Type 2 diabetes mellitus without complication, without long-term current use of insulin 4/29/2017    Hb A1c 6.5% in 2/2017. Diet controlled.      Social History     Tobacco Use    Smoking status: Former Smoker     Packs/day: 1.00     Years: 40.00     Pack years: 40.00     Types: Cigarettes     Start date: 8/13/1977     Quit date: 6/19/2017     Years since quitting: 3.4    Smokeless tobacco: Never Used   Substance Use Topics    Alcohol use: Yes     Alcohol/week: 0.8 standard drinks     Types: 1 Standard drinks or equivalent per week     Comment: occasional     Social History     Substance and Sexual Activity   Drug Use Never     Social History     Substance and Sexual Activity   Sexual Activity Not on file       Per Today's Psychosocial:  Tobacco: Pt last smoked cigarettes in 2017. Pt states that she began smoking cigarettes in her late teens and smoked a pack per day until she quit.  Alcohol: Pt reports that since she has been isolated at home due to the COVID pandemic, pt reports increased alcohol use. Pt reports that she drinks one shot of vodka or one  shot of bourbon with water every other day. Pt states that the alcohol helps her to sleep. Pt states that prior to the pandemic, she drank once a month. Pt has a pending PETH test and urine. SW notified pt that she would need to abstain from all ETOH use for at least 6 months in order to be a transplant candidate. Pt verbalized understanding and declined resources.    Illicit Drugs/Non-prescribed Medications: none, patient denies any use.    Patient and Caregiver states clear understanding of the potential impact of substance use as it relates to transplant candidacy and is aware of possible random substance screening.  Substance abstinence/cessation counseling, education and resources provided and reviewed.     Arrests/DWI/Treatment/Rehab: patient denies    Psychiatric History:    Mental Health: depression and anxiety; Pt reports that her depression initially started in 2017 when she lost her brother and then her best friend Ramírez. Pt states that this is the same year her medical issues worsened. Pt states that currently, she is more depressed and anxious due to the increased isolation from being confined to her home due to the COVID pandemic.   Psychiatrist/Counselor: none. Pt willing to see a provider at [facility]. SW provided pt with contact information to self-refer  Medications:  Pt is on Zoloft through her primary care doctor.   Suicide/Homicide Issues: Pt denies current/past SI/HI.    Safety at home: Pt denies safety issues as pt lives alone.     Knowledge: Patient and Caregiver states having clear understanding and realistic expectations regarding the potential risks and potential benefits of organ transplantation and organ donation, agrees to discuss with health care team members and support system members and to utilize available resources and express questions and/or concerns in order to further facilitate the pt informed decision-making.  Resources and information provided and reviewed.     Patient and  Caregiver is aware of [facility]'s affiliation and/or partnership with agencies in home health care, LTAC, SNF, [facility], and other hospitals and clinics.    Understanding: Patient and Caregiver reports having a clear understanding of the many lifetime commitments involved with being a transplant recipient, including costs, compliance, medications, lab work, procedures, appointments, concrete and financial planning, preparedness, timely and appropriate communication of concerns, abstinence (ETOH, tobacco, illicit non-prescribed drugs), adherence to all health care team recommendations, support system and caregiver involvement, appropriate and timely resource utilization and follow-through, mental health counseling as needed/recommended, and patient and caregiver responsibilities.  Social Service Handbook, resources and detailed educational information provided and reviewed.  Educational information provided.    Patient and Caregiver also reports current and expected compliance with health care regime and states having a clear understanding of the importance of compliance.      Patient and Caregiver reports a clear understanding that risks and benefits may be involved with organ transplantation and with organ donation.      Patient and Caregiver also reports clear understanding that psychosocial risk factors may affect patient, and include but are not limited to feelings of depression, generalized anxiety, anxiety regarding dependence on others, post traumatic stress disorder, feelings of guilt and other emotional and/or mental concerns, and/or exacerbation of existing mental health concerns.  Detailed resources provided and discussed.     Patient and Caregiver agrees to access appropriate resources in a timely manner as needed and/or as recommended, and to communicate concerns appropriately.  Patient and Caregiver also reports a clear understanding of treatment options available.      reviewed education,  "provided additional information, and answered questions.    Feelings or Concerns: Pt reports that she is very concerned about going through a major surgery and feels overwhelmed by the risks. Pt aware that she is free to ask her medical providers questions. SW provided validation and support regarding pt's emotional experience with going through this evaluation.     Coping: Identify Patient & Caregiver Strategies to [place]:   1. Currently & Pre-transplant - Pt reports that she does some free lety work, goes on social media, listens to music, watches television, and sleeps. Pt also enjoys talking with a few of her friends.    2. At the time of surgery - Talking with friends, listening to music, going on social media, and watching TV   3. During post-Transplant & Recovery Period - Same as above    Goals: Pt states that her primary goal is "to live."      Interview Behavior: Patient and Caregiver presents as alert and oriented x 4, pleasant, good eye contact, well groomed, recall good, concentration/judgement good, average intelligence, calm, communicative, cooperative and asking and answering questions appropriately.          Transplant Social Work - Candidacy  Assessment/Plan:     Psychosocial Suitability: Patient presents as a not suitable candidate for lung transplant at this time. Based on psychosocial risk factors, patient still needs to confirm primary caregiver plan, initiate fundraising, and abstain from all alcohol use. In addition, it is strongly recommended that pt enroll in counseling due to worsening depression and anxiety. SW to reassess suitability once these areas are addressed.     Recommendations/Additional Comments:     EDILIA to meet with backup caregiver Rose Honeycutt tomorrow afternoon, Wed 12/9. EDILIA requires that one of pt's caregivers sign caregiver agreement in order to be able to consider pt for Lung Transplant.     EDILIA discussed fundraising at length with pt and caregiver Mayela Walker. Pt aware " that she will need to initiate some form of fundraising before she can be considered a Lung Transplant candidate. SW to further address tomorrow during second meeting.    Pt will need to abstain from all substance use for at least 6 months before she can be considered for Lung Transplant. SW offered additional resources, however pt states that she can abstain without any form of treatment.     SW strongly recommends that pt enroll in individual counseling to address pt's current depression and anxiety and to boost coping skills. Pt states that she is open to enrolling in [facility]'s Psych program. SW provided contact information for pt to self-refer.     Leila Boucher, MELISSAW

## 2020-12-09 ENCOUNTER — DOCUMENTATION ONLY (OUTPATIENT)
Dept: CARDIOLOGY | Facility: CLINIC | Age: 60
End: 2020-12-09

## 2020-12-09 ENCOUNTER — OFFICE VISIT (OUTPATIENT)
Dept: INFECTIOUS DISEASES | Facility: CLINIC | Age: 60
End: 2020-12-09
Payer: MEDICARE

## 2020-12-09 ENCOUNTER — TELEPHONE (OUTPATIENT)
Dept: TRANSPLANT | Facility: CLINIC | Age: 60
End: 2020-12-09

## 2020-12-09 ENCOUNTER — LAB VISIT (OUTPATIENT)
Dept: LAB | Facility: HOSPITAL | Age: 60
End: 2020-12-09
Attending: STUDENT IN AN ORGANIZED HEALTH CARE EDUCATION/TRAINING PROGRAM
Payer: MEDICARE

## 2020-12-09 ENCOUNTER — CLINICAL SUPPORT (OUTPATIENT)
Dept: INFECTIOUS DISEASES | Facility: CLINIC | Age: 60
End: 2020-12-09
Payer: MEDICARE

## 2020-12-09 ENCOUNTER — OFFICE VISIT (OUTPATIENT)
Dept: CARDIOLOGY | Facility: CLINIC | Age: 60
End: 2020-12-09
Payer: MEDICARE

## 2020-12-09 ENCOUNTER — SOCIAL WORK (OUTPATIENT)
Dept: TRANSPLANT | Facility: CLINIC | Age: 60
End: 2020-12-09
Payer: MEDICARE

## 2020-12-09 VITALS
SYSTOLIC BLOOD PRESSURE: 126 MMHG | BODY MASS INDEX: 21.66 KG/M2 | HEIGHT: 65 IN | OXYGEN SATURATION: 92 % | DIASTOLIC BLOOD PRESSURE: 66 MMHG | HEART RATE: 110 BPM | WEIGHT: 130 LBS

## 2020-12-09 VITALS
BODY MASS INDEX: 25.52 KG/M2 | DIASTOLIC BLOOD PRESSURE: 80 MMHG | HEIGHT: 60 IN | WEIGHT: 130 LBS | SYSTOLIC BLOOD PRESSURE: 137 MMHG | HEART RATE: 115 BPM | TEMPERATURE: 98 F

## 2020-12-09 DIAGNOSIS — Z76.82 LUNG TRANSPLANT CANDIDATE: ICD-10-CM

## 2020-12-09 DIAGNOSIS — E78.2 MIXED HYPERLIPIDEMIA: Chronic | ICD-10-CM

## 2020-12-09 DIAGNOSIS — I70.0 ATHEROSCLEROSIS OF AORTA: ICD-10-CM

## 2020-12-09 DIAGNOSIS — Z71.85 VACCINE COUNSELING: ICD-10-CM

## 2020-12-09 DIAGNOSIS — R91.1 PULMONARY NODULE SEEN ON IMAGING STUDY: ICD-10-CM

## 2020-12-09 DIAGNOSIS — J44.9 CHRONIC OBSTRUCTIVE PULMONARY DISEASE, UNSPECIFIED COPD TYPE: ICD-10-CM

## 2020-12-09 DIAGNOSIS — Z01.818 PRE-TRANSPLANT EVALUATION FOR LUNG TRANSPLANT: ICD-10-CM

## 2020-12-09 DIAGNOSIS — E11.9 TYPE 2 DIABETES MELLITUS WITHOUT COMPLICATION, WITHOUT LONG-TERM CURRENT USE OF INSULIN: ICD-10-CM

## 2020-12-09 DIAGNOSIS — J96.11 CHRONIC RESPIRATORY FAILURE WITH HYPOXIA: Chronic | ICD-10-CM

## 2020-12-09 DIAGNOSIS — Z71.85 VACCINE COUNSELING: Primary | ICD-10-CM

## 2020-12-09 DIAGNOSIS — I73.9 CLAUDICATION IN PERIPHERAL VASCULAR DISEASE: ICD-10-CM

## 2020-12-09 DIAGNOSIS — G47.33 OSA (OBSTRUCTIVE SLEEP APNEA): ICD-10-CM

## 2020-12-09 DIAGNOSIS — J44.9 COPD, VERY SEVERE: Primary | ICD-10-CM

## 2020-12-09 PROCEDURE — G0010 ADMIN HEPATITIS B VACCINE: HCPCS | Mod: S$GLB,TXP,, | Performed by: STUDENT IN AN ORGANIZED HEALTH CARE EDUCATION/TRAINING PROGRAM

## 2020-12-09 PROCEDURE — 99204 PR OFFICE/OUTPT VISIT, NEW, LEVL IV, 45-59 MIN: ICD-10-PCS | Mod: S$GLB,TXP,, | Performed by: STUDENT IN AN ORGANIZED HEALTH CARE EDUCATION/TRAINING PROGRAM

## 2020-12-09 PROCEDURE — 1126F PR PAIN SEVERITY QUANTIFIED, NO PAIN PRESENT: ICD-10-PCS | Mod: S$GLB,TXP,, | Performed by: STUDENT IN AN ORGANIZED HEALTH CARE EDUCATION/TRAINING PROGRAM

## 2020-12-09 PROCEDURE — 3008F BODY MASS INDEX DOCD: CPT | Mod: CPTII,S$GLB,TXP, | Performed by: STUDENT IN AN ORGANIZED HEALTH CARE EDUCATION/TRAINING PROGRAM

## 2020-12-09 PROCEDURE — 99999 PR PBB SHADOW E&M-EST. PATIENT-LVL IV: CPT | Mod: PBBFAC,TXP,, | Performed by: STUDENT IN AN ORGANIZED HEALTH CARE EDUCATION/TRAINING PROGRAM

## 2020-12-09 PROCEDURE — 99999 PR PBB SHADOW E&M-EST. PATIENT-LVL II: CPT | Mod: PBBFAC,TXP,,

## 2020-12-09 PROCEDURE — 1126F AMNT PAIN NOTED NONE PRSNT: CPT | Mod: S$GLB,TXP,, | Performed by: STUDENT IN AN ORGANIZED HEALTH CARE EDUCATION/TRAINING PROGRAM

## 2020-12-09 PROCEDURE — 3008F BODY MASS INDEX DOCD: CPT | Mod: CPTII,S$GLB,TXP, | Performed by: INTERNAL MEDICINE

## 2020-12-09 PROCEDURE — 1126F AMNT PAIN NOTED NONE PRSNT: CPT | Mod: S$GLB,TXP,, | Performed by: INTERNAL MEDICINE

## 2020-12-09 PROCEDURE — 90739 HEPB VACC 2/4 DOSE ADULT IM: CPT | Mod: S$GLB,TXP,, | Performed by: STUDENT IN AN ORGANIZED HEALTH CARE EDUCATION/TRAINING PROGRAM

## 2020-12-09 PROCEDURE — G0010 HEPATITIS B (RECOMBINANT) ADJUVANTED, 2 DOSE: ICD-10-PCS | Mod: S$GLB,TXP,, | Performed by: STUDENT IN AN ORGANIZED HEALTH CARE EDUCATION/TRAINING PROGRAM

## 2020-12-09 PROCEDURE — 99205 PR OFFICE/OUTPT VISIT, NEW, LEVL V, 60-74 MIN: ICD-10-PCS | Mod: GC,S$GLB,TXP, | Performed by: INTERNAL MEDICINE

## 2020-12-09 PROCEDURE — 99999 PR PBB SHADOW E&M-EST. PATIENT-LVL V: CPT | Mod: PBBFAC,TXP,, | Performed by: INTERNAL MEDICINE

## 2020-12-09 PROCEDURE — 99499 UNLISTED E&M SERVICE: CPT | Mod: S$GLB,TXP,, | Performed by: STUDENT IN AN ORGANIZED HEALTH CARE EDUCATION/TRAINING PROGRAM

## 2020-12-09 PROCEDURE — 99204 OFFICE O/P NEW MOD 45 MIN: CPT | Mod: S$GLB,TXP,, | Performed by: STUDENT IN AN ORGANIZED HEALTH CARE EDUCATION/TRAINING PROGRAM

## 2020-12-09 PROCEDURE — 99999 PR PBB SHADOW E&M-EST. PATIENT-LVL V: ICD-10-PCS | Mod: PBBFAC,TXP,, | Performed by: INTERNAL MEDICINE

## 2020-12-09 PROCEDURE — 3008F PR BODY MASS INDEX (BMI) DOCUMENTED: ICD-10-PCS | Mod: CPTII,S$GLB,TXP, | Performed by: INTERNAL MEDICINE

## 2020-12-09 PROCEDURE — 36415 COLL VENOUS BLD VENIPUNCTURE: CPT | Mod: TXP

## 2020-12-09 PROCEDURE — 99205 OFFICE O/P NEW HI 60 MIN: CPT | Mod: GC,S$GLB,TXP, | Performed by: INTERNAL MEDICINE

## 2020-12-09 PROCEDURE — 99999 PR PBB SHADOW E&M-EST. PATIENT-LVL II: ICD-10-PCS | Mod: PBBFAC,TXP,,

## 2020-12-09 PROCEDURE — 3008F PR BODY MASS INDEX (BMI) DOCUMENTED: ICD-10-PCS | Mod: CPTII,S$GLB,TXP, | Performed by: STUDENT IN AN ORGANIZED HEALTH CARE EDUCATION/TRAINING PROGRAM

## 2020-12-09 PROCEDURE — 1126F PR PAIN SEVERITY QUANTIFIED, NO PAIN PRESENT: ICD-10-PCS | Mod: S$GLB,TXP,, | Performed by: INTERNAL MEDICINE

## 2020-12-09 PROCEDURE — 86403 PARTICLE AGGLUT ANTBDY SCRN: CPT | Mod: TXP

## 2020-12-09 PROCEDURE — 3044F PR MOST RECENT HEMOGLOBIN A1C LEVEL <7.0%: ICD-10-PCS | Mod: CPTII,S$GLB,TXP, | Performed by: INTERNAL MEDICINE

## 2020-12-09 PROCEDURE — 99499 RISK ADDL DX/OHS AUDIT: ICD-10-PCS | Mod: S$GLB,TXP,, | Performed by: STUDENT IN AN ORGANIZED HEALTH CARE EDUCATION/TRAINING PROGRAM

## 2020-12-09 PROCEDURE — 90739 HEPATITIS B (RECOMBINANT) ADJUVANTED, 2 DOSE: ICD-10-PCS | Mod: S$GLB,TXP,, | Performed by: STUDENT IN AN ORGANIZED HEALTH CARE EDUCATION/TRAINING PROGRAM

## 2020-12-09 PROCEDURE — 3044F HG A1C LEVEL LT 7.0%: CPT | Mod: CPTII,S$GLB,TXP, | Performed by: INTERNAL MEDICINE

## 2020-12-09 PROCEDURE — 99999 PR PBB SHADOW E&M-EST. PATIENT-LVL IV: ICD-10-PCS | Mod: PBBFAC,TXP,, | Performed by: STUDENT IN AN ORGANIZED HEALTH CARE EDUCATION/TRAINING PROGRAM

## 2020-12-09 RX ORDER — DIPHENHYDRAMINE HCL 25 MG
50 CAPSULE ORAL ONCE
Status: CANCELLED | OUTPATIENT
Start: 2020-12-09 | End: 2020-12-09

## 2020-12-09 RX ORDER — ATORVASTATIN CALCIUM 40 MG/1
40 TABLET, FILM COATED ORAL DAILY
COMMUNITY
End: 2021-05-27

## 2020-12-09 RX ORDER — SODIUM CHLORIDE 9 MG/ML
INJECTION, SOLUTION INTRAVENOUS CONTINUOUS
Status: CANCELLED | OUTPATIENT
Start: 2020-12-09 | End: 2020-12-09

## 2020-12-09 NOTE — PROGRESS NOTES
Interventional Cardiology Office Visit     PATIENT: Ann Elizabeth Canavan  MRN: [id number]   PCP: Trinh Bowles MD       Dear Dr. Trinh Bowles MD    Thank you for asking me to see your patient Ms. Ann Elizabeth Canavan today for pre-transplant lung RHC/LHC    Chief Complaint   Patient presents with    Lung Transplant Pre-evaluation    Shortness of Breath         History of Present Illness:   Ms. Ann Elizabeth Canavan is a 60 y.o. F who I am evaluating for Lung pre transplant LHC/RHC       Ann Elizabeth Canavan is a 60 y.o. female with a PMH significant for severe COPD on home oxygen, anxiety, DM2 controlled with meds, HLD on medications. She denies chest pain but has some dyspnea on exertion.        Past Medical History:   Diagnosis Date    Chronic respiratory failure with hypoxia and hypercapnia 5/4/2014    COPD (chronic obstructive pulmonary disease)     Depression     Hyperlipidemia 5/12/2017    Otosclerosis of right ear 5/12/2017    40% hearing back in 1988, had surgery with some improvement, but hearing loss persists.      PAD (peripheral artery disease)     Post-menopausal 2008    Pulmonary emphysema 04/28/2017    Tobacco dependence 5/2/2014    Type 2 diabetes mellitus without complication, without long-term current use of insulin 4/29/2017    Hb A1c 6.5% in 2/2017. Diet controlled.        Review of patient's allergies indicates:   Allergen Reactions    Avelox [moxifloxacin] Itching and Rash     IV       Outpatient Medications Marked as Taking for the 12/9/20 encounter (Office Visit) with Mau Real MD   Medication Sig Dispense Refill    albuterol 90 mcg/actuation inhaler Inhale 2 puffs into the lungs every 6 (six) hours as needed for Wheezing. Rescue 18 g 11    aspirin (ECOTRIN) 81 MG EC tablet Take 81 mg by mouth once daily.      atorvastatin (LIPITOR) 40 MG tablet Take 40 mg by mouth once daily.      diphenhydrAMINE (BENADRYL ALLERGY) 25 mg tablet Take 25 mg by mouth nightly as needed for  Insomnia.      fluticasone propionate (FLONASE) 50 mcg/actuation nasal spray 1 spray (50 mcg total) by Each Nostril route 2 (two) times a day. 16 g 3    levocetirizine (XYZAL) 5 MG tablet Take 1 tablet (5 mg total) by mouth every evening. 90 tablet 0    roflumilast (DALIRESP) 500 mcg Tab Take 1 tablet (500 mcg total) by mouth once daily. 30 tablet 11    sertraline (ZOLOFT) 100 MG tablet Take 1 tablet (100 mg total) by mouth once daily. Last refill. Need appointment. 90 tablet 1    tiotropium (SPIRIVA) 18 mcg inhalation capsule Inhale 1 capsule (18 mcg total) into the lungs once daily. 90 capsule 3       Family History   Problem Relation Age of Onset    Diabetes Mother     Heart disease Father         Strong FH of CAD/CHF on fathers side    Peripheral vascular disease Brother         With necrosis of leg    Stroke Brother 49    Diabetes Maternal Uncle     Cancer Neg Hx     Thyroid disease Neg Hx        Social History:  Social History     Tobacco Use    Smoking status: Former Smoker     Packs/day: 1.00     Years: 40.00     Pack years: 40.00     Types: Cigarettes     Start date: 8/13/1977     Quit date: 6/19/2017     Years since quitting: 3.4    Smokeless tobacco: Never Used   Substance Use Topics    Alcohol use: Yes     Alcohol/week: 0.8 standard drinks     Types: 1 Standard drinks or equivalent per week     Comment: occasional         Review of Systems:  Pertinent positive and negative as mentioned below, otherwise a full review of systems was negative.    General/Constitutional: No Weight loss or gain, No fevers, No chills   Skin: No Rash, No itching   HEENT: No Headaches, No vertigo, No lightheadedness, No double vision,   Cardiovascular: No Chest pain, No palpitations,  No syncope,   Respiratory: Shortness of breath, No wheezing, No stridor  Gastrointestinal: No poor Appetite, No dysphagia, No indigestion, No vomiting, No constipation, No diarrhea  Genitourinary: No Urgency, No frequency, No  dysuria, No nocturia, No hematuria   Musculoskeletal: No Pain, No swelling, No redness or heat of muscles or joints, No limitation, of motion, No muscular weakness, No atrophy, No cramps   Neurologic: No Convulsions, No paralyses, No tremor,No incoordination, No parathesias,    Psychiatry: Anxious      Vitals:  BP: 126/66           Pulse: 110            Physical Exam:  Alert, and oriented  Skin: No cutaneous findings consistent with hyperlipidemia. Skin dry without rash, jaundice.  Mouth: Oral mucosa moist without lesions, telangiectasias.  Neck: Supple without cervical lymphadenopathy.  No JVD, thyroid gland enlargement.  Normal carotid upstrokes bilaterally without bruits.  Cardiac: RRR without murmur, S3, S4, rub  Lungs: Decreased lung sounds bilaterally   Abdomen: Soft, flat, non-tender, normal active bowel sounds throughout.  No hepatosplenomegaly, mass.  The abdominal aorta is not palpable and not aneurysmal.  No abdominal, pelvic, flank bruits bilaterally.  Neuro: Alert, and oriented, no focal neurodeficit   Extremities: No ischemia, cyanosis, ulceration or intertrigo.  No edema, venous varicosities, or stasis pigmentation changes bilaterally.    Pulses: Superficial temporal 2+, carotid 2+, axillary 2+, brachial 2+, radial 2+, femoral 2+ without bruits, popliteal 2+, dorsalis pedis 2+ and posterior tibial pulses 2+ and symmetric.  No pallor with elevation.  No rubor with dependency.       Data/results:  Basic Metabolic Panel   Lab Results   Component Value Date     11/30/2020    K 3.9 11/30/2020    CO2 33 (H) 11/30/2020    BUN 17 11/30/2020     Liver Function Tests  [unfilled]  No components found for: GHBA1C  TSH   Date Value Ref Range Status   11/30/2020 1.917 0.400 - 4.000 uIU/mL Final     Lab Results   Component Value Date    WBC 9.26 11/30/2020    RBC 4.40 11/30/2020    HGB 12.8 11/30/2020    HCT 41.5 11/30/2020     (L) 11/30/2020     Lab Results   Component Value Date    INR 0.9 11/30/2020        Images     EKG: I have personally reviewed the EKG from [unfilled], which shows Sinus tachycardia      Assessment and Plan:    Ms.Canavan is a 60 y.o. female who presents with severe COPD for pre lung transplant LHC/RHC    Pre-transplant evaluation for lung transplant  - Patient seen and examined  - Proceed with Diagnostic LHC and RHC   - Access R Radial and R Brachial (RIJ back up)  - Allergies: none  -The risks, benefits & alternatives of the procedure were explained to the patient.    -The risks of coronary angiography include but are not limited to:  Bleeding, infection, heart rhythm abnormalities, allergic reactions, kidney injury, stroke and death.    -Should stenting be indicated, the patient has agreed to dual anti-platelet therapy for 1-consecutive year with a drug-eluting stent and a minimum of 1-month with the use of a bare metal stent.    -The risks of moderate sedation include hypotension, respiratory depression, arrhythmias, bronchospasm, & death.    -Informed consent was obtained & the patient is agreeable to proceed with the procedure.       Chronic obstructive pulmonary disease  - Stable but severe on Home oxygen and inhalers managed by primary    Mixed hyperlipidemia  - Stable continue crestor    Atherosclerosis of aorta  - Stable continue Crestor     Type 2 diabetes mellitus without complication, without long-term current use of insulin  Improved A1C 5.5, Diet controlled        Education Counseling:  Goals    None           Follow up:  No follow-ups on file.    Sheng Kumar MD ([place])  Cardiology Fellow  PGY 6  [facility]

## 2020-12-09 NOTE — TELEPHONE ENCOUNTER
Contacted patient, she is requesting to reschedule her GYN appt to another day, preferably in the afternoon. She is requesting this because she is admittedly overwhelmed and exhausted. Patient is aware she will be contacted to follow up this request. Request to .

## 2020-12-09 NOTE — PROGRESS NOTES
OUTPATIENT CATHETERIZATION INSTRUCTIONS    You have been scheduled for a procedure in the catheterization lab on Tuesday, January 5, 2021.     Please report to the Cardiology Waiting Area on the Third floor of the hospital and check in at 10:30 AM.   You will then be taken to the SSCU (Short Stay Cardiac Unit) and prepared for your procedure. Please be aware that this is not the time of your procedure but the time you are to arrive. The procedures are scheduled on an hourly basis; however, emergency cases take precedence over all other cases.       You may not have anything to eat or drink after midnight the night before your test. You may take your regular morning medications with water. If there are any medications that you should not take you will be instructed to hold them that morning. If you are diabetic and on Metformin (Glucophage) do not take it the day before, the day of, and for 2 days after your procedure.      The procedure will take 1-2 hours to perform. After the procedure, you will return to SSCU on the third floor of the hospital. You will need to lie still (or keep your arm still) for the next 4 to 6 hours to minimize bleeding from the puncture site. Your family may remain in the room with you during this time.       You may be able to be discharged home that same afternoon if there is someone to drive you home and there were no complications. If you have one of the balloon, stent, or device procedures you may spend the night in the hospital. Your doctor will determine, based on your progress, the date and time of your discharge. The results of your procedure will be discussed with you before you are discharged. Any further testing or procedures will be scheduled for you either before you leave or you will be called with these appointments.       If you should have any questions, concerns, or need to change the date of your procedure, please call  KE Hooker @ [phone]    Special  Instructions  Drink plenty of water the day before and after your procedure.   Get covid test on Saturday, 1/2/21      THE ABOVE INSTRUCTIONS WERE GIVEN TO THE PATIENT VERBALLY AND THEY VERBALIZED UNDERSTANDING.  THEY DO NOT REQUIRE ANY SPECIAL NEEDS AND DO NOT HAVE ANY LEARNING BARRIERS.          Directions for Reporting to Cardiology Waiting Area in the Hospital  If you park in the Parking Garage:  Take elevators to the1st floor of the parking garage.  Continue past the gift shop, coffee shop, and piano.  Take a right and go to the gold elevators. (Elevator B)  Take the elevator to the 3rd floor.  Follow the arrow on the sign on the wall that says Cath Lab Registration/EP Lab Registration.  Follow the long hallway all the way around until you come to a big open area.  This is the registration area.  Check in at Reception Desk.    OR    If family is dropping you off:  Have them drop you off at the front of the Hospital under the green overhang.  Enter through the doors and take a right.  Take the E elevators to the 3rd floor Cardiology Waiting Area.  Check in at the Reception Desk in the waiting room.

## 2020-12-09 NOTE — LETTER
December 9, 2020      Kanwal Jacobsen, DO  [address]  [facility] [address]           [facility] - [facility] 1st Fl  [address]  [facility] [address]  Phone: [phone]  Fax: [phone]          Patient: Ann Elizabeth Canavan   MR Number: [id number]   YOB: 1960   Date of Visit: 12/9/2020       Dear Dr. Kanwal Jacobsen:    Thank you for referring Ann Canavan to me for evaluation. Attached you will find relevant portions of my assessment and plan of care.    If you have questions, please do not hesitate to call me. I look forward to following Ann Canavan along with you.    Sincerely,    Emily Orozco MD    Enclosure  CC:  No Recipients    If you would like to receive this communication electronically, please contact [email or web address] or [phone] to request more information on [employer] [facility] access.    For providers and/or their staff who would like to refer a patient to [facility], please contact us through our one-stop-shop provider referral line, [facility] [place], at [phone].    If you feel you have received this communication in error or would no longer like to receive these types of communications, please e-mail [email or web address]

## 2020-12-09 NOTE — TELEPHONE ENCOUNTER
----- Message from Rafa Summers sent at 12/9/2020  3:13 PM CST -----  Regarding: Scheduling        Pt calling to reschedule her 12/10 appt                        [phone] (NELLA)

## 2020-12-09 NOTE — PROGRESS NOTES
Psychosocial Assessment Follow Up:    12/8/20 - Pt and pt's primary caregiver met with SAHA Boucher LCSW on for psychosocial assessment. Per assessment, pt's primary caregiver Mayela would not sign caregiver agreement but states she will provide some care to pt. She reports that she cannot commit as the point person, and would not be able to stay with pt for all three months. She and pt states that they would need to work out a rotating caregiver plan for the three months that pt requires 24/7 care. Pt states that she will bring her friend Rose Honeycutt in with her tomorrow morning to also meet with SW. Rose may be willing to sign caregiver agreement.     12/9/2020 - EDILIA scheduled for appointment in clinic with pt and her backup caregiver, Rose Alvarezley, at 10:00 AM. Pt presents to clinic alone. Pt reports she did confirm appointment with Rose night before. Pt and SW called Rose in clinic. No answer. Left voicemail. SW reviewed with pt caregiver education and explained SW will call Rose Honeycutt at later time/date to provide education via telephone and confirm her willingness to be back up caregiver. Pt's primary caregiver Mayela presented to clinic at 10:25, prior to pt's appointment with infectious disease at 10:30 AM.    Pt's caregiver agreement remains unsigned at this time.

## 2020-12-10 DIAGNOSIS — F33.41 RECURRENT MAJOR DEPRESSIVE DISORDER, IN PARTIAL REMISSION: ICD-10-CM

## 2020-12-10 DIAGNOSIS — Z91.09 ENVIRONMENTAL ALLERGIES: ICD-10-CM

## 2020-12-10 LAB — CRYPTOC AG SER QL LA: NEGATIVE

## 2020-12-11 ENCOUNTER — TELEPHONE (OUTPATIENT)
Dept: ADMINISTRATIVE | Facility: HOSPITAL | Age: 60
End: 2020-12-11

## 2020-12-11 RX ORDER — LEVOCETIRIZINE DIHYDROCHLORIDE 5 MG/1
5 TABLET, FILM COATED ORAL NIGHTLY
Qty: 90 TABLET | Refills: 3 | Status: SHIPPED | OUTPATIENT
Start: 2020-12-11 | End: 2022-01-01 | Stop reason: SDUPTHER

## 2020-12-11 RX ORDER — SERTRALINE HYDROCHLORIDE 100 MG/1
100 TABLET, FILM COATED ORAL DAILY
Qty: 90 TABLET | Refills: 3 | Status: SHIPPED | OUTPATIENT
Start: 2020-12-11 | End: 2020-12-15

## 2020-12-14 ENCOUNTER — PROCEDURE VISIT (OUTPATIENT)
Dept: HEPATOLOGY | Facility: CLINIC | Age: 60
End: 2020-12-14
Attending: INTERNAL MEDICINE
Payer: MEDICARE

## 2020-12-14 VITALS
SYSTOLIC BLOOD PRESSURE: 132 MMHG | WEIGHT: 134.94 LBS | BODY MASS INDEX: 22.48 KG/M2 | HEART RATE: 114 BPM | DIASTOLIC BLOOD PRESSURE: 82 MMHG | OXYGEN SATURATION: 84 % | RESPIRATION RATE: 16 BRPM | HEIGHT: 65 IN

## 2020-12-14 DIAGNOSIS — Z76.82 LUNG TRANSPLANT CANDIDATE: Primary | ICD-10-CM

## 2020-12-14 DIAGNOSIS — Z01.818 PRE-TRANSPLANT EVALUATION FOR LUNG TRANSPLANT: ICD-10-CM

## 2020-12-14 DIAGNOSIS — R16.0 HEPATOMEGALY: ICD-10-CM

## 2020-12-14 DIAGNOSIS — K76.0 FATTY LIVER: Chronic | ICD-10-CM

## 2020-12-14 PROCEDURE — 99999 PR PBB SHADOW E&M-EST. PATIENT-LVL III: CPT | Mod: PBBFAC,TXP,, | Performed by: INTERNAL MEDICINE

## 2020-12-14 PROCEDURE — 99999 PR PBB SHADOW E&M-EST. PATIENT-LVL III: ICD-10-PCS | Mod: PBBFAC,TXP,, | Performed by: INTERNAL MEDICINE

## 2020-12-14 PROCEDURE — 1126F PR PAIN SEVERITY QUANTIFIED, NO PAIN PRESENT: ICD-10-PCS | Mod: S$GLB,TXP,, | Performed by: INTERNAL MEDICINE

## 2020-12-14 PROCEDURE — 1126F AMNT PAIN NOTED NONE PRSNT: CPT | Mod: S$GLB,TXP,, | Performed by: INTERNAL MEDICINE

## 2020-12-14 PROCEDURE — 91200 LIVER ELASTOGRAPHY: CPT | Mod: S$GLB,TXP,, | Performed by: INTERNAL MEDICINE

## 2020-12-14 PROCEDURE — 99203 PR OFFICE/OUTPT VISIT, NEW, LEVL III, 30-44 MIN: ICD-10-PCS | Mod: S$GLB,TXP,, | Performed by: INTERNAL MEDICINE

## 2020-12-14 PROCEDURE — 91200 PR LIVER ELASTOGRAPHY W/OUT IMAG W/INTERP & REPORT: ICD-10-PCS | Mod: S$GLB,TXP,, | Performed by: INTERNAL MEDICINE

## 2020-12-14 PROCEDURE — 3008F PR BODY MASS INDEX (BMI) DOCUMENTED: ICD-10-PCS | Mod: CPTII,S$GLB,TXP, | Performed by: INTERNAL MEDICINE

## 2020-12-14 PROCEDURE — 3008F BODY MASS INDEX DOCD: CPT | Mod: CPTII,S$GLB,TXP, | Performed by: INTERNAL MEDICINE

## 2020-12-14 PROCEDURE — 99203 OFFICE O/P NEW LOW 30 MIN: CPT | Mod: S$GLB,TXP,, | Performed by: INTERNAL MEDICINE

## 2020-12-14 NOTE — LETTER
December 14, 2020      Kanwal Jacobsen, DO  [address]  [facility] [address]           [facility] - Transplant 1st Fl  [address]  [facility] [address]  Phone: [phone]  Fax: [phone]          Patient: Ann Elizabeth Canavan   MR Number: [id number]   YOB: 1960   Date of Visit: 12/14/2020       Dear Dr. Kanwal Jacobsen:    Thank you for referring Ann Canavan to me for evaluation. Attached you will find relevant portions of my assessment and plan of care.    If you have questions, please do not hesitate to call me. I look forward to following Ann Canavan along with you.    Sincerely,    Sidney Escoto MD    Enclosure  CC:  No Recipients    If you would like to receive this communication electronically, please contact [email or web address] or [phone] to request more information on [employer] Link access.    For providers and/or their staff who would like to refer a patient to [facility], please contact us through our one-stop-shop provider referral line, [facility], at [phone].    If you feel you have received this communication in error or would no longer like to receive these types of communications, please e-mail [email or web address]

## 2020-12-14 NOTE — PROGRESS NOTES
HEPATOLOGY CONSULTATION        Reason for Consultation: Consultation for evaluation of fatty liver  History of Present Illness: Ann Elizabeth Canavan is a 60 y.o. femalewho presents for evaluation of Fatty liver  Patient with severe COPD in process of lung transplant evaluation, presented for hepatology clinic to evaluation hepatomegaly found in the US abdomen last week. Patient has no complains today, denied abdominal swelling, abdominal pain, N/V. Denied hematemesis, melena or bleeding per rectum. Denied any episode of encephalopathy. She reports she never had liver problem, denied smoking, she reports drinks glass of alcohol every couple days, denied IVDA.     Past Medical History:   Diagnosis Date    Chronic respiratory failure with hypoxia and hypercapnia 5/4/2014    COPD (chronic obstructive pulmonary disease)     Depression     Hyperlipidemia 5/12/2017    Otosclerosis of right ear 5/12/2017    40% hearing back in 1988, had surgery with some improvement, but hearing loss persists.      PAD (peripheral artery disease)     Post-menopausal 2008    Pulmonary emphysema 04/28/2017    Tobacco dependence 5/2/2014    Type 2 diabetes mellitus without complication, without long-term current use of insulin 4/29/2017    Hb A1c 6.5% in 2/2017. Diet controlled.      Outpatient Encounter Medications as of 12/14/2020   Medication Sig Dispense Refill    aspirin (ECOTRIN) 81 MG EC tablet Take 81 mg by mouth once daily.      atorvastatin (LIPITOR) 40 MG tablet Take 40 mg by mouth once daily.      blood sugar diagnostic Strp 1 strip by Misc.(Non-Drug; Combo Route) route once daily. 100 strip 3    diphenhydrAMINE (BENADRYL ALLERGY) 25 mg tablet Take 25 mg by mouth nightly as needed for Insomnia.      fluticasone propionate (FLONASE) 50 mcg/actuation nasal spray 1 spray (50 mcg total) by Each Nostril route 2 (two) times a day. 16 g 3    fluticasone-salmeterol diskus inhaler 250-50 mcg       lancets Misc 1 Device by  Misc.(Non-Drug; Combo Route) route once daily. 100 each 3    levocetirizine (XYZAL) 5 MG tablet Take 1 tablet (5 mg total) by mouth every evening. 90 tablet 3    OPW TEST CLAIM - DO NOT FILL Inhale into the lungs. OPW test claim. Do not fill. 30 tablet 1    predniSONE (DELTASONE) 20 MG tablet Take 2 tablets (40 mg total) by mouth once daily. For exacerbation. 5 tablet 0    roflumilast (DALIRESP) 500 mcg Tab Take 1 tablet (500 mcg total) by mouth once daily. 30 tablet 11    sertraline (ZOLOFT) 100 MG tablet Take 1 tablet (100 mg total) by mouth once daily. 90 tablet 3    tiotropium (SPIRIVA) 18 mcg inhalation capsule Inhale 1 capsule (18 mcg total) into the lungs once daily. 90 capsule 3    albuterol 90 mcg/actuation inhaler Inhale 2 puffs into the lungs every 6 (six) hours as needed for Wheezing. Rescue 18 g 11    albuterol-ipratropium (DUO-NEB) 2.5 mg-0.5 mg/3 mL nebulizer solution Take 3 mLs by nebulization every 6 (six) hours as needed for Wheezing or Shortness of Breath. Rescue (Patient not taking: Reported on 12/9/2020) 360 mL 0    ergocalciferol (ERGOCALCIFEROL) 50,000 unit Cap Take 1 capsule (50,000 Units total) by mouth every 7 days. (Patient not taking: Reported on 12/9/2020) 12 capsule 3    rosuvastatin (CRESTOR) 40 MG Tab Take 1 tablet (40 mg total) by mouth every evening. (Patient not taking: Reported on 12/9/2020) 90 tablet 3     No facility-administered encounter medications on file as of 12/14/2020.      Review of patient's allergies indicates:   Allergen Reactions    Avelox [moxifloxacin] Itching and Rash     IV     Family History   Problem Relation Age of Onset    Diabetes Mother     Heart disease Father         Strong FH of CAD/CHF on fathers side    Peripheral vascular disease Brother         With necrosis of leg    Stroke Brother 49    Diabetes Maternal Uncle     Cancer Neg Hx     Thyroid disease Neg Hx        Social History     Socioeconomic History    Marital status:       Spouse name: Not on file    Number of children: Not on file    Years of education: Not on file    Highest education level: Not on file   Occupational History    Not on file   Social Needs    Financial resource strain: Not on file    Food insecurity     Worry: Not on file     Inability: Not on file    Transportation needs     Medical: Not on file     Non-medical: Not on file   Tobacco Use    Smoking status: Former Smoker     Packs/day: 1.00     Years: 40.00     Pack years: 40.00     Types: Cigarettes     Start date: 1977     Quit date: 2017     Years since quitting: 3.4    Smokeless tobacco: Never Used   Substance and Sexual Activity    Alcohol use: Yes     Alcohol/week: 0.8 standard drinks     Types: 1 Standard drinks or equivalent per week     Comment: occasional    Drug use: Never    Sexual activity: Not on file   Lifestyle    Physical activity     Days per week: Not on file     Minutes per session: Not on file    Stress: Not on file   Relationships    Social connections     Talks on phone: Not on file     Gets together: Not on file     Attends Taoist service: Not on file     Active member of club or organization: Not on file     Attends meetings of clubs or organizations: Not on file     Relationship status: Not on file   Other Topics Concern    Not on file   Social History Narrative    Reports 6-8 years of sedentary lifestyle, where she lived where she worked, only walking 20 paces per day. 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 Hasn't worked this year due to illness and recent move.         Has had a lot of recent stressors. 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 Reports environment there is terrible, with a lot of dust . For the past 2-3 years, patient had been living alone in [place], and boss lived in [place] in the patient's house that she'd bought. Patient  came to [place] in January when she was sick, and at that time she decided not to move back to [place].         Patient is [place].      Review of Systems   Constitutional: Negative for appetite change, chills, fatigue and fever.   HENT: Negative for congestion and sore throat.    Eyes: Negative for photophobia.   Respiratory: Positive for shortness of breath.    Cardiovascular: Negative for chest pain and leg swelling.   Gastrointestinal: Negative for abdominal pain, blood in stool, constipation, diarrhea, nausea and vomiting.   Genitourinary: Negative for dysuria and hematuria.   Musculoskeletal: Negative for back pain.   Skin: Negative for color change.   Psychiatric/Behavioral: Negative for agitation.     Vitals:    12/14/20 0755   BP: 132/82   Pulse: (!) 114   Resp: 16       Physical Exam  Constitutional:       Appearance: Normal appearance.      Comments: On wheelchair, Oxygen via NC   HENT:      Head: Normocephalic and atraumatic.      Nose: Nose normal.   Eyes:      Extraocular Movements: Extraocular movements intact.      Pupils: Pupils are equal, round, and reactive to light.   Cardiovascular:      Rate and Rhythm: Normal rate and regular rhythm.   Pulmonary:      Comments: On O2 via NC   Abdominal:      General: Abdomen is flat. Bowel sounds are normal. There is no distension.      Palpations: Abdomen is soft. There is no mass.      Tenderness: There is no abdominal tenderness.   Musculoskeletal:         General: No swelling or tenderness.   Skin:     General: Skin is warm and dry.   Neurological:      General: No focal deficit present.      Mental Status: She is alert and oriented to person, place, and time.   Psychiatric:         Mood and Affect: Mood normal.         Behavior: Behavior normal.         MELD-Na score: 6 at 11/30/2020  9:13 AM  MELD score: 6 at 11/30/2020  9:13 AM  Calculated from:  Serum Creatinine: 0.7 mg/dL (Rounded to 1 mg/dL) at 11/30/2020  9:13 AM  Serum Sodium: 140 mmol/L (Rounded to  137 mmol/L) at 11/30/2020  9:13 AM  Total Bilirubin: 0.3 mg/dL (Rounded to 1 mg/dL) at 11/30/2020  9:13 AM  INR(ratio): 0.9 (Rounded to 1) at 11/30/2020  9:13 AM  Age: 60 years 3 months    Lab Results   Component Value Date     (H) 11/30/2020    BUN 17 11/30/2020    CREATININE 0.7 12/07/2020    CALCIUM 9.3 11/30/2020     11/30/2020    K 3.9 11/30/2020    CL 97 11/30/2020    PROT 7.5 11/30/2020    CO2 33 (H) 11/30/2020    ANIONGAP 10 11/30/2020    WBC 9.26 11/30/2020    RBC 4.40 11/30/2020    HGB 12.8 11/30/2020    HCT 41.5 11/30/2020    MCV 94 11/30/2020    MCH 29.1 11/30/2020    MCHC 30.8 (L) 11/30/2020     Lab Results   Component Value Date    RDW 13.4 11/30/2020     (L) 11/30/2020    MPV 10.8 11/30/2020    GRAN 8.2 (H) 11/30/2020    GRAN 88.8 (H) 11/30/2020    LYMPH 0.6 (L) 11/30/2020    LYMPH 6.5 (L) 11/30/2020    MONO 0.3 11/30/2020    MONO 3.2 (L) 11/30/2020    EOSINOPHIL 1.0 11/30/2020    BASOPHIL 0.1 11/30/2020    EOS 0.1 11/30/2020    BASO 0.01 11/30/2020    APTT 25.1 11/30/2020    GROUPTRH B POS 12/08/2020    BNP <10 11/30/2020    CHOL 219 (H) 11/30/2020    TRIG 69 11/30/2020     (H) 11/30/2020    CHOLHDL 52.5 (H) 11/30/2020    TOTALCHOLEST 1.9 (L) 11/30/2020    ALBUMIN 4.3 11/30/2020    AST 19 11/30/2020    ALT 18 11/30/2020    ALKPHOS 107 11/30/2020    MG 1.8 11/30/2020    LABPROT 10.2 11/30/2020    INR 0.9 11/30/2020       Assessment and Plan:  Patient Active Problem List   Diagnosis    Tobacco dependence    Chronic respiratory failure with hypoxia    Glucose intolerance (impaired glucose tolerance)    Centrilobular emphysema    Type 2 diabetes mellitus without complication, without long-term current use of insulin    Pulmonary nodule seen on imaging study    Mixed hyperlipidemia    Severe episode of recurrent major depressive disorder, without psychotic features    Complicated grieving    Right leg pain    Malnutrition    Claudication in peripheral vascular  disease    Otosclerosis of right ear    Conductive hearing loss of right ear with unrestricted hearing of left ear    Multiple renal cysts    Atherosclerosis of aorta    LAMBERTO (obstructive sleep apnea)    COPD, very severe    Lung transplant candidate    Chronic obstructive pulmonary disease    Pre-transplant evaluation for lung transplant     Ann Elizabeth Canavan is a 60 y.o. female withNo chief complaint on file.      Zoila was seen today for fatty liver.    Diagnoses and all orders for this visit:    Lung transplant candidate    Hepatomegaly  Fatty liver  -     FibroScan (Vibration Controlled Transient Elastography); Future    Pre-transplant evaluation for lung transplant  -     FibroScan (Vibration Controlled Transient Elastography); Future      Ming Mcdonough MD  Internal Medicine Department, PGY-II  [facility]  [phone]

## 2020-12-14 NOTE — PROCEDURES
Procedures   Fibroscan Procedure     Name: Ann Elizabeth Canavan  Date of Procedure : 2020   :: Sidney Escoto MD  Diagnosis: NAFLD    Probe: M    Fibroscan readin.7 KPa    Fibrosis:F 0-1     CAP readin dB/m    Steatosis: :<S1

## 2020-12-15 ENCOUNTER — TELEPHONE (OUTPATIENT)
Dept: TRANSPLANT | Facility: CLINIC | Age: 60
End: 2020-12-15

## 2020-12-15 ENCOUNTER — OFFICE VISIT (OUTPATIENT)
Dept: PALLIATIVE MEDICINE | Facility: CLINIC | Age: 60
End: 2020-12-15
Payer: MEDICARE

## 2020-12-15 DIAGNOSIS — F33.41 RECURRENT MAJOR DEPRESSIVE DISORDER, IN PARTIAL REMISSION: ICD-10-CM

## 2020-12-15 DIAGNOSIS — F41.9 ANXIETY: ICD-10-CM

## 2020-12-15 DIAGNOSIS — Z01.818 PRE-TRANSPLANT EVALUATION FOR LUNG TRANSPLANT: ICD-10-CM

## 2020-12-15 DIAGNOSIS — Z51.5 PALLIATIVE CARE ENCOUNTER: ICD-10-CM

## 2020-12-15 DIAGNOSIS — J96.11 CHRONIC RESPIRATORY FAILURE WITH HYPOXIA: Chronic | ICD-10-CM

## 2020-12-15 DIAGNOSIS — R06.00 DYSPNEA, UNSPECIFIED TYPE: Primary | ICD-10-CM

## 2020-12-15 DIAGNOSIS — J44.9 CHRONIC OBSTRUCTIVE PULMONARY DISEASE, UNSPECIFIED COPD TYPE: ICD-10-CM

## 2020-12-15 PROCEDURE — 99499 RISK ADDL DX/OHS AUDIT: ICD-10-PCS | Mod: 95,TXP,, | Performed by: STUDENT IN AN ORGANIZED HEALTH CARE EDUCATION/TRAINING PROGRAM

## 2020-12-15 PROCEDURE — 99497 ADVNCD CARE PLAN 30 MIN: CPT | Mod: 95,TXP,, | Performed by: STUDENT IN AN ORGANIZED HEALTH CARE EDUCATION/TRAINING PROGRAM

## 2020-12-15 PROCEDURE — 99499 UNLISTED E&M SERVICE: CPT | Mod: 95,TXP,, | Performed by: STUDENT IN AN ORGANIZED HEALTH CARE EDUCATION/TRAINING PROGRAM

## 2020-12-15 PROCEDURE — 99204 OFFICE O/P NEW MOD 45 MIN: CPT | Mod: 95,TXP,, | Performed by: STUDENT IN AN ORGANIZED HEALTH CARE EDUCATION/TRAINING PROGRAM

## 2020-12-15 PROCEDURE — 99497 PR ADVNCD CARE PLAN 30 MIN: ICD-10-PCS | Mod: 95,TXP,, | Performed by: STUDENT IN AN ORGANIZED HEALTH CARE EDUCATION/TRAINING PROGRAM

## 2020-12-15 PROCEDURE — 99204 PR OFFICE/OUTPT VISIT, NEW, LEVL IV, 45-59 MIN: ICD-10-PCS | Mod: 95,TXP,, | Performed by: STUDENT IN AN ORGANIZED HEALTH CARE EDUCATION/TRAINING PROGRAM

## 2020-12-15 RX ORDER — SERTRALINE HYDROCHLORIDE 100 MG/1
150 TABLET, FILM COATED ORAL DAILY
Qty: 120 TABLET | Refills: 1 | Status: SHIPPED | OUTPATIENT
Start: 2020-12-15 | End: 2021-05-27

## 2020-12-15 NOTE — LETTER
December 15, 2020      Kanwal Jacobsen, DO  [address]  [facility] [address]           [facility] 9th Fl  [address]  [facility] [address]  Phone: [phone]  Fax: [phone]          Patient: Ann Elizabeth Canavan   MR Number: [id number]   YOB: 1960   Date of Visit: 12/15/2020       Dear Dr. Kanwal Jacobsen:    Thank you for referring Ann Canavan to me for evaluation. Attached you will find relevant portions of my assessment and plan of care.        She is doing well, I will help manage her Anxiety by increasing her Zoloft and discussed mindfulness exercises to help with her dyspnea too.     She will fill out a HCPOA form and mail back to us.        If you have questions, please do not hesitate to call me. I look forward to following Ann Canavan along with you.    Sincerely,    Re Rodney MD    Enclosure  CC:  No Recipients    If you would like to receive this communication electronically, please contact [email or web address] or [phone] to request more information on [employer] Link access.    For providers and/or their staff who would like to refer a patient to [facility], please contact us through our one-stop-shop provider referral line, [facility], at [phone].    If you feel you have received this communication in error or would no longer like to receive these types of communications, please e-mail [email or web address]

## 2020-12-15 NOTE — PROGRESS NOTES
Consult Note  Palliative Medicine Clinic      Consult Requested By: Dr. Kanwal Jacobsen     Primary Care Physician: Trinh Bowles MD    Reason for Consult: Advance care planning    The patient location is: [place] , [facility]    The chief complaint leading to consultation is: Advance care planning    Visit type: audiovisual    Face to Face time with patient: 45min  ACP 20min  60min minutes of total time spent on the encounter, which includes face to face time and non-face to face time preparing to see the patient (eg, review of tests), Obtaining and/or reviewing separately obtained history, Documenting clinical information in the electronic or other health record, Independently interpreting results (not separately reported) and communicating results to the patient/family/caregiver, or Care coordination (not separately reported).       Each patient provided with medical services by telemedicine is:  (1) informed of the relationship between the physician and patient and the respective role of any other health care provider with respect to management of the patient; and (2) notified that he or she may decline to receive medical services by telemedicine and may withdraw from such care at any time.      ASSESSMENT/PLAN:     Plan/Recommendations:  Zoila was seen today for shortness of breath and advanced care planning.    Diagnoses and all orders for this visit:      Chronic respiratory failure with hypoxia / Chronic obstructive pulmonary disease, unspecified COPD type  -     Ambulatory referral/consult to Palliative Care  - Currently on Albuterol Inhaler, Duonebs, Prednisone PO, Spiriva, Roflumilast    Pre-transplant evaluation for lung transplant  -     Ambulatory referral/consult to Palliative Care  -Undergoing Transplant evaluation    Recurrent major depressive disorder, in partial remission /   -   Increased  sertraline (ZOLOFT) 100 MG tablet; to Take 1.5 tablets (150 mg total) by mouth once daily.    Dyspnea,  unspecified type  - patient reports the subjective sensation of difficulty or inadequate ability to breathe, (with or without hypoxia)    Non-pharmacologic therapies discussed:  - relaxation/breathing techniques  - activity modification, balance rest with activity  - use of a fan blowing cool air on the face  - supplemental humidified oxygen    -Continue Pharmacologic management as above        Anxiety  - Worse with dyspnea- Increased Zoloft to 150mg daily  - Discussed mindfulness - CALM rosalina        Palliative care encounter  1) Symptoms:  dyspnea and anxiety     2) Medicolegal: Pt has decision making capacity. She would like to name her friend Mayela Maddox as HCPOA, will send paperwork for her to fill out     3) Psychosocial: Has some support system, mainly friends from the community, she states that 3 of them have signed up to be her caregivers     4) Spiritual:     5) Prognostication: Patient with severe COPD, prognosis depending of Lung transplantation.     6) Understanding of disease and Illness Trajectory: She has good understanding of her illness    7) Goals of care:   main goal is to regain independence to be able to be active again, and to get a lung transplant     8) Code status:  Full code    9) Advance directives:  Will sign healthcare power of     10) Summary and recommendations:  Increase SSRI for anxiety      20min time was spent on advance care planning, goals of care discussion, emotional support, formulating and communicating prognosis and goals of care, exploring burden/benefit of various approaches of treatment.              Plan discussed with Referring physician    SUBJECTIVE:     History obtained from: Patient     complaint:   Chief Complaint   Patient presents with    Shortness of Breath    Advanced care Planning       History of Present Illness:  Ms. Ann Elizabeth Canavan is 60 y.o. year old female presenting with Advanced COPD undergoing workup for lung transplantation.   Referred to Palliative Care for evaluation and management of physical symptoms and advance care planning,. She attended the appointment alone.      Interval History:  She lives alone her mother is 90 years old and she leaves different state we have her sister-in-law.  Her brother  a couple of years ago.    Overall she is able to do everything at home she has a friend who comes by and helps her once or twice a week.  Otherwise she is able to do all of her ADLs and most ADLs.    She feels limited by her dyspnea she states that she feels tired after doing most of her activities.  She feels better after a good night's sleep.  In the mornings she tries to exercise and she did pulmonary rehab in the past.  She describes moments when she feels very short of breath and has anxiety, her shortness of breath improves once she calms down.    She has some society however she says it is episodic like when she needs to go out and plan for a doctor's appointments or when she knows that she is going to have a lot of activity or when she gets the dyspnea episodes      Advance Care Planning     Power of   I initiated the process of advance care planning today and explained the importance of this process to the patient.  I introduced the concept of advance directives to the patient, as well. Then the patient received detailed information about the importance of designating a Health Care Power of  (HCPOA). She was also instructed to communicate with this person about their wishes for future healthcare, should she become sick and lose decision-making capacity. The patient has not previously appointed a HCPOA. After our discussion, the patient has decided to complete a HCPOA and has appointed her Friend, NAME:Mayela Maddox NUMBER: .  HCPOA form will be mailed to patient and she will feel it out and returning to us       [facility]  I engaged the patient in a conversation about advance care planning and we specifically  addressed what the goals of care would be moving forward, in light of the patient's change in clinical status, specifically workup for lung transplantation and advanced COPD. We explored the patient's values and preferences for future care.  The patient endorses that what is most important right now is to focus on remaining as independent as possible, symptom/pain control and getting her lung transplant so she can regain some quality of life.     Quality of life for her means to be able to stay active and to take care of herself.    She hopes that after getting the lung transplant she wants to be able to walk again and do her regular activities she would love to be able to play soccer again she was a very active person before the disease progressed.  She also wants to be able to take care of her mother who is 91 years old living with her sister-in-law in what she describes not a very home like environment.    She worries about dying about who will take care of her mom.  She also feels that it has some fair for her mom to worry her as she worried her brother who  at 49 years old.    She is okay on with being on life support for a short period he does not want to be on life support indefinitely if she has no chances of recovery.  She wants to remain full code.  She will talk about her wishes with her friend angelica at [place].    Accordingly, we have decided that the best plan to meet the patient's goals includes getting a lung transplant.            Disease History:  Severe COPD (FEV1 23%), 3L of oxygen.  She is on no assisted ventilation.  Her New York Heart Association Class is III and a Karnofsky score of 60%       Previous experience or exposure to a serious illness: Brother was on life support.  of Cancer at 50y/o    Past Medical History:   Diagnosis Date    Chronic respiratory failure with hypoxia and hypercapnia 2014    COPD (chronic obstructive pulmonary disease)     Depression      Hyperlipidemia 5/12/2017    Otosclerosis of right ear 5/12/2017    40% hearing back in 1988, had surgery with some improvement, but hearing loss persists.      PAD (peripheral artery disease)     Post-menopausal 2008    Pulmonary emphysema 04/28/2017    Tobacco dependence 5/2/2014    Type 2 diabetes mellitus without complication, without long-term current use of insulin 4/29/2017    Hb A1c 6.5% in 2/2017. Diet controlled.      Past Surgical History:   Procedure Laterality Date    BREAST BIOPSY      BREAST CYST ASPIRATION      GANGLION CYST EXCISION Right 1988    STAPEDES SURGERY Right 1988     Family History   Problem Relation Age of Onset    Diabetes Mother     Heart disease Father         Strong FH of CAD/CHF on fathers side    Peripheral vascular disease Brother         With necrosis of leg    Stroke Brother 49    Diabetes Maternal Uncle     Cancer Neg Hx     Thyroid disease Neg Hx      Review of patient's allergies indicates:   Allergen Reactions    Avelox [moxifloxacin] Itching and Rash     IV       Medications:    Current Outpatient Medications:     albuterol 90 mcg/actuation inhaler, Inhale 2 puffs into the lungs every 6 (six) hours as needed for Wheezing. Rescue, Disp: 18 g, Rfl: 11    albuterol-ipratropium (DUO-NEB) 2.5 mg-0.5 mg/3 mL nebulizer solution, Take 3 mLs by nebulization every 6 (six) hours as needed for Wheezing or Shortness of Breath. Rescue (Patient not taking: Reported on 12/9/2020), Disp: 360 mL, Rfl: 0    aspirin (ECOTRIN) 81 MG EC tablet, Take 81 mg by mouth once daily., Disp: , Rfl:     atorvastatin (LIPITOR) 40 MG tablet, Take 40 mg by mouth once daily., Disp: , Rfl:     blood sugar diagnostic Strp, 1 strip by Misc.(Non-Drug; Combo Route) route once daily., Disp: 100 strip, Rfl: 3    diphenhydrAMINE (BENADRYL ALLERGY) 25 mg tablet, Take 25 mg by mouth nightly as needed for Insomnia., Disp: , Rfl:     ergocalciferol (ERGOCALCIFEROL) 50,000 unit Cap, Take 1 capsule  (50,000 Units total) by mouth every 7 days. (Patient not taking: Reported on 12/9/2020), Disp: 12 capsule, Rfl: 3    fluticasone propionate (FLONASE) 50 mcg/actuation nasal spray, 1 spray (50 mcg total) by Each Nostril route 2 (two) times a day., Disp: 16 g, Rfl: 3    fluticasone-salmeterol diskus inhaler 250-50 mcg, , Disp: , Rfl:     lancets Misc, 1 Device by Misc.(Non-Drug; Combo Route) route once daily., Disp: 100 each, Rfl: 3    levocetirizine (XYZAL) 5 MG tablet, Take 1 tablet (5 mg total) by mouth every evening., Disp: 90 tablet, Rfl: 3    OPW TEST CLAIM - DO NOT FILL, Inhale into the lungs. OPW test claim. Do not fill., Disp: 30 tablet, Rfl: 1    predniSONE (DELTASONE) 20 MG tablet, Take 2 tablets (40 mg total) by mouth once daily. For exacerbation., Disp: 5 tablet, Rfl: 0    roflumilast (DALIRESP) 500 mcg Tab, Take 1 tablet (500 mcg total) by mouth once daily., Disp: 30 tablet, Rfl: 11    rosuvastatin (CRESTOR) 40 MG Tab, Take 1 tablet (40 mg total) by mouth every evening. (Patient not taking: Reported on 12/9/2020), Disp: 90 tablet, Rfl: 3    sertraline (ZOLOFT) 100 MG tablet, Take 1.5 tablets (150 mg total) by mouth once daily., Disp: 120 tablet, Rfl: 1    tiotropium (SPIRIVA) 18 mcg inhalation capsule, Inhale 1 capsule (18 mcg total) into the lungs once daily., Disp: 90 capsule, Rfl: 3     database queried on 12/15/2020  by Re Rodney . The results reviewed and considered with the clinical data in the decision whether or not to prescribe a controlled substance.        OBJECTIVE:       ROS:  Review of Systems   Constitutional: Positive for fatigue. Negative for activity change, appetite change, fever and unexpected weight change.   HENT: Negative for hearing loss, sore throat and trouble swallowing.    Eyes: Negative for photophobia, pain and visual disturbance.   Respiratory: Positive for cough and shortness of breath. Negative for wheezing.    Cardiovascular: Negative for chest pain  and leg swelling.   Gastrointestinal: Negative for abdominal pain, constipation, diarrhea, nausea, vomiting and reflux.   Genitourinary: Negative for dysuria and urgency.   Musculoskeletal: Negative for back pain, gait problem, leg pain and myalgias.   Neurological: Negative for light-headedness, headaches and memory loss.   Psychiatric/Behavioral: Negative for behavioral problems and confusion. The patient is not nervous/anxious.          Review of Symptoms    Symptom Assessment (ESAS 0-10 Scale)  Pain:  0  Dyspnea:  2  Anxiety:  2  Nausea:  0  Depression:  0  Anorexia:  0  Fatigue:  3  Insomnia:  0  Restlessness:  0  Agitation:  0     CAM / Delirium:  Negative  Constipation:  Negative  Diarrhea:  Negative          Performance Status:  60    ECOG Performance Status Grade:  1 - Ambulates, capable of light work    Living Arrangements:  Lives alone    Psychosocial/Cultural: Lives alone; has friends who help her out.  Has a 91-year-old mother living with her sister-in-law.  Her brother  at 49 years old.    Advance Care Planning   Advance Directives:   Living Will: No    LaPOST: No    Do Not Resuscitate Status: No    Medical Power of : No        Oral Declaration: Yes  Agent's Name:  Mayela Maddox    Decision Making:  Patient answered questions              Physical Exam: Limited due to video Visit  Vitals:    Physical Exam   Constitutional: She is oriented to person, place, and time and well-developed, well-nourished, and in no distress.   HENT:   Mouth/Throat: Oropharynx is clear and moist.   Wearing O2 NC   Eyes: Conjunctivae are normal. No scleral icterus.   Neck: Normal range of motion.   Pulmonary/Chest: Effort normal. No respiratory distress.   Neurological: She is alert and oriented to person, place, and time.   Skin: No rash noted. She is not diaphoretic.   Psychiatric: Mood and affect normal.         Labs:  CBC:   WBC   Date Value Ref Range Status   2020 9.26 3.90 - 12.70 K/uL Final        Hemoglobin   Date Value Ref Range Status   11/30/2020 12.8 12.0 - 16.0 g/dL Final       Hematocrit   Date Value Ref Range Status   11/30/2020 41.5 37.0 - 48.5 % Final       MCV   Date Value Ref Range Status   11/30/2020 94 82 - 98 fL Final       Platelets   Date Value Ref Range Status   11/30/2020 147 (L) 150 - 350 K/uL Final           LFT:   Lab Results   Component Value Date    AST 19 11/30/2020    ALKPHOS 107 11/30/2020    BILITOT 0.3 11/30/2020       Albumin:   Albumin   Date Value Ref Range Status   11/30/2020 4.3 3.5 - 5.2 g/dL Final     Protein:   Total Protein   Date Value Ref Range Status   11/30/2020 7.5 6.0 - 8.4 g/dL Final       Radiology:I have reviewed all pertinent imaging results/findings within the past 24 hours.    Results for orders placed or performed during the hospital encounter of 10/19/20 (from the past 2160 hour(s))   CT CHEST WITHOUT CONTRAST    Impression    1. Severe centrilobular emphysema coalescing to panlobular emphysema as detailed above.  2. Few scattered pulmonary nodules measuring up to 0.4 cm.  For multiple solid nodules all <6 mm, Fleischner Society 2017 guidelines recommend follow up with non-contrast chest CT at 12 months after discovery in a high risk patient.  3. Mild aortic and coronary artery atherosclerosis.  4. Additional findings as above.    Electronically signed by resident: Romeo Peck  Date:    10/19/2020  Time:    14:02    Electronically signed by: Yanni Fleming MD  Date:    10/19/2020  Time:    14:27           Encounter occurred during period of COVID-19 emergency. Encounter performed under the concurrent guidelines, limitations and protocols.      Signature: Re Rodney MD

## 2020-12-16 DIAGNOSIS — E11.9 TYPE 2 DIABETES MELLITUS WITHOUT COMPLICATION, UNSPECIFIED WHETHER LONG TERM INSULIN USE: ICD-10-CM

## 2020-12-18 ENCOUNTER — TELEPHONE (OUTPATIENT)
Dept: TRANSPLANT | Facility: CLINIC | Age: 60
End: 2020-12-18

## 2020-12-22 ENCOUNTER — OFFICE VISIT (OUTPATIENT)
Dept: UROLOGY | Facility: CLINIC | Age: 60
End: 2020-12-22
Payer: MEDICARE

## 2020-12-22 ENCOUNTER — HOSPITAL ENCOUNTER (OUTPATIENT)
Dept: RADIOLOGY | Facility: CLINIC | Age: 60
Discharge: HOME | End: 2020-12-22
Attending: INTERNAL MEDICINE
Payer: MEDICARE

## 2020-12-22 ENCOUNTER — HOSPITAL ENCOUNTER (OUTPATIENT)
Dept: RADIOLOGY | Facility: HOSPITAL | Age: 60
Discharge: HOME | End: 2020-12-22
Attending: INTERNAL MEDICINE
Payer: MEDICARE

## 2020-12-22 VITALS
SYSTOLIC BLOOD PRESSURE: 139 MMHG | DIASTOLIC BLOOD PRESSURE: 79 MMHG | WEIGHT: 134.94 LBS | HEART RATE: 112 BPM | BODY MASS INDEX: 22.48 KG/M2 | HEIGHT: 65 IN

## 2020-12-22 DIAGNOSIS — Z01.818 PRE-TRANSPLANT EVALUATION FOR LUNG TRANSPLANT: ICD-10-CM

## 2020-12-22 DIAGNOSIS — N28.1 RENAL CYST, LEFT: ICD-10-CM

## 2020-12-22 DIAGNOSIS — J44.9 CHRONIC OBSTRUCTIVE PULMONARY DISEASE, UNSPECIFIED COPD TYPE: ICD-10-CM

## 2020-12-22 DIAGNOSIS — N20.0 KIDNEY STONE ON LEFT SIDE: ICD-10-CM

## 2020-12-22 DIAGNOSIS — J96.11 CHRONIC RESPIRATORY FAILURE WITH HYPOXIA: ICD-10-CM

## 2020-12-22 DIAGNOSIS — J96.11 CHRONIC RESPIRATORY FAILURE WITH HYPOXIA: Chronic | ICD-10-CM

## 2020-12-22 PROCEDURE — 1126F AMNT PAIN NOTED NONE PRSNT: CPT | Mod: S$GLB,TXP,, | Performed by: PHYSICIAN ASSISTANT

## 2020-12-22 PROCEDURE — 77080 DEXA BONE DENSITY SPINE HIP: ICD-10-PCS | Mod: 26,TXP,, | Performed by: INTERNAL MEDICINE

## 2020-12-22 PROCEDURE — 1126F PR PAIN SEVERITY QUANTIFIED, NO PAIN PRESENT: ICD-10-PCS | Mod: S$GLB,TXP,, | Performed by: PHYSICIAN ASSISTANT

## 2020-12-22 PROCEDURE — 99203 OFFICE O/P NEW LOW 30 MIN: CPT | Mod: S$GLB,TXP,, | Performed by: PHYSICIAN ASSISTANT

## 2020-12-22 PROCEDURE — 99999 PR PBB SHADOW E&M-EST. PATIENT-LVL V: CPT | Mod: PBBFAC,TXP,, | Performed by: PHYSICIAN ASSISTANT

## 2020-12-22 PROCEDURE — 99203 PR OFFICE/OUTPT VISIT, NEW, LEVL III, 30-44 MIN: ICD-10-PCS | Mod: S$GLB,TXP,, | Performed by: PHYSICIAN ASSISTANT

## 2020-12-22 PROCEDURE — 3008F BODY MASS INDEX DOCD: CPT | Mod: CPTII,S$GLB,TXP, | Performed by: PHYSICIAN ASSISTANT

## 2020-12-22 PROCEDURE — 3008F PR BODY MASS INDEX (BMI) DOCUMENTED: ICD-10-PCS | Mod: CPTII,S$GLB,TXP, | Performed by: PHYSICIAN ASSISTANT

## 2020-12-22 PROCEDURE — 77080 DXA BONE DENSITY AXIAL: CPT | Mod: 26,TXP,, | Performed by: INTERNAL MEDICINE

## 2020-12-22 PROCEDURE — 99999 PR PBB SHADOW E&M-EST. PATIENT-LVL V: ICD-10-PCS | Mod: PBBFAC,TXP,, | Performed by: PHYSICIAN ASSISTANT

## 2020-12-22 PROCEDURE — 25500020 PHARM REV CODE 255: Mod: TXP | Performed by: INTERNAL MEDICINE

## 2020-12-22 PROCEDURE — 74220 FL ESOPHAGRAM COMPLETE: ICD-10-PCS | Mod: 26,TXP,, | Performed by: RADIOLOGY

## 2020-12-22 PROCEDURE — 74220 X-RAY XM ESOPHAGUS 1CNTRST: CPT | Mod: 26,TXP,, | Performed by: RADIOLOGY

## 2020-12-22 PROCEDURE — A9698 NON-RAD CONTRAST MATERIALNOC: HCPCS | Mod: TXP | Performed by: INTERNAL MEDICINE

## 2020-12-22 PROCEDURE — 77080 DXA BONE DENSITY AXIAL: CPT | Mod: TC,TXP

## 2020-12-22 PROCEDURE — 74220 X-RAY XM ESOPHAGUS 1CNTRST: CPT | Mod: TC,TXP

## 2020-12-22 RX ADMIN — BARIUM SULFATE 150 ML: 0.6 SUSPENSION ORAL at 09:12

## 2020-12-22 NOTE — LETTER
December 22, 2020      Kanwal Jacobsen, DO  [address]  [facility] [address]           [facility] [address]  [address]  [facility] [address]  Phone: [phone]          Patient: Ann Elizabeth Canavan   MR Number: [id number]   YOB: 1960   Date of Visit: 12/22/2020       Dear Dr. Kanwal Jacobsen:    Thank you for referring Ann Canavan to me for evaluation. Attached you will find relevant portions of my assessment and plan of care.    If you have questions, please do not hesitate to call me. I look forward to following Ann Canavan along with you.    Sincerely,    Leisa Mckay PA-C    Enclosure  CC:  No Recipients    If you would like to receive this communication electronically, please contact [email or web address] or [phone] to request more information on [employer] Link access.    For providers and/or their staff who would like to refer a patient to [facility], please contact us through our one-stop-shop provider referral line, [facility], at [phone].    If you feel you have received this communication in error or would no longer like to receive these types of communications, please e-mail [email or web address]

## 2020-12-22 NOTE — PROGRESS NOTES
CHIEF COMPLAINT:    Mrs. Canavan is a 60 y.o. female presenting for kidney stones and renal cysts.  PRESENTING ILLNESS:    Ann Elizabeth Canavan is a 60 y.o. female with a PMH of DM, renal cysts, copd,  who presents for kidney stones and renal cysts.     She reports being told she had kidney stones in the past but has never passed one that she can recall.    No back pain.    She denies abnormal frequency, urgency, dysuria, gross hematuria.  She does not have any urinary complaints today.        U/s abdomen complete 11/30/2020:  Right kidney: Normal in size with no hydronephrosis, measuring 11.4 cm.     Left kidney:  Normal in size with no hydronephrosis, measuring 11.7 cm.  Several subcentimeter nonobstructive renal stones are identified, each measures 0.3 cm.  Several simple renal cysts are identified, the largest measures 2.0 x 2.4 x 3.0 cm.    How much daily water intake? 24oz  Eat red meat? yes  Eat broccoli, spinach, beets, nuts? Eats spinach sometimes.    Take multivitamins, vitamin c, tums, rolaids? no  Limits salt intake? yes  Drinks coffee, tea, caffeine beverages? Coffee and tea      REVIEW OF SYSTEMS:      Constitutional: Negative for fever and chills.   HENT: Negative for hearing loss.   Eyes: Negative for visual disturbance.   Respiratory: Positive for shortness of breath. ( on home oxygen)  Cardiovascular: Negative for chest pain.   Gastrointestinal: Negative for vomiting, and constipation.   Genitourinary:  See HPI  Neurological: Negative for dizziness.   Hematological: Does not bruise/bleed easily.   Psychiatric/Behavioral: Negative for confusion.     PATIENT HISTORY:    Past Medical History:   Diagnosis Date    Chronic respiratory failure with hypoxia and hypercapnia 5/4/2014    COPD (chronic obstructive pulmonary disease)     Depression     Hyperlipidemia 5/12/2017    Otosclerosis of right ear 5/12/2017    40% hearing back in 1988, had surgery with some improvement, but hearing loss persists.       PAD (peripheral artery disease)     Post-menopausal 2008    Pulmonary emphysema 04/28/2017    Tobacco dependence 5/2/2014    Type 2 diabetes mellitus without complication, without long-term current use of insulin 4/29/2017    Hb A1c 6.5% in 2/2017. Diet controlled.        Past Surgical History:   Procedure Laterality Date    BREAST BIOPSY      BREAST CYST ASPIRATION      GANGLION CYST EXCISION Right 1988    STAPEDES SURGERY Right 1988       Family History   Problem Relation Age of Onset    Diabetes Mother     Heart disease Father         Strong FH of CAD/CHF on fathers side    Peripheral vascular disease Brother         With necrosis of leg    Stroke Brother 49    Diabetes Maternal Uncle     Cancer Neg Hx     Thyroid disease Neg Hx        Social History     Socioeconomic History    Marital status:      Spouse name: Not on file    Number of children: Not on file    Years of education: Not on file    Highest education level: Not on file   Occupational History    Not on file   Social Needs    Financial resource strain: Not on file    Food insecurity     Worry: Not on file     Inability: Not on file    Transportation needs     Medical: Not on file     Non-medical: Not on file   Tobacco Use    Smoking status: Former Smoker     Packs/day: 1.00     Years: 40.00     Pack years: 40.00     Types: Cigarettes     Start date: 8/13/1977     Quit date: 6/19/2017     Years since quitting: 3.5    Smokeless tobacco: Never Used   Substance and Sexual Activity    Alcohol use: Yes     Alcohol/week: 0.8 standard drinks     Types: 1 Standard drinks or equivalent per week     Comment: occasional    Drug use: Never    Sexual activity: Not on file   Lifestyle    Physical activity     Days per week: Not on file     Minutes per session: Not on file    Stress: Not on file   Relationships    Social connections     Talks on phone: Not on file     Gets together: Not on file     Attends Buddhism service:  Not on file     Active member of club or organization: Not on file     Attends meetings of clubs or organizations: Not on file     Relationship status: Not on file   Other Topics Concern    Not on file   Social History Narrative    Reports 6-8 years of sedentary lifestyle, where she lived where she worked, only walking 20 paces per day. 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 Hasn't worked this year due to illness and recent move.         Has had a lot of recent stressors. 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 Reports environment there is terrible, with a lot of dust . For the past 2-3 years, patient had been living alone in [place], and boss lived in [place] in the patient's house that she'd bought. Patient came to [place] in January when she was sick, and at that time she decided not to move back to [place].         Patient is [place].        Allergies:  Avelox [moxifloxacin]    Medications:    Current Outpatient Medications:     albuterol 90 mcg/actuation inhaler, Inhale 2 puffs into the lungs every 6 (six) hours as needed for Wheezing. Rescue, Disp: 18 g, Rfl: 11    albuterol-ipratropium (DUO-NEB) 2.5 mg-0.5 mg/3 mL nebulizer solution, Take 3 mLs by nebulization every 6 (six) hours as needed for Wheezing or Shortness of Breath. Rescue (Patient not taking: Reported on 2020), Disp: 360 mL, Rfl: 0    aspirin (ECOTRIN) 81 MG EC tablet, Take 81 mg by mouth once daily., Disp: , Rfl:     atorvastatin (LIPITOR) 40 MG tablet, Take 40 mg by mouth once daily., Disp: , Rfl:     blood sugar diagnostic Strp, 1 strip by Misc.(Non-Drug; Combo Route) route once daily., Disp: 100 strip, Rfl: 3    diphenhydrAMINE (BENADRYL ALLERGY) 25 mg tablet, Take 25 mg by mouth nightly as needed for Insomnia., Disp: , Rfl:     ergocalciferol (ERGOCALCIFEROL) 50,000 unit Cap, Take 1 capsule (50,000 Units total) by  mouth every 7 days. (Patient not taking: Reported on 12/9/2020), Disp: 12 capsule, Rfl: 3    fluticasone propionate (FLONASE) 50 mcg/actuation nasal spray, 1 spray (50 mcg total) by Each Nostril route 2 (two) times a day., Disp: 16 g, Rfl: 3    fluticasone-salmeterol diskus inhaler 250-50 mcg, , Disp: , Rfl:     lancets Misc, 1 Device by Misc.(Non-Drug; Combo Route) route once daily., Disp: 100 each, Rfl: 3    levocetirizine (XYZAL) 5 MG tablet, Take 1 tablet (5 mg total) by mouth every evening., Disp: 90 tablet, Rfl: 3    OPW TEST CLAIM - DO NOT FILL, Inhale into the lungs. OPW test claim. Do not fill., Disp: 30 tablet, Rfl: 1    predniSONE (DELTASONE) 20 MG tablet, Take 2 tablets (40 mg total) by mouth once daily. For exacerbation., Disp: 5 tablet, Rfl: 0    roflumilast (DALIRESP) 500 mcg Tab, Take 1 tablet (500 mcg total) by mouth once daily., Disp: 30 tablet, Rfl: 11    rosuvastatin (CRESTOR) 40 MG Tab, Take 1 tablet (40 mg total) by mouth every evening. (Patient not taking: Reported on 12/9/2020), Disp: 90 tablet, Rfl: 3    sertraline (ZOLOFT) 100 MG tablet, Take 1.5 tablets (150 mg total) by mouth once daily., Disp: 120 tablet, Rfl: 1    tiotropium (SPIRIVA) 18 mcg inhalation capsule, Inhale 1 capsule (18 mcg total) into the lungs once daily., Disp: 90 capsule, Rfl: 3    PHYSICAL EXAMINATION:    Constitutional:   She is in no apparent distress.    Eyes: No scleral icterus noted bilaterally. No discharge bilaterally.    Nose: No rhinorrhea, nasal cannula connected to oxygen noted.     Pulmonary/Chest: Effort normal. No respiratory distress.     Abdominal:  She exhibits no distension.      Neurological: She is alert and oriented to person, place, and time.     Skin: Skin is warm and dry.     Psych: Cooperative with normal affect.    Physical Exam  Cardiovascular:      Rate and Rhythm: Regular rhythm.       LABS:    U/a: 1.005, pH 5, negative.     IMPRESSION:    Encounter Diagnoses   Name Primary?     Pre-transplant evaluation for lung transplant     Kidney stone on left side     Renal cyst, left        PLAN:    Discussed simple renal cysts seen on u/s.  Simple cysts are not precancerous.  They do not require any further treatment or work up.      Discussed stones seen on imaging. Discussed intervention vs monitoring.  She will like to continue to monitor.  I will follow her annually with renal ultrasound.      General risk factors for kidney stones and the conservative measures to prevent kidney stones in the future were discussed with the patient in detail.  The patient was encouraged to drink 2-3 liters of water a day, have lots of fruits and veggies, limit oxalate and salt in the diet, limit iced tea and sanaz, to avoid Tums and Rolaids, to avoid unnecessary supplements and NSAIDS.     She is cleared for transplant from an urological standpoint.      Leisa Mckay PA-C

## 2020-12-22 NOTE — PATIENT INSTRUCTIONS
Drink at least 2-3 liters of water a day, have lots of fruits and veggies, limit oxalate and salt in the diet, limit iced tea and sanaz, to avoid Tums and Rolaids, to avoid unnecessary supplements and NSAIDS.

## 2020-12-24 ENCOUNTER — TELEPHONE (OUTPATIENT)
Dept: TRANSPLANT | Facility: CLINIC | Age: 60
End: 2020-12-24

## 2020-12-24 NOTE — TELEPHONE ENCOUNTER
"12/21/20 - Discussed patient in the Lung Transplant Selection Committee Prep Meeting.  Discussed Dr. Gonzalez's and 's concerns with current alcohol use and positive PETH test.  Discussed 's concerns during psychosocial evaluation and lack of suitability as a lung transplant candidate at this time.  Per ASHA Boucher's note - "Based on psychosocial risk factors, patient still needs to confirm primary caregiver plan, initiate fundraising, and abstain from all alcohol use. In addition, it is strongly recommended that pt enroll in counseling due to worsening depression and anxiety."  Instructions received from Dr. Weill to defer patient for lung transplant consideration until she has a reliable and adequate caregiver plan.        12/24/20 - Attempted to contact patient.  No answer.  Left a message requesting a return call to discuss caregiver plan and the discussion in the meeting this week.  Also left a message regarding rescheduling her GYN appointment that she cancelled on 12/10/20.  "

## 2020-12-31 ENCOUNTER — TELEPHONE (OUTPATIENT)
Dept: TRANSPLANT | Facility: CLINIC | Age: 60
End: 2020-12-31

## 2021-01-01 ENCOUNTER — PATIENT MESSAGE (OUTPATIENT)
Dept: ADMINISTRATIVE | Facility: HOSPITAL | Age: 61
End: 2021-01-01

## 2021-01-01 ENCOUNTER — PATIENT MESSAGE (OUTPATIENT)
Dept: PALLIATIVE MEDICINE | Facility: CLINIC | Age: 61
End: 2021-01-01

## 2021-01-01 ENCOUNTER — PATIENT MESSAGE (OUTPATIENT)
Dept: PALLIATIVE MEDICINE | Facility: CLINIC | Age: 61
End: 2021-01-01
Payer: MEDICARE

## 2021-01-01 ENCOUNTER — NURSE TRIAGE (OUTPATIENT)
Dept: ADMINISTRATIVE | Facility: CLINIC | Age: 61
End: 2021-01-01
Payer: MEDICARE

## 2021-01-01 DIAGNOSIS — J44.9 CHRONIC OBSTRUCTIVE PULMONARY DISEASE, UNSPECIFIED COPD TYPE: Primary | ICD-10-CM

## 2021-01-01 RX ORDER — IPRATROPIUM BROMIDE AND ALBUTEROL SULFATE 2.5; .5 MG/3ML; MG/3ML
3 SOLUTION RESPIRATORY (INHALATION) EVERY 6 HOURS PRN
Qty: 360 ML | Refills: 0 | Status: SHIPPED | OUTPATIENT
Start: 2021-01-01 | End: 2022-09-27

## 2021-01-01 RX ORDER — FLUTICASONE PROPIONATE AND SALMETEROL 250; 50 UG/1; UG/1
1 POWDER RESPIRATORY (INHALATION) 2 TIMES DAILY
Qty: 60 EACH | Refills: 3 | Status: SHIPPED | OUTPATIENT
Start: 2021-01-01 | End: 2022-01-01 | Stop reason: SDUPTHER

## 2021-01-01 RX ORDER — TIOTROPIUM BROMIDE 18 UG/1
18 CAPSULE ORAL; RESPIRATORY (INHALATION) DAILY
Qty: 90 CAPSULE | Refills: 0 | Status: SHIPPED | OUTPATIENT
Start: 2021-01-01 | End: 2022-01-01 | Stop reason: SDUPTHER

## 2021-01-01 RX ORDER — PREDNISONE 20 MG/1
40 TABLET ORAL DAILY
Qty: 10 TABLET | Refills: 0 | Status: SHIPPED | OUTPATIENT
Start: 2021-01-01 | End: 2022-01-01

## 2021-01-01 RX ORDER — ROSUVASTATIN CALCIUM 40 MG/1
40 TABLET, COATED ORAL NIGHTLY
Qty: 90 TABLET | Refills: 0 | Status: SHIPPED | OUTPATIENT
Start: 2021-01-01 | End: 2022-01-01 | Stop reason: SDUPTHER

## 2021-01-02 ENCOUNTER — LAB VISIT (OUTPATIENT)
Dept: INTERNAL MEDICINE | Facility: CLINIC | Age: 61
End: 2021-01-02
Payer: MEDICARE

## 2021-01-02 DIAGNOSIS — Z01.818 PRE-OP TESTING: ICD-10-CM

## 2021-01-02 PROCEDURE — U0003 INFECTIOUS AGENT DETECTION BY NUCLEIC ACID (DNA OR RNA); SEVERE ACUTE RESPIRATORY SYNDROME CORONAVIRUS 2 (SARS-COV-2) (CORONAVIRUS DISEASE [COVID-19]), AMPLIFIED PROBE TECHNIQUE, MAKING USE OF HIGH THROUGHPUT TECHNOLOGIES AS DESCRIBED BY CMS-2020-01-R: HCPCS

## 2021-01-03 LAB — SARS-COV-2 RNA RESP QL NAA+PROBE: NOT DETECTED

## 2021-01-04 ENCOUNTER — PATIENT MESSAGE (OUTPATIENT)
Dept: ADMINISTRATIVE | Facility: HOSPITAL | Age: 61
End: 2021-01-04

## 2021-01-05 ENCOUNTER — HOSPITAL ENCOUNTER (OUTPATIENT)
Facility: HOSPITAL | Age: 61
Discharge: HOME | End: 2021-01-05
Attending: INTERNAL MEDICINE | Admitting: INTERNAL MEDICINE
Payer: MEDICARE

## 2021-01-05 VITALS
RESPIRATION RATE: 18 BRPM | BODY MASS INDEX: 21.99 KG/M2 | HEIGHT: 65 IN | WEIGHT: 132 LBS | SYSTOLIC BLOOD PRESSURE: 135 MMHG | HEART RATE: 110 BPM | DIASTOLIC BLOOD PRESSURE: 63 MMHG | OXYGEN SATURATION: 96 % | TEMPERATURE: 96 F

## 2021-01-05 DIAGNOSIS — Z01.818 PRE-TRANSPLANT EVALUATION FOR LUNG TRANSPLANT: ICD-10-CM

## 2021-01-05 DIAGNOSIS — J44.9 COPD, VERY SEVERE: ICD-10-CM

## 2021-01-05 LAB
ABO + RH BLD: NORMAL
ANION GAP SERPL CALC-SCNC: 13 MMOL/L (ref 8–16)
BASOPHILS # BLD AUTO: 0.01 K/UL (ref 0–0.2)
BASOPHILS NFR BLD: 0.1 % (ref 0–1.9)
BLD GP AB SCN CELLS X3 SERPL QL: NORMAL
BUN SERPL-MCNC: 11 MG/DL (ref 6–20)
CALCIUM SERPL-MCNC: 9.4 MG/DL (ref 8.7–10.5)
CHLORIDE SERPL-SCNC: 98 MMOL/L (ref 95–110)
CO2 SERPL-SCNC: 30 MMOL/L (ref 23–29)
CREAT SERPL-MCNC: 0.7 MG/DL (ref 0.5–1.4)
DIFFERENTIAL METHOD: ABNORMAL
EOSINOPHIL # BLD AUTO: 0.1 K/UL (ref 0–0.5)
EOSINOPHIL NFR BLD: 0.7 % (ref 0–8)
ERYTHROCYTE [DISTWIDTH] IN BLOOD BY AUTOMATED COUNT: 12.6 % (ref 11.5–14.5)
EST. GFR  (AFRICAN AMERICAN): >60 ML/MIN/1.73 M^2
EST. GFR  (NON AFRICAN AMERICAN): >60 ML/MIN/1.73 M^2
GLUCOSE SERPL-MCNC: 108 MG/DL (ref 70–110)
HCT VFR BLD AUTO: 42.1 % (ref 37–48.5)
HGB BLD-MCNC: 12.7 G/DL (ref 12–16)
IMM GRANULOCYTES # BLD AUTO: 0.03 K/UL (ref 0–0.04)
IMM GRANULOCYTES NFR BLD AUTO: 0.3 % (ref 0–0.5)
INR PPP: 1 (ref 0.8–1.2)
LYMPHOCYTES # BLD AUTO: 0.5 K/UL (ref 1–4.8)
LYMPHOCYTES NFR BLD: 5.6 % (ref 18–48)
MCH RBC QN AUTO: 29 PG (ref 27–31)
MCHC RBC AUTO-ENTMCNC: 30.2 G/DL (ref 32–36)
MCV RBC AUTO: 96 FL (ref 82–98)
MONOCYTES # BLD AUTO: 0.3 K/UL (ref 0.3–1)
MONOCYTES NFR BLD: 3.8 % (ref 4–15)
NEUTROPHILS # BLD AUTO: 7.8 K/UL (ref 1.8–7.7)
NEUTROPHILS NFR BLD: 89.5 % (ref 38–73)
NRBC BLD-RTO: 0 /100 WBC
PLATELET # BLD AUTO: 144 K/UL (ref 150–350)
PMV BLD AUTO: 10.9 FL (ref 9.2–12.9)
POTASSIUM SERPL-SCNC: 4.1 MMOL/L (ref 3.5–5.1)
PROTHROMBIN TIME: 10.6 SEC (ref 9–12.5)
RBC # BLD AUTO: 4.38 M/UL (ref 4–5.4)
SODIUM SERPL-SCNC: 141 MMOL/L (ref 136–145)
WBC # BLD AUTO: 8.74 K/UL (ref 3.9–12.7)

## 2021-01-05 PROCEDURE — 86900 BLOOD TYPING SEROLOGIC ABO: CPT | Mod: TXP

## 2021-01-05 PROCEDURE — 85025 COMPLETE CBC W/AUTO DIFF WBC: CPT | Mod: TXP

## 2021-01-05 PROCEDURE — 93460 R&L HRT ART/VENTRICLE ANGIO: CPT | Mod: 26,TXP,, | Performed by: INTERNAL MEDICINE

## 2021-01-05 PROCEDURE — 99152 PR MOD CONSCIOUS SEDATION, SAME PHYS, 5+ YRS, FIRST 15 MIN: ICD-10-PCS | Mod: TXP,,, | Performed by: INTERNAL MEDICINE

## 2021-01-05 PROCEDURE — 63600175 PHARM REV CODE 636 W HCPCS: Mod: TXP | Performed by: INTERNAL MEDICINE

## 2021-01-05 PROCEDURE — 85610 PROTHROMBIN TIME: CPT | Mod: TXP

## 2021-01-05 PROCEDURE — 80048 BASIC METABOLIC PNL TOTAL CA: CPT | Mod: TXP

## 2021-01-05 PROCEDURE — 99152 MOD SED SAME PHYS/QHP 5/>YRS: CPT | Mod: TXP | Performed by: INTERNAL MEDICINE

## 2021-01-05 PROCEDURE — 93460 R&L HRT ART/VENTRICLE ANGIO: CPT | Mod: TXP | Performed by: INTERNAL MEDICINE

## 2021-01-05 PROCEDURE — 99152 MOD SED SAME PHYS/QHP 5/>YRS: CPT | Mod: TXP,,, | Performed by: INTERNAL MEDICINE

## 2021-01-05 PROCEDURE — 25000003 PHARM REV CODE 250: Mod: TXP | Performed by: STUDENT IN AN ORGANIZED HEALTH CARE EDUCATION/TRAINING PROGRAM

## 2021-01-05 PROCEDURE — 99153 MOD SED SAME PHYS/QHP EA: CPT | Mod: TXP | Performed by: INTERNAL MEDICINE

## 2021-01-05 PROCEDURE — C1769 GUIDE WIRE: HCPCS | Mod: TXP | Performed by: INTERNAL MEDICINE

## 2021-01-05 PROCEDURE — C1894 INTRO/SHEATH, NON-LASER: HCPCS | Mod: TXP | Performed by: INTERNAL MEDICINE

## 2021-01-05 PROCEDURE — 25500020 PHARM REV CODE 255: Mod: TXP | Performed by: INTERNAL MEDICINE

## 2021-01-05 PROCEDURE — 25000003 PHARM REV CODE 250: Mod: TXP | Performed by: INTERNAL MEDICINE

## 2021-01-05 PROCEDURE — 93460 PR CATH PLACE/CORON ANGIO, IMG SUPER/INTERP,R&L HRT CATH, L HRT VENTRIC: ICD-10-PCS | Mod: 26,TXP,, | Performed by: INTERNAL MEDICINE

## 2021-01-05 PROCEDURE — C1751 CATH, INF, PER/CENT/MIDLINE: HCPCS | Mod: TXP | Performed by: INTERNAL MEDICINE

## 2021-01-05 RX ORDER — HEPARIN SOD,PORCINE/0.9 % NACL 1000/500ML
INTRAVENOUS SOLUTION INTRAVENOUS
Status: DISCONTINUED | OUTPATIENT
Start: 2021-01-05 | End: 2021-01-05 | Stop reason: HOSPADM

## 2021-01-05 RX ORDER — FENTANYL CITRATE 50 UG/ML
INJECTION, SOLUTION INTRAMUSCULAR; INTRAVENOUS
Status: DISCONTINUED | OUTPATIENT
Start: 2021-01-05 | End: 2021-01-05 | Stop reason: HOSPADM

## 2021-01-05 RX ORDER — SODIUM CHLORIDE 9 MG/ML
INJECTION, SOLUTION INTRAVENOUS CONTINUOUS
Status: ACTIVE | OUTPATIENT
Start: 2021-01-05 | End: 2021-01-05

## 2021-01-05 RX ORDER — ACETAMINOPHEN 325 MG/1
650 TABLET ORAL EVERY 4 HOURS PRN
Status: DISCONTINUED | OUTPATIENT
Start: 2021-01-05 | End: 2021-01-05 | Stop reason: HOSPADM

## 2021-01-05 RX ORDER — DIPHENHYDRAMINE HCL 50 MG
50 CAPSULE ORAL ONCE
Status: COMPLETED | OUTPATIENT
Start: 2021-01-05 | End: 2021-01-05

## 2021-01-05 RX ORDER — HEPARIN SODIUM 1000 [USP'U]/ML
INJECTION, SOLUTION INTRAVENOUS; SUBCUTANEOUS
Status: DISCONTINUED | OUTPATIENT
Start: 2021-01-05 | End: 2021-01-05 | Stop reason: HOSPADM

## 2021-01-05 RX ORDER — MIDAZOLAM HYDROCHLORIDE 1 MG/ML
INJECTION, SOLUTION INTRAMUSCULAR; INTRAVENOUS
Status: DISCONTINUED | OUTPATIENT
Start: 2021-01-05 | End: 2021-01-05 | Stop reason: HOSPADM

## 2021-01-05 RX ORDER — LIDOCAINE HYDROCHLORIDE 20 MG/ML
INJECTION, SOLUTION INFILTRATION; PERINEURAL
Status: DISCONTINUED | OUTPATIENT
Start: 2021-01-05 | End: 2021-01-05 | Stop reason: HOSPADM

## 2021-01-05 RX ORDER — ONDANSETRON 8 MG/1
8 TABLET, ORALLY DISINTEGRATING ORAL EVERY 8 HOURS PRN
Status: DISCONTINUED | OUTPATIENT
Start: 2021-01-05 | End: 2021-01-05 | Stop reason: HOSPADM

## 2021-01-05 RX ADMIN — SODIUM CHLORIDE 200 ML/HR: 0.9 INJECTION, SOLUTION INTRAVENOUS at 01:01

## 2021-01-05 RX ADMIN — DIPHENHYDRAMINE HYDROCHLORIDE 50 MG: 50 CAPSULE ORAL at 01:01

## 2021-01-05 RX ADMIN — SODIUM CHLORIDE 180 ML/HR: 0.9 INJECTION, SOLUTION INTRAVENOUS at 05:01

## 2021-01-08 ENCOUNTER — PATIENT MESSAGE (OUTPATIENT)
Dept: TRANSPLANT | Facility: CLINIC | Age: 61
End: 2021-01-08

## 2021-01-13 ENCOUNTER — OFFICE VISIT (OUTPATIENT)
Dept: OBSTETRICS AND GYNECOLOGY | Facility: CLINIC | Age: 61
End: 2021-01-13
Attending: OBSTETRICS & GYNECOLOGY
Payer: MEDICARE

## 2021-01-13 ENCOUNTER — TELEPHONE (OUTPATIENT)
Dept: TRANSPLANT | Facility: CLINIC | Age: 61
End: 2021-01-13

## 2021-01-13 ENCOUNTER — TELEPHONE (OUTPATIENT)
Dept: PALLIATIVE MEDICINE | Facility: CLINIC | Age: 61
End: 2021-01-13

## 2021-01-13 VITALS
HEIGHT: 65 IN | BODY MASS INDEX: 21.79 KG/M2 | WEIGHT: 130.75 LBS | DIASTOLIC BLOOD PRESSURE: 82 MMHG | SYSTOLIC BLOOD PRESSURE: 110 MMHG

## 2021-01-13 DIAGNOSIS — Z12.4 SCREENING FOR CERVICAL CANCER: ICD-10-CM

## 2021-01-13 DIAGNOSIS — Z12.31 ENCOUNTER FOR SCREENING MAMMOGRAM FOR BREAST CANCER: ICD-10-CM

## 2021-01-13 DIAGNOSIS — Z01.419 WELL WOMAN EXAM WITH ROUTINE GYNECOLOGICAL EXAM: Primary | ICD-10-CM

## 2021-01-13 PROCEDURE — 99999 PR PBB SHADOW E&M-EST. PATIENT-LVL IV: CPT | Mod: PBBFAC,,, | Performed by: OBSTETRICS & GYNECOLOGY

## 2021-01-13 PROCEDURE — 1126F PR PAIN SEVERITY QUANTIFIED, NO PAIN PRESENT: ICD-10-PCS | Mod: S$GLB,,, | Performed by: OBSTETRICS & GYNECOLOGY

## 2021-01-13 PROCEDURE — 88142 CYTOPATH C/V THIN LAYER: CPT

## 2021-01-13 PROCEDURE — 1126F AMNT PAIN NOTED NONE PRSNT: CPT | Mod: S$GLB,,, | Performed by: OBSTETRICS & GYNECOLOGY

## 2021-01-13 PROCEDURE — 87624 HPV HI-RISK TYP POOLED RSLT: CPT

## 2021-01-13 PROCEDURE — G0101 PR CA SCREEN;PELVIC/BREAST EXAM: ICD-10-PCS | Mod: S$GLB,,, | Performed by: OBSTETRICS & GYNECOLOGY

## 2021-01-13 PROCEDURE — G0101 CA SCREEN;PELVIC/BREAST EXAM: HCPCS | Mod: S$GLB,,, | Performed by: OBSTETRICS & GYNECOLOGY

## 2021-01-13 PROCEDURE — 99999 PR PBB SHADOW E&M-EST. PATIENT-LVL IV: ICD-10-PCS | Mod: PBBFAC,,, | Performed by: OBSTETRICS & GYNECOLOGY

## 2021-01-13 PROCEDURE — 3008F PR BODY MASS INDEX (BMI) DOCUMENTED: ICD-10-PCS | Mod: CPTII,S$GLB,, | Performed by: OBSTETRICS & GYNECOLOGY

## 2021-01-13 PROCEDURE — 3008F BODY MASS INDEX DOCD: CPT | Mod: CPTII,S$GLB,, | Performed by: OBSTETRICS & GYNECOLOGY

## 2021-01-14 ENCOUNTER — TELEPHONE (OUTPATIENT)
Dept: TRANSPLANT | Facility: CLINIC | Age: 61
End: 2021-01-14

## 2021-01-14 ENCOUNTER — OFFICE VISIT (OUTPATIENT)
Dept: PALLIATIVE MEDICINE | Facility: CLINIC | Age: 61
End: 2021-01-14
Payer: MEDICARE

## 2021-01-14 DIAGNOSIS — Z51.5 PALLIATIVE CARE ENCOUNTER: ICD-10-CM

## 2021-01-14 DIAGNOSIS — R06.00 DYSPNEA, UNSPECIFIED TYPE: ICD-10-CM

## 2021-01-14 DIAGNOSIS — J44.9 CHRONIC OBSTRUCTIVE PULMONARY DISEASE, UNSPECIFIED COPD TYPE: Primary | ICD-10-CM

## 2021-01-14 DIAGNOSIS — F41.9 ANXIETY: ICD-10-CM

## 2021-01-14 DIAGNOSIS — Z01.818 PRE-TRANSPLANT EVALUATION FOR LUNG TRANSPLANT: ICD-10-CM

## 2021-01-14 PROCEDURE — 99499 RISK ADDL DX/OHS AUDIT: ICD-10-PCS | Mod: 95,TXP,, | Performed by: STUDENT IN AN ORGANIZED HEALTH CARE EDUCATION/TRAINING PROGRAM

## 2021-01-14 PROCEDURE — 99499 UNLISTED E&M SERVICE: CPT | Mod: 95,TXP,, | Performed by: STUDENT IN AN ORGANIZED HEALTH CARE EDUCATION/TRAINING PROGRAM

## 2021-01-14 PROCEDURE — 99215 PR OFFICE/OUTPT VISIT, EST, LEVL V, 40-54 MIN: ICD-10-PCS | Mod: 95,TXP,, | Performed by: STUDENT IN AN ORGANIZED HEALTH CARE EDUCATION/TRAINING PROGRAM

## 2021-01-14 PROCEDURE — 99215 OFFICE O/P EST HI 40 MIN: CPT | Mod: 95,TXP,, | Performed by: STUDENT IN AN ORGANIZED HEALTH CARE EDUCATION/TRAINING PROGRAM

## 2021-01-21 LAB
HPV HR 12 DNA SPEC QL NAA+PROBE: NEGATIVE
HPV16 AG SPEC QL: NEGATIVE
HPV18 DNA SPEC QL NAA+PROBE: NEGATIVE

## 2021-01-27 ENCOUNTER — TELEPHONE (OUTPATIENT)
Dept: TRANSPLANT | Facility: CLINIC | Age: 61
End: 2021-01-27

## 2021-02-09 LAB
FINAL PATHOLOGIC DIAGNOSIS: NORMAL
Lab: NORMAL

## 2021-03-05 ENCOUNTER — PATIENT MESSAGE (OUTPATIENT)
Dept: ADMINISTRATIVE | Facility: HOSPITAL | Age: 61
End: 2021-03-05

## 2021-03-16 ENCOUNTER — TELEPHONE (OUTPATIENT)
Dept: PALLIATIVE MEDICINE | Facility: CLINIC | Age: 61
End: 2021-03-16

## 2021-03-16 ENCOUNTER — PATIENT OUTREACH (OUTPATIENT)
Dept: ADMINISTRATIVE | Facility: HOSPITAL | Age: 61
End: 2021-03-16

## 2021-03-16 ENCOUNTER — DOCUMENTATION ONLY (OUTPATIENT)
Dept: ADMINISTRATIVE | Facility: HOSPITAL | Age: 61
End: 2021-03-16

## 2021-03-16 ENCOUNTER — PATIENT MESSAGE (OUTPATIENT)
Dept: ADMINISTRATIVE | Facility: HOSPITAL | Age: 61
End: 2021-03-16

## 2021-03-17 ENCOUNTER — OFFICE VISIT (OUTPATIENT)
Dept: PALLIATIVE MEDICINE | Facility: CLINIC | Age: 61
End: 2021-03-17
Payer: MEDICARE

## 2021-03-17 DIAGNOSIS — R06.00 DYSPNEA, UNSPECIFIED TYPE: Primary | ICD-10-CM

## 2021-03-17 DIAGNOSIS — J44.9 CHRONIC OBSTRUCTIVE PULMONARY DISEASE, UNSPECIFIED COPD TYPE: ICD-10-CM

## 2021-03-17 DIAGNOSIS — F41.9 ANXIETY: ICD-10-CM

## 2021-03-17 DIAGNOSIS — J96.11 CHRONIC RESPIRATORY FAILURE WITH HYPOXIA: ICD-10-CM

## 2021-03-17 PROCEDURE — 99499 RISK ADDL DX/OHS AUDIT: ICD-10-PCS | Mod: 95,TXP,, | Performed by: STUDENT IN AN ORGANIZED HEALTH CARE EDUCATION/TRAINING PROGRAM

## 2021-03-17 PROCEDURE — 99214 OFFICE O/P EST MOD 30 MIN: CPT | Mod: 95,NTX,, | Performed by: STUDENT IN AN ORGANIZED HEALTH CARE EDUCATION/TRAINING PROGRAM

## 2021-03-17 PROCEDURE — 99214 PR OFFICE/OUTPT VISIT, EST, LEVL IV, 30-39 MIN: ICD-10-PCS | Mod: 95,NTX,, | Performed by: STUDENT IN AN ORGANIZED HEALTH CARE EDUCATION/TRAINING PROGRAM

## 2021-03-17 PROCEDURE — 99499 UNLISTED E&M SERVICE: CPT | Mod: 95,TXP,, | Performed by: STUDENT IN AN ORGANIZED HEALTH CARE EDUCATION/TRAINING PROGRAM

## 2021-03-17 RX ORDER — MORPHINE SULFATE ORAL SOLUTION 10 MG/5ML
2 SOLUTION ORAL EVERY 4 HOURS PRN
Qty: 100 ML | Refills: 0 | Status: SHIPPED | OUTPATIENT
Start: 2021-03-17

## 2021-04-05 ENCOUNTER — PATIENT MESSAGE (OUTPATIENT)
Dept: ADMINISTRATIVE | Facility: HOSPITAL | Age: 61
End: 2021-04-05

## 2021-04-12 ENCOUNTER — TELEPHONE (OUTPATIENT)
Dept: TRANSPLANT | Facility: CLINIC | Age: 61
End: 2021-04-12

## 2021-04-15 ENCOUNTER — TELEPHONE (OUTPATIENT)
Dept: TRANSPLANT | Facility: CLINIC | Age: 61
End: 2021-04-15

## 2021-04-16 ENCOUNTER — PATIENT MESSAGE (OUTPATIENT)
Dept: RESEARCH | Facility: HOSPITAL | Age: 61
End: 2021-04-16

## 2021-04-22 ENCOUNTER — PATIENT MESSAGE (OUTPATIENT)
Dept: UROLOGY | Facility: CLINIC | Age: 61
End: 2021-04-22

## 2021-04-23 ENCOUNTER — TELEPHONE (OUTPATIENT)
Dept: ENDOSCOPY | Facility: HOSPITAL | Age: 61
End: 2021-04-23

## 2021-05-17 ENCOUNTER — TELEPHONE (OUTPATIENT)
Dept: ENDOSCOPY | Facility: HOSPITAL | Age: 61
End: 2021-05-17

## 2021-05-26 ENCOUNTER — TELEPHONE (OUTPATIENT)
Dept: PALLIATIVE MEDICINE | Facility: CLINIC | Age: 61
End: 2021-05-26

## 2021-05-27 ENCOUNTER — OFFICE VISIT (OUTPATIENT)
Dept: PALLIATIVE MEDICINE | Facility: CLINIC | Age: 61
End: 2021-05-27
Payer: MEDICARE

## 2021-05-27 DIAGNOSIS — R06.00 DYSPNEA, UNSPECIFIED TYPE: Primary | ICD-10-CM

## 2021-05-27 DIAGNOSIS — J44.9 CHRONIC OBSTRUCTIVE PULMONARY DISEASE, UNSPECIFIED COPD TYPE: ICD-10-CM

## 2021-05-27 DIAGNOSIS — F41.9 ANXIETY: ICD-10-CM

## 2021-05-27 DIAGNOSIS — Z51.5 PALLIATIVE CARE ENCOUNTER: ICD-10-CM

## 2021-05-27 DIAGNOSIS — F33.41 RECURRENT MAJOR DEPRESSIVE DISORDER, IN PARTIAL REMISSION: ICD-10-CM

## 2021-05-27 PROCEDURE — 99497 ADVNCD CARE PLAN 30 MIN: CPT | Mod: 95,NTX,, | Performed by: STUDENT IN AN ORGANIZED HEALTH CARE EDUCATION/TRAINING PROGRAM

## 2021-05-27 PROCEDURE — 99214 PR OFFICE/OUTPT VISIT, EST, LEVL IV, 30-39 MIN: ICD-10-PCS | Mod: 95,NTX,, | Performed by: STUDENT IN AN ORGANIZED HEALTH CARE EDUCATION/TRAINING PROGRAM

## 2021-05-27 PROCEDURE — 99214 OFFICE O/P EST MOD 30 MIN: CPT | Mod: 95,NTX,, | Performed by: STUDENT IN AN ORGANIZED HEALTH CARE EDUCATION/TRAINING PROGRAM

## 2021-05-27 PROCEDURE — 99499 UNLISTED E&M SERVICE: CPT | Mod: 95,TXP,, | Performed by: STUDENT IN AN ORGANIZED HEALTH CARE EDUCATION/TRAINING PROGRAM

## 2021-05-27 PROCEDURE — 99497 PR ADVNCD CARE PLAN 30 MIN: ICD-10-PCS | Mod: 95,NTX,, | Performed by: STUDENT IN AN ORGANIZED HEALTH CARE EDUCATION/TRAINING PROGRAM

## 2021-05-27 PROCEDURE — 99499 RISK ADDL DX/OHS AUDIT: ICD-10-PCS | Mod: 95,TXP,, | Performed by: STUDENT IN AN ORGANIZED HEALTH CARE EDUCATION/TRAINING PROGRAM

## 2021-05-27 RX ORDER — ALBUTEROL SULFATE 90 UG/1
2 AEROSOL, METERED RESPIRATORY (INHALATION) EVERY 6 HOURS PRN
Qty: 8.5 G | Refills: 11 | Status: SHIPPED | OUTPATIENT
Start: 2021-05-27 | End: 2022-01-01

## 2021-05-27 RX ORDER — SERTRALINE HYDROCHLORIDE 100 MG/1
200 TABLET, FILM COATED ORAL DAILY
Qty: 60 TABLET | Refills: 3 | Status: SHIPPED | OUTPATIENT
Start: 2021-05-27 | End: 2022-01-01 | Stop reason: SDUPTHER

## 2021-05-28 ENCOUNTER — PATIENT MESSAGE (OUTPATIENT)
Dept: PALLIATIVE MEDICINE | Facility: CLINIC | Age: 61
End: 2021-05-28

## 2021-06-16 DIAGNOSIS — E11.9 TYPE 2 DIABETES MELLITUS WITHOUT COMPLICATION: ICD-10-CM

## 2021-06-21 ENCOUNTER — TELEPHONE (OUTPATIENT)
Dept: INTERNAL MEDICINE | Facility: CLINIC | Age: 61
End: 2021-06-21

## 2021-06-21 ENCOUNTER — PATIENT MESSAGE (OUTPATIENT)
Dept: INTERNAL MEDICINE | Facility: CLINIC | Age: 61
End: 2021-06-21

## 2021-06-21 RX ORDER — FLUTICASONE PROPIONATE AND SALMETEROL 250; 50 UG/1; UG/1
1 POWDER RESPIRATORY (INHALATION) 2 TIMES DAILY
Qty: 60 EACH | Refills: 0 | Status: CANCELLED | OUTPATIENT
Start: 2021-06-21

## 2021-06-21 RX ORDER — ROFLUMILAST 500 UG/1
500 TABLET ORAL DAILY
Qty: 30 TABLET | Refills: 11 | Status: CANCELLED | OUTPATIENT
Start: 2021-06-21

## 2021-06-21 RX ORDER — FLUTICASONE PROPIONATE AND SALMETEROL 250; 50 UG/1; UG/1
1 POWDER RESPIRATORY (INHALATION) 2 TIMES DAILY
Qty: 60 EACH | Refills: 1 | Status: SHIPPED | OUTPATIENT
Start: 2021-06-21 | End: 2021-01-01 | Stop reason: SDUPTHER

## 2021-06-21 RX ORDER — ROFLUMILAST 500 UG/1
500 TABLET ORAL DAILY
Qty: 30 TABLET | Refills: 3 | Status: SHIPPED | OUTPATIENT
Start: 2021-06-21

## 2021-06-21 RX ORDER — TIOTROPIUM BROMIDE 18 UG/1
18 CAPSULE ORAL; RESPIRATORY (INHALATION) DAILY
Qty: 90 CAPSULE | Refills: 0 | Status: SHIPPED | OUTPATIENT
Start: 2021-06-21 | End: 2021-01-01 | Stop reason: SDUPTHER

## 2021-06-23 ENCOUNTER — TELEPHONE (OUTPATIENT)
Dept: PHARMACY | Facility: CLINIC | Age: 61
End: 2021-06-23

## 2021-07-07 ENCOUNTER — PATIENT MESSAGE (OUTPATIENT)
Dept: ADMINISTRATIVE | Facility: HOSPITAL | Age: 61
End: 2021-07-07

## 2021-07-26 ENCOUNTER — TELEPHONE (OUTPATIENT)
Dept: PALLIATIVE MEDICINE | Facility: CLINIC | Age: 61
End: 2021-07-26

## 2021-07-28 ENCOUNTER — TELEPHONE (OUTPATIENT)
Dept: PALLIATIVE MEDICINE | Facility: CLINIC | Age: 61
End: 2021-07-28

## 2022-01-01 ENCOUNTER — PATIENT MESSAGE (OUTPATIENT)
Dept: PRIMARY CARE CLINIC | Facility: CLINIC | Age: 62
End: 2022-01-01
Payer: MEDICARE

## 2022-01-01 ENCOUNTER — TELEPHONE (OUTPATIENT)
Dept: ADMINISTRATIVE | Facility: CLINIC | Age: 62
End: 2022-01-01
Payer: MEDICARE

## 2022-01-01 ENCOUNTER — PATIENT MESSAGE (OUTPATIENT)
Dept: PALLIATIVE MEDICINE | Facility: CLINIC | Age: 62
End: 2022-01-01
Payer: MEDICARE

## 2022-01-01 ENCOUNTER — PES CALL (OUTPATIENT)
Dept: ADMINISTRATIVE | Facility: CLINIC | Age: 62
End: 2022-01-01
Payer: MEDICARE

## 2022-01-01 ENCOUNTER — TELEPHONE (OUTPATIENT)
Dept: PRIMARY CARE CLINIC | Facility: CLINIC | Age: 62
End: 2022-01-01
Payer: MEDICARE

## 2022-01-01 ENCOUNTER — TELEPHONE (OUTPATIENT)
Dept: PALLIATIVE MEDICINE | Facility: CLINIC | Age: 62
End: 2022-01-01
Payer: MEDICARE

## 2022-01-01 ENCOUNTER — DOCUMENT SCAN (OUTPATIENT)
Dept: HOME HEALTH SERVICES | Facility: HOSPITAL | Age: 62
End: 2022-01-01
Payer: MEDICARE

## 2022-01-01 ENCOUNTER — OFFICE VISIT (OUTPATIENT)
Dept: PALLIATIVE MEDICINE | Facility: CLINIC | Age: 62
End: 2022-01-01
Payer: MEDICARE

## 2022-01-01 ENCOUNTER — EXTERNAL HOME HEALTH (OUTPATIENT)
Dept: HOME HEALTH SERVICES | Facility: HOSPITAL | Age: 62
End: 2022-01-01
Payer: MEDICARE

## 2022-01-01 ENCOUNTER — TELEPHONE (OUTPATIENT)
Dept: TRANSPLANT | Facility: CLINIC | Age: 62
End: 2022-01-01
Payer: MEDICARE

## 2022-01-01 ENCOUNTER — DOCUMENTATION ONLY (OUTPATIENT)
Dept: TRANSPLANT | Facility: CLINIC | Age: 62
End: 2022-01-01
Payer: MEDICARE

## 2022-01-01 ENCOUNTER — PATIENT MESSAGE (OUTPATIENT)
Dept: CARDIOLOGY | Facility: CLINIC | Age: 62
End: 2022-01-01
Payer: MEDICARE

## 2022-01-01 ENCOUNTER — PATIENT MESSAGE (OUTPATIENT)
Dept: INTERNAL MEDICINE | Facility: CLINIC | Age: 62
End: 2022-01-01
Payer: MEDICARE

## 2022-01-01 ENCOUNTER — HOSPITAL ENCOUNTER (INPATIENT)
Facility: HOSPITAL | Age: 62
LOS: 3 days | DRG: 189 | End: 2022-08-01
Attending: EMERGENCY MEDICINE | Admitting: STUDENT IN AN ORGANIZED HEALTH CARE EDUCATION/TRAINING PROGRAM
Payer: MEDICARE

## 2022-01-01 ENCOUNTER — PATIENT MESSAGE (OUTPATIENT)
Dept: ADMINISTRATIVE | Facility: CLINIC | Age: 62
End: 2022-01-01
Payer: MEDICARE

## 2022-01-01 ENCOUNTER — OFFICE VISIT (OUTPATIENT)
Dept: HOME HEALTH SERVICES | Facility: CLINIC | Age: 62
End: 2022-01-01
Payer: MEDICARE

## 2022-01-01 VITALS
HEIGHT: 65 IN | OXYGEN SATURATION: 94 % | SYSTOLIC BLOOD PRESSURE: 121 MMHG | BODY MASS INDEX: 14.48 KG/M2 | HEART RATE: 99 BPM | TEMPERATURE: 98 F | RESPIRATION RATE: 16 BRPM | WEIGHT: 86.88 LBS | DIASTOLIC BLOOD PRESSURE: 57 MMHG

## 2022-01-01 VITALS — HEIGHT: 65 IN | WEIGHT: 130 LBS | BODY MASS INDEX: 21.66 KG/M2

## 2022-01-01 DIAGNOSIS — Z01.812 PRE-PROCEDURE LAB EXAM: ICD-10-CM

## 2022-01-01 DIAGNOSIS — J44.9 CHRONIC OBSTRUCTIVE PULMONARY DISEASE, UNSPECIFIED COPD TYPE: ICD-10-CM

## 2022-01-01 DIAGNOSIS — J44.9 CHRONIC OBSTRUCTIVE PULMONARY DISEASE, UNSPECIFIED COPD TYPE: Primary | ICD-10-CM

## 2022-01-01 DIAGNOSIS — E78.2 MIXED HYPERLIPIDEMIA: Chronic | ICD-10-CM

## 2022-01-01 DIAGNOSIS — R06.00 DYSPNEA, UNSPECIFIED TYPE: Primary | ICD-10-CM

## 2022-01-01 DIAGNOSIS — Z99.81 DEPENDENCE ON SUPPLEMENTAL OXYGEN: ICD-10-CM

## 2022-01-01 DIAGNOSIS — F41.9 ANXIETY: ICD-10-CM

## 2022-01-01 DIAGNOSIS — J96.11 CHRONIC RESPIRATORY FAILURE WITH HYPOXIA: Primary | ICD-10-CM

## 2022-01-01 DIAGNOSIS — Z51.5 PALLIATIVE CARE ENCOUNTER: ICD-10-CM

## 2022-01-01 DIAGNOSIS — F33.2 SEVERE EPISODE OF RECURRENT MAJOR DEPRESSIVE DISORDER, WITHOUT PSYCHOTIC FEATURES: Chronic | ICD-10-CM

## 2022-01-01 DIAGNOSIS — R11.0 NAUSEA: Primary | ICD-10-CM

## 2022-01-01 DIAGNOSIS — Z91.09 ENVIRONMENTAL ALLERGIES: ICD-10-CM

## 2022-01-01 DIAGNOSIS — J44.1 COPD WITH ACUTE EXACERBATION: ICD-10-CM

## 2022-01-01 DIAGNOSIS — F33.41 RECURRENT MAJOR DEPRESSIVE DISORDER, IN PARTIAL REMISSION: ICD-10-CM

## 2022-01-01 DIAGNOSIS — H90.11 CONDUCTIVE HEARING LOSS OF RIGHT EAR WITH UNRESTRICTED HEARING OF LEFT EAR: ICD-10-CM

## 2022-01-01 DIAGNOSIS — Z00.00 ENCOUNTER FOR PREVENTIVE HEALTH EXAMINATION: Primary | ICD-10-CM

## 2022-01-01 DIAGNOSIS — J96.11 CHRONIC RESPIRATORY FAILURE WITH HYPOXIA: ICD-10-CM

## 2022-01-01 DIAGNOSIS — R54 FRAILTY: ICD-10-CM

## 2022-01-01 DIAGNOSIS — M79.89 LEFT LEG SWELLING: ICD-10-CM

## 2022-01-01 DIAGNOSIS — R06.00 DYSPNEA, UNSPECIFIED TYPE: ICD-10-CM

## 2022-01-01 DIAGNOSIS — I73.9 CLAUDICATION IN PERIPHERAL VASCULAR DISEASE: ICD-10-CM

## 2022-01-01 DIAGNOSIS — R26.9 ABNORMALITY OF GAIT AND MOBILITY: ICD-10-CM

## 2022-01-01 DIAGNOSIS — I70.0 ATHEROSCLEROSIS OF AORTA: ICD-10-CM

## 2022-01-01 DIAGNOSIS — R06.02 SHORTNESS OF BREATH: ICD-10-CM

## 2022-01-01 DIAGNOSIS — E11.9 TYPE 2 DIABETES MELLITUS WITHOUT COMPLICATION, WITHOUT LONG-TERM CURRENT USE OF INSULIN: ICD-10-CM

## 2022-01-01 DIAGNOSIS — J96.11 CHRONIC RESPIRATORY FAILURE WITH HYPOXIA: Chronic | ICD-10-CM

## 2022-01-01 DIAGNOSIS — J96.01 ACUTE RESPIRATORY FAILURE WITH HYPOXIA AND HYPERCAPNIA: Primary | ICD-10-CM

## 2022-01-01 DIAGNOSIS — J96.02 ACUTE RESPIRATORY FAILURE WITH HYPOXIA AND HYPERCAPNIA: Primary | ICD-10-CM

## 2022-01-01 LAB
ALBUMIN SERPL BCP-MCNC: 3.8 G/DL (ref 3.5–5.2)
ALLENS TEST: ABNORMAL
ALP SERPL-CCNC: 66 U/L (ref 55–135)
ALT SERPL W/O P-5'-P-CCNC: 10 U/L (ref 10–44)
ANION GAP SERPL CALC-SCNC: 13 MMOL/L (ref 8–16)
AST SERPL-CCNC: 16 U/L (ref 10–40)
BASOPHILS # BLD AUTO: 0.02 K/UL (ref 0–0.2)
BASOPHILS NFR BLD: 0.2 % (ref 0–1.9)
BILIRUB SERPL-MCNC: 0.6 MG/DL (ref 0.1–1)
BNP SERPL-MCNC: 1399 PG/ML (ref 0–99)
BUN SERPL-MCNC: 25 MG/DL (ref 6–20)
BUN SERPL-MCNC: 31 MG/DL (ref 6–30)
CALCIUM SERPL-MCNC: 8.9 MG/DL (ref 8.7–10.5)
CHLORIDE SERPL-SCNC: 86 MMOL/L (ref 95–110)
CHLORIDE SERPL-SCNC: 89 MMOL/L (ref 95–110)
CK SERPL-CCNC: 93 U/L (ref 20–180)
CO2 SERPL-SCNC: 38 MMOL/L (ref 23–29)
CREAT SERPL-MCNC: 0.8 MG/DL (ref 0.5–1.4)
CREAT SERPL-MCNC: 0.9 MG/DL (ref 0.5–1.4)
DELSYS: ABNORMAL
DIFFERENTIAL METHOD: ABNORMAL
EOSINOPHIL # BLD AUTO: 0 K/UL (ref 0–0.5)
EOSINOPHIL NFR BLD: 0 % (ref 0–8)
ERYTHROCYTE [DISTWIDTH] IN BLOOD BY AUTOMATED COUNT: 15.7 % (ref 11.5–14.5)
EST. GFR  (AFRICAN AMERICAN): >60 ML/MIN/1.73 M^2
EST. GFR  (NON AFRICAN AMERICAN): >60 ML/MIN/1.73 M^2
FLOW: 3
GLUCOSE SERPL-MCNC: 207 MG/DL (ref 70–110)
GLUCOSE SERPL-MCNC: 209 MG/DL (ref 70–110)
HCO3 UR-SCNC: 52 MMOL/L (ref 24–28)
HCT VFR BLD AUTO: 46.7 % (ref 37–48.5)
HCT VFR BLD CALC: 48 %PCV (ref 36–54)
HCV AB SERPL QL IA: NEGATIVE
HGB BLD-MCNC: 13.7 G/DL (ref 12–16)
HIV 1+2 AB+HIV1 P24 AG SERPL QL IA: NEGATIVE
IMM GRANULOCYTES # BLD AUTO: 0.05 K/UL (ref 0–0.04)
IMM GRANULOCYTES NFR BLD AUTO: 0.6 % (ref 0–0.5)
LACTATE SERPL-SCNC: 1.5 MMOL/L (ref 0.5–2.2)
LYMPHOCYTES # BLD AUTO: 1.7 K/UL (ref 1–4.8)
LYMPHOCYTES NFR BLD: 19.7 % (ref 18–48)
MAGNESIUM SERPL-MCNC: 2.1 MG/DL (ref 1.6–2.6)
MCH RBC QN AUTO: 30.6 PG (ref 27–31)
MCHC RBC AUTO-ENTMCNC: 29.3 G/DL (ref 32–36)
MCV RBC AUTO: 105 FL (ref 82–98)
MODE: ABNORMAL
MONOCYTES # BLD AUTO: 0.5 K/UL (ref 0.3–1)
MONOCYTES NFR BLD: 5.4 % (ref 4–15)
NEUTROPHILS # BLD AUTO: 6.5 K/UL (ref 1.8–7.7)
NEUTROPHILS NFR BLD: 74.1 % (ref 38–73)
NRBC BLD-RTO: 0 /100 WBC
PCO2 BLDA: 129.8 MMHG (ref 35–45)
PH SMN: 7.21 [PH] (ref 7.35–7.45)
PLATELET # BLD AUTO: 87 K/UL (ref 150–450)
PMV BLD AUTO: 12.9 FL (ref 9.2–12.9)
PO2 BLDA: 42 MMHG (ref 40–60)
POC BE: 24 MMOL/L
POC IONIZED CALCIUM: 1.16 MMOL/L (ref 1.06–1.42)
POC SATURATED O2: 61 % (ref 95–100)
POC TCO2 (MEASURED): 45 MMOL/L (ref 23–29)
POC TCO2: >50 MMOL/L (ref 24–29)
POTASSIUM BLD-SCNC: 4.7 MMOL/L (ref 3.5–5.1)
POTASSIUM SERPL-SCNC: 4.7 MMOL/L (ref 3.5–5.1)
PROT SERPL-MCNC: 6.5 G/DL (ref 6–8.4)
RBC # BLD AUTO: 4.47 M/UL (ref 4–5.4)
SAMPLE: ABNORMAL
SAMPLE: ABNORMAL
SARS-COV-2 RDRP RESP QL NAA+PROBE: NEGATIVE
SITE: ABNORMAL
SODIUM BLD-SCNC: 136 MMOL/L (ref 136–145)
SODIUM SERPL-SCNC: 140 MMOL/L (ref 136–145)
TROPONIN I SERPL DL<=0.01 NG/ML-MCNC: 0.04 NG/ML (ref 0–0.03)
WBC # BLD AUTO: 8.78 K/UL (ref 3.9–12.7)

## 2022-01-01 PROCEDURE — 99291 PR CRITICAL CARE, E/M 30-74 MINUTES: ICD-10-PCS | Mod: NTX,CS,, | Performed by: EMERGENCY MEDICINE

## 2022-01-01 PROCEDURE — 25000003 PHARM REV CODE 250: Mod: NTX

## 2022-01-01 PROCEDURE — 99291 CRITICAL CARE FIRST HOUR: CPT | Mod: NTX,CS,, | Performed by: EMERGENCY MEDICINE

## 2022-01-01 PROCEDURE — 1160F PR REVIEW ALL MEDS BY PRESCRIBER/CLIN PHARMACIST DOCUMENTED: ICD-10-PCS | Mod: CPTII,95,NTX, | Performed by: NURSE PRACTITIONER

## 2022-01-01 PROCEDURE — 1160F RVW MEDS BY RX/DR IN RCRD: CPT | Mod: CPTII,95,NTX, | Performed by: NURSE PRACTITIONER

## 2022-01-01 PROCEDURE — 27000221 HC OXYGEN, UP TO 24 HOURS: Mod: NTX

## 2022-01-01 PROCEDURE — 99232 SBSQ HOSP IP/OBS MODERATE 35: CPT | Mod: GC,NTX,, | Performed by: STUDENT IN AN ORGANIZED HEALTH CARE EDUCATION/TRAINING PROGRAM

## 2022-01-01 PROCEDURE — 63600175 PHARM REV CODE 636 W HCPCS: Mod: NTX | Performed by: STUDENT IN AN ORGANIZED HEALTH CARE EDUCATION/TRAINING PROGRAM

## 2022-01-01 PROCEDURE — 99291 CRITICAL CARE FIRST HOUR: CPT | Mod: 25

## 2022-01-01 PROCEDURE — G0180 PR HOME HEALTH MD CERTIFICATION: ICD-10-PCS | Mod: NTX,,, | Performed by: STUDENT IN AN ORGANIZED HEALTH CARE EDUCATION/TRAINING PROGRAM

## 2022-01-01 PROCEDURE — 99214 PR OFFICE/OUTPT VISIT, EST, LEVL IV, 30-39 MIN: ICD-10-PCS | Mod: 95,NTX,, | Performed by: STUDENT IN AN ORGANIZED HEALTH CARE EDUCATION/TRAINING PROGRAM

## 2022-01-01 PROCEDURE — G0180 MD CERTIFICATION HHA PATIENT: HCPCS | Mod: NTX,,, | Performed by: STUDENT IN AN ORGANIZED HEALTH CARE EDUCATION/TRAINING PROGRAM

## 2022-01-01 PROCEDURE — 99215 OFFICE O/P EST HI 40 MIN: CPT | Mod: 95,NTX,, | Performed by: STUDENT IN AN ORGANIZED HEALTH CARE EDUCATION/TRAINING PROGRAM

## 2022-01-01 PROCEDURE — 25000242 PHARM REV CODE 250 ALT 637 W/ HCPCS: Mod: NTX

## 2022-01-01 PROCEDURE — 27100098 HC SPACER: Mod: NTX

## 2022-01-01 PROCEDURE — 80053 COMPREHEN METABOLIC PANEL: CPT | Mod: NTX | Performed by: EMERGENCY MEDICINE

## 2022-01-01 PROCEDURE — 99499 UNLISTED E&M SERVICE: CPT | Mod: 95,,, | Performed by: STUDENT IN AN ORGANIZED HEALTH CARE EDUCATION/TRAINING PROGRAM

## 2022-01-01 PROCEDURE — 99238 HOSP IP/OBS DSCHRG MGMT 30/<: CPT | Mod: GC,,, | Performed by: STUDENT IN AN ORGANIZED HEALTH CARE EDUCATION/TRAINING PROGRAM

## 2022-01-01 PROCEDURE — 83880 ASSAY OF NATRIURETIC PEPTIDE: CPT | Mod: NTX | Performed by: EMERGENCY MEDICINE

## 2022-01-01 PROCEDURE — 94660 CPAP INITIATION&MGMT: CPT | Mod: NTX

## 2022-01-01 PROCEDURE — 93005 ELECTROCARDIOGRAM TRACING: CPT | Mod: NTX

## 2022-01-01 PROCEDURE — 25000003 PHARM REV CODE 250: Mod: NTX | Performed by: EMERGENCY MEDICINE

## 2022-01-01 PROCEDURE — 99499 RISK ADDL DX/OHS AUDIT: ICD-10-PCS | Mod: 95,,, | Performed by: STUDENT IN AN ORGANIZED HEALTH CARE EDUCATION/TRAINING PROGRAM

## 2022-01-01 PROCEDURE — 87389 HIV-1 AG W/HIV-1&-2 AB AG IA: CPT | Mod: NTX | Performed by: EMERGENCY MEDICINE

## 2022-01-01 PROCEDURE — 94640 AIRWAY INHALATION TREATMENT: CPT | Mod: NTX

## 2022-01-01 PROCEDURE — 99900035 HC TECH TIME PER 15 MIN (STAT): Mod: NTX

## 2022-01-01 PROCEDURE — 94761 N-INVAS EAR/PLS OXIMETRY MLT: CPT | Mod: NTX

## 2022-01-01 PROCEDURE — 99215 PR OFFICE/OUTPT VISIT, EST, LEVL V, 40-54 MIN: ICD-10-PCS | Mod: 95,NTX,, | Performed by: STUDENT IN AN ORGANIZED HEALTH CARE EDUCATION/TRAINING PROGRAM

## 2022-01-01 PROCEDURE — 99232 PR SUBSEQUENT HOSPITAL CARE,LEVL II: ICD-10-PCS | Mod: GC,NTX,, | Performed by: STUDENT IN AN ORGANIZED HEALTH CARE EDUCATION/TRAINING PROGRAM

## 2022-01-01 PROCEDURE — 80047 BASIC METABLC PNL IONIZED CA: CPT

## 2022-01-01 PROCEDURE — G0439 PPPS, SUBSEQ VISIT: HCPCS | Mod: 95,NTX,, | Performed by: NURSE PRACTITIONER

## 2022-01-01 PROCEDURE — 99214 OFFICE O/P EST MOD 30 MIN: CPT | Mod: 95,NTX,, | Performed by: STUDENT IN AN ORGANIZED HEALTH CARE EDUCATION/TRAINING PROGRAM

## 2022-01-01 PROCEDURE — 83735 ASSAY OF MAGNESIUM: CPT | Mod: NTX | Performed by: EMERGENCY MEDICINE

## 2022-01-01 PROCEDURE — 36600 WITHDRAWAL OF ARTERIAL BLOOD: CPT | Mod: NTX

## 2022-01-01 PROCEDURE — 99499 UNLISTED E&M SERVICE: CPT | Mod: 95,,, | Performed by: NURSE PRACTITIONER

## 2022-01-01 PROCEDURE — 93010 ELECTROCARDIOGRAM REPORT: CPT | Mod: NTX,,, | Performed by: INTERNAL MEDICINE

## 2022-01-01 PROCEDURE — 25000242 PHARM REV CODE 250 ALT 637 W/ HCPCS: Mod: NTX | Performed by: EMERGENCY MEDICINE

## 2022-01-01 PROCEDURE — 82803 BLOOD GASES ANY COMBINATION: CPT | Mod: NTX

## 2022-01-01 PROCEDURE — 82550 ASSAY OF CK (CPK): CPT | Mod: NTX | Performed by: EMERGENCY MEDICINE

## 2022-01-01 PROCEDURE — 99223 1ST HOSP IP/OBS HIGH 75: CPT | Mod: AI,GC,NTX, | Performed by: STUDENT IN AN ORGANIZED HEALTH CARE EDUCATION/TRAINING PROGRAM

## 2022-01-01 PROCEDURE — G0439 PR MEDICARE ANNUAL WELLNESS SUBSEQUENT VISIT: ICD-10-PCS | Mod: 95,NTX,, | Performed by: NURSE PRACTITIONER

## 2022-01-01 PROCEDURE — 85025 COMPLETE CBC W/AUTO DIFF WBC: CPT | Mod: NTX | Performed by: EMERGENCY MEDICINE

## 2022-01-01 PROCEDURE — 11000001 HC ACUTE MED/SURG PRIVATE ROOM: Mod: NTX

## 2022-01-01 PROCEDURE — 99499 RISK ADDL DX/OHS AUDIT: ICD-10-PCS | Mod: 95,,, | Performed by: NURSE PRACTITIONER

## 2022-01-01 PROCEDURE — 83605 ASSAY OF LACTIC ACID: CPT | Mod: NTX | Performed by: EMERGENCY MEDICINE

## 2022-01-01 PROCEDURE — 3008F PR BODY MASS INDEX (BMI) DOCUMENTED: ICD-10-PCS | Mod: CPTII,95,NTX, | Performed by: NURSE PRACTITIONER

## 2022-01-01 PROCEDURE — 99238 PR HOSPITAL DISCHARGE DAY,<30 MIN: ICD-10-PCS | Mod: GC,,, | Performed by: STUDENT IN AN ORGANIZED HEALTH CARE EDUCATION/TRAINING PROGRAM

## 2022-01-01 PROCEDURE — 99497 ADVNCD CARE PLAN 30 MIN: CPT | Mod: 95,NTX,, | Performed by: STUDENT IN AN ORGANIZED HEALTH CARE EDUCATION/TRAINING PROGRAM

## 2022-01-01 PROCEDURE — 1159F PR MEDICATION LIST DOCUMENTED IN MEDICAL RECORD: ICD-10-PCS | Mod: CPTII,95,NTX, | Performed by: NURSE PRACTITIONER

## 2022-01-01 PROCEDURE — 99497 PR ADVNCD CARE PLAN 30 MIN: ICD-10-PCS | Mod: 95,NTX,, | Performed by: STUDENT IN AN ORGANIZED HEALTH CARE EDUCATION/TRAINING PROGRAM

## 2022-01-01 PROCEDURE — 84484 ASSAY OF TROPONIN QUANT: CPT | Mod: NTX | Performed by: EMERGENCY MEDICINE

## 2022-01-01 PROCEDURE — 1159F MED LIST DOCD IN RCRD: CPT | Mod: CPTII,95,NTX, | Performed by: NURSE PRACTITIONER

## 2022-01-01 PROCEDURE — 93010 EKG 12-LEAD: ICD-10-PCS | Mod: NTX,,, | Performed by: INTERNAL MEDICINE

## 2022-01-01 PROCEDURE — 87040 BLOOD CULTURE FOR BACTERIA: CPT | Mod: 59,NTX | Performed by: EMERGENCY MEDICINE

## 2022-01-01 PROCEDURE — 27000190 HC CPAP FULL FACE MASK W/VALVE: Mod: NTX

## 2022-01-01 PROCEDURE — U0002 COVID-19 LAB TEST NON-CDC: HCPCS | Mod: NTX | Performed by: EMERGENCY MEDICINE

## 2022-01-01 PROCEDURE — 3008F BODY MASS INDEX DOCD: CPT | Mod: CPTII,95,NTX, | Performed by: NURSE PRACTITIONER

## 2022-01-01 PROCEDURE — 99223 PR INITIAL HOSPITAL CARE,LEVL III: ICD-10-PCS | Mod: AI,GC,NTX, | Performed by: STUDENT IN AN ORGANIZED HEALTH CARE EDUCATION/TRAINING PROGRAM

## 2022-01-01 PROCEDURE — 96365 THER/PROPH/DIAG IV INF INIT: CPT

## 2022-01-01 PROCEDURE — 86803 HEPATITIS C AB TEST: CPT | Mod: NTX | Performed by: EMERGENCY MEDICINE

## 2022-01-01 RX ORDER — SERTRALINE HYDROCHLORIDE 50 MG/1
200 TABLET, FILM COATED ORAL DAILY
Status: DISCONTINUED | OUTPATIENT
Start: 2022-01-01 | End: 2022-01-01

## 2022-01-01 RX ORDER — IBUPROFEN 200 MG
16 TABLET ORAL
Status: DISCONTINUED | OUTPATIENT
Start: 2022-01-01 | End: 2022-01-01

## 2022-01-01 RX ORDER — TALC
6 POWDER (GRAM) TOPICAL NIGHTLY PRN
Status: DISCONTINUED | OUTPATIENT
Start: 2022-01-01 | End: 2022-01-01

## 2022-01-01 RX ORDER — SERTRALINE HYDROCHLORIDE 100 MG/1
200 TABLET, FILM COATED ORAL DAILY
Qty: 60 TABLET | Refills: 3 | Status: SHIPPED | OUTPATIENT
Start: 2022-01-01 | End: 2022-08-10

## 2022-01-01 RX ORDER — NALOXONE HCL 0.4 MG/ML
0.02 VIAL (ML) INJECTION
Status: DISCONTINUED | OUTPATIENT
Start: 2022-01-01 | End: 2022-01-01

## 2022-01-01 RX ORDER — DIPHENHYDRAMINE HCL 25 MG
25 CAPSULE ORAL NIGHTLY PRN
Status: DISCONTINUED | OUTPATIENT
Start: 2022-01-01 | End: 2022-01-01

## 2022-01-01 RX ORDER — ROSUVASTATIN CALCIUM 40 MG/1
40 TABLET, COATED ORAL NIGHTLY
Qty: 90 TABLET | Refills: 0 | OUTPATIENT
Start: 2022-01-01 | End: 2022-01-01

## 2022-01-01 RX ORDER — ASPIRIN 81 MG/1
81 TABLET ORAL DAILY
Status: DISCONTINUED | OUTPATIENT
Start: 2022-01-01 | End: 2022-01-01

## 2022-01-01 RX ORDER — BUSPIRONE HYDROCHLORIDE 10 MG/1
10 TABLET ORAL 2 TIMES DAILY
Qty: 60 TABLET | Refills: 2 | Status: SHIPPED | OUTPATIENT
Start: 2022-01-01 | End: 2022-01-01

## 2022-01-01 RX ORDER — TIOTROPIUM BROMIDE 18 UG/1
18 CAPSULE ORAL; RESPIRATORY (INHALATION) DAILY
Qty: 90 CAPSULE | Refills: 0 | Status: SHIPPED | OUTPATIENT
Start: 2022-01-01

## 2022-01-01 RX ORDER — IBUPROFEN 200 MG
24 TABLET ORAL
Status: DISCONTINUED | OUTPATIENT
Start: 2022-01-01 | End: 2022-01-01

## 2022-01-01 RX ORDER — LEVOCETIRIZINE DIHYDROCHLORIDE 5 MG/1
5 TABLET, FILM COATED ORAL NIGHTLY
Qty: 90 TABLET | Refills: 3 | Status: SHIPPED | OUTPATIENT
Start: 2022-01-01 | End: 2023-01-12

## 2022-01-01 RX ORDER — MORPHINE SULFATE 2 MG/ML
2 INJECTION, SOLUTION INTRAMUSCULAR; INTRAVENOUS ONCE
Status: COMPLETED | OUTPATIENT
Start: 2022-01-01 | End: 2022-01-01

## 2022-01-01 RX ORDER — SODIUM CHLORIDE 0.9 % (FLUSH) 0.9 %
10 SYRINGE (ML) INJECTION EVERY 12 HOURS PRN
Status: DISCONTINUED | OUTPATIENT
Start: 2022-01-01 | End: 2022-01-01

## 2022-01-01 RX ORDER — FLUTICASONE PROPIONATE AND SALMETEROL 250; 50 UG/1; UG/1
1 POWDER RESPIRATORY (INHALATION) 2 TIMES DAILY
Qty: 60 EACH | Refills: 3 | Status: SHIPPED | OUTPATIENT
Start: 2022-01-01

## 2022-01-01 RX ORDER — GLUCAGON 1 MG
1 KIT INJECTION
Status: DISCONTINUED | OUTPATIENT
Start: 2022-01-01 | End: 2022-01-01

## 2022-01-01 RX ORDER — BUSPIRONE HYDROCHLORIDE 10 MG/1
20 TABLET ORAL 2 TIMES DAILY
Qty: 120 TABLET | Refills: 2 | OUTPATIENT
Start: 2022-01-01 | End: 2022-01-01

## 2022-01-01 RX ORDER — PREDNISONE 10 MG/1
10 TABLET ORAL DAILY
Qty: 27 TABLET | Refills: 0 | Status: SHIPPED | OUTPATIENT
Start: 2022-01-01 | End: 2022-01-01 | Stop reason: HOSPADM

## 2022-01-01 RX ORDER — FLUTICASONE PROPIONATE 50 MCG
1 SPRAY, SUSPENSION (ML) NASAL 2 TIMES DAILY
Status: DISCONTINUED | OUTPATIENT
Start: 2022-01-01 | End: 2022-01-01

## 2022-01-01 RX ORDER — ALBUTEROL SULFATE 90 UG/1
1 AEROSOL, METERED RESPIRATORY (INHALATION) EVERY 6 HOURS PRN
Refills: 0 | Status: DISCONTINUED | OUTPATIENT
Start: 2022-01-01 | End: 2022-01-01

## 2022-01-01 RX ORDER — IPRATROPIUM BROMIDE AND ALBUTEROL SULFATE 2.5; .5 MG/3ML; MG/3ML
3 SOLUTION RESPIRATORY (INHALATION) ONCE
Status: COMPLETED | OUTPATIENT
Start: 2022-01-01 | End: 2022-01-01

## 2022-01-01 RX ORDER — ONDANSETRON 4 MG/1
4 TABLET, FILM COATED ORAL EVERY 8 HOURS PRN
Qty: 90 TABLET | Refills: 1 | Status: SHIPPED | OUTPATIENT
Start: 2022-01-01 | End: 2022-01-01

## 2022-01-01 RX ORDER — HALOPERIDOL 5 MG/ML
1 INJECTION INTRAMUSCULAR EVERY 4 HOURS PRN
Status: DISCONTINUED | OUTPATIENT
Start: 2022-01-01 | End: 2022-01-01 | Stop reason: HOSPADM

## 2022-01-01 RX ORDER — BUSPIRONE HYDROCHLORIDE 10 MG/1
20 TABLET ORAL 2 TIMES DAILY
Status: DISCONTINUED | OUTPATIENT
Start: 2022-01-01 | End: 2022-01-01 | Stop reason: HOSPADM

## 2022-01-01 RX ORDER — SERTRALINE HYDROCHLORIDE 50 MG/1
100 TABLET, FILM COATED ORAL DAILY
Status: DISCONTINUED | OUTPATIENT
Start: 2022-01-01 | End: 2022-01-01 | Stop reason: HOSPADM

## 2022-01-01 RX ORDER — ALBUTEROL SULFATE 1.25 MG/3ML
1.25 SOLUTION RESPIRATORY (INHALATION) EVERY 6 HOURS PRN
Qty: 75 ML | Refills: 0 | Status: SHIPPED | OUTPATIENT
Start: 2022-01-01 | End: 2023-03-09

## 2022-01-01 RX ORDER — MORPHINE SULFATE 2 MG/ML
2 INJECTION, SOLUTION INTRAMUSCULAR; INTRAVENOUS
Status: DISCONTINUED | OUTPATIENT
Start: 2022-01-01 | End: 2022-01-01 | Stop reason: HOSPADM

## 2022-01-01 RX ORDER — ROFLUMILAST 500 UG/1
500 TABLET ORAL DAILY
Status: DISCONTINUED | OUTPATIENT
Start: 2022-01-01 | End: 2022-01-01 | Stop reason: HOSPADM

## 2022-01-01 RX ORDER — MAGNESIUM SULFATE 1 G/100ML
1 INJECTION INTRAVENOUS ONCE
Status: COMPLETED | OUTPATIENT
Start: 2022-01-01 | End: 2022-01-01

## 2022-01-01 RX ORDER — ATORVASTATIN CALCIUM 20 MG/1
80 TABLET, FILM COATED ORAL DAILY
Refills: 0 | Status: DISCONTINUED | OUTPATIENT
Start: 2022-01-01 | End: 2022-01-01

## 2022-01-01 RX ORDER — POLYETHYLENE GLYCOL 3350 17 G/17G
17 POWDER, FOR SOLUTION ORAL DAILY
Status: DISCONTINUED | OUTPATIENT
Start: 2022-01-01 | End: 2022-01-01 | Stop reason: HOSPADM

## 2022-01-01 RX ORDER — SODIUM CHLORIDE 0.9 % (FLUSH) 0.9 %
10 SYRINGE (ML) INJECTION
Status: DISCONTINUED | OUTPATIENT
Start: 2022-01-01 | End: 2022-01-01

## 2022-01-01 RX ORDER — IPRATROPIUM BROMIDE AND ALBUTEROL SULFATE 2.5; .5 MG/3ML; MG/3ML
3 SOLUTION RESPIRATORY (INHALATION) EVERY 6 HOURS PRN
Status: DISCONTINUED | OUTPATIENT
Start: 2022-01-01 | End: 2022-01-01

## 2022-01-01 RX ADMIN — MORPHINE SULFATE 2 MG: 2 INJECTION, SOLUTION INTRAMUSCULAR; INTRAVENOUS at 05:07

## 2022-01-01 RX ADMIN — DEXTROSE 1 G: 50 INJECTION, SOLUTION INTRAVENOUS at 01:07

## 2022-01-01 RX ADMIN — MORPHINE SULFATE 2 MG: 2 INJECTION, SOLUTION INTRAMUSCULAR; INTRAVENOUS at 11:07

## 2022-01-01 RX ADMIN — ATORVASTATIN CALCIUM 80 MG: 40 TABLET, FILM COATED ORAL at 09:07

## 2022-01-01 RX ADMIN — ASPIRIN 81 MG: 81 TABLET, COATED ORAL at 09:07

## 2022-01-01 RX ADMIN — BUSPIRONE HYDROCHLORIDE 20 MG: 10 TABLET ORAL at 09:07

## 2022-01-01 RX ADMIN — MORPHINE SULFATE 2 MG: 2 INJECTION, SOLUTION INTRAMUSCULAR; INTRAVENOUS at 05:08

## 2022-01-01 RX ADMIN — MORPHINE SULFATE 2 MG: 2 INJECTION, SOLUTION INTRAMUSCULAR; INTRAVENOUS at 09:07

## 2022-01-01 RX ADMIN — MORPHINE SULFATE 2 MG: 2 INJECTION, SOLUTION INTRAMUSCULAR; INTRAVENOUS at 06:07

## 2022-01-01 RX ADMIN — BUSPIRONE HYDROCHLORIDE 20 MG: 10 TABLET ORAL at 10:07

## 2022-01-01 RX ADMIN — FLUTICASONE PROPIONATE 50 MCG: 50 SPRAY, METERED NASAL at 11:07

## 2022-01-01 RX ADMIN — IPRATROPIUM BROMIDE AND ALBUTEROL SULFATE 3 ML: .5; 3 SOLUTION RESPIRATORY (INHALATION) at 01:07

## 2022-01-01 RX ADMIN — BUSPIRONE HYDROCHLORIDE 20 MG: 10 TABLET ORAL at 05:07

## 2022-01-01 RX ADMIN — TIOTROPIUM BROMIDE INHALATION SPRAY 2 PUFF: 3.12 SPRAY, METERED RESPIRATORY (INHALATION) at 09:07

## 2022-01-12 NOTE — TELEPHONE ENCOUNTER
Please let pt know that I'll do a courtesy 90 day refill but I'm no longer with [facility] and can no longer be her PCP. Recommend she est care with another PCP as soon as she can. Recommend Dr. Willis, Dr. Price and Dr. Bailon.

## 2022-01-12 NOTE — TELEPHONE ENCOUNTER
Spoke with pt regarding her request to get a f/u appt scheduled with Dr Florentino. Pt is scheduled for a f/u appt

## 2022-01-12 NOTE — TELEPHONE ENCOUNTER
Care Due:                  Date            Visit Type   Department     Provider  --------------------------------------------------------------------------------                                             [facility]  Last Visit: 11-      None         MEDICINE       Trinh Bowles  Next Visit: None Scheduled  None         None Found                                                            Last  Test          Frequency    Reason                     Performed    Due Date  --------------------------------------------------------------------------------    CMP.........  12 months..  rosuvastatin.............  Not Found    Overdue    Lipid Panel.  12 months..  rosuvastatin.............  Not Found    Overdue    Powered by [employer]. Reference number: [id number].   1/12/2022 10:30:19 AM CST

## 2022-01-14 NOTE — TELEPHONE ENCOUNTER
Patient reports nausea for last 3-4 days. Denies chills fever, diarrhea. She states that she is able to keep up with fluid intake, drinking water and gatorade but little food intake. Encouraged her to continue drinking fluids. Does not have medication for nausea. Informed her that Dr. Florentino will send in prescription.

## 2022-03-08 NOTE — TELEPHONE ENCOUNTER
Mom calls regarding patient having episodes of \"seizure\" activity yesterday.      Mom spoke with triage night nurse and was advised to go to the ED but mom refused related to potential exposure to the COVID-19.  Mom did say to triage nurse she would go if occurred again per chart review.    Location/description:   10-15 minutes minutes ago, pt was laying down and mother looked over to see him with his arms straight out to his sides and moving very slowly and pt's head was shaking back and forth. Mother picked pt up right away. Pt was not responsive so mother tried blowing into pt's face several times to \"get him out of it\". Pt did not respond right away and eyes were seen \"rolling into the back of his head\".  Episode lasted for a good 1-2 minutes in total. Pt was silent the entire episode. When pt did finally come to, started gagging and ended up vomiting and was breathing heavily for a brief moment.        Negative for COVID-19 exposure.    Mom states there have been no episodes today.    Mom states she is unsure if this was a \"seizure\" because she has never seen one.    Negative for fever.    Mom states a close friend of mom's that watches patient at times states she noticed same symptoms before but mom states she never saw any until last night.    Mom states patient is having no coughing, does take the bottle without difficulty.    Mom states today patient is acting normal and nothing unusual is being seen.    Mom states dad had seizure problems when he was 13.    Please advise    Thank you           Pt confirmed
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
## 2022-03-18 NOTE — TELEPHONE ENCOUNTER
SW reached out to patient for follow up. Patient reports she has not heard from [facility] regarding this writer's referral for outpatient behavioral health services. SW noted she would attempt to place referral to local ACT team.  Patient noted she considered reaching out to [facility]  on Aging for homemaker services.  SW strongly encouraged patient to contact agency.  SW noted she would also place a referral for services with [facility] online.  Patient added she felt she may benefit from physical therapy.  SW informed patient she would reach out to MD to request order for home health PT.     SW received referral form from [facility] listing criteria for services.  Patient does not meet criteria at this time.  SW requested order from MD for home health PT and SW to assess patient's living situation and provide emotional support in the home.  Order placed and forwarded to [facility] for approval.  EDILIA will continue to follow.    Janice Helms, SADAF  Outpatient   Palliative Medicine
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
## 2022-05-09 NOTE — TELEPHONE ENCOUNTER
Called pt, no answer. Left message for call back, explained that pt is not eligible for 65+ clinic, based on age.     Pt needs to be 65 years old or older.     Referral cancelled.

## 2022-05-24 NOTE — Clinical Note
Good afternoon,  I had a video visit with her just now and I am reaching out to you so I can get an official answer to her questions. She is wondering is [facility] is doing Pulmonx procedures and if she would be eligible to get one or if she can ever become a candidate for transplant. She has lost a large amount of weight and is now NYHA IV, she looks very frail. Let me know your thoughts.   Thanks for your guidance,  Re
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
## 2022-05-27 NOTE — TELEPHONE ENCOUNTER
"5/27/22: Received the plan of care from Dr. Rose. Called the patient to discuss the plan but received no answer. Left a voice message requesting a return call.    6/2/22: Called patient to discuss scheduling testing and a lung transplant clinic visit but received no answer. Left a voice message requesting a return call.    6/7/22: Patient returned my call. Informed patient that the [facility] lung transplant program has been reactivated and Dr. Rose would like to re-assess her suitability for lung transplantation and determine if she is eligible for [place] valve placement. The patient shared concerns that she "may be too far gone" for transplant but added that she would like to be re-evaluated for both transplant and pulmonary valve placement. She stated "I have a lot of living left in me." Explained the testing ordered by Dr. Rose. The patient stated that she cannot stand for long periods of time. Emphasized that she should do her best effort during testing so Dr. Rose can use the results as part of her medical assessment. The patient agreed to the testing and provider visit. Offered appointments on Friday, 6/17/22 and the patient accepted this date. Informed her that I will mail to her printed information and she will be able to view the appointment details in her [employer] portal. The patient asked questions, which were answered to her satisfaction, and verbalized her understanding of all information discussed.     6/9/22: Notified by transplant  Jv Fontana that insurance clearance was obtained for the patient's 6/17/22 appointments.      ----- Message from Antony Rose MD sent at 5/24/2022  9:21 PM CDT -----  Testing: ABG, 6MWT, Full PFTs including lung volumes in the body box(1. Please make this known to PFT lab in appt note, 2.  Please make the patient aware the this entire test needs to be done to determine her eligibility for the valves)    Thank you      ----- Message " -----  From: Re Rodney MD  Sent: 5/24/2022   2:39 PM CDT  To: Zakia Burrell RN, Antony Rose MD    Thank you  ----- Message -----  From: Zakia Burrell RN  Sent: 5/24/2022   2:34 PM CDT  To: Antony Rose MD, Re Rodney MD    The lung transplant program has been reactivated, but our selection criteria has changed.  We discussed the patient in one of our recent meetings.  The plan was to schedule her for a follow-up appointment to discuss transplant candidacy.  I will contact her to see about scheduling the appointment.  If she is too frail, then she will more than likely not be a candidate.  I will leave that decision to Dr. Rose and the providers.    Zakia  ----- Message -----  From: Re Rodney MD  Sent: 5/24/2022   2:17 PM CDT  To: Antony Rose MD, Milton Hardy Staff    Good afternoon,    I had a video visit with her just now and I am reaching out to you so I can get an official answer to her questions. She is wondering is [facility] is doing Pulmonx procedures and if she would be eligible to get one or if she can ever become a candidate for transplant. She has lost a large amount of weight and is now NYHA IV, she looks very frail. Let me know your thoughts.     Thanks for your guidance,    Re

## 2022-06-07 NOTE — TELEPHONE ENCOUNTER
Patient returned my previous calls to her. See other telephone encounter for details.      ----- Message from Alton Luu sent at 6/7/2022  3:58 PM CDT -----  Regarding: call back  Pt call to speak with April requesting call back    Call

## 2022-06-10 NOTE — TELEPHONE ENCOUNTER
Called pt; no answer; left message informing patient I was calling to confirm her virtual EAWV on 6/13/22 at 12:00pm and to see if she needed any help with mychart or e-pre check and to login 15 minutes prior to scheduled appt; left my name & number

## 2022-06-13 NOTE — PATIENT INSTRUCTIONS
Counseling and Referral of Other Preventative  (Italic type indicates deductible and co-insurance are waived)    Patient Name: Ann Canavan  Today's Date: 6/13/2022    Health Maintenance       Date Due Completion Date    COVID-19 Vaccine (1) Never done ---    Colorectal Cancer Screening Never done ---    Diabetes Urine Screening 04/29/2018 4/29/2017    Eye Exam 11/16/2018 11/16/2017 (Done)    Override on 11/16/2017: Done    Foot Exam 02/01/2019 2/1/2018 (Done)    Override on 2/1/2018: Done    Override on 6/8/2017: Done    Shingles Vaccine (2 of 2) 01/14/2021 11/19/2020    Hemoglobin A1c 05/30/2021 11/30/2020    Lipid Panel 11/30/2021 11/30/2020    Mammogram 12/08/2021 12/8/2020    LDCT Lung Screen 08/31/2022 8/31/2021    Influenza Vaccine (Season Ended) 09/01/2022 11/19/2020    High Dose Statin 01/12/2023 1/12/2022    Cervical Cancer Screening 01/13/2026 1/13/2021    TETANUS VACCINE 06/08/2027 6/8/2017        No orders of the defined types were placed in this encounter.    The following information is provided to all patients.  This information is to help you find resources for any of the problems found today that may be affecting your health:                Living healthy guide: www.[facility].gov      Understanding Diabetes: www.diabetes.org      Eating healthy: www.cdc.gov/healthyweight      CDC home safety checklist: www.cdc.gov/steadi/patient.html      Agency on Aging: www.goea.[place].gov      Alcoholics anonymous (AA): www.aa.org      Physical Activity: www.israel.nih.gov/gz3ltqj      Tobacco use: www.quitwithusla.org

## 2022-06-13 NOTE — PROGRESS NOTES
"The patient location is: [place]  The chief complaint leading to consultation is: Health Risk Assessment    Visit type: audiovisual    Face to Face time with patient: 20  35 minutes of total time spent on the encounter, which includes face to face time and non-face to face time preparing to see the patient (eg, review of tests), Obtaining and/or reviewing separately obtained history, Documenting clinical information in the electronic or other health record, Independently interpreting results (not separately reported) and communicating results to the patient/family/caregiver, or Care coordination (not separately reported).         Each patient to whom he or she provides medical services by telemedicine is:  (1) informed of the relationship between the physician and patient and the respective role of any other health care provider with respect to management of the patient; and (2) notified that he or she may decline to receive medical services by telemedicine and may withdraw from such care at any time.    Notes:       Ann Canavan presented for a  Medicare AWV and comprehensive Health Risk Assessment today. The following components were reviewed and updated:    · Medical history  · Family History  · Social history  · Allergies and Current Medications  · Health Risk Assessment  · Health Maintenance  · Care Team         ** See Completed Assessments for Annual Wellness Visit within the encounter summary.**         The following assessments were completed:  · Living Situation  · CAGE  · Depression Screening  · Fall Risk Assessment (MACH 10)  · Hearing Assessment(HHI)  · Cognitive Function Screening  · Nutrition Screening  · ADL Screening  · PAQ Screening      Vitals:    06/13/22 1204   Weight: 59 kg (130 lb)   Height: 5' 5" (1.651 m)     Body mass index is 21.63 kg/m².  Physical Exam  Constitutional:       Appearance: She is ill-appearing.   Pulmonary:      Comments: Pt' wearing oxygen per NC  Neurological:      Mental " Status: She is alert and oriented to person, place, and time.               Diagnoses and health risks identified today and associated recommendations/orders:    1. Encounter for preventive health examination  Assessments completed. Preventive measures and health maintenance reviewed with patient.  - Ambulatory referral/consult to Outpatient Case Management    2. Chronic respiratory failure with hypoxia  Stable, followed by Palliative Care.    3. Chronic obstructive pulmonary disease, unspecified COPD type  Stable, followed by Palliative Care.    4. Atherosclerosis of aorta  Stable, followed by Palliative Care.    5. Claudication in peripheral vascular disease  Stable, followed by Palliative Care.    6. Type 2 diabetes mellitus without complication, without long-term current use of insulin  Stable, followed by Palliative Care.    7. Severe episode of recurrent major depressive disorder, without psychotic features  Stable, followed by Palliative Care.    8. Conductive hearing loss of right ear with unrestricted hearing of left ear  Stable, followed by Palliative Care.    9. Dependence on supplemental oxygen  Stable, followed by Palliative Care.    10. Mixed hyperlipidemia  Stable, followed by Palliative Care.    11. Abnormality of gait and mobility  Stable, followed by Palliative Care.    Provided Zoila with a 5-10 year written screening schedule and personal prevention plan. Recommendations were developed using the USPSTF age appropriate recommendations. Education, counseling, and referrals were provided as needed. After Visit Summary printed and given to patient which includes a list of additional screenings\tests needed.    Follow up in about 1 year (around 6/13/2023) for your next annual wellness visit.    Cristina Rosado NP  I offered to discuss advanced care planning, including how to pick a person who would make decisions for you if you were unable to make them for yourself, called a health care power of  , and what kind of decisions you might make such as use of life sustaining treatments such as ventilators and tube feeding when faced with a life limiting illness recorded on a living will that they will need to know. (How you want to be cared for as you near the end of your natural life)     X Patient is interested in learning more about how to make advanced directives.  I provided them paperwork and offered to discuss this with them.

## 2022-06-13 NOTE — TELEPHONE ENCOUNTER
Called pt; informed pt I was just making a reminder call for her virtual visit today at 12:00pm and to see if she needed any help; pt stated she didn't need any help; pt informed to login 15 minutes prior to appt time

## 2022-06-16 NOTE — TELEPHONE ENCOUNTER
Patient cancelled her appointments scheduled for 6/17/22. Called her to ask about rescheduling but received no answer. Left a voice message asking for a return call.

## 2022-06-29 NOTE — TELEPHONE ENCOUNTER
Patient cancelled her appointments scheduled for 6/17/22. Call # 2 made to ask patient about rescheduling but received no answer and was unable to leave a voice message. Will send the patient a letter re: her appointments and status with the [facility] transplant program.

## 2022-07-29 PROBLEM — J96.20 ACUTE ON CHRONIC RESPIRATORY FAILURE: Status: ACTIVE | Noted: 2017-06-21

## 2022-07-29 NOTE — SUBJECTIVE & OBJECTIVE
Past Medical History:   Diagnosis Date    Chronic respiratory failure with hypoxia and hypercapnia 5/4/2014    COPD (chronic obstructive pulmonary disease)     Depression     Hyperlipidemia 5/12/2017    Otosclerosis of right ear 5/12/2017    40% hearing back in 1988, had surgery with some improvement, but hearing loss persists.      PAD (peripheral artery disease)     Post-menopausal 2008    Pulmonary emphysema 04/28/2017    Sleep apnea     Tobacco dependence 5/2/2014    Type 2 diabetes mellitus without complication, without long-term current use of insulin 4/29/2017    Hb A1c 6.5% in 2/2017. Diet controlled.        Past Surgical History:   Procedure Laterality Date    BREAST BIOPSY      BREAST CYST ASPIRATION      CATHETERIZATION OF BOTH LEFT AND RIGHT HEART N/A 1/5/2021    Procedure: CATHETERIZATION, HEART, BOTH LEFT AND RIGHT;  Surgeon: Mau Real MD;  Location: [facility] CATH LAB;  Service: Cardiology;  Laterality: N/A;    GANGLION CYST EXCISION Right 1988    STAPEDES SURGERY Right 1988       Review of patient's allergies indicates:   Allergen Reactions    Avelox [moxifloxacin] Itching and Rash     IV       No current facility-administered medications on file prior to encounter.     Current Outpatient Medications on File Prior to Encounter   Medication Sig    albuterol (ACCUNEB) 1.25 mg/3 mL Nebu Take 3 mLs (1.25 mg total) by nebulization every 6 (six) hours as needed. Rescue    albuterol-ipratropium (DUO-NEB) 2.5 mg-0.5 mg/3 mL nebulizer solution Take 3 mLs by nebulization every 6 (six) hours as needed for Wheezing or Shortness of Breath. Rescue    aspirin (ECOTRIN) 81 MG EC tablet Take 81 mg by mouth once daily.    busPIRone (BUSPAR) 10 MG tablet Take 2 tablets (20 mg total) by mouth 2 (two) times daily.    diphenhydrAMINE (SOMINEX) 25 mg tablet Take 25 mg by mouth nightly as needed for Insomnia.    fluticasone propionate (FLONASE) 50 mcg/actuation nasal spray 1 spray (50 mcg total) by Each Nostril route 2  (two) times a day.    fluticasone-salmeterol diskus inhaler 250-50 mcg Inhale 1 puff into the lungs 2 (two) times a day.    levocetirizine (XYZAL) 5 MG tablet Take 1 tablet (5 mg total) by mouth every evening.    morphine 10 mg/5 mL solution Take 1 mL (2 mg total) by mouth every 4 (four) hours as needed for Pain.    OPW TEST CLAIM - DO NOT FILL Inhale into the lungs. OPW test claim. Do not fill.    predniSONE (DELTASONE) 10 MG tablet Take 4 tabs daily for 3 days, then 3 tabs daily for 3 days, then 2 tabs daily for 2 days, then 1 tab daily for 2 days.    roflumilast (DALIRESP) 500 mcg Tab Take 1 tablet (500 mcg total) by mouth once daily.    rosuvastatin (CRESTOR) 40 MG Tab Take 1 tablet (40 mg total) by mouth every evening.    sertraline (ZOLOFT) 100 MG tablet Take 2 tablets (200 mg total) by mouth once daily.    tiotropium (SPIRIVA WITH HANDIHALER) 18 mcg inhalation capsule Inhale 1 capsule (18 mcg total) into the lungs via handihaler once daily.     Family History       Problem Relation (Age of Onset)    Breast cancer Mother    Diabetes Mother, Maternal Uncle    Heart disease Father    Peripheral vascular disease Brother    Stroke Brother (49)          Tobacco Use    Smoking status: Former Smoker     Packs/day: 1.00     Years: 40.00     Pack years: 40.00     Types: Cigarettes     Start date: 1977     Quit date: 2017     Years since quittin.1    Smokeless tobacco: Never Used   Substance and Sexual Activity    Alcohol use: Not Currently     Alcohol/week: 0.8 standard drinks     Types: 1 Standard drinks or equivalent per week    Drug use: Never    Sexual activity: Not on file     Review of Systems   Constitutional:  Negative for chills, fatigue, fever and unexpected weight change.   HENT: Negative.     Eyes: Negative.    Respiratory:  Positive for cough, shortness of breath and wheezing. Negative for stridor.    Cardiovascular:  Negative for chest pain.   Gastrointestinal:  Negative for abdominal  distention, abdominal pain, diarrhea, nausea and vomiting.   Endocrine: Negative.    Genitourinary: Negative.    Musculoskeletal: Negative.    Skin: Negative.    Neurological:  Negative for dizziness, syncope and light-headedness.   Objective:     Vital Signs (Most Recent):  Temp: 97.1 °F (36.2 °C) (07/29/22 0058)  Pulse: 101 (07/29/22 0708)  Resp: 16 (07/29/22 0530)  BP: 124/71 (07/29/22 0708)  SpO2: 97 % (07/29/22 0708) Vital Signs (24h Range):  Temp:  [97.1 °F (36.2 °C)] 97.1 °F (36.2 °C)  Pulse:  [] 101  Resp:  [16-35] 16  SpO2:  [97 %-100 %] 97 %  BP: (107-164)/(60-98) 124/71        There is no height or weight on file to calculate BMI.    Physical Exam  Vitals and nursing note reviewed.   Constitutional:       Appearance: She is ill-appearing.      Comments: General wasting noted  Hypersomnolent     HENT:      Head: Normocephalic and atraumatic.      Mouth/Throat:      Mouth: Mucous membranes are moist.      Pharynx: Oropharynx is clear.   Eyes:      Extraocular Movements: Extraocular movements intact.      Pupils: Pupils are equal, round, and reactive to light.   Pulmonary:      Effort: Pulmonary effort is normal. No respiratory distress.      Breath sounds: No wheezing.      Comments: Decreased breath sounds throughout all lung fields  Chest:      Chest wall: No tenderness.   Abdominal:      General: Abdomen is flat. Bowel sounds are normal. There is no distension.      Palpations: Abdomen is soft.      Tenderness: There is no abdominal tenderness. There is no guarding.   Musculoskeletal:         General: Normal range of motion.   Skin:     General: Skin is warm and dry.      Capillary Refill: Capillary refill takes less than 2 seconds.      Coloration: Skin is pale.      Comments: Diffuse yellow scales to legs bilaterally and scalp   Neurological:      Mental Status: She is oriented to person, place, and time.   Psychiatric:      Comments: Hypersomnolent        Significant Labs: All pertinent labs  within the past 24 hours have been reviewed.    Significant Imaging: I have reviewed all pertinent imaging results/findings within the past 24 hours.

## 2022-07-29 NOTE — ED NOTES
Pt in ultrasound on tele box. Per ED monitoring, pt currently off monitor. Alessandra in ultrasound contacted to request pt be placed back on tele.

## 2022-07-29 NOTE — H&P
[facility] - Emergency Dept  [facility] Medicine  History & Physical    Patient Name: Ann Elizabeth Canavan  MRN: [id number]  Patient Class: IP- Inpatient  Admission Date: 7/29/2022  Attending Physician: Johnny Jimenez MD   Primary Care Provider: Re Rodney MD         Patient information was obtained from patient and ER records.     Subjective:     Principal Problem:Acute on chronic respiratory failure    Chief Complaint:   Chief Complaint   Patient presents with    Respiratory Distress     Pt has hx of copd not taking home meds. Pt hx of lung ca. 70s on room air.         HPI: 61 y.o. female who is a poor historian and somnolent on interview presents to the ED with acute hypercapnic respiratory failure c/o SOB for 3 or more days. Pt has hx of COPD and is non-complaint with medications at home. EMS gave pt duo-neb and 125 of solumedrol PTA. Pt's oxygen saturation was in mid-70's upon EMS arrival. Pt is currently on 8L and oxygen saturation is 98%. Pt denies any chest pain, cough, wheeze, fever, chills, LOC. At time of interview with patient she had elevated BNP 1399 and Trop 0.041. CT on admission showed diffuse, severe emphysematous changes. Pt is currently followed by Palliative medicine outpatient.           Past Medical History:   Diagnosis Date    Chronic respiratory failure with hypoxia and hypercapnia 5/4/2014    COPD (chronic obstructive pulmonary disease)     Depression     Hyperlipidemia 5/12/2017    Otosclerosis of right ear 5/12/2017    40% hearing back in 1988, had surgery with some improvement, but hearing loss persists.      PAD (peripheral artery disease)     Post-menopausal 2008    Pulmonary emphysema 04/28/2017    Sleep apnea     Tobacco dependence 5/2/2014    Type 2 diabetes mellitus without complication, without long-term current use of insulin 4/29/2017    Hb A1c 6.5% in 2/2017. Diet controlled.        Past Surgical History:   Procedure Laterality Date    BREAST BIOPSY      BREAST  CYST ASPIRATION      CATHETERIZATION OF BOTH LEFT AND RIGHT HEART N/A 1/5/2021    Procedure: CATHETERIZATION, HEART, BOTH LEFT AND RIGHT;  Surgeon: Mau Real MD;  Location: [facility] CATH LAB;  Service: Cardiology;  Laterality: N/A;    GANGLION CYST EXCISION Right 1988    STAPEDES SURGERY Right 1988       Review of patient's allergies indicates:   Allergen Reactions    Avelox [moxifloxacin] Itching and Rash     IV       No current facility-administered medications on file prior to encounter.     Current Outpatient Medications on File Prior to Encounter   Medication Sig    albuterol (ACCUNEB) 1.25 mg/3 mL Nebu Take 3 mLs (1.25 mg total) by nebulization every 6 (six) hours as needed. Rescue    albuterol-ipratropium (DUO-NEB) 2.5 mg-0.5 mg/3 mL nebulizer solution Take 3 mLs by nebulization every 6 (six) hours as needed for Wheezing or Shortness of Breath. Rescue    aspirin (ECOTRIN) 81 MG EC tablet Take 81 mg by mouth once daily.    busPIRone (BUSPAR) 10 MG tablet Take 2 tablets (20 mg total) by mouth 2 (two) times daily.    diphenhydrAMINE (SOMINEX) 25 mg tablet Take 25 mg by mouth nightly as needed for Insomnia.    fluticasone propionate (FLONASE) 50 mcg/actuation nasal spray 1 spray (50 mcg total) by Each Nostril route 2 (two) times a day.    fluticasone-salmeterol diskus inhaler 250-50 mcg Inhale 1 puff into the lungs 2 (two) times a day.    levocetirizine (XYZAL) 5 MG tablet Take 1 tablet (5 mg total) by mouth every evening.    morphine 10 mg/5 mL solution Take 1 mL (2 mg total) by mouth every 4 (four) hours as needed for Pain.    OPW TEST CLAIM - DO NOT FILL Inhale into the lungs. OPW test claim. Do not fill.    predniSONE (DELTASONE) 10 MG tablet Take 4 tabs daily for 3 days, then 3 tabs daily for 3 days, then 2 tabs daily for 2 days, then 1 tab daily for 2 days.    roflumilast (DALIRESP) 500 mcg Tab Take 1 tablet (500 mcg total) by mouth once daily.    rosuvastatin (CRESTOR) 40 MG Tab Take 1  tablet (40 mg total) by mouth every evening.    sertraline (ZOLOFT) 100 MG tablet Take 2 tablets (200 mg total) by mouth once daily.    tiotropium (SPIRIVA WITH HANDIHALER) 18 mcg inhalation capsule Inhale 1 capsule (18 mcg total) into the lungs via handihaler once daily.     Family History       Problem Relation (Age of Onset)    Breast cancer Mother    Diabetes Mother, Maternal Uncle    Heart disease Father    Peripheral vascular disease Brother    Stroke Brother (49)          Tobacco Use    Smoking status: Former Smoker     Packs/day: 1.00     Years: 40.00     Pack years: 40.00     Types: Cigarettes     Start date: 1977     Quit date: 2017     Years since quittin.1    Smokeless tobacco: Never Used   Substance and Sexual Activity    Alcohol use: Not Currently     Alcohol/week: 0.8 standard drinks     Types: 1 Standard drinks or equivalent per week    Drug use: Never    Sexual activity: Not on file     Review of Systems   Constitutional:  Negative for chills, fatigue, fever and unexpected weight change.   HENT: Negative.     Eyes: Negative.    Respiratory:  Positive for cough, shortness of breath and wheezing. Negative for stridor.    Cardiovascular:  Negative for chest pain.   Gastrointestinal:  Negative for abdominal distention, abdominal pain, diarrhea, nausea and vomiting.   Endocrine: Negative.    Genitourinary: Negative.    Musculoskeletal: Negative.    Skin: Negative.    Neurological:  Negative for dizziness, syncope and light-headedness.   Objective:     Vital Signs (Most Recent):  Temp: 97.1 °F (36.2 °C) (22 0058)  Pulse: 101 (22 0708)  Resp: 16 (22 0530)  BP: 124/71 (22 0708)  SpO2: 97 % (22 0708) Vital Signs (24h Range):  Temp:  [97.1 °F (36.2 °C)] 97.1 °F (36.2 °C)  Pulse:  [] 101  Resp:  [16-35] 16  SpO2:  [97 %-100 %] 97 %  BP: (107-164)/(60-98) 124/71        There is no height or weight on file to calculate BMI.    Physical Exam  Vitals and  nursing note reviewed.   Constitutional:       Appearance: She is ill-appearing.      Comments: General wasting noted  Hypersomnolent     HENT:      Head: Normocephalic and atraumatic.      Mouth/Throat:      Mouth: Mucous membranes are moist.      Pharynx: Oropharynx is clear.   Eyes:      Extraocular Movements: Extraocular movements intact.      Pupils: Pupils are equal, round, and reactive to light.   Pulmonary:      Effort: Pulmonary effort is normal. No respiratory distress.      Breath sounds: No wheezing.      Comments: Decreased breath sounds throughout all lung fields  Chest:      Chest wall: No tenderness.   Abdominal:      General: Abdomen is flat. Bowel sounds are normal. There is no distension.      Palpations: Abdomen is soft.      Tenderness: There is no abdominal tenderness. There is no guarding.   Musculoskeletal:         General: Normal range of motion.   Skin:     General: Skin is warm and dry.      Capillary Refill: Capillary refill takes less than 2 seconds.      Coloration: Skin is pale.      Comments: Diffuse yellow scales to legs bilaterally and scalp   Neurological:      Mental Status: She is oriented to person, place, and time.   Psychiatric:      Comments: Hypersomnolent        Significant Labs: All pertinent labs within the past 24 hours have been reviewed.    Significant Imaging: I have reviewed all pertinent imaging results/findings within the past 24 hours.    Assessment/Plan:     * Acute on chronic respiratory failure  60 yo somnolent patient with Hypercapnic Respiratory failure which is Acute on chronic brought to the ED via EMS. Signs/symptoms of respiratory failure include- tachypnea and respiratory distress. Contributing diagnoses includes - COPD Labs and images were reviewed. Patient has not had a recent ABG.     -Palliative care consult  -supplemental oxygen  - Satting 98 on 3L  - Prednisone 40mg PO daily to complete a 5 day course  - Continue home medications  - Duonebs q4h  while awake and prn with IS  - Guaifenesin and Codeine cough syrup prn cough      Chronic obstructive pulmonary disease  -see Acute on Chronic Resp Failure    Malnutrition  -consider advancing diet if tolerated. Pt may refuse diet    Mixed hyperlipidemia  -continue home medications          VTE Risk Mitigation (From admission, onward)         Ordered     IP VTE HIGH RISK PATIENT  Once         07/29/22 0721     Place sequential compression device  Until discontinued         07/29/22 0721                   Tra Jeong MD  Department of Hospital Medicine   [facility] - Emergency Dept

## 2022-07-29 NOTE — PLAN OF CARE
07/29/22 1056   Post-Acute Status   Post-Acute Authorization Placement   Post-Acute Placement Status Referrals Sent   Coverage PHN medicare   Patient choice form signed by patient/caregiver List from System Post-Acute Care   Discharge Delays (!) Post-Acute Set-up   Discharge Plan   Discharge Plan A Hospice/home;New Nursing Home placement - senior care care facility     Referrals sent to multiple facilities via CarePort. NH orders and clinicals faxed to [facility] for insurance auth. Pt denied any facility preference but wishes to stay in the [facility].     Completed LOCET. Will upload PASSR and 142 to [employer] once available.     UPDATE, 2:00PM- 142 received. Uploaded into [employer] along with PASSR.     Loan Castillo LMSW  ED   Care Management  [facility]  Ext. [phone]

## 2022-07-29 NOTE — PLAN OF CARE
SW consulted by palliative medicine d/t need for nursing home w/hospice placement. Pt is not a candidate for inpatient hospice but is unable to return home d/t lack of caregiving support and inability to care for herself.     SW met with pt at bedside. Pt states that she has not left her home at all for the last 2 years. She lives alone and has no family or friends who support her (closest family member is her  brother's fiance in [place]). She has been able to manage her ADLs mostly okay until about 2 months ago when she felt that her physical health began deteriorating. For the last 3-4 weeks she reports she has been unable to ambulate well and has been falling frequently. She has not been able to tend to her hygiene, make it to the bathroom, or make it to the front door to get food delivered for the past week due to extreme weakness and her feet swelling. Pt states she has eaten very little in the past 3 weeks and has had no food in her home for a couple of days. Pt has a virtual PCP, but has not been able to get an appointment for 2 months. Pt states she cannot return home b/c she cannot care for herself and wants to move into a nursing home. Following discussion with palliative care, pt is also agreeable to hospice service at the NH.     SW explained NH placement process to pt and answered all questions. Provider team updated, pt to be admitted. NH orders placed. SW will fax NH orders to [facility] for insurance auth, send NH referrals via CarePort for placement, and complete PASSR/LOCET.     SW will continue to monitor and follow up on referrals to facilitate NH placement.     Loan Castillo LMSW  ED   Care Management  [facility]  Ext. [phone]    [facility] Dept  Initial Discharge Assessment       Primary Care Provider: Re Rodney MD    Admission Diagnosis: Shortness of breath [R06.02]    Admission Date: 2022  Expected Discharge Date:     Discharge Barriers  Identified: No family/friends to help, Social    Payor: [employer]ICARE / Plan: [employer] 65 / Product Type: Medicare Advantage /     Extended Emergency Contact Information  Primary Emergency Contact: Rose Honeycutt   [place]  Mobile Phone: [phone]  Relation: Friend    Discharge Plan A: New Nursing Home placement - snf care facility, Hospice/home  Discharge Plan B: Hospice/home, Rehab      [facility]  [address]  [facility] [address]  Phone: [phone] Fax: [phone]      Initial Assessment (most recent)     Adult Discharge Assessment - 07/29/22 1012        Discharge Assessment    Assessment Type Discharge Planning Assessment     Confirmed/corrected address, phone number and insurance Yes     Confirmed Demographics Correct on Facesheet     Source of Information patient;health record     When was your last doctors appointment? --   2 months ago    Does patient/caregiver understand observation status Yes     Communicated LONI with patient/caregiver Date not available/Unable to determine     Reason For Admission SOB, leg swelling, COPD     Lives With alone     Do you expect to return to your current living situation? No   Unable to care for self at home    Do you have help at home or someone to help you manage your care at home? No     Prior to hospitilization cognitive status: No Deficits;Alert/Oriented     Current cognitive status: Alert/Oriented;No Deficits     Walking or Climbing Stairs Difficulty ambulation difficulty, requires equipment     Mobility Management Uses walker, but more falls recently d/t loss of strength/balance     Dressing/Bathing Difficulty bathing difficulty, assistance 1 person     Dressing/Bathing Management Too weak to tend to hygiene/ADLs herself     Do you have any problems with: Needs other help     Specify other help Cannot obtain food     Home Accessibility wheelchair accessible     Home Layout Able to  live on 1st floor     Equipment Currently Used at Home oxygen;walker, rolling     Readmission within 30 days? No     Patient currently being followed by outpatient case management? No     Do you currently have service(s) that help you manage your care at home? No     Do you take prescription medications? Yes     Do you have prescription coverage? Yes     Coverage PHN     Do you have any problems affording any of your prescribed medications? No     Is the patient taking medications as prescribed? yes     How do you get to doctors appointments? --   Non-emergent ambulance    Are you on dialysis? No     Do you take coumadin? No     Discharge Plan A New Nursing Home placement - intermediate care facility;Hospice/home     Discharge Plan B Hospice/home;Rehab     DME Needed Upon Discharge  none     Discharge Plan discussed with: Patient     Discharge Barriers Identified No family/friends to help;Social     SDOH Housing/Economic Concerns;Lack of Primary/family support     Housing/Economic Concerns Yes     Housing/Economic Concerns Low Income     Lack of Primary/family support Yes

## 2022-07-29 NOTE — ED NOTES
Pt states they want to use the bathroom but refused a bed pan and wants to wait until they can get a room to use an external urinary catheter.

## 2022-07-29 NOTE — ED NOTES
I-STAT Chem-8+ Results:   Value Reference Range   Sodium 136 136-145 mmol/L   Potassium  4.7 3.5-5.1 mmol/L   Chloride 86  mmol/L   Ionized Calcium 1.16 1.06-1.42 mmol/L   CO2 (measured) 45 23-29 mmol/L   Glucose 209  mg/dL   BUN 31 6-30 mg/dL   Creatinine 0.9 0.5-1.4 mg/dL   Hematocrit 48 36-54%

## 2022-07-29 NOTE — CONSULTS
"[facility] - Emergency Dept  Palliative Care       Patient Name: Ann Elizabeth Canavan  MRN: [id number]  Admission Date: 7/29/2022  Hospital Length of Stay: 0 days  Code Status: DNR   Attending Provider: Johnny Jimenez MD  Palliative Care Provider: Donaldo Williamson LCSW  Primary Care Physician: Re Rodney MD  Principal Problem:Acute on chronic respiratory failure     Palliative medicine consulted for end of life/hospice care. Patient known to outpatient palliative medicine clinic.     This  visited patient in ED 2. Patient awake in bed. Patient stated she had been having trouble with activities of daily living. Patient stated having multiple falls and then calling EMS to bring her to the hospital. Patient stated she is ready for hospice. Patient stated not having any family or support to help with care. Patient stated being agreeable to admission to a nursing home with hospice. Patient mentioned having hospice at home. This  explained that hospice at home does not provide extensive support with care giving needs and patient would still need to have someone with her. Patient verbalized understanding and stated "well get on it then". Patient reported having no preference of facility. This  informed Medicine team and ED case management social worker.  will follow for support.     Donaldo Williamson LCSW  Palliative Medicine     "

## 2022-07-29 NOTE — ASSESSMENT & PLAN NOTE
60 yo somnolent patient with Hypercapnic Respiratory failure which is Acute on chronic brought to the ED via EMS. Signs/symptoms of respiratory failure include- tachypnea and respiratory distress. Contributing diagnoses includes - COPD Labs and images were reviewed. Patient has not had a recent ABG.     -Palliative care consult  -supplemental oxygen  - Satting 98 on 3L  - Prednisone 40mg PO daily to complete a 5 day course  - Continue home medications  - Duonebs q4h while awake and prn with IS  - Guaifenesin and Codeine cough syrup prn cough

## 2022-07-29 NOTE — ED NOTES
Patient identifiers for Ann Elizabeth Canavan 61 y.o. female checked and correct.  Chief Complaint   Patient presents with    Respiratory Distress     Pt has hx of copd not taking home meds. Pt hx of lung ca. 70s on room air.      Past Medical History:   Diagnosis Date    Chronic respiratory failure with hypoxia and hypercapnia 5/4/2014    COPD (chronic obstructive pulmonary disease)     Depression     Hyperlipidemia 5/12/2017    Otosclerosis of right ear 5/12/2017    40% hearing back in 1988, had surgery with some improvement, but hearing loss persists.      PAD (peripheral artery disease)     Post-menopausal 2008    Pulmonary emphysema 04/28/2017    Sleep apnea     Tobacco dependence 5/2/2014    Type 2 diabetes mellitus without complication, without long-term current use of insulin 4/29/2017    Hb A1c 6.5% in 2/2017. Diet controlled.      Allergies reported:   Review of patient's allergies indicates:   Allergen Reactions    Avelox [moxifloxacin] Itching and Rash     IV         LOC: Patient is awake, alert, and aware of environment with an appropriate affect. Patient is oriented x 4 and speaking appropriately.  APPEARANCE: Patient resting comfortably and in no acute distress. Patient hair unkempt with dry flakes, patient's clothing is properly fastened.  HEENT: Pt presents with surgical mask on. Hirsutism present on face.  SKIN: The skin is cool and dry. Patient has sluggish skin turgor and dry mucus membranes. Pt has significant bruising and discoloration to both upper extremities.  MUSKULOSKELETAL: Patient is moving upper extremities well however states that she has been feeling weak for the last few days and that she was previously ambulatory at home but is unable to walk unassisted at this time. No obvious deformities noted. Pulses intact.   RESPIRATORY: Airway is open and patent. Respirations are spontaneous and non-labored with normal effort and rate. Pt is on 3L, SpO2 WDL, pt tolerating well, pt  states she is on 3L at home.   CARDIAC: Patient has an elevated rate.104 on cardiac monitor. 2+ pitting edema noted to the bilateral feet.   ABDOMEN: No distention noted. Soft and non-tender upon palpation.  NEUROLOGICAL: Facial expression is symmetrical. Hand grasps are equal bilaterally. Normal sensation in all extremities when touched with finger.

## 2022-07-29 NOTE — ED PROVIDER NOTES
"History:  Ann Elizabeth Canavan is a 61 y.o. female who presents to the ED with Respiratory Distress (Pt has hx of copd not taking home meds. Pt hx of lung ca. 70s on room air. )    Described as 60yo F with PMH O2 dependant COPD with palliative care due to severity presenting to the ER with SOB. She reports she has been feeling weak over the past few days with multiple episodes of vomiting and diarrhea, which she is unable to describe. She denies any abdominal pain, but noted a "lung pain" across her lower anterior chest yesterday, now resolved. Today, she reportedly had a fall, striking her head on a fireplace, only down reportedly a few minutes before calling her neighbor for help. Per EMS, patient was found with low O2, 70s on RA, in respiratory distress. Given 125mg solumedrol, started on duoneb with improvement to 100%. History limited due to acuity of condition.     Review of Systems: All systems reviewed and are negative except as per history of present illness.  Constitutional: Negative for fever.   HENT: Negative for congestion.    Respiratory: + for shortness of breath.    Cardiovascular: + for chest pain.   Gastrointestinal: Negative for abdominal pain. +diarrhea, vomiting  Genitourinary: Negative for dysuria.   Musculoskeletal: Negative for myalgias.   Skin: Negative for rash.   Neurological: Negative for focal weakness.   Psychiatric/Behavioral: Negative for memory loss.     Medications:   Previous Medications    ALBUTEROL (ACCUNEB) 1.25 MG/3 ML NEBU    Take 3 mLs (1.25 mg total) by nebulization every 6 (six) hours as needed. Rescue    ALBUTEROL-IPRATROPIUM (DUO-NEB) 2.5 MG-0.5 MG/3 ML NEBULIZER SOLUTION    Take 3 mLs by nebulization every 6 (six) hours as needed for Wheezing or Shortness of Breath. Rescue    ASPIRIN (ECOTRIN) 81 MG EC TABLET    Take 81 mg by mouth once daily.    BUSPIRONE (BUSPAR) 10 MG TABLET    Take 2 tablets (20 mg total) by mouth 2 (two) times daily.    DIPHENHYDRAMINE (SOMINEX) 25 " MG TABLET    Take 25 mg by mouth nightly as needed for Insomnia.    FLUTICASONE PROPIONATE (FLONASE) 50 MCG/ACTUATION NASAL SPRAY    1 spray (50 mcg total) by Each Nostril route 2 (two) times a day.    FLUTICASONE-SALMETEROL DISKUS INHALER 250-50 MCG    Inhale 1 puff into the lungs 2 (two) times a day.    LEVOCETIRIZINE (XYZAL) 5 MG TABLET    Take 1 tablet (5 mg total) by mouth every evening.    MORPHINE 10 MG/5 ML SOLUTION    Take 1 mL (2 mg total) by mouth every 4 (four) hours as needed for Pain.    OPW TEST CLAIM - DO NOT FILL    Inhale into the lungs. OPW test claim. Do not fill.    PREDNISONE (DELTASONE) 10 MG TABLET    Take 4 tabs daily for 3 days, then 3 tabs daily for 3 days, then 2 tabs daily for 2 days, then 1 tab daily for 2 days.    ROFLUMILAST (DALIRESP) 500 MCG TAB    Take 1 tablet (500 mcg total) by mouth once daily.    ROSUVASTATIN (CRESTOR) 40 MG TAB    Take 1 tablet (40 mg total) by mouth every evening.    SERTRALINE (ZOLOFT) 100 MG TABLET    Take 2 tablets (200 mg total) by mouth once daily.    TIOTROPIUM (SPIRIVA WITH HANDIHALER) 18 MCG INHALATION CAPSULE    Inhale 1 capsule (18 mcg total) into the lungs via handihaler once daily.       PMH:   Past Medical History:   Diagnosis Date    Chronic respiratory failure with hypoxia and hypercapnia 5/4/2014    COPD (chronic obstructive pulmonary disease)     Depression     Hyperlipidemia 5/12/2017    Otosclerosis of right ear 5/12/2017    40% hearing back in 1988, had surgery with some improvement, but hearing loss persists.      PAD (peripheral artery disease)     Post-menopausal 2008    Pulmonary emphysema 04/28/2017    Sleep apnea     Tobacco dependence 5/2/2014    Type 2 diabetes mellitus without complication, without long-term current use of insulin 4/29/2017    Hb A1c 6.5% in 2/2017. Diet controlled.      PSH:   Past Surgical History:   Procedure Laterality Date    BREAST BIOPSY      BREAST CYST ASPIRATION      CATHETERIZATION OF BOTH  LEFT AND RIGHT HEART N/A 1/5/2021    Procedure: CATHETERIZATION, HEART, BOTH LEFT AND RIGHT;  Surgeon: Mau Real MD;  Location: [facility] CATH LAB;  Service: Cardiology;  Laterality: N/A;    GANGLION CYST EXCISION Right 1988    STAPEDES SURGERY Right 1988     Allergies: She is allergic to avelox [moxifloxacin].  Social History: Marital Status: . She  reports that she quit smoking about 5 years ago. Her smoking use included cigarettes. She started smoking about 44 years ago. She has a 40.00 pack-year smoking history. She has never used smokeless tobacco.. She  reports previous alcohol use of about 0.8 standard drinks of alcohol per week..       Exam:  VITAL SIGNS:   Vitals:    07/29/22 0400 07/29/22 0523 07/29/22 0530 07/29/22 0708   BP: 115/64  107/61 124/71   Pulse: 100 95 96 101   Resp:   16    Temp:       TempSrc:       SpO2: 100% 97% 97% 97%     Const: Awake and alert, in respiratory distress, tripoding, cachectic, dishevled   Head: Atraumatic  Eyes: Normal Conjunctiva  ENT: Normal External Ears, Nose and Mouth.  Neck: Full range of motion. No meningismus.  Resp: tachypneic, tripoding, speaking in 1 word sentences, diminished breath sounds bilaterally  Cardio: Equal and intact distal pulses, tachycardic  Abd: Soft, non tender, non distended  Skin: Abrasion R knee, bruising L lower leg  Ext: LLE edema compared to R  Neur: Awake and alert, moves all extremities equally, CN grossly intact  Psych: Cooperative    Data:  Results for orders placed or performed during the hospital encounter of 07/29/22   CBC auto differential   Result Value Ref Range    WBC 8.78 3.90 - 12.70 K/uL    RBC 4.47 4.00 - 5.40 M/uL    Hemoglobin 13.7 12.0 - 16.0 g/dL    Hematocrit 46.7 37.0 - 48.5 %     (H) 82 - 98 fL    MCH 30.6 27.0 - 31.0 pg    MCHC 29.3 (L) 32.0 - 36.0 g/dL    RDW 15.7 (H) 11.5 - 14.5 %    Platelets 87 (L) 150 - 450 K/uL    MPV 12.9 9.2 - 12.9 fL    Immature Granulocytes 0.6 (H) 0.0 - 0.5 %    Gran # (ANC)  6.5 1.8 - 7.7 K/uL    Immature Grans (Abs) 0.05 (H) 0.00 - 0.04 K/uL    Lymph # 1.7 1.0 - 4.8 K/uL    Mono # 0.5 0.3 - 1.0 K/uL    Eos # 0.0 0.0 - 0.5 K/uL    Baso # 0.02 0.00 - 0.20 K/uL    nRBC 0 0 /100 WBC    Gran % 74.1 (H) 38.0 - 73.0 %    Lymph % 19.7 18.0 - 48.0 %    Mono % 5.4 4.0 - 15.0 %    Eosinophil % 0.0 0.0 - 8.0 %    Basophil % 0.2 0.0 - 1.9 %    Differential Method Automated    Comprehensive metabolic panel   Result Value Ref Range    Sodium 140 136 - 145 mmol/L    Potassium 4.7 3.5 - 5.1 mmol/L    Chloride 89 (L) 95 - 110 mmol/L    CO2 38 (H) 23 - 29 mmol/L    Glucose 207 (H) 70 - 110 mg/dL    BUN 25 (H) 6 - 20 mg/dL    Creatinine 0.8 0.5 - 1.4 mg/dL    Calcium 8.9 8.7 - 10.5 mg/dL    Total Protein 6.5 6.0 - 8.4 g/dL    Albumin 3.8 3.5 - 5.2 g/dL    Total Bilirubin 0.6 0.1 - 1.0 mg/dL    Alkaline Phosphatase 66 55 - 135 U/L    AST 16 10 - 40 U/L    ALT 10 10 - 44 U/L    Anion Gap 13 8 - 16 mmol/L    eGFR if African American >60.0 >60 mL/min/1.73 m^2    eGFR if non African American >60.0 >60 mL/min/1.73 m^2   Lactic acid, plasma   Result Value Ref Range    Lactate (Lactic Acid) 1.5 0.5 - 2.2 mmol/L   Troponin I   Result Value Ref Range    Troponin I 0.041 (H) 0.000 - 0.026 ng/mL   Brain natriuretic peptide   Result Value Ref Range    BNP 1,399 (H) 0 - 99 pg/mL   COVID-19 Rapid Screening   Result Value Ref Range    SARS-CoV-2 RNA, Amplification, Qual Negative Negative   Magnesium   Result Value Ref Range    Magnesium 2.1 1.6 - 2.6 mg/dL   CPK   Result Value Ref Range    CPK 93 20 - 180 U/L   ISTAT PROCEDURE   Result Value Ref Range    POC Glucose 209 (H) 70 - 110 mg/dL    POC BUN 31 (H) 6 - 30 mg/dL    POC Creatinine 0.9 0.5 - 1.4 mg/dL    POC Sodium 136 136 - 145 mmol/L    POC Potassium 4.7 3.5 - 5.1 mmol/L    POC Chloride 86 (L) 95 - 110 mmol/L    POC TCO2 (MEASURED) 45 (H) 23 - 29 mmol/L    POC Ionized Calcium 1.16 1.06 - 1.42 mmol/L    POC Hematocrit 48 36 - 54 %PCV    Sample RAMON      Imaging  Results           CT Head Without Contrast (Final result)  Result time 07/29/22 04:40:20    Final result by Jordan Bashir MD (07/29/22 04:40:20)                 Impression:      Exam limited by patient rotation and patient motion.    1. No acute intracranial findings.  2. No vertebral fracture or traumatic malalignment.  3. Mild degenerative changes of the cervical spine most pronounced at C5-C6 with mild-moderate bilateral neural foraminal narrowing.  No significant spinal canal stenosis.  4. Severe bilateral apical emphysematous changes.  5. 4 mm calcification or radiopaque foreign body in the soft tissues on the left anterolateral to the cervical spine at C4 level.  Correlate clinically.  This report was flagged in Epic as abnormal.    Electronically signed by resident: Alyssia Martinez  Date:    07/29/2022  Time:    04:09    Electronically signed by: Jordan Bashir MD  Date:    07/29/2022  Time:    04:40             Narrative:    EXAMINATION:  CT HEAD WITHOUT CONTRAST; CT CERVICAL SPINE WITHOUT CONTRAST    CLINICAL HISTORY:  Head trauma, abnormal mental status (Age 19-64y);fall, trauma, on ASA;; Neck trauma, intoxicated or obtunded (Age >= 16y);fall, neck trauma;    TECHNIQUE:  Low dose axial CT images obtained throughout the head and cervical spine without the use of intravenous contrast.  Axial, sagittal and coronal reconstructions were performed.    COMPARISON:  None.    FINDINGS:  Patient is severely rotated and there is also motion degradation both of which limits assessment.    CT head:    Intracranial compartment:    Ventricles and sulci are normal in size for age without evidence of hydrocephalus.    The brain parenchyma appears within normal limits.  No parenchymal mass, hemorrhage, edema or major vascular distribution infarct.    No extra-axial blood or fluid collections.    Skull/extracranial contents (limited evaluation):    No fracture. Mastoid air cells and paranasal sinuses are essentially  clear.    CT cervical spine:    Alignment: Assessment is limited due to patient rotation within the scanner.  With allowances for limitations there are no findings to suggest traumatic listhesis.    Vertebrae: The dens, lateral masses, and occipital condyles, are intact.  Trouble body height is maintained.    Disc: Mild disc height loss with height loss most pronounced at C5-C6.    Degenerative changes: Mild degenerative changes of the cervical spine most pronounced at C5-C6 with posterior disc osteophyte complex and bilateral uncovertebral spurring resulting in mild-moderate bilateral neural foraminal narrowing.  No significant spinal canal stenosis.    Soft tissue: No prevertebral soft tissue edema.  Severe bilateral apical emphysematous changes.  4 mm calcification or radiopaque foreign body in the soft tissues on the left anterolateral to the cervical spine at C4 level.                                CT Cervical Spine Without Contrast (Final result)  Result time 07/29/22 04:40:20    Final result by Jordan Bashir MD (07/29/22 04:40:20)                 Impression:      Exam limited by patient rotation and patient motion.    1. No acute intracranial findings.  2. No vertebral fracture or traumatic malalignment.  3. Mild degenerative changes of the cervical spine most pronounced at C5-C6 with mild-moderate bilateral neural foraminal narrowing.  No significant spinal canal stenosis.  4. Severe bilateral apical emphysematous changes.  5. 4 mm calcification or radiopaque foreign body in the soft tissues on the left anterolateral to the cervical spine at C4 level.  Correlate clinically.  This report was flagged in Epic as abnormal.    Electronically signed by resident: Alyssia Martinez  Date:    07/29/2022  Time:    04:09    Electronically signed by: Jordan Bashir MD  Date:    07/29/2022  Time:    04:40             Narrative:    EXAMINATION:  CT HEAD WITHOUT CONTRAST; CT CERVICAL SPINE WITHOUT CONTRAST    CLINICAL  HISTORY:  Head trauma, abnormal mental status (Age 19-64y);fall, trauma, on ASA;; Neck trauma, intoxicated or obtunded (Age >= 16y);fall, neck trauma;    TECHNIQUE:  Low dose axial CT images obtained throughout the head and cervical spine without the use of intravenous contrast.  Axial, sagittal and coronal reconstructions were performed.    COMPARISON:  None.    FINDINGS:  Patient is severely rotated and there is also motion degradation both of which limits assessment.    CT head:    Intracranial compartment:    Ventricles and sulci are normal in size for age without evidence of hydrocephalus.    The brain parenchyma appears within normal limits.  No parenchymal mass, hemorrhage, edema or major vascular distribution infarct.    No extra-axial blood or fluid collections.    Skull/extracranial contents (limited evaluation):    No fracture. Mastoid air cells and paranasal sinuses are essentially clear.    CT cervical spine:    Alignment: Assessment is limited due to patient rotation within the scanner.  With allowances for limitations there are no findings to suggest traumatic listhesis.    Vertebrae: The dens, lateral masses, and occipital condyles, are intact.  Trouble body height is maintained.    Disc: Mild disc height loss with height loss most pronounced at C5-C6.    Degenerative changes: Mild degenerative changes of the cervical spine most pronounced at C5-C6 with posterior disc osteophyte complex and bilateral uncovertebral spurring resulting in mild-moderate bilateral neural foraminal narrowing.  No significant spinal canal stenosis.    Soft tissue: No prevertebral soft tissue edema.  Severe bilateral apical emphysematous changes.  4 mm calcification or radiopaque foreign body in the soft tissues on the left anterolateral to the cervical spine at C4 level.                               X-Ray Chest AP Portable (Final result)  Result time 07/29/22 01:34:05    Final result by Jordan Bashir MD (07/29/22  01:34:05)                 Impression:      Interval development of bibasilar patchy airspace opacities.  Correlate clinically for atypical pneumonia or aspiration.      Electronically signed by: Jordan Bashir MD  Date:    2022  Time:    01:34             Narrative:    EXAMINATION:  XR CHEST AP PORTABLE    CLINICAL HISTORY:  SOB;    TECHNIQUE:  Single frontal view of the chest was performed.    COMPARISON:  2020.    FINDINGS:  Interval development of patchy airspace opacities at the lung bases bilaterally.    Chronic blunting of the CP angles, similar to prior.  Hyperinflated lungs with emphysematous architecture, similar to prior.    Heart and lungs otherwise appear unchanged when allowing for differences in technique and positioning.                              12-LEAD EKG INTERPRETATION BY ME:  Rate/Rhythm: Sinus tachycardia with rate of 126 beats per minute  QRS, ST, T-waves: No changes consistent with acute ischemia  Impression: Sinus tachycardia  Labs & Imaging studies were reviewed independently by me.     Medical Decision Makinyo F presenting with acute respiratory distress. She was continued on her albuterol neb upon arrival with improvement, now speaking in full sentences, though still tachypneic. VBG showed acute on chronic hypercapneic respiratory failure with severe hypercapnea, started on bipap. Magnesium and repeat neb ordered. Steroids given PTA.  Patient had frequent requests to remove bipap, changed to nasal bipap with slight improvement. VBG shows slowly improving hypercarbia. She is at her neurologic baseline without signs of CO2 narcosis. Ct head/c-spine negative for acute traumatic injury. FB in neck likely calcification, unclear source.     Laboratory studies do show elevated troponin and BNP. I suspect her troponin elevation is likely due to demand ischemia, less likely ACS, no acute ischemic changes on EKG. BNP elevation from baseline may be secondary to her severely  elevated pulmonary pressures in the setting of COPD. She has no leukocytosis or fever, and on reassessment, reports she has had difficulty caring for herself solely due to her new leg swelling. DVT US is ordered due to unilateral swelling and is pending at time of admission.     Patient does endorse DNR/DNI, confirmed as prior wish on chart review. She requests to be placed on hospice.     [facility]     Critical Care Time: 55 minutes  Treatments/Evaluations: Close monitoring and treatment of unstable vital signs, cardiorespiratory, and neurologic status, while maintaining tight balance of fluid, respiratory, and cardiac interventions. This time includes discussing the case with the patient and the patients family. This time does not include all procedures stated elsewhere in this record. This time also includes reviewing old records, labs and radiological studies. This time includes examining and re-examining the patient. Additionally, this time also includes arranging care with admitting and consulting physicians.     Clinical Impression:  1. Acute respiratory failure with hypoxia and hypercapnia    2. Shortness of breath    3. Left leg swelling    4. COPD with acute exacerbation               Mindi Augustin MD  07/29/22 [id number]

## 2022-07-29 NOTE — ED TRIAGE NOTES
Ann Elizabeth Canavan, an 61 y.o. female presents to the ED with c/o SOB x few days. Pt has hx of COPD and is non-complaint with medications at home. EMS gave pt duo-neb and 125 of solumedrol PTA. Pt's oxygen saturation was in mid-70's upon EMS arrival. Pt is currently on 8L and oxygen saturation is 98%.      Chief Complaint   Patient presents with    Respiratory Distress     Pt has hx of copd not taking home meds. Pt hx of lung ca. 70s on room air.      Review of patient's allergies indicates:   Allergen Reactions    Avelox [moxifloxacin] Itching and Rash     IV     Past Medical History:   Diagnosis Date    Chronic respiratory failure with hypoxia and hypercapnia 5/4/2014    COPD (chronic obstructive pulmonary disease)     Depression     Hyperlipidemia 5/12/2017    Otosclerosis of right ear 5/12/2017    40% hearing back in 1988, had surgery with some improvement, but hearing loss persists.      PAD (peripheral artery disease)     Post-menopausal 2008    Pulmonary emphysema 04/28/2017    Sleep apnea     Tobacco dependence 5/2/2014    Type 2 diabetes mellitus without complication, without long-term current use of insulin 4/29/2017    Hb A1c 6.5% in 2/2017. Diet controlled.

## 2022-07-29 NOTE — CONSULTS
Acknowledgement of consult order - goals of care  Goals of care established.  See note written per Aury 7/29/22 10:02 AM    Primary team  completing appropriate referrals.     Thank you for consult and opportunity to participate in Ms. Canavan's care.

## 2022-07-29 NOTE — ED NOTES
Pt placed back on tele box in ultrasound. VS, rhythm now available on ED central monitor. Hr 104 bpm, NSR.

## 2022-07-29 NOTE — ED NOTES
Pt on continuous cardiac monitoring. Pt side rails up X2, bed low and locked. Pt resting and denies any complaints or needs.

## 2022-07-29 NOTE — PLAN OF CARE
NURSING HOME ORDERS    07/29/2022  [facility]  [facility] - EMERGENCY DEPT  [address]  [facility] [address]  Dept: [phone]  Loc: [phone]     Admit to Nursing Home: With Hospice    Diagnoses:  Active Hospital Problems    Diagnosis  POA    *Acute on chronic respiratory failure [J96.20]  Yes    Chronic obstructive pulmonary disease [J44.9]  Yes     Stable on Advair 250/50 BID and Spiriva.  Scant needs for albuterol.        Malnutrition [E46]  Yes    Mixed hyperlipidemia [E78.2]  Yes     Chronic      Resolved Hospital Problems   No resolved problems to display.       Patient is homebound due to:  Acute on chronic respiratory failure    Allergies:  Review of patient's allergies indicates:   Allergen Reactions    Avelox [moxifloxacin] Itching and Rash     IV       Vitals:  Routine    Diet: regular diet    Activities:   Up in a chair each morning as tolerated, Ambulate with assistance to bathroom and Activity as tolerated    Goals of Care Treatment Preferences:  Code Status: DNR    Health care agent: [facility] agent number:                    Labs:  Per facility protocol    Nursing Precautions:  Aspiration , Fall and Pressure ulcer prevention    Miscellaneous Care: Routine Skin for Bedridden Patients:  Apply moisture barrier cream to all          Medications: Discontinue all previous medication orders, if any. See new list below.     Medication List      CONTINUE taking these medications    albuterol 1.25 mg/3 mL Nebu  Commonly known as: ACCUNEB  Take 3 mLs (1.25 mg total) by nebulization every 6 (six) hours as needed. Rescue     albuterol-ipratropium 2.5 mg-0.5 mg/3 mL nebulizer solution  Commonly known as: DUO-NEB  Take 3 mLs by nebulization every 6 (six) hours as needed for Wheezing or Shortness of Breath. Rescue     DALIRESP 500 mcg Tab  Generic drug: roflumilast  Take 1 tablet (500 mcg total) by mouth once daily.     diphenhydrAMINE 25 mg  tablet  Commonly known as: SOMINEX  Take 25 mg by mouth nightly as needed for Insomnia.     fluticasone propionate 50 mcg/actuation nasal spray  Commonly known as: FLONASE  1 spray (50 mcg total) by Each Nostril route 2 (two) times a day.     fluticasone-salmeterol 250-50 mcg/dose 250-50 mcg/dose diskus inhaler  Commonly known as: WIXELA INHUB  Inhale 1 puff into the lungs 2 (two) times a day.     levocetirizine 5 MG tablet  Commonly known as: XYZAL  Take 1 tablet (5 mg total) by mouth every evening.     morphine 10 mg/5 mL solution  Take 1 mL (2 mg total) by mouth every 4 (four) hours as needed for Pain.     OPW TEST CLAIM - DO NOT FILL  Inhale into the lungs. OPW test claim. Do not fill.     sertraline 100 MG tablet  Commonly known as: ZOLOFT  Take 2 tablets (200 mg total) by mouth once daily.     SPIRIVA WITH HANDIHALER 18 mcg inhalation capsule  Generic drug: tiotropium  Inhale 1 capsule (18 mcg total) into the lungs via handihaler once daily.        STOP taking these medications    aspirin 81 MG EC tablet  Commonly known as: ECOTRIN     busPIRone 10 MG tablet  Commonly known as: BUSPAR     predniSONE 10 MG tablet  Commonly known as: DELTASONE     rosuvastatin 40 MG Tab  Commonly known as: CRESTOR              Immunizations Administered as of 7/29/2022     No immunizations on file.        Some patients may experience side effects after vaccination.  These may include fever, headache, muscle or joint aches.  Most symptoms resolve with 24-48 hours and do not require urgent medical evaluation unless they persist for more than 72 hours or symptoms are concerning for an unrelated medical condition.          _________________________________  Jeannette Garcia MD  07/29/2022

## 2022-07-29 NOTE — HPI
61 y.o. female who is a poor historian and somnolent on interview presents to the ED with acute hypercapnic respiratory failure c/o SOB for 3 or more days. Pt has hx of COPD and is non-complaint with medications at home. EMS gave pt duo-neb and 125 of solumedrol PTA. Pt's oxygen saturation was in mid-70's upon EMS arrival. Pt is currently on 8L and oxygen saturation is 98%. Pt denies any chest pain, cough, wheeze, fever, chills, LOC. At time of interview with patient she had elevated BNP 1399 and Trop 0.041. CT on admission showed diffuse, severe emphysematous changes. Pt is currently followed by Palliative medicine outpatient.

## 2022-07-30 PROBLEM — E46 MALNUTRITION: Status: RESOLVED | Noted: 2017-05-12 | Resolved: 2022-01-01

## 2022-07-30 PROBLEM — E78.2 MIXED HYPERLIPIDEMIA: Chronic | Status: RESOLVED | Noted: 2017-05-12 | Resolved: 2022-01-01

## 2022-07-30 PROBLEM — Z51.5 COMFORT MEASURES ONLY STATUS: Status: ACTIVE | Noted: 2022-01-01

## 2022-07-30 NOTE — HOSPITAL COURSE
61 year old female admitted to Hospital Medicine with acute on chronic hypoxemic/hypercapnic respiratory failure. Shortly after hospital admission, she expressed wanting to transition to hospice.  was notified and ultimately she decided on SNF with hospice. Referrals for SNF were sent and further lab draws and workup were withheld. The next morning following admission, she became hypoxic to 60s despite supplemental oxygen. She expressed desire to remain comfortable and was started on IV morphine pushes.  She  on .

## 2022-07-30 NOTE — PLAN OF CARE
Patient comfort care was aknowledge. Patient had an uneventful day  Problem: Adult Inpatient Plan of Care  Goal: Plan of Care Review  Outcome: Ongoing, Progressing  Goal: Patient-Specific Goal (Individualized)  Outcome: Ongoing, Progressing  Goal: Absence of Hospital-Acquired Illness or Injury  Outcome: Ongoing, Progressing  Goal: Optimal Comfort and Wellbeing  Outcome: Ongoing, Progressing  Goal: Readiness for Transition of Care  Outcome: Ongoing, Progressing     Problem: Coping Ineffective  Goal: Effective Coping  Outcome: Ongoing, Progressing     Problem: Diabetes Comorbidity  Goal: Blood Glucose Level Within Targeted Range  Outcome: Ongoing, Progressing     Problem: Skin Injury Risk Increased  Goal: Skin Health and Integrity  Outcome: Ongoing, Progressing     Problem: Palliative Care  Goal: Enhanced Quality of Life  Outcome: Ongoing, Progressing

## 2022-07-30 NOTE — SUBJECTIVE & OBJECTIVE
Interval History:   Acutely hypoxic overnight, started on IV morphine for dyspnea. She had significant improvement and remains comfortable. She is aware that she is actively dying.  called to bedside for emotional support.     Review of Systems   Constitutional:  Positive for activity change, appetite change and fatigue. Negative for chills and fever.   Respiratory:  Positive for shortness of breath. Negative for cough.    Cardiovascular:  Positive for leg swelling. Negative for chest pain and palpitations.   Gastrointestinal:  Negative for nausea and vomiting.   Genitourinary:  Negative for difficulty urinating.   Skin:  Negative for rash.   Neurological:  Negative for dizziness and light-headedness.   Objective:     Vital Signs (Most Recent):  Temp: 96.3 °F (35.7 °C) (07/30/22 0737)  Pulse: 100 (07/30/22 1211)  Resp: 20 (07/30/22 1211)  BP: 119/69 (07/30/22 1211)  SpO2: 99 % (07/30/22 1211) Vital Signs (24h Range):  Temp:  [96.3 °F (35.7 °C)-98 °F (36.7 °C)] 96.3 °F (35.7 °C)  Pulse:  [100-122] 100  Resp:  [14-26] 20  SpO2:  [55 %-99 %] 99 %  BP: (106-165)/(56-92) 119/69     Weight: 39.4 kg (86 lb 13.8 oz)  Body mass index is 14.45 kg/m².  No intake or output data in the 24 hours ending 07/30/22 1244   Physical Exam  Vitals reviewed.   Constitutional:       General: She is in acute distress.      Appearance: She is ill-appearing. She is not diaphoretic.   HENT:      Head: Normocephalic.   Eyes:      Extraocular Movements: Extraocular movements intact.      Pupils: Pupils are equal, round, and reactive to light.   Cardiovascular:      Rate and Rhythm: Tachycardia present.   Pulmonary:      Effort: Tachypnea present. No respiratory distress.      Breath sounds: No rales.   Abdominal:      General: There is no distension.   Musculoskeletal:         General: Normal range of motion.      Right lower leg: Edema present.      Left lower leg: Edema present.   Neurological:      Mental Status: She is alert and  oriented to person, place, and time.     Significant Labs: All pertinent labs within the past 24 hours have been reviewed.    Significant Imaging: I have reviewed all pertinent imaging results/findings within the past 24 hours.

## 2022-07-30 NOTE — PROGRESS NOTES
[facility] Medicine  Progress Note    Patient Name: Ann Elizabeth Canavan  MRN: [id number]  Patient Class: IP- Inpatient   Admission Date: 7/29/2022  Length of Stay: 1 days  Attending Physician: Neo Alvarado MD  Primary Care Provider: Re Rodney MD    Subjective:     Principal Problem:Comfort measures only status    HPI:  61 y.o. female who is a poor historian and somnolent on interview presents to the ED with acute hypercapnic respiratory failure c/o SOB for 3 or more days. Pt has hx of COPD and is non-complaint with medications at home. EMS gave pt duo-neb and 125 of solumedrol PTA. Pt's oxygen saturation was in mid-70's upon EMS arrival. Pt is currently on 8L and oxygen saturation is 98%. Pt denies any chest pain, cough, wheeze, fever, chills, LOC. At time of interview with patient she had elevated BNP 1399 and Trop 0.041. CT on admission showed diffuse, severe emphysematous changes. Pt is currently followed by Palliative medicine outpatient.         Overview/Hospital Course:  61 year old female admitted to Hospital Medicine with acute on chronic hypoxemic/hypercapnic respiratory failure. Shortly after hospital admission, she expressed wanting to transition to hospice.  was notified and ultimately she decided on SNF with hospice. Referrals for SNF were sent and further lab draws and workup were withheld. The next morning following admission, she became hypoxic to 60s despite supplemental oxygen. She expressed desire to remain comfortable and was started on IV morphine pushes.       Interval History:   Acutely hypoxic overnight, started on IV morphine for dyspnea. She had significant improvement and remains comfortable. She is aware that she is actively dying.  called to bedside for emotional support.     Review of Systems   Constitutional:  Positive for activity change, appetite change and fatigue. Negative for chills and fever.   Respiratory:  Positive for  shortness of breath. Negative for cough.    Cardiovascular:  Positive for leg swelling. Negative for chest pain and palpitations.   Gastrointestinal:  Negative for nausea and vomiting.   Genitourinary:  Negative for difficulty urinating.   Skin:  Negative for rash.   Neurological:  Negative for dizziness and light-headedness.   Objective:     Vital Signs (Most Recent):  Temp: 96.3 °F (35.7 °C) (07/30/22 0737)  Pulse: 100 (07/30/22 1211)  Resp: 20 (07/30/22 1211)  BP: 119/69 (07/30/22 1211)  SpO2: 99 % (07/30/22 1211) Vital Signs (24h Range):  Temp:  [96.3 °F (35.7 °C)-98 °F (36.7 °C)] 96.3 °F (35.7 °C)  Pulse:  [100-122] 100  Resp:  [14-26] 20  SpO2:  [55 %-99 %] 99 %  BP: (106-165)/(56-92) 119/69     Weight: 39.4 kg (86 lb 13.8 oz)  Body mass index is 14.45 kg/m².  No intake or output data in the 24 hours ending 07/30/22 1244   Physical Exam  Vitals reviewed.   Constitutional:       General: She is in acute distress.      Appearance: She is ill-appearing. She is not diaphoretic.   HENT:      Head: Normocephalic.   Eyes:      Extraocular Movements: Extraocular movements intact.      Pupils: Pupils are equal, round, and reactive to light.   Cardiovascular:      Rate and Rhythm: Tachycardia present.   Pulmonary:      Effort: Tachypnea present. No respiratory distress.      Breath sounds: No rales.   Abdominal:      General: There is no distension.   Musculoskeletal:         General: Normal range of motion.      Right lower leg: Edema present.      Left lower leg: Edema present.   Neurological:      Mental Status: She is alert and oriented to person, place, and time.     Significant Labs: All pertinent labs within the past 24 hours have been reviewed.    Significant Imaging: I have reviewed all pertinent imaging results/findings within the past 24 hours.      Assessment/Plan:      * Comfort measures only status  61 year old female with acute on chronic hypoxemic/hypercapnic respiratory failure admitted with worsening  SOB. Patient opted to pursue hospice on admission. She is now comfort care measures only while awaiting to transfer to a SNF to continue her hospice care.     - Morphine 2 mg q2 hours prn for dyspnea  - Haldol in place for agitation  - Continuous oxygen  -  consulted for spiritual care per her request     Chronic obstructive pulmonary disease  -see Acute on Chronic Resp Failure    Acute on chronic respiratory failure  - Continue home meds and albuterol prn.  - Continuous oxygen    VTE Risk Mitigation (From admission, onward)         Ordered     IP VTE HIGH RISK PATIENT  Once         07/29/22 0721                Discharge Planning   LONI: 8/1/2022     Code Status: DNR   Is the patient medically ready for discharge?: Yes    Reason for patient still in hospital (select all that apply): Pending disposition  Discharge Plan A: Hospice/home, New Nursing Home placement - [facility] facility   Discharge Delays: (!) Post-Acute Set-up      Jeannette Garcia MD  Department of Hospital Medicine   [facility] - Med Surg

## 2022-07-30 NOTE — RESPIRATORY THERAPY
RAPID RESPONSE RESPIRATORY CHART CHECK       Chart check completed.  Please instructed to call [phone] for further concerns or assistance.

## 2022-07-30 NOTE — ASSESSMENT & PLAN NOTE
61 year old female with acute on chronic hypoxemic/hypercapnic respiratory failure admitted with worsening SOB. Patient opted to pursue hospice on admission. She is now comfort care measures only while awaiting to transfer to a SNF to continue her hospice care.     - Morphine 2 mg q2 hours prn for dyspnea  - Haldol in place for agitation.  - Continuous oxygen  -  consulted for spiritual care per her request

## 2022-07-30 NOTE — PLAN OF CARE
Patient pulled out IV yesterday and refused IV during the shift. Went to patient's room around 0525 to get vitals and helped patient to bedside commode. She was short of breath. Oxygen saturation was in the 50's. Contacted charge nurse, Kim respiratory and MD on call. Spoke to Dr. Parker, he okayed giving the patient the early dose of Buspar. Dr. Garcia ordered a one time dose of Morphine due to the patient being so agitated. Patient has calmed down and is resting comfortably. The tosha was also notified.

## 2022-07-31 NOTE — ASSESSMENT & PLAN NOTE
61 year old female with acute on chronic hypoxemic/hypercapnic respiratory failure admitted with worsening SOB. Patient opted to pursue hospice on admission. She is now comfort care measures only while awaiting to transfer to a SNF to continue her hospice care.     - Morphine 2 mg q2 hours prn for dyspnea  - Haldol in place for agitation.  - Continuous oxygen  -  visited patient for spiritual care per her request

## 2022-07-31 NOTE — PLAN OF CARE
Patient rested comfortably during the shift. This morning patient became restless and had trouble breathing. Her O2 sats were in the 50's. Patient became bluish in color and a tosha came to visit her. Patient was given morphine for comfort.

## 2022-07-31 NOTE — PROGRESS NOTES
[facility] Medicine  Progress Note    Patient Name: Ann Elizabeth Canavan  MRN: [id number]  Patient Class: IP- Inpatient   Admission Date: 7/29/2022  Length of Stay: 2 days  Attending Physician: Neo Alvarado MD  Primary Care Provider: Re Rodney MD      Subjective:     Principal Problem:Comfort measures only status    HPI:  61 y.o. female who is a poor historian and somnolent on interview presents to the ED with acute hypercapnic respiratory failure c/o SOB for 3 or more days. Pt has hx of COPD and is non-complaint with medications at home. EMS gave pt duo-neb and 125 of solumedrol PTA. Pt's oxygen saturation was in mid-70's upon EMS arrival. Pt is currently on 8L and oxygen saturation is 98%. Pt denies any chest pain, cough, wheeze, fever, chills, LOC. At time of interview with patient she had elevated BNP 1399 and Trop 0.041. CT on admission showed diffuse, severe emphysematous changes. Pt is currently followed by Palliative medicine outpatient.         Overview/Hospital Course:  61 year old female admitted to Hospital Medicine with acute on chronic hypoxemic/hypercapnic respiratory failure. Shortly after hospital admission, she expressed wanting to transition to hospice.  was notified and ultimately she decided on SNF with hospice. Referrals for SNF were sent and further lab draws and workup were withheld. The next morning following admission, she became hypoxic to 60s despite supplemental oxygen. She expressed desire to remain comfortable and was started on IV morphine pushes.       Interval History: Patient this morning had trouble breathing with periods of apnea. Pt given morphine for comfort and now resting with decreased respiratory rate of 10-12. Pt increasingly less responsive and non-verbal currently. She was visited by the .    Review of Systems   Reason unable to perform ROS: Pt on comfort care measures receiving morphine. Currently non-verbal.    Objective:     Vital Signs (Most Recent):  Temp: 97.6 °F (36.4 °C) (07/31/22 0452)  Pulse: 104 (07/31/22 1217)  Resp: 20 (07/31/22 1217)  BP: 97/63 (07/31/22 1217)  SpO2: (!) 90 % (07/31/22 1217)   Vital Signs (24h Range):  Temp:  [97.6 °F (36.4 °C)] 97.6 °F (36.4 °C)  Pulse:  [101-109] 104  Resp:  [16-24] 20  SpO2:  [90 %-100 %] 90 %  BP: ()/(54-75) 97/63     Weight: 39.4 kg (86 lb 13.8 oz)  Body mass index is 14.45 kg/m².    Intake/Output Summary (Last 24 hours) at 7/31/2022 1335  Last data filed at 7/31/2022 0621  Gross per 24 hour   Intake 50 ml   Output --   Net 50 ml      Physical exam was deferred due to patient's status on comfort care measures and the desire to make patient as comfortable as possible.    Significant Labs: All pertinent labs within the past 24 hours have been reviewed.  None    Significant Imaging: I have reviewed all pertinent imaging results/findings within the past 24 hours.  None        Assessment/Plan:      * Comfort measures only status  61 year old female with acute on chronic hypoxemic/hypercapnic respiratory failure admitted with worsening SOB. Patient opted to pursue hospice on admission. She is now comfort care measures only while awaiting to transfer to a SNF to continue her hospice care.     - Morphine 2 mg q2 hours prn for dyspnea  - Haldol in place for agitation.  - Continuous oxygen  -  visited patient for spiritual care per her request       Chronic obstructive pulmonary disease  -see Acute on Chronic Resp Failure    Acute on chronic respiratory failure  - Continue home meds and albuterol prn.  - Continuous oxygen      VTE Risk Mitigation (From admission, onward)         Ordered     IP VTE HIGH RISK PATIENT  Once         07/29/22 0721                Discharge Planning   LONI: 8/1/2022     Code Status: DNR   Is the patient medically ready for discharge?: Yes    Reason for patient still in hospital (select all that apply): Pending disposition  Discharge Plan A:  Hospice/home, New Nursing Home placement - intermediate care facility   Discharge Delays: (!) Post-Acute Set-up      Gaston Graf MD  Department of Hospital Medicine   [facility] Surg

## 2022-07-31 NOTE — CARE UPDATE
RAPID RESPONSE NURSE ROUND       Rounding completed with charge RNMaximo. No concerns verbalized at this time. Instructed to call [phone] for further concerns or assistance.

## 2022-07-31 NOTE — SUBJECTIVE & OBJECTIVE
Interval History: Patient this morning had trouble breathing with periods of apnea. Pt given morphine for comfort and now resting with decreased respiratory rate of 10-12. Pt increasingly less responsive and non-verbal currently. She was visited by the .    Review of Systems   Reason unable to perform ROS: Pt on comfort care measures receiving morphine. Currently non-verbal.   Objective:     Vital Signs (Most Recent):  Temp: 97.6 °F (36.4 °C) (07/31/22 0452)  Pulse: 104 (07/31/22 1217)  Resp: 20 (07/31/22 1217)  BP: 97/63 (07/31/22 1217)  SpO2: (!) 90 % (07/31/22 1217)   Vital Signs (24h Range):  Temp:  [97.6 °F (36.4 °C)] 97.6 °F (36.4 °C)  Pulse:  [101-109] 104  Resp:  [16-24] 20  SpO2:  [90 %-100 %] 90 %  BP: ()/(54-75) 97/63     Weight: 39.4 kg (86 lb 13.8 oz)  Body mass index is 14.45 kg/m².    Intake/Output Summary (Last 24 hours) at 7/31/2022 1335  Last data filed at 7/31/2022 0621  Gross per 24 hour   Intake 50 ml   Output --   Net 50 ml      Physical exam was deferred due to patient's status on comfort care measures and the desire to make patient as comfortable as possible.    Significant Labs: All pertinent labs within the past 24 hours have been reviewed.  None    Significant Imaging: I have reviewed all pertinent imaging results/findings within the past 24 hours.  None

## 2022-07-31 NOTE — CHAPLAIN
Charge nurse called and said the previous shift put in a consult for this patient. When I got there the patient was unresponsive. Checked on her three times.

## 2022-07-31 NOTE — NURSING
"Patient a little more alert and talkative. Her friend Rose came to visit with her. Patient told her friend "I will be dead tomorrow."  "

## 2022-07-31 NOTE — PLAN OF CARE
Allan sent out hospice referral for Pt who is now a DNR, will follow. Renee with [facility] will follow, tried to call friend listed with no response, will follow with Pt's decline and consents.

## 2022-07-31 NOTE — NURSING
Patient having periods of apnea with a respiratory rate of 10-12. She is less responsive than she was at 8 a.m. she will occasionally make an attempt to hold nurses hand. Non-verbal at this time.

## 2022-08-01 NOTE — CHAPLAIN
0530 responded to pt death. No family at bedside. Collected and bagged pt's belongings (cellphone and glasses). Provided primary nurse w/DCP. Awaiting completed DCP. Next of Kin UNK at this time.      Kai, Spiritual Care  Spectra *[phone]

## 2022-08-01 NOTE — PROGRESS NOTES
Called to patients bedside by nursing staff. Told that patient . Patients lungs auscultated with absent breath sounds.  Patient's chest wall without rise and fall.  Auscultated patients's heart.  Patient with absent heart sounds.  Patient without peripheral pulses on palpation of the radial arteries in the wrist.  Patient's pupils unreactive to light.  Time of Death 0633.  Patient's preliminary cause of death Severe COPD.    [email or web address]

## 2022-08-01 NOTE — PLAN OF CARE
[facility] - Med Surg  Discharge Final Note    Primary Care Provider: Re Rodney MD    Expected Discharge Date: 2022    Final Discharge Note (most recent)     Final Note - 22 1009        Final Note    Assessment Type Final Discharge Note     Anticipated Discharge Disposition      What phone number can be called within the next 1-3 days to see how you are doing after discharge? [phone]                 Important Message from Medicare

## 2022-08-03 LAB
BACTERIA BLD CULT: NORMAL
BACTERIA BLD CULT: NORMAL

## 2022-08-03 NOTE — DISCHARGE SUMMARY
Govind Bacon [facility] Medicine  Discharge Summary      Patient Name: Ann Elizabeth Canavan  MRN: [id number]  Patient Class: IP- Inpatient  Admission Date: 2022  Hospital Length of Stay: 3 days  Discharge Date and Time: 2022  4:20 PM  Attending Physician: No att. providers found   Discharging Provider: Neo Alvarado MD  Primary Care Provider: Re Rodney MD  [facility] Medicine Team: [facility]OSP MED 5 Neo Alvarado MD    HPI:   61 y.o. female who is a poor historian and somnolent on interview presents to the ED with acute hypercapnic respiratory failure c/o SOB for 3 or more days. Pt has hx of COPD and is non-complaint with medications at home. EMS gave pt duo-neb and 125 of solumedrol PTA. Pt's oxygen saturation was in mid-70's upon EMS arrival. Pt is currently on 8L and oxygen saturation is 98%. Pt denies any chest pain, cough, wheeze, fever, chills, LOC. At time of interview with patient she had elevated BNP 1399 and Trop 0.041. CT on admission showed diffuse, severe emphysematous changes. Pt is currently followed by Palliative medicine outpatient.           * No surgery found *      Hospital Course:   61 year old female admitted to Hospital Medicine with acute on chronic hypoxemic/hypercapnic respiratory failure. Shortly after hospital admission, she expressed wanting to transition to hospice.  was notified and ultimately she decided on SNF with hospice. Referrals for SNF were sent and further lab draws and workup were withheld. The next morning following admission, she became hypoxic to 60s despite supplemental oxygen. She expressed desire to remain comfortable and was started on IV morphine pushes.  She  on .         Goals of Care Treatment Preferences:  Code Status: DNR    Health care agent: [facility] agent number:                    Consults:   Consults (From admission, onward)        Status Ordering Provider     Inpatient consult to  Spiritual Care  Once        Provider:  (Not yet assigned)    Completed KANWAL CRUZ     Inpatient consult to Palliative Care  Once        Provider:  (Not yet assigned)    Completed ELBA BLOUNT          Acute on chronic respiratory failure  Discussed hospice she is agreeable with no escalation of care.  Morphine pushes for comfort         Final Active Diagnoses:    Diagnosis Date Noted POA    PRINCIPAL PROBLEM:  Comfort measures only status [Z51.5] 2022 Not Applicable    Chronic obstructive pulmonary disease [J44.9]  Yes    LAMBERTO (obstructive sleep apnea) [G47.33]  Yes    Acute on chronic respiratory failure [J96.20] 2017 Yes    Type 2 diabetes mellitus without complication, without long-term current use of insulin [E11.9] 2017 Yes    Chronic respiratory failure with hypoxia [J96.11] 2014 Yes     Chronic    Tobacco dependence [F17.200] 2014 Yes     Chronic      Problems Resolved During this Admission:    Diagnosis Date Noted Date Resolved POA    Malnutrition [E46] 2017 Yes    Mixed hyperlipidemia [E78.2] 2017 Yes     Chronic       Discharged Condition:     Disposition:     Follow Up:    Patient Instructions:   No discharge procedures on file.    Significant Diagnostic Studies: Please see epic charting     Pending Diagnostic Studies:     None         Medications:  Reconciled Home Medications:   Dced all home meds as she is      Indwelling Lines/Drains at time of discharge:   Lines/Drains/Airways     None                 Time spent on the discharge of patient: 35 minutes         Noe Alvarado MD  Department of Hospital Medicine  [facility] Surg

## 2022-08-08 NOTE — PHYSICIAN QUERY
PT Name: Ann Elizabeth Canavan  MR #: [id number]    DOCUMENTATION CLARIFICATION     CDS/: Jodi BARRIENTOS, RN-BC  Contact information: [email or web address]  This form is a permanent document in the medical record.     Query Date: August 8, 2022    By submitting this query, we are merely seeking further clarification of documentation. Please utilize your independent clinical judgment when addressing the question(s) below.    The Medical Record contains the following:   Indicators   Supporting Clinical Findings Location in Medical Record   X AMS, Confusion, LOC, etc.  Patient is awake, alert, and aware of environment with an appropriate affect. Patient is oriented x 4 and speaking appropriately      Admitted with acute encephalopathy 2/2 acute/chronic hypoxemic + hypercapnic resp failure. Initially somnolent, tachycardic, tachypneic, and hypoxic requiring HFNC and eventually NIV. She improved with IV corticosteroids and duo-nebs. Upon my initial assessment, she was AAOx3    ED Provider Note 7/29        Hospital Medicine, 7/29             X Acute/Chronic Illness chronic hypoxemic + hypercapnic resp failure 2/2 severe COPD, depression, HLD, PAD, LAMBERTO, tobacco abuse, and T2DM admitted with acute encephalopathy 2/2 acute/chronic hypoxemic + hypercapnic resp failure    [facility] Medicine, 7/29     X Radiology Findings Interval development of bibasilar patchy airspace opacities.  Correlate clinically for atypical pneumonia or aspiration  . CXR 7/29   X Electrolyte Imbalance Chloride 89   CO2 38   7/29 Lab    Medication     X Treatment         Palliative care consult  supplemental oxygen  Satting 98 on 3L  Prednisone 40mg PO daily to complete a 5-day course  Continue home medications  Duonebs q4h while awake and prn with IS  Guaifenesin and Codeine cough syrup prn cough   [facility] Medicine, 7/29      Other       The noted clinical guidelines are only system guidelines and do not replace the providers clinical judgment.    The  [place] of Neurologic Disorders and Stroke (NINDS) of the NIH describes encephalopathy as any diffuse disease of the brain that alters brain function or structure.    Provider, please further specify the Encephalopathy diagnosis associated with above clinical findings.  [x ] Metabolic Encephalopathy - Due to electrolyte imbalance, metabolic derangements, or infectious processes, includes Septic Encephalopathy, Uremic Encephalopathy   [   ] Encephalopathy, unspecified      [   ] Other Encephalopathy (please specify): ____________________   [   ] Other neurological condition- Includes Post-ictal altered mental status (please specify condition): __________   [   ] Encephalopathy ruled out   [   ]  Clinically Undetermined     Please document in your progress notes daily for the duration of treatment until resolved, and include in your discharge summary.    References:  JUAN CARLOS Horta RN, CCDS. (2018, June 9). Notes from the Instructor: Encephalopathy tips. Retrieved October 22, 2020, from [email or web address]    ICD-9-CM Coding Clinic First Quarter 2013, Effective with discharges: October 21, 2013 [facility] § Seizure with encephalopathy due to postictal state (2013).    ICD-10-CM/PCS [employer] Integrated Codebook (Version V.20.8.10.0) [Computer software]. (2020). Retrieved October 21, 2020.    [place] of Neurological Disorders and Stroke. (2019, March 27). Retrieved October 22, 2020, from https://www.ninds.nih.gov/Disorders/All-Disorders/Eklhwilopmdeah-Avzohjwaxdg-Rozi    Form No. 54765

## 2022-10-18 NOTE — ASSESSMENT & PLAN NOTE
- Pt with 40+ pack-year history  - Cause of patient's COPD and likely direct contributor to exacerbation  - Pt has received information on smoking cessation; repeat education once stabilized and prior to discharge/stepdown  - Provide nicotine patch as appropriate once sedation weaned   no

## 2022-10-31 NOTE — ED NOTES
Bed: [facility]  Expected date:   Expected time:   Means of arrival:   Comments:   Attending Only

## 2023-07-26 NOTE — PLAN OF CARE
Problem: Physical Therapy Goal  Goal: Physical Therapy Goal  Goals to be met by: 2017     Patient will increase functional independence with mobility by performin. Gait x 400 feet with Eads and O2 sats >90% on supplemental oxygen as needed. - MET  2. Ascend/descend 6 stair with no Handrails and independence-  3. Pt will verbalize exercise/walking plan to continue upon discharge - MET  Outcome: Ongoing (interventions implemented as appropriate)  Pt independent and ready for d/c from inpatient physical therapy.        Former smoker

## 2025-06-26 NOTE — ED PROVIDER NOTES
Encounter Date: 4/27/2017    SCRIBE #1 NOTE: I, Rosamaria Hankins, am scribing for, and in the presence of, Dr. Fitzgerald.       History     Chief Complaint   Patient presents with    Shortness of Breath     Patient reports reports shortness of breath, especially on exertion. Patient reports a hx of COPD. Patient has not been using her oxygen     Review of patient's allergies indicates:   Allergen Reactions    Avelox [moxifloxacin] Itching and Rash     IV     HPI Comments: Time seen by provider: 8:36 PM    This is a 56 y.o. Female with history of COPD, continued to smoker who presents with complaint of gradually worsening shortness of breath over the past few days with a severe episode today with low oxygen saturations. States she received a breathing treatment and steroids that helped during last admission. She was on home oxygen after last admission, but had it picked up because she no longer needed it. She denies fever, chills, productive cough, chest pain, leg swelling, abdominal distension, headache, vision changes, eye drainage, nosebleeds, dental problems, gum bleeding, abdominal pain. No history of diabetes, cardiac disease. She uses an inhaler at home, but no nebulizer.     The history is provided by the patient.     Past Medical History:   Diagnosis Date    COPD (chronic obstructive pulmonary disease)      History reviewed. No pertinent surgical history.  History reviewed. No pertinent family history.  Social History   Substance Use Topics    Smoking status: Heavy Tobacco Smoker     Packs/day: 1.00     Types: Cigarettes    Smokeless tobacco: None    Alcohol use 0.5 oz/week     1 drink(s) per week     Review of Systems   Constitutional: Negative for chills and fever.   HENT: Negative for dental problem, mouth sores, nosebleeds and sore throat.    Eyes: Negative for discharge and visual disturbance.   Respiratory: Positive for shortness of breath. Negative for cough.    Cardiovascular: Negative for  This is a duplicate encounter; see Med Info Forms encounter dated 06/25/25.     chest pain and leg swelling.   Gastrointestinal: Negative for abdominal distention, abdominal pain, nausea and vomiting.   Genitourinary: Negative for dysuria.   Musculoskeletal: Negative for neck pain and neck stiffness.   Skin: Negative for rash.   Neurological: Negative for weakness, light-headedness and headaches.       Physical Exam   Initial Vitals   BP Pulse Resp Temp SpO2   04/27/17 1958 04/27/17 1958 04/27/17 1958 04/27/17 1958 04/27/17 1958   169/88 126 20 98.1 °F (36.7 °C) 80 %     Physical Exam    Nursing note and vitals reviewed.  Constitutional: She is cooperative. She appears ill. She appears distressed (respiratory distress).   Unable to complete sentences   HENT:   Head: Normocephalic and atraumatic.   Mouth/Throat: Oropharynx is clear and moist.   Eyes: Conjunctivae are normal. No scleral icterus.   Neck: Neck supple. No tracheal deviation present. No JVD present.   Cardiovascular: Regular rhythm.   Pulmonary/Chest: Tachypnea noted. She is in respiratory distress. She has decreased breath sounds. She has no wheezes.   Abdominal: Soft. She exhibits no distension.   Musculoskeletal: Normal range of motion. She exhibits no edema.   Neurological: She is alert and oriented to person, place, and time. She has normal strength.   Skin: Skin is warm and dry.         ED Course   Procedures  Labs Reviewed   CBC W/ AUTO DIFFERENTIAL - Abnormal; Notable for the following:        Result Value    Hemoglobin 16.7 (*)     Hematocrit 51.2 (*)     MCV 99 (*)     MCH 32.4 (*)     Gran # 9.0 (*)     Gran% 81.5 (*)     Lymph% 11.4 (*)     All other components within normal limits   COMPREHENSIVE METABOLIC PANEL - Abnormal; Notable for the following:     Glucose 115 (*)     All other components within normal limits   URINALYSIS - Abnormal; Notable for the following:     Appearance, UA Hazy (*)     Leukocytes, UA Trace (*)     All other components within normal limits   URINALYSIS MICROSCOPIC - Abnormal; Notable for the  following:     Bacteria, UA Moderate (*)     All other components within normal limits   BASIC METABOLIC PANEL - Abnormal; Notable for the following:     Glucose 157 (*)     All other components within normal limits   CBC W/ AUTO DIFFERENTIAL - Abnormal; Notable for the following:     MCH 31.6 (*)     Platelets 138 (*)     Lymph # 0.6 (*)     Mono # 0.1 (*)     Gran% 89.5 (*)     Lymph% 8.6 (*)     Mono% 1.7 (*)     All other components within normal limits   PROTIME-INR   MAGNESIUM   PHOSPHORUS             Medical Decision Making:   History:   Old Medical Records: I decided to obtain old medical records.  Initial Assessment:   Pt with history of COPD, continued to smoke, now with 2 day history of increasing SOB.  Differential Diagnosis:   COPD with exacerbation, bronchitis, pneumonia  Clinical Tests:   Lab Tests: Ordered and Reviewed  Radiological Study: Ordered and Reviewed  Medical Tests: Ordered and Reviewed  ED Management:  Will give breathing treatment. Will treat with steroids. May need to be admitted.            Scribe Attestation:   Scribe #1: I performed the above scribed service and the documentation accurately describes the services I performed. I attest to the accuracy of the note.    Attending Attestation:           Physician Attestation for Scribe:  Physician Attestation Statement for Scribe #1: I, Dr. Fitzgerald, reviewed documentation, as scribed by Rosamaria Hankins in my presence, and it is both accurate and complete.         Attending ED Notes:   Patient has improved patient has improved only somewhat with the medications and breathing treatments have been given chest x-ray shows no consolidation or pleural effusion heart size is small.  Despite all our efforts the patient continues to be quite tachypneic and cannot tell complete sentences.  It is felt that this patient needs to be admitted for further treatment and care will be admitted to the internal medicine service          ED Course      Clinical Impression:   The primary encounter diagnosis was COPD with exacerbation. A diagnosis of Glucose intolerance (impaired glucose tolerance) was also pertinent to this visit.    Disposition:   Disposition: Admitted  Condition: Serious       Matthew Fitzgerald MD  05/08/17 1028

## (undated) DEVICE — HEMOSTAT VASC BAND REG 24CM

## (undated) DEVICE — SHEATH INTRODUCER 5F 10CM

## (undated) DEVICE — GUIDE WIRE WHOLLY FLOPPY

## (undated) DEVICE — GUIDEWIRE STD .035X180CM ANG

## (undated) DEVICE — CATH WEDGE 5FRX110CM

## (undated) DEVICE — PROTECTION STATION PLUS

## (undated) DEVICE — CATH IMPULSE 5F 100CM FR4

## (undated) DEVICE — SEE MEDLINE ITEM 156894

## (undated) DEVICE — SHEATH INTRODUCER 6FR 11CM

## (undated) DEVICE — VISE RADIFOCUS MULTI TORQUE

## (undated) DEVICE — SPIKE CONTRAST CONTROLLER

## (undated) DEVICE — KIT GLIDESHEATH SLEND 6FR 10CM

## (undated) DEVICE — LINE 60IN PRESSURE MON.

## (undated) DEVICE — STOPCOCK 3-WAY

## (undated) DEVICE — KIT MICROINTRO 4F .018X40X7CM

## (undated) DEVICE — OMNIPAQUE 350 200ML

## (undated) DEVICE — CATH FL 3.5 5FR

## (undated) DEVICE — CATH WEDGE 6FR X 110CM

## (undated) DEVICE — WIRE GUIDE SAFE-T-J .035 260CM

## (undated) DEVICE — SEE MEDLINE ITEM 157187

## (undated) DEVICE — KIT CUSTOM MANIFOLD